# Patient Record
Sex: FEMALE | Race: WHITE | NOT HISPANIC OR LATINO | Employment: FULL TIME | URBAN - METROPOLITAN AREA
[De-identification: names, ages, dates, MRNs, and addresses within clinical notes are randomized per-mention and may not be internally consistent; named-entity substitution may affect disease eponyms.]

---

## 2017-01-06 ENCOUNTER — TELEPHONE (OUTPATIENT)
Dept: RADIOLOGY | Facility: MEDICAL CENTER | Age: 37
End: 2017-01-06

## 2017-01-09 ENCOUNTER — TELEPHONE (OUTPATIENT)
Dept: RADIOLOGY | Facility: MEDICAL CENTER | Age: 37
End: 2017-01-09

## 2017-01-10 ENCOUNTER — TELEPHONE (OUTPATIENT)
Dept: RADIOLOGY | Facility: MEDICAL CENTER | Age: 37
End: 2017-01-10

## 2017-01-12 ENCOUNTER — OFFICE VISIT (OUTPATIENT)
Dept: NEUROLOGY | Facility: MEDICAL CENTER | Age: 37
End: 2017-01-12
Payer: COMMERCIAL

## 2017-01-12 VITALS
SYSTOLIC BLOOD PRESSURE: 116 MMHG | BODY MASS INDEX: 22.69 KG/M2 | HEART RATE: 81 BPM | HEIGHT: 66 IN | DIASTOLIC BLOOD PRESSURE: 74 MMHG | RESPIRATION RATE: 16 BRPM | TEMPERATURE: 98.4 F | WEIGHT: 141.2 LBS | OXYGEN SATURATION: 98 %

## 2017-01-12 DIAGNOSIS — G43.009 MIGRAINE WITHOUT AURA AND WITHOUT STATUS MIGRAINOSUS, NOT INTRACTABLE: Primary | ICD-10-CM

## 2017-01-12 PROCEDURE — 99214 OFFICE O/P EST MOD 30 MIN: CPT | Performed by: PSYCHIATRY & NEUROLOGY

## 2017-01-12 RX ORDER — TOPIRAMATE 50 MG/1
150 CAPSULE, EXTENDED RELEASE ORAL 2 TIMES DAILY
Qty: 180 EACH | Refills: 1 | Status: SHIPPED | OUTPATIENT
Start: 2017-01-12 | End: 2017-01-12 | Stop reason: SDUPTHER

## 2017-01-12 RX ORDER — TOPIRAMATE 50 MG/1
150 CAPSULE, EXTENDED RELEASE ORAL 2 TIMES DAILY
Qty: 180 EACH | Refills: 1
Start: 2017-01-12 | End: 2017-02-15 | Stop reason: SDUPTHER

## 2017-01-12 ASSESSMENT — ENCOUNTER SYMPTOMS: HEADACHES: 1

## 2017-01-12 NOTE — PROGRESS NOTES
"Subjective:      Dany Lambert is a 36 y.o. female who presents for follow-up with a history of intractable migraine without aura.    HPI    Last seen 6 weeks ago, she received her last series of Botox injections, received good benefit for several weeks but then the headaches spontaneously increased. She denied any real triggers at the time, but since the headaches have increased, everything else has worsened including sleep hygiene, elevated stress, weather changes over the winter months, etc. She had not undergone medication changes prior to the headaches increasing. She now has a headache once or twice every week. Maxalt-MLT does provide benefit consistently, the problem is she has to take it several days every week. Between the headaches she can be headache free.    When last seen, we had increased Topamax by 33%, now 100 mg, twice a day. She cut Keppra dosing by 50% to 250 mg twice a day. She has had no event recurrences suggestive of near syncope/seizure. She has now taken to using gabapentin 300 mg on a near daily basis to help with headaches, at times taken with the Maxalt. She started doing this as the headaches recurred. We reviewed her history at length, she does remember having tried verapamil as well as Elavil and Inderal, all ineffective.    Medical, surgical and family histories are reviewed in the electronic health record, there are no new drug allergies. She is on Topamax 100 mg, twice a day, Maxalt-MLT 10 mg when necessary, gabapentin 300 mg when necessary, Keppra 250 mg, twice a day, Inderal 10 mg when necessary for anxiety, Prilosec, Effexor  mg daily, birth control, the rest as per the electronic health record.    Review of Systems   Constitutional: Positive for malaise/fatigue.   Neurological: Positive for headaches.   All other systems reviewed and are negative.       Objective:     /74 mmHg  Pulse 81  Temp(Src) 36.9 °C (98.4 °F)  Resp 16  Ht 1.676 m (5' 5.98\")  Wt " 64.048 kg (141 lb 3.2 oz)  BMI 22.80 kg/m2  SpO2 98%     Physical Exam    She appears in no acute distress. Vital signs are stable. There is no malar rash. Her neck is supple. Cardiac evaluation reveals a regular rhythm. Performed in quick and cursory fashion, with observation, mental status, cranial nerve, motor, coordination, and sensory evaluations are grossly intact.     Assessment/Plan:     1. Migraine without aura and without status migrainosus, not intractable  The headaches seem to have taken on a life of their own, even a higher dose of Topamax did not seem to provide much benefit. Getting off Keppra has not helped. Gabapentin may not be helping since she is not using it appropriately, but it also may be perpetuating the problem. She is using Maxalt appropriately, she was reassured she is not yet approaching a threshold for rebound headache.    Most of the evaluation today was spent discussing treatment options for her. She was told that we have no published longitudinal data in patients receiving Botox as to whether or not initial responders continued to respond, what is the percentage of patients with failure over time, predictors of failure, etc. In the interim, there are short-term abortive regimens including steroids and Migranal nasal spray, we talked about these, but she is opting out. Going up further on Topamax I think is unlikely to provide benefit, though the drug did provide notable benefit when she first got on it; she may simply be developing tachyphylaxis. In any case, she wants to avoid adding something else, we will increase Topamax to 150 mg, twice a day. She was told that if there is no benefit seen with this higher dose, further increases will be futile, we will simply reduce back to 100 mg twice a day and add something else. To be sure the drug is still helping, she was also told she can stop the drug entirely and see what happens, since she can always reinitiate without loss of  efficacy. Keppra will be discontinued entirely, as will gabapentin. She was reassured that she will begin to notice benefit within a matter of weeks of this change in her regimen. She will also follow-up for her next series of Botox injections 6 weeks from now, we will see if we can push it up by one week or so, if her insurance allows. She will continue the Maxalt rescue.    - Topiramate ER 50 MG Capsule ER 24 Hour Sprinkle; Take 150 mg by mouth 2 Times a Day.  Dispense: 180 Each; Refill: 1    Time: Evaluation of 25 minutes for exam, review, discussion, and education  Discussion: As mentioned in assessment, over 80% of the time spent face-to-face counseling and coordinating care surrounding all of the above.

## 2017-01-12 NOTE — MR AVS SNAPSHOT
"Dany Lambert   2017 10:20 AM   Office Visit   MRN: [id number]    Department:  [facility]   Dept Phone:  [phone]    Description:  Female : 1980   Provider:  Juan Manuel Griffin M.D.           Reason for Visit     Follow-Up Chronic migraine      Allergies as of 2017     Allergen Noted Reactions    Benadryl Allergy 2016   Anxiety    Demerol 2013   Hives    Medrol [Methylprednisolone] 04/10/2015   Itching    Previfem [Norgestimate-Ethinyl Estradiol] 2015       Nausea/vomiting    Reglan [Metoclopramide] 2016   Anxiety      You were diagnosed with     Migraine without aura and without status migrainosus, not intractable   [335003]  -  Primary       Vital Signs     Blood Pressure Pulse Temperature Respirations Height Weight    116/74 mmHg 81 36.9 °C (98.4 °F) 16 1.676 m (5' 5.98\") 64.048 kg (141 lb 3.2 oz)    Body Mass Index Oxygen Saturation Smoking Status             22.80 kg/m2 98% Former Smoker         Basic Information     Date Of Birth Sex Race Ethnicity Preferred Language    1980 Female White Non- English      Your appointments     2017 10:40 AM   MA DX30 with [facility] MG 1   [facility] ([address])    [address] [place] [address] [address]  [facility] [id number] [phone]            Feb 15, 2017  4:40 PM   BOTOX with Juan Manuel Griffin M.D.   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   10/6/2016 - Present      Health Maintenance        Date Due Completion Dates    IMM DTaP/Tdap/Td Vaccine (1 - Tdap) 1997 " ---    PAP SMEAR 9/1/2019 9/1/2016, 9/16/2013 (Done)    Override on 9/16/2013: Done            Current Immunizations     Hepatitis B Vaccine Recombivax (Adol/Adult) 5/4/2016, 4/20/2012    Influenza TIV (IM) 12/11/2014, 11/5/2013    Influenza Vaccine Quad Inj (Pf) 11/17/2016 11:45 AM, 12/11/2014    Influenza Vaccine Quad Inj (Preserved) 11/23/2015  3:10 PM    Tuberculin Skin Test 5/9/2016  7:40 AM, 5/2/2016  8:42 AM, 11/15/2013  2:44 PM, 11/5/2013  6:55 PM      Below and/or attached are the medications your provider expects you to take. Review all of your home medications and newly ordered medications with your provider and/or pharmacist. Follow medication instructions as directed by your provider and/or pharmacist. Please keep your medication list with you and share with your provider. Update the information when medications are discontinued, doses are changed, or new medications (including over-the-counter products) are added; and carry medication information at all times in the event of emergency situations     Allergies:  BENADRYL ALLERGY - Anxiety     DEMEROL - Hives     MEDROL - Itching     PREVIFEM - (reactions not documented)     REGLAN - Anxiety               Medications  Valid as of: January 12, 2017 - 11:42 AM    Generic Name Brand Name Tablet Size Instructions for use    Amoxicillin (Tab) AMOXIL 875 MG Take 1 Tab by mouth 2 times a day.        Cetirizine HCl (Cap) Cetirizine HCl 10 MG Take  by mouth.        Coenzyme Q10   Take 1 Cap by mouth every day.        Levonorgest-Eth Estrad 91-Day (Tab) Levonorgest-Eth Estrad 91-Day 0.15-0.03 &0.01 MG Take  by mouth.        Levonorgest-Eth Estrad 91-Day (Tab) Levonorgest-Eth Estrad 91-Day 0.15-0.03 &0.01 MG Take 1 Tab by mouth every day.        Magnesium   Take 1 Tab by mouth every day.        Omeprazole (CAPSULE DELAYED RELEASE) PRILOSEC 20 MG Take 20 mg by mouth every day.        Propranolol HCl (Tab) INDERAL 10 MG One tab daily as needed for performance anxiety         Rizatriptan Benzoate (TABLET DISPERSIBLE) MAXALT-MLT 10 MG 1 tab at headache onset; repeat every hour as needed up to #4 tab/24 hours        Topiramate (Capsule ER 24 Hour Sprinkle) Topiramate ER 50 MG Take 150 mg by mouth 2 Times a Day.        Venlafaxine HCl (CAPSULE SR 24 HR) EFFEXOR- MG Take 1 Cap by mouth every day.        .                 Medicines prescribed today were sent to:     [facility]ARMACY #[id number] - [place], [place] - [address]    [address] [address]    Phone: [phone] Fax: [phone]    Open 24 Hours?: No    [facility] - [place], [place] - [address]    [address] [facility] [address]    Phone: [phone] Fax: [phone]    Open 24 Hours?: No      Medication refill instructions:       If your prescription bottle indicates you have medication refills left, it is not necessary to call your provider’s office. Please contact your pharmacy and they will refill your medication.    If your prescription bottle indicates you do not have any refills left, you may request refills at any time through one of the following ways: The online [employer] system (except Urgent Care), by calling your provider’s office, or by asking your pharmacy to contact your provider’s office with a refill request. Medication refills are processed only during regular business hours and may not be available until the next business day. Your provider may request additional information or to have a follow-up visit with you prior to refilling your medication.   *Please Note: Medication refills are assigned a new Rx number when refilled electronically. Your pharmacy may indicate that no refills were authorized even though a new prescription for the same medication is available at the pharmacy. Please request the medicine by name with the pharmacy before contacting your provider for a refill.           [employer] Access Code: Activation code not generated  Current [employer] Status: Active

## 2017-01-18 ENCOUNTER — TELEPHONE (OUTPATIENT)
Dept: NEUROLOGY | Facility: MEDICAL CENTER | Age: 37
End: 2017-01-18

## 2017-01-18 NOTE — TELEPHONE ENCOUNTER
Pt is calling and stating that her insurance is stating that she needs to pay 400 dollars for the Topamax  mg BID. She is asking for a Tier reduction to be placed with her insurance. Tier reduction submitted into her insurance. Called and LM for pt that it has been submitted and I will inform her when it has been approved or denied. HERBERT

## 2017-01-18 NOTE — TELEPHONE ENCOUNTER
Insurance company called back and her co-pay for the Topamax  mg. Called and LM for pt stating that she should have a 15 dollar co-pay at the pharmacy for the Topamax ER. KA

## 2017-01-26 ENCOUNTER — HOSPITAL ENCOUNTER (OUTPATIENT)
Dept: RADIOLOGY | Facility: MEDICAL CENTER | Age: 37
End: 2017-01-26
Attending: FAMILY MEDICINE
Payer: COMMERCIAL

## 2017-01-26 DIAGNOSIS — N64.52 NIPPLE DISCHARGE: ICD-10-CM

## 2017-01-26 PROCEDURE — 76642 ULTRASOUND BREAST LIMITED: CPT | Mod: LT

## 2017-01-26 PROCEDURE — G0204 DX MAMMO INCL CAD BI: HCPCS

## 2017-02-15 ENCOUNTER — OFFICE VISIT (OUTPATIENT)
Dept: NEUROLOGY | Facility: MEDICAL CENTER | Age: 37
End: 2017-02-15
Payer: COMMERCIAL

## 2017-02-15 VITALS
DIASTOLIC BLOOD PRESSURE: 82 MMHG | SYSTOLIC BLOOD PRESSURE: 104 MMHG | BODY MASS INDEX: 23.46 KG/M2 | HEART RATE: 96 BPM | RESPIRATION RATE: 16 BRPM | TEMPERATURE: 97.1 F | WEIGHT: 140.8 LBS | HEIGHT: 65 IN | OXYGEN SATURATION: 97 %

## 2017-02-15 DIAGNOSIS — G43.009 MIGRAINE WITHOUT AURA AND WITHOUT STATUS MIGRAINOSUS, NOT INTRACTABLE: Primary | ICD-10-CM

## 2017-02-15 PROCEDURE — 64615 CHEMODENERV MUSC MIGRAINE: CPT | Performed by: PSYCHIATRY & NEUROLOGY

## 2017-02-15 PROCEDURE — 99213 OFFICE O/P EST LOW 20 MIN: CPT | Mod: 25 | Performed by: PSYCHIATRY & NEUROLOGY

## 2017-02-15 RX ORDER — TOPIRAMATE 50 MG/1
150 CAPSULE, EXTENDED RELEASE ORAL 2 TIMES DAILY
Qty: 180 EACH | Refills: 11 | Status: SHIPPED | OUTPATIENT
Start: 2017-02-15 | End: 2017-03-28

## 2017-02-15 ASSESSMENT — PATIENT HEALTH QUESTIONNAIRE - PHQ9: CLINICAL INTERPRETATION OF PHQ2 SCORE: 0

## 2017-02-15 NOTE — MR AVS SNAPSHOT
"        Dany Lambert   2/15/2017 4:40 PM   Office Visit   MRN: [id number]    Department:  [facility]   Dept Phone:  [phone]    Description:  Female : 1980   Provider:  Juan Manuel Griffin M.D.           Reason for Visit     Injections Botox      Allergies as of 2/15/2017     Allergen Noted Reactions    Benadryl Allergy 2016   Anxiety    Demerol 2013   Hives    Medrol [Methylprednisolone] 04/10/2015   Itching    Previfem [Norgestimate-Ethinyl Estradiol] 2015       Nausea/vomiting    Reglan [Metoclopramide] 2016   Anxiety      You were diagnosed with     Migraine without aura and without status migrainosus, not intractable   [[id number]]  -  Primary       Vital Signs     Blood Pressure Pulse Temperature Respirations Height Weight    104/82 mmHg 96 36.2 °C (97.1 °F) 16 1.651 m (5' 5\") 63.866 kg (140 lb 12.8 oz)    Body Mass Index Oxygen Saturation Smoking Status             23.43 kg/m2 97% Former Smoker         Basic Information     Date Of Birth Sex Race Ethnicity Preferred Language    1980 Female White Non- English      Your appointments     May 17, 2017  4:40 PM   BOTOX with Juan Manuel Griffin M.D.   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   10/6/2016 - Present      Health Maintenance        Date Due Completion Dates    IMM DTaP/Tdap/Td Vaccine (1 - Tdap) 1997 ---    PAP SMEAR 2019, 2013 (Done)    Override on 2013: Done            Current Immunizations     Hepatitis B Vaccine Recombivax (Adol/Adult) 2016, 2012   " Influenza TIV (IM) 12/11/2014, 11/5/2013    Influenza Vaccine Quad Inj (Pf) 11/17/2016 11:45 AM, 12/11/2014    Influenza Vaccine Quad Inj (Preserved) 11/23/2015  3:10 PM    Tuberculin Skin Test 5/9/2016  7:40 AM, 5/2/2016  8:42 AM, 11/15/2013  2:44 PM, 11/5/2013  6:55 PM      Below and/or attached are the medications your provider expects you to take. Review all of your home medications and newly ordered medications with your provider and/or pharmacist. Follow medication instructions as directed by your provider and/or pharmacist. Please keep your medication list with you and share with your provider. Update the information when medications are discontinued, doses are changed, or new medications (including over-the-counter products) are added; and carry medication information at all times in the event of emergency situations     Allergies:  BENADRYL ALLERGY - Anxiety     DEMEROL - Hives     MEDROL - Itching     PREVIFEM - (reactions not documented)     REGLAN - Anxiety               Medications  Valid as of: February 15, 2017 -  5:10 PM    Generic Name Brand Name Tablet Size Instructions for use    Amoxicillin (Tab) AMOXIL 875 MG Take 1 Tab by mouth 2 times a day.        Cetirizine HCl (Cap) Cetirizine HCl 10 MG Take  by mouth.        Coenzyme Q10   Take 1 Cap by mouth every day.        Levonorgest-Eth Estrad 91-Day (Tab) Levonorgest-Eth Estrad 91-Day 0.15-0.03 &0.01 MG Take  by mouth.        Levonorgest-Eth Estrad 91-Day (Tab) Levonorgest-Eth Estrad 91-Day 0.15-0.03 &0.01 MG Take 1 Tab by mouth every day.        Magnesium   Take 1 Tab by mouth every day.        Omeprazole (CAPSULE DELAYED RELEASE) PRILOSEC 20 MG Take 20 mg by mouth every day.        Propranolol HCl (Tab) INDERAL 10 MG One tab daily as needed for performance anxiety        Rizatriptan Benzoate (TABLET DISPERSIBLE) MAXALT-MLT 10 MG 1 tab at headache onset; repeat every hour as needed up to #4 tab/24 hours        Topiramate (Capsule ER 24 Hour  Sprinkle) Topiramate ER 50 MG Take 150 mg by mouth 2 Times a Day.        Venlafaxine HCl (CAPSULE SR 24 HR) EFFEXOR- MG Take 1 Cap by mouth every day.        .                 Medicines prescribed today were sent to:     [facility]/PHARMACY #[id number] - [place], [place] - [address]    [address] [place] [address]    Phone: [phone] Fax: [phone]    Open 24 Hours?: No    [facility] - [place], [place] - [address]    [address] [place] [address]    Phone: [phone] Fax: [phone]    Open 24 Hours?: No      Medication refill instructions:       If your prescription bottle indicates you have medication refills left, it is not necessary to call your provider’s office. Please contact your pharmacy and they will refill your medication.    If your prescription bottle indicates you do not have any refills left, you may request refills at any time through one of the following ways: The online [employer] system (except Urgent Care), by calling your provider’s office, or by asking your pharmacy to contact your provider’s office with a refill request. Medication refills are processed only during regular business hours and may not be available until the next business day. Your provider may request additional information or to have a follow-up visit with you prior to refilling your medication.   *Please Note: Medication refills are assigned a new Rx number when refilled electronically. Your pharmacy may indicate that no refills were authorized even though a new prescription for the same medication is available at the pharmacy. Please request the medicine by name with the pharmacy before contacting your provider for a refill.           [employer] Access Code: Activation code not generated  Current [employer] Status: Active

## 2017-02-16 NOTE — PROGRESS NOTES
"Subjective:      Dany Lambert is a 36 y.o. female who presents for follow-up with a history of intractable migraine without aura, for her next series of Botox injections.     HPI    Last seen 6 weeks ago, we increased Topamax from 100 mg twice a day to 150 mg twice a day. The headaches have improved, they have not recurred leading into this next series of Botox injections, as has been in the habit. She continues to show a greater than 7 days/month of headache reduction, but the other day she still is incapacitated for more than 4 hours/day when the headache occurs. She has some unusual reactions with an increase in the Topamax, describing almost a \"night terror\" like effect when she goes to sleep and then awakens. She is clearly awake and alert. These have since attenuated. She denies any other sustained side effects of the higher dose of the Topamax.    Medical, surgical and family histories are reviewed, there are no new drug allergies. She is on Topamax 150 mg, twice a day, Effexor  mg daily, Maxalt-MLT 10 mg when necessary, Prilosec, Inderal as needed, the rest as per the electronic health record.    Review of Systems   All other systems reviewed and are negative.       Objective:     /82 mmHg  Pulse 96  Temp(Src) 36.2 °C (97.1 °F)  Resp 16  Ht 1.651 m (5' 5\")  Wt 63.866 kg (140 lb 12.8 oz)  BMI 23.43 kg/m2  SpO2 97%     Physical Exam    She appears in no acute distress. She is still quite anxious, as always in anticipation of the injections themselves. She is quite cooperative. Vital signs are stable. There is no malar rash or jaw claudication. The neck is supple, range of motion is full, there is no tenderness across the occipital ridge. In quick and cursory fashion, mental status, cranial nerve, motor, coordination, and sensory assessments remain intact.    As per FDA protocol for chronic migraine injection, botulinum toxin injection was used at #31 sites, 5 units/site for a " total of 155 units, over frontalis, , procerus, temporalis, occipitalis, cervical paraspinal and trapezius muscle bodies bilaterally. No uncomfortable, she still tolerates the procedure well. She left the office in good shape. As per FDA protocol, 45 units was discarded.     Assessment/Plan:     1. Migraine without aura and without status migrainosus, not intractable  She will continue Topamax 150 mrem, twice a day, I will follow-up in 3 months for her next series of injections. She's been doing quite well in regards to headache control area    - onabotulinum toxin type A SOLR 200 Units; 200 Units by Injection route Once.    Time: Evaluation of 20 minutes for exam, review, discussion, and education  Discussion: As mentioned in the assessment, over 80% of the time spent face-to-face counseling and coordinating care

## 2017-02-28 ENCOUNTER — TELEPHONE (OUTPATIENT)
Dept: NEUROLOGY | Facility: MEDICAL CENTER | Age: 37
End: 2017-02-28

## 2017-02-28 ENCOUNTER — HOSPITAL ENCOUNTER (EMERGENCY)
Facility: MEDICAL CENTER | Age: 37
End: 2017-02-28
Attending: EMERGENCY MEDICINE
Payer: COMMERCIAL

## 2017-02-28 VITALS
TEMPERATURE: 97.2 F | WEIGHT: 135 LBS | BODY MASS INDEX: 21.69 KG/M2 | HEIGHT: 66 IN | RESPIRATION RATE: 20 BRPM | SYSTOLIC BLOOD PRESSURE: 103 MMHG | OXYGEN SATURATION: 98 % | HEART RATE: 89 BPM | DIASTOLIC BLOOD PRESSURE: 66 MMHG

## 2017-02-28 DIAGNOSIS — G43.911 INTRACTABLE MIGRAINE WITH STATUS MIGRAINOSUS, UNSPECIFIED MIGRAINE TYPE: ICD-10-CM

## 2017-02-28 PROCEDURE — 99284 EMERGENCY DEPT VISIT MOD MDM: CPT

## 2017-02-28 PROCEDURE — 304562 HCHG STAT O2 MASK/CANNULA

## 2017-02-28 PROCEDURE — 96375 TX/PRO/DX INJ NEW DRUG ADDON: CPT

## 2017-02-28 PROCEDURE — A9270 NON-COVERED ITEM OR SERVICE: HCPCS | Performed by: EMERGENCY MEDICINE

## 2017-02-28 PROCEDURE — 96365 THER/PROPH/DIAG IV INF INIT: CPT

## 2017-02-28 PROCEDURE — 700111 HCHG RX REV CODE 636 W/ 250 OVERRIDE (IP): Performed by: EMERGENCY MEDICINE

## 2017-02-28 PROCEDURE — 700105 HCHG RX REV CODE 258: Performed by: EMERGENCY MEDICINE

## 2017-02-28 PROCEDURE — 96376 TX/PRO/DX INJ SAME DRUG ADON: CPT

## 2017-02-28 PROCEDURE — 96361 HYDRATE IV INFUSION ADD-ON: CPT

## 2017-02-28 PROCEDURE — 304561 HCHG STAT O2

## 2017-02-28 PROCEDURE — 700102 HCHG RX REV CODE 250 W/ 637 OVERRIDE(OP): Performed by: EMERGENCY MEDICINE

## 2017-02-28 PROCEDURE — 96366 THER/PROPH/DIAG IV INF ADDON: CPT

## 2017-02-28 PROCEDURE — 96367 TX/PROPH/DG ADDL SEQ IV INF: CPT

## 2017-02-28 RX ORDER — ONDANSETRON 2 MG/ML
4 INJECTION INTRAMUSCULAR; INTRAVENOUS ONCE
Status: COMPLETED | OUTPATIENT
Start: 2017-02-28 | End: 2017-02-28

## 2017-02-28 RX ORDER — BUTALBITAL, ACETAMINOPHEN AND CAFFEINE 50; 325; 40 MG/1; MG/1; MG/1
2 TABLET ORAL ONCE
Status: COMPLETED | OUTPATIENT
Start: 2017-02-28 | End: 2017-02-28

## 2017-02-28 RX ORDER — MAGNESIUM SULFATE HEPTAHYDRATE 40 MG/ML
2 INJECTION, SOLUTION INTRAVENOUS ONCE
Status: COMPLETED | OUTPATIENT
Start: 2017-02-28 | End: 2017-02-28

## 2017-02-28 RX ORDER — DEXAMETHASONE SODIUM PHOSPHATE 4 MG/ML
10 INJECTION, SOLUTION INTRA-ARTICULAR; INTRALESIONAL; INTRAMUSCULAR; INTRAVENOUS; SOFT TISSUE ONCE
Status: COMPLETED | OUTPATIENT
Start: 2017-02-28 | End: 2017-02-28

## 2017-02-28 RX ORDER — SODIUM CHLORIDE 9 MG/ML
1000 INJECTION, SOLUTION INTRAVENOUS ONCE
Status: COMPLETED | OUTPATIENT
Start: 2017-02-28 | End: 2017-02-28

## 2017-02-28 RX ORDER — KETOROLAC TROMETHAMINE 30 MG/ML
30 INJECTION, SOLUTION INTRAMUSCULAR; INTRAVENOUS ONCE
Status: COMPLETED | OUTPATIENT
Start: 2017-02-28 | End: 2017-02-28

## 2017-02-28 RX ORDER — PROMETHAZINE HYDROCHLORIDE 25 MG/1
25 TABLET ORAL ONCE
Status: COMPLETED | OUTPATIENT
Start: 2017-02-28 | End: 2017-02-28

## 2017-02-28 RX ADMIN — SODIUM CHLORIDE 1000 ML: 9 INJECTION, SOLUTION INTRAVENOUS at 04:39

## 2017-02-28 RX ADMIN — HYDROMORPHONE HYDROCHLORIDE 1 MG: 1 INJECTION, SOLUTION INTRAMUSCULAR; INTRAVENOUS; SUBCUTANEOUS at 04:34

## 2017-02-28 RX ADMIN — ONDANSETRON 4 MG: 2 INJECTION, SOLUTION INTRAMUSCULAR; INTRAVENOUS at 04:34

## 2017-02-28 RX ADMIN — DEXAMETHASONE SODIUM PHOSPHATE 10 MG: 4 INJECTION, SOLUTION INTRAMUSCULAR; INTRAVENOUS at 05:01

## 2017-02-28 RX ADMIN — KETOROLAC TROMETHAMINE 30 MG: 30 INJECTION, SOLUTION INTRAMUSCULAR; INTRAVENOUS at 04:35

## 2017-02-28 RX ADMIN — PROMETHAZINE HYDROCHLORIDE 25 MG: 25 TABLET ORAL at 09:27

## 2017-02-28 RX ADMIN — MAGNESIUM SULFATE IN WATER 2 G: 40 INJECTION, SOLUTION INTRAVENOUS at 09:27

## 2017-02-28 RX ADMIN — BUTALBITAL, ACETAMINOPHEN, AND CAFFEINE 2 TABLET: 50; 325; 40 TABLET ORAL at 09:27

## 2017-02-28 RX ADMIN — HYDROMORPHONE HYDROCHLORIDE 1 MG: 1 INJECTION, SOLUTION INTRAMUSCULAR; INTRAVENOUS; SUBCUTANEOUS at 06:03

## 2017-02-28 RX ADMIN — VALPROATE SODIUM 1000 MG: 100 INJECTION, SOLUTION INTRAVENOUS at 08:09

## 2017-02-28 ASSESSMENT — ENCOUNTER SYMPTOMS
DIZZINESS: 1
HEADACHES: 1
BLURRED VISION: 1
ABDOMINAL PAIN: 0
PHOTOPHOBIA: 1
NAUSEA: 1
FEVER: 0

## 2017-02-28 ASSESSMENT — PAIN SCALES - GENERAL
PAINLEVEL_OUTOF10: 8
PAINLEVEL_OUTOF10: 8
PAINLEVEL_OUTOF10: 9

## 2017-02-28 NOTE — ED AVS SNAPSHOT
2/28/2017          Dany Lambert  [address] [place] [address]    Dear Dany:    [facility] wants to ensure your discharge home is safe and you or your loved ones have had all your questions answered regarding your care after you leave the hospital.    You may receive a telephone call within two days of your discharge.  This call is to make certain you understand your discharge instructions as well as ensure we provided you with the best care possible during your stay with us.     The call will only last approximately 3-5 minutes and will be done by a nurse.    Once again, we want to ensure your discharge home is safe and that you have a clear understanding of any next steps in your care.  If you have any questions or concerns, please do not hesitate to contact us, we are here for you.  Thank you for choosing [facility] for your healthcare needs.    Sincerely,    Jose Manuel Castillo    [facility]

## 2017-02-28 NOTE — ED AVS SNAPSHOT
[employer] Access Code: Activation code not generated  Current [employer] Status: Active    [employer]  A secure, online tool to manage your health information     [employer]’s [employer]com® is a secure, online tool that connects you to your personalized health information from the privacy of your home -- day or night - making it very easy for you to manage your healthcare. Once the activation process is completed, you can even access your medical information using the M[employer]ife[employer]com tristin, which is available for free in the Apple Tristin store or Google Play store.     M[employer]com provides the following levels of access (as shown below):   My Chart Features   [facility] Primary Care Doctor [facility]  Specialists [facility]  Urgent  Care Non-[facility]  Primary Care  Doctor   Email your healthcare team securely and privately 24/7 X X X X   Manage appointments: schedule your next appointment; view details of past/upcoming appointments X      Request prescription refills. X      View recent personal medical records, including lab and immunizations X X X X   View health record, including health history, allergies, medications X X X X   Read reports about your outpatient visits, procedures, consult and ER notes X X X X   See your discharge summary, which is a recap of your hospital and/or ER visit that includes your diagnosis, lab results, and care plan. X X       How to register for [employer]com:  1. Go to  [email or web address].  2. Click on the Sign Up Now box, which takes you to the New Member Sign Up page. You will need to provide the following information:  a. Enter your [employer] Access Code exactly as it appears at the top of this page. (You will not need to use this code after you’ve completed the sign-up process. If you do not sign up before the expiration date, you must request a new code.)   b. Enter your date of birth.   c. Enter your home email address.   d. Click Submit, and follow the next screen’s instructions.  3. Create a Mplife.com ID. This will  be your [employer] login ID and cannot be changed, so think of one that is secure and easy to remember.  4. Create a [employer] password. You can change your password at any time.  5. Enter your Password Reset Question and Answer. This can be used at a later time if you forget your password.   6. Enter your e-mail address. This allows you to receive e-mail notifications when new information is available in [employer].  7. Click Sign Up. You can now view your health information.    For assistance activating your [employer] account, call [phone]

## 2017-02-28 NOTE — ED PROVIDER NOTES
"ED Provider Note    Scribed for Dr. Rimma Rodrigues D.O. by Sal Ron. 2/28/2017, 4:02 AM.    Primary care provider: Pcp Pt States None  Means of arrival: Walk-in  History obtained from: Patient  History limited by: None     CHIEF COMPLAINT  Chief Complaint   Patient presents with   • Migraine       HPI  Dany Lambert is a 36 y.o. female who presents to the Emergency Department due to an intermittent severe headache onset 8 days ago and worsened at approximately 8 PM last night. Per patient, she came into work last night and her headache was \"tolerable\" but then gradually became severe. She states that her headache has been right-sided and intermittent since this morning and she has had associated symptoms of dizziness, nausea, and photophobia. When her pain is a 10/10 in severity, her headache is diffuse. Characteristic of her headache today is in keeping with her normal chronic intermittent headaches that she has had 5. Per patient, she started to feel dizzy when her headache worsened and it felt as if \"the floor was dropping out\". She also states that she was \"unable to see straight\". Per patient, she has taken 9 tablets of maxalt, with no relief. The patient states that she is aware of her triggers for migraines but is unable to alleviate her symptoms at home.She states that she has had migraines since she was 3 years old and she sees Dr. Griffin (Neurology). Per nurse's notes, she has had botox injections and takes topiramate at home. Denies fever or abdominal pain.  She is allergic to medrol.     REVIEW OF SYSTEMS  Review of Systems   Constitutional: Negative for fever.   Eyes: Positive for blurred vision (resolved) and photophobia.   Gastrointestinal: Positive for nausea. Negative for abdominal pain.   Neurological: Positive for dizziness and headaches (Right-sided).   All other systems reviewed and are negative.    PAST MEDICAL HISTORY   has a past medical history of Migraine (8/26/2013); " "Anxiety (8/26/2013); and Allergy, unspecified not elsewhere classified.    SURGICAL HISTORY   has past surgical history that includes tonsillectomy.    SOCIAL HISTORY  Social History   Substance Use Topics   • Smoking status: Former Smoker -- 15 years     Types: Cigarettes   • Smokeless tobacco: Never Used   • Alcohol Use: No      Comment: denies      History   Drug Use No       FAMILY HISTORY  Family History   Problem Relation Age of Onset   • Hypertension Father    • Diabetes Maternal Grandfather    • Cancer Paternal Grandfather    • Alzheimer's Disease Paternal Grandfather    • Cancer Paternal Grandmother 30     breast   • Suicide Attempts Maternal Uncle      Killed himself by GSW       CURRENT MEDICATIONS  Home Medications     Reviewed by Wilma Mccracken R.N. (Registered Nurse) on 02/28/17 at 0246  Med List Status: Complete    Medication Last Dose Status    Cetirizine HCl (ZYRTEC ALLERGY) 10 MG Cap  Active    Coenzyme Q10 (CO Q 10 PO)  Active    Levonorgest-Eth Estrad 91-Day (DAYSEE) 0.15-0.03 &0.01 MG Tab  Active    Levonorgest-Eth Estrad 91-Day (SEASONIQUE) 0.15-0.03 &0.01 MG Tab  Active    MAGNESIUM PO  Active    omeprazole (PRILOSEC) 20 MG delayed-release capsule  Active    rizatriptan (MAXALT-MLT) 10 MG disintegrating tablet  Active    Topiramate ER 50 MG Capsule ER 24 Hour Sprinkle  Active    venlafaxine (EFFEXOR XR) 150 MG extended-release capsule  Active                ALLERGIES  Allergies   Allergen Reactions   • Benadryl Allergy Anxiety   • Demerol Hives   • Medrol [Methylprednisolone] Itching   • Previfem [Norgestimate-Ethinyl Estradiol]      Nausea/vomiting   • Reglan [Metoclopramide] Anxiety       PHYSICAL EXAM  VITAL SIGNS: BP 97/65 mmHg  Pulse 89  Temp(Src) 36.2 °C (97.2 °F)  Resp 20  Ht 1.676 m (5' 5.98\")  Wt 61.236 kg (135 lb)  BMI 21.80 kg/m2  Vitals reviewed.  Constitutional: Patient is oriented to person, place, and time. Appears well-developed and well-nourished. Mild distress.  "   Head: Normocephalic and atraumatic.   Ears: Normal external ears bilaterally.   Mouth/Throat: Oropharynx is clear and moist.   Eyes: Conjunctivae are normal. Pupils are equal, round, and reactive to light.   Cardiovascular: Normal rate, regular rhythm and normal heart sounds.   Pulmonary/Chest: Effort normal and breath sounds normal. No respiratory distress, no wheezes, rhonchi, or rales.   Abdominal: Soft. Bowel sounds are normal. There is no tenderness, rebound or guarding, or peritoneal signs.  Musculoskeletal: No edema and no tenderness.   Neurological: No focal deficits.   Skin: Skin is warm and dry. No erythema. No pallor.   Psychiatric: Patient has a normal mood and affect.     COURSE & MEDICAL DECISION MAKING  Pertinent Labs & Imaging studies reviewed. (See chart for details)    Obtained and reviewed past medical records, which showed that the patient was seen here 3 times in 2016 for migraines. She was last seen in July.     4:02 AM - Patient seen and examined at bedside. Patient will be treated with dilaudid 1 mg IV, zofran 4 mg IV, toradol 30 mg IV, and NS infusion 1,000 mL IV. The differential diagnoses include but are not limited to: Exacerbation of chronic migraines and dehydration.    4:51 AM- Patient will be treated with decadron 10 mg IV for her symptoms.     5:55 AM- Patient will be treated with dilaudid 1 mg IV for her symptoms.     6:21 AM Recheck: Patient re-evaluated at beside. Patient reports that she was starting to feel improved but her headache has returned. She is concerned about going home. I explained that I will consult with her neurologist. Patient understands and agrees.     6:23 AM- Paged Dr. Griffin (Neurology).     7:10 AM I discussed the patient's case and the above findings with Dr. Montenegro (Neurology) who recommended ordering magnesium sulfate IVPB premix, a tapering dose of medrol, and depacon, 20 mg/kg IV infusion.    7:17 AM- Patient will be treated with depacon 1,000 mg in  NS 50 mL IV and magnesium sulfate IVPB premix. She has a allergy to oral prednisolone.    8:31 AM- Per nurse, the patient is requesting pain medication.     9:18 AM- Patient will be treated with phenergan 25 mg PO and fioricet -45 mg PO.     11:22 AM patient is reevaluated. She is resting comfortably. She awakens as I enter the room. She takes admitted to think about her symptoms but then reports that they are markedly better. At this time, I feel she is safe for discharge to home. She is advised to follow-up with her neurologist in the next 1-2 days. She is given strict return precautions. Again we talked about the danger in taking this many tablets of Maxalt which she is aware of and will not repeat.    Patient has had high blood pressure while in the emergency department, felt likely secondary to medical condition. Counseled patient to monitor blood pressure at home and follow up with primary care physician.      DISPOSITION:  Patient will be discharged home in stable condition.    FOLLOW UP:  Juan Manuel Griffin M.D.  [address] [place] [id number] [phone]    In 1 day  [facility]    [facility], Emergency Dept  [address] [facility] [address] [phone]    If symptoms worsen      OUTPATIENT MEDICATIONS:  New Prescriptions    No medications on file         FINAL IMPRESSION  1. Intractable migraine with status migrainosus, unspecified migraine type         This dictation has been created using voice recognition software and/or scribes. The accuracy of the dictation is limited by the abilities of the software and the expertise of the scribes. I expect there may be some errors of grammar and possibly content. I made every attempt to manually correct the errors within my dictation. However, errors related to voice recognition software and/or scribes may still exist and should be interpreted within the appropriate context.     Sal SILVEIRA  Asaf (Scribe), am scribing for, and in the presence of, Rimma Rodrigues D.O..    Electronically signed by: Sal Ron (Sabino), 2/28/2017    I, Rimma Rodrigues D.O. personally performed the services described in this documentation, as scribed by Sal Ron in my presence, and it is both accurate and complete.      The note accurately reflects work and decisions made by me.  Rimma Rodrigues  2/28/2017  11:40 AM

## 2017-02-28 NOTE — ED NOTES
Report received from BEAU Choe. Pt is resting, new orders have been place and poc has been discussed.

## 2017-02-28 NOTE — ED AVS SNAPSHOT
Home Care Instructions                                                                                                                Dany Lambert   MRN: [id number]    Department:  [facility], Emergency Dept   Date of Visit:  2/28/2017            [facility], Emergency Dept    [address] [facility] [address]    Phone:  [phone]      You were seen by     Rimma Rodrigues D.O.      Your Diagnosis Was     Intractable migraine with status migrainosus, unspecified migraine type     G43.911       These are the medications you received during your hospitalization from 02/28/2017 0225 to 02/28/2017 1109     Date/Time Order Dose Route Action    02/28/2017 0439 NS infusion 1,000 mL 1,000 mL Intravenous New Bag    02/28/2017 0435 ketorolac (TORADOL) injection 30 mg 30 mg Intravenous Given    02/28/2017 0434 ondansetron (ZOFRAN) syringe/vial injection 4 mg 4 mg Intravenous Given    02/28/2017 0434 HYDROmorphone (DILAUDID) injection 1 mg 1 mg Intravenous Given    02/28/2017 0501 dexamethasone (DECADRON) injection 10 mg 10 mg Intravenous Given    02/28/2017 0603 HYDROmorphone (DILAUDID) injection 1 mg 1 mg Intravenous Given    02/28/2017 0927 magnesium sulfate IVPB premix 2 g 2 g Intravenous New Bag    02/28/2017 0809 valproate (DEPACON) 1,000 mg in NS 50 mL IVPB 1,000 mg Intravenous New Bag    02/28/2017 0927 acetaminophen/caffeine/butalbital 325-40-50 mg (FIORICET) -40 MG per tablet 2 Tab 2 Tab Oral Given    02/28/2017 0927 promethazine (PHENERGAN) tablet 25 mg 25 mg Oral Given      Follow-up Information     1. Follow up with Juan Manuel Griffin M.D. In 1 day.    Specialty:  Neurology    Why:  call tomorrow to leyla follow-up    Contact information    [address]  [facility] [address] [phone]          2. Follow up with [facility], Emergency Dept.    Specialty:  Emergency Medicine    Why:  If symptoms worsen    Contact information     [address] [facility] [address]  [phone]      Medication Information     Review all of your home medications and newly ordered medications with your primary doctor and/or pharmacist as soon as possible. Follow medication instructions as directed by your doctor and/or pharmacist.     Please keep your complete medication list with you and share with your physician. Update the information when medications are discontinued, doses are changed, or new medications (including over-the-counter products) are added; and carry medication information at all times in the event of emergency situations.               Medication List      ASK your doctor about these medications        Instructions    CO Q 10 PO    Take 1 Cap by mouth every day.   Dose:  1 Cap       * DAYSEE 0.15-0.03 &0.01 MG Tabs   Generic drug:  Levonorgest-Eth Estrad 91-Day    Take  by mouth.       * Levonorgest-Eth Estrad 91-Day 0.15-0.03 &0.01 MG Tabs   Commonly known as:  SEASONIQUE    Take 1 Tab by mouth every day.   Dose:  1 Tab       MAGNESIUM PO    Take 1 Tab by mouth every day.   Dose:  1 Tab       omeprazole 20 MG delayed-release capsule   Commonly known as:  PRILOSEC    Take 20 mg by mouth every day.   Dose:  20 mg       rizatriptan 10 MG disintegrating tablet   Commonly known as:  MAXALT-MLT    1 tab at headache onset; repeat every hour as needed up to #4 tab/24 hours       Topiramate ER 50 MG Cs24    Take 150 mg by mouth 2 Times a Day.   Dose:  150 mg       venlafaxine 150 MG extended-release capsule   Commonly known as:  EFFEXOR XR    Take 1 Cap by mouth every day.   Dose:  150 mg       ZYRTEC ALLERGY 10 MG Caps   Generic drug:  Cetirizine HCl    Take  by mouth.       * Notice:  This list has 2 medication(s) that are the same as other medications prescribed for you. Read the directions carefully, and ask your doctor or other care provider to review them with you.            Procedures and tests performed during your visit     IV  Saline Lock        Discharge Instructions       Migraine Headache  A migraine headache is very bad, throbbing pain on one or both sides of your head. Talk to your doctor about what things may bring on (trigger) your migraine headaches.  HOME CARE  · Only take medicines as told by your doctor.  · Lie down in a dark, quiet room when you have a migraine.  · Keep a journal to find out if certain things bring on migraine headaches. For example, write down:  ¨ What you eat and drink.  ¨ How much sleep you get.  ¨ Any change to your diet or medicines.  · Lessen how much alcohol you drink.  · Quit smoking if you smoke.  · Get enough sleep.  · Lessen any stress in your life.  · Keep lights dim if bright lights bother you or make your migraines worse.  GET HELP RIGHT AWAY IF:   · Your migraine becomes really bad.  · You have a fever.  · You have a stiff neck.  · You have trouble seeing.  · Your muscles are weak, or you lose muscle control.  · You lose your balance or have trouble walking.  · You feel like you will pass out (faint), or you pass out.  · You have really bad symptoms that are different than your first symptoms.  MAKE SURE YOU:   · Understand these instructions.  · Will watch your condition.  · Will get help right away if you are not doing well or get worse.     This information is not intended to replace advice given to you by your health care provider. Make sure you discuss any questions you have with your health care provider.     Document Released: 09/26/2009 Document Revised: 03/11/2013 Document Reviewed: 08/25/2014  Elsevier Interactive Patient Education ©2016 Elsevier Inc[employer]            Patient Information     Patient Information    Following emergency treatment: all patient requiring follow-up care must return either to a private physician or a clinic if your condition worsens before you are able to obtain further medical attention, please return to the emergency room.     Billing Information    At [facility], we work to make the billing process streamlined for our patients.  Our Representatives are here to answer any questions you may have regarding your hospital bill.  If you have insurance coverage and have supplied your insurance information to us, we will submit a claim to your insurer on your behalf.  Should you have any questions regarding your bill, we can be reached online or by phone as follows:  Online: You are able pay your bills online or live chat with our representatives about any billing questions you may have. We are here to help Monday - Friday from 8:00am to 7:30pm and 9:00am - 12:00pm on Saturdays.  Please visit [email or web address]  for more information.   Phone:  [phone] or [phone]    Please note that your emergency physician, surgeon, pathologist, radiologist, anesthesiologist, and other specialists are not employed by [facility] and will therefore bill separately for their services.  Please contact them directly for any questions concerning their bills at the numbers below:     Emergency Physician Services:  [phone]  [facility]:  [phone]  [facility]:  [phone]  [facility]:  [phone]    1. Your final bill may vary from the amount quoted upon discharge if all procedures are not complete at that time, or if your doctor has additional procedures of which we are not aware. You will receive an additional bill if you return to the Emergency Department at [facility] for suture removal regardless of the facility of which the sutures were placed.     2. Please arrange for settlement of this account at the emergency registration.    3. All self-pay accounts are due in full at the time of treatment.  If you are unable to meet this obligation then payment is expected within 4-5 days.     4. If you have had radiology studies (CT, X-ray, Ultrasound, MRI), you have received a preliminary result  during your emergency department visit. Please contact the radiology department [phone] to receive a copy of your final result. Please discuss the Final result with your primary physician or with the follow up physician provided.     Crisis Hotline:  [place] Crisis Hotline:  3-740-IFIICLS or 1-338.818.5119  [place] Crisis Hotline:    1-485.817.8597 or 684-385-4247         ED Discharge Follow Up Questions    1. In order to provide you with very good care, we would like to follow up with a phone call in the next few days.  May we have your permission to contact you?     YES /  NO    2. What is the best phone number to call you? (       )_____-__________    3. What is the best time to call you?      Morning  /  Afternoon  /  Evening                   Patient Signature:  ____________________________________________________________    Date:  ____________________________________________________________      Your appointments     May 17, 2017  4:40 PM   BOTOX with Juan Manuel Griffin M.D.   [facility] (--)    [address], [address]  [facility] [id number] [phone]

## 2017-02-28 NOTE — ED NOTES
This patient present with migraine HA. Report present for 8 days, but much worse this evening at approx 2000. At this time dizziness, nausea, photosensitivity. Hx of migraine. Has had botox injections. Taking toprimate at home.

## 2017-02-28 NOTE — ED NOTES
Pt discharged to home. Pt was given follow up instructions. Pt verbalized understanding of all instructions for discharge and is ambulatory out of ED with steady gait father driving home.

## 2017-02-28 NOTE — TELEPHONE ENCOUNTER
Is calling and stating that she was just released from the ER due to a really bad migraine. Per the ER notes pt need to follow up with Dr. Griffin in 1 to 3 days. Called and LM for pt that I will contact her back about an appointment as soon as there is an opening. Pt is to return my call. HERBERT

## 2017-02-28 NOTE — ED NOTES
Report and care of pt received. Pt resting on gurney, lights in room dimmed, new medication orders received.

## 2017-03-04 ENCOUNTER — HOSPITAL ENCOUNTER (OUTPATIENT)
Facility: MEDICAL CENTER | Age: 37
End: 2017-03-06
Attending: EMERGENCY MEDICINE | Admitting: INTERNAL MEDICINE
Payer: COMMERCIAL

## 2017-03-04 ENCOUNTER — RESOLUTE PROFESSIONAL BILLING HOSPITAL PROF FEE (OUTPATIENT)
Dept: HOSPITALIST | Facility: MEDICAL CENTER | Age: 37
End: 2017-03-04
Payer: COMMERCIAL

## 2017-03-04 DIAGNOSIS — G43.811 OTHER MIGRAINE WITH STATUS MIGRAINOSUS, INTRACTABLE: ICD-10-CM

## 2017-03-04 PROBLEM — K58.9 IRRITABLE BOWEL DISEASE: Status: ACTIVE | Noted: 2017-03-04

## 2017-03-04 PROBLEM — G43.909 MIGRAINE HEADACHE: Status: ACTIVE | Noted: 2017-03-04

## 2017-03-04 LAB
ALBUMIN SERPL BCP-MCNC: 3.9 G/DL (ref 3.2–4.9)
ALBUMIN/GLOB SERPL: 1.3 G/DL
ALP SERPL-CCNC: 57 U/L (ref 30–99)
ALT SERPL-CCNC: 12 U/L (ref 2–50)
ANION GAP SERPL CALC-SCNC: 8 MMOL/L (ref 0–11.9)
AST SERPL-CCNC: 16 U/L (ref 12–45)
BASOPHILS # BLD AUTO: 0.3 % (ref 0–1.8)
BASOPHILS # BLD: 0.03 K/UL (ref 0–0.12)
BILIRUB SERPL-MCNC: 0.3 MG/DL (ref 0.1–1.5)
BUN SERPL-MCNC: 17 MG/DL (ref 8–22)
CALCIUM SERPL-MCNC: 9.1 MG/DL (ref 8.5–10.5)
CHLORIDE SERPL-SCNC: 110 MMOL/L (ref 96–112)
CO2 SERPL-SCNC: 18 MMOL/L (ref 20–33)
CREAT SERPL-MCNC: 0.86 MG/DL (ref 0.5–1.4)
EOSINOPHIL # BLD AUTO: 0.1 K/UL (ref 0–0.51)
EOSINOPHIL NFR BLD: 1.2 % (ref 0–6.9)
ERYTHROCYTE [DISTWIDTH] IN BLOOD BY AUTOMATED COUNT: 46.4 FL (ref 35.9–50)
GFR SERPL CREATININE-BSD FRML MDRD: >60 ML/MIN/1.73 M 2
GLOBULIN SER CALC-MCNC: 3 G/DL (ref 1.9–3.5)
GLUCOSE SERPL-MCNC: 89 MG/DL (ref 65–99)
HCG SERPL QL: NEGATIVE
HCT VFR BLD AUTO: 38.8 % (ref 37–47)
HGB BLD-MCNC: 12.7 G/DL (ref 12–16)
IMM GRANULOCYTES # BLD AUTO: 0.02 K/UL (ref 0–0.11)
IMM GRANULOCYTES NFR BLD AUTO: 0.2 % (ref 0–0.9)
LYMPHOCYTES # BLD AUTO: 2.7 K/UL (ref 1–4.8)
LYMPHOCYTES NFR BLD: 31.2 % (ref 22–41)
MCH RBC QN AUTO: 30.6 PG (ref 27–33)
MCHC RBC AUTO-ENTMCNC: 32.7 G/DL (ref 33.6–35)
MCV RBC AUTO: 93.5 FL (ref 81.4–97.8)
MONOCYTES # BLD AUTO: 0.54 K/UL (ref 0–0.85)
MONOCYTES NFR BLD AUTO: 6.2 % (ref 0–13.4)
NEUTROPHILS # BLD AUTO: 5.27 K/UL (ref 2–7.15)
NEUTROPHILS NFR BLD: 60.9 % (ref 44–72)
NRBC # BLD AUTO: 0 K/UL
NRBC BLD AUTO-RTO: 0 /100 WBC
PLATELET # BLD AUTO: 278 K/UL (ref 164–446)
PMV BLD AUTO: 10.9 FL (ref 9–12.9)
POTASSIUM SERPL-SCNC: 3.6 MMOL/L (ref 3.6–5.5)
PROT SERPL-MCNC: 6.9 G/DL (ref 6–8.2)
RBC # BLD AUTO: 4.15 M/UL (ref 4.2–5.4)
SODIUM SERPL-SCNC: 136 MMOL/L (ref 135–145)
TSH SERPL DL<=0.005 MIU/L-ACNC: 1.45 UIU/ML (ref 0.3–3.7)
WBC # BLD AUTO: 8.7 K/UL (ref 4.8–10.8)

## 2017-03-04 PROCEDURE — 84703 CHORIONIC GONADOTROPIN ASSAY: CPT

## 2017-03-04 PROCEDURE — 99285 EMERGENCY DEPT VISIT HI MDM: CPT

## 2017-03-04 PROCEDURE — 96361 HYDRATE IV INFUSION ADD-ON: CPT

## 2017-03-04 PROCEDURE — 83516 IMMUNOASSAY NONANTIBODY: CPT

## 2017-03-04 PROCEDURE — 96376 TX/PRO/DX INJ SAME DRUG ADON: CPT

## 2017-03-04 PROCEDURE — G0378 HOSPITAL OBSERVATION PER HR: HCPCS

## 2017-03-04 PROCEDURE — 93005 ELECTROCARDIOGRAM TRACING: CPT

## 2017-03-04 PROCEDURE — 700111 HCHG RX REV CODE 636 W/ 250 OVERRIDE (IP): Performed by: HOSPITALIST

## 2017-03-04 PROCEDURE — 700105 HCHG RX REV CODE 258: Performed by: HOSPITALIST

## 2017-03-04 PROCEDURE — 700101 HCHG RX REV CODE 250: Performed by: HOSPITALIST

## 2017-03-04 PROCEDURE — A9270 NON-COVERED ITEM OR SERVICE: HCPCS | Performed by: HOSPITALIST

## 2017-03-04 PROCEDURE — 700105 HCHG RX REV CODE 258: Performed by: EMERGENCY MEDICINE

## 2017-03-04 PROCEDURE — 96365 THER/PROPH/DIAG IV INF INIT: CPT

## 2017-03-04 PROCEDURE — 99220 PR INITIAL OBSERVATION CARE,LEVL III: CPT | Mod: GC | Performed by: INTERNAL MEDICINE

## 2017-03-04 PROCEDURE — 93010 ELECTROCARDIOGRAM REPORT: CPT | Performed by: INTERNAL MEDICINE

## 2017-03-04 PROCEDURE — 80053 COMPREHEN METABOLIC PANEL: CPT

## 2017-03-04 PROCEDURE — 700102 HCHG RX REV CODE 250 W/ 637 OVERRIDE(OP): Performed by: HOSPITALIST

## 2017-03-04 PROCEDURE — 96372 THER/PROPH/DIAG INJ SC/IM: CPT

## 2017-03-04 PROCEDURE — A9270 NON-COVERED ITEM OR SERVICE: HCPCS | Performed by: PSYCHIATRY & NEUROLOGY

## 2017-03-04 PROCEDURE — 700111 HCHG RX REV CODE 636 W/ 250 OVERRIDE (IP): Performed by: EMERGENCY MEDICINE

## 2017-03-04 PROCEDURE — 96367 TX/PROPH/DG ADDL SEQ IV INF: CPT

## 2017-03-04 PROCEDURE — 84443 ASSAY THYROID STIM HORMONE: CPT

## 2017-03-04 PROCEDURE — 700102 HCHG RX REV CODE 250 W/ 637 OVERRIDE(OP): Performed by: PSYCHIATRY & NEUROLOGY

## 2017-03-04 PROCEDURE — 96368 THER/DIAG CONCURRENT INF: CPT

## 2017-03-04 PROCEDURE — 93005 ELECTROCARDIOGRAM TRACING: CPT | Performed by: HOSPITALIST

## 2017-03-04 PROCEDURE — 85025 COMPLETE CBC W/AUTO DIFF WBC: CPT

## 2017-03-04 PROCEDURE — 96375 TX/PRO/DX INJ NEW DRUG ADDON: CPT

## 2017-03-04 PROCEDURE — 700111 HCHG RX REV CODE 636 W/ 250 OVERRIDE (IP): Performed by: PSYCHIATRY & NEUROLOGY

## 2017-03-04 RX ORDER — DIHYDROERGOTAMINE MESYLATE 1 MG/ML
1 INJECTION, SOLUTION INTRAMUSCULAR; INTRAVENOUS; SUBCUTANEOUS EVERY 8 HOURS
Status: DISCONTINUED | OUTPATIENT
Start: 2017-03-04 | End: 2017-03-04

## 2017-03-04 RX ORDER — SODIUM CHLORIDE 9 MG/ML
1000 INJECTION, SOLUTION INTRAVENOUS ONCE
Status: COMPLETED | OUTPATIENT
Start: 2017-03-04 | End: 2017-03-04

## 2017-03-04 RX ORDER — ONDANSETRON 2 MG/ML
4 INJECTION INTRAMUSCULAR; INTRAVENOUS ONCE
Status: COMPLETED | OUTPATIENT
Start: 2017-03-04 | End: 2017-03-04

## 2017-03-04 RX ORDER — DEXAMETHASONE SODIUM PHOSPHATE 4 MG/ML
10 INJECTION, SOLUTION INTRA-ARTICULAR; INTRALESIONAL; INTRAMUSCULAR; INTRAVENOUS; SOFT TISSUE EVERY 8 HOURS
Status: DISCONTINUED | OUTPATIENT
Start: 2017-03-04 | End: 2017-03-06 | Stop reason: HOSPADM

## 2017-03-04 RX ORDER — DEXAMETHASONE SODIUM PHOSPHATE 4 MG/ML
10 INJECTION, SOLUTION INTRA-ARTICULAR; INTRALESIONAL; INTRAMUSCULAR; INTRAVENOUS; SOFT TISSUE ONCE
Status: COMPLETED | OUTPATIENT
Start: 2017-03-04 | End: 2017-03-04

## 2017-03-04 RX ORDER — BISACODYL 10 MG
10 SUPPOSITORY, RECTAL RECTAL
Status: DISCONTINUED | OUTPATIENT
Start: 2017-03-04 | End: 2017-03-06 | Stop reason: HOSPADM

## 2017-03-04 RX ORDER — POLYETHYLENE GLYCOL 3350 17 G/17G
1 POWDER, FOR SOLUTION ORAL
Status: DISCONTINUED | OUTPATIENT
Start: 2017-03-04 | End: 2017-03-06 | Stop reason: HOSPADM

## 2017-03-04 RX ORDER — AMOXICILLIN 250 MG
2 CAPSULE ORAL 2 TIMES DAILY
Status: DISCONTINUED | OUTPATIENT
Start: 2017-03-04 | End: 2017-03-06 | Stop reason: HOSPADM

## 2017-03-04 RX ORDER — OMEPRAZOLE 20 MG/1
20 CAPSULE, DELAYED RELEASE ORAL DAILY
Status: DISCONTINUED | OUTPATIENT
Start: 2017-03-04 | End: 2017-03-06 | Stop reason: HOSPADM

## 2017-03-04 RX ORDER — DIHYDROERGOTAMINE MESYLATE 1 MG/ML
1 INJECTION, SOLUTION INTRAMUSCULAR; INTRAVENOUS; SUBCUTANEOUS EVERY 8 HOURS
Status: DISCONTINUED | OUTPATIENT
Start: 2017-03-05 | End: 2017-03-06 | Stop reason: HOSPADM

## 2017-03-04 RX ORDER — CHLORPROMAZINE HYDROCHLORIDE 25 MG/1
25 TABLET, FILM COATED ORAL EVERY 8 HOURS
Status: DISCONTINUED | OUTPATIENT
Start: 2017-03-04 | End: 2017-03-04

## 2017-03-04 RX ORDER — SODIUM CHLORIDE 9 MG/ML
1000 INJECTION, SOLUTION INTRAVENOUS ONCE
Status: COMPLETED | OUTPATIENT
Start: 2017-03-04 | End: 2017-03-05

## 2017-03-04 RX ORDER — CHLORPROMAZINE HYDROCHLORIDE 25 MG/1
25 TABLET, FILM COATED ORAL ONCE
Status: COMPLETED | OUTPATIENT
Start: 2017-03-04 | End: 2017-03-04

## 2017-03-04 RX ORDER — VENLAFAXINE HYDROCHLORIDE 75 MG/1
150 CAPSULE, EXTENDED RELEASE ORAL DAILY
Status: DISCONTINUED | OUTPATIENT
Start: 2017-03-04 | End: 2017-03-06 | Stop reason: HOSPADM

## 2017-03-04 RX ORDER — TOPIRAMATE 100 MG/1
150 TABLET, FILM COATED ORAL 2 TIMES DAILY
Status: DISCONTINUED | OUTPATIENT
Start: 2017-03-04 | End: 2017-03-06 | Stop reason: HOSPADM

## 2017-03-04 RX ORDER — TIZANIDINE 4 MG/1
4 TABLET ORAL EVERY EVENING
Status: DISCONTINUED | OUTPATIENT
Start: 2017-03-04 | End: 2017-03-06 | Stop reason: HOSPADM

## 2017-03-04 RX ADMIN — ENOXAPARIN SODIUM 40 MG: 100 INJECTION SUBCUTANEOUS at 20:49

## 2017-03-04 RX ADMIN — ONDANSETRON 4 MG: 2 INJECTION, SOLUTION INTRAMUSCULAR; INTRAVENOUS at 09:20

## 2017-03-04 RX ADMIN — DEXAMETHASONE SODIUM PHOSPHATE 10 MG: 4 INJECTION, SOLUTION INTRAMUSCULAR; INTRAVENOUS at 16:21

## 2017-03-04 RX ADMIN — MAGNESIUM SULFATE HEPTAHYDRATE 2 G: 500 INJECTION, SOLUTION INTRAMUSCULAR; INTRAVENOUS at 10:15

## 2017-03-04 RX ADMIN — VALPROATE SODIUM 1000 MG: 100 INJECTION, SOLUTION INTRAVENOUS at 09:56

## 2017-03-04 RX ADMIN — CHLORPROMAZINE HYDROCHLORIDE 25 MG: 25 INJECTION INTRAMUSCULAR at 15:04

## 2017-03-04 RX ADMIN — OMEPRAZOLE 20 MG: 20 CAPSULE, DELAYED RELEASE ORAL at 16:21

## 2017-03-04 RX ADMIN — CHLORPROMAZINE HYDROCHLORIDE 25 MG: 25 TABLET, SUGAR COATED ORAL at 23:45

## 2017-03-04 RX ADMIN — STANDARDIZED SENNA CONCENTRATE AND DOCUSATE SODIUM 2 TABLET: 8.6; 5 TABLET, FILM COATED ORAL at 20:48

## 2017-03-04 RX ADMIN — SODIUM CHLORIDE 1000 ML: 9 INJECTION, SOLUTION INTRAVENOUS at 15:49

## 2017-03-04 RX ADMIN — TOPIRAMATE 150 MG: 100 TABLET ORAL at 20:48

## 2017-03-04 RX ADMIN — DEXAMETHASONE SODIUM PHOSPHATE 10 MG: 4 INJECTION, SOLUTION INTRAMUSCULAR; INTRAVENOUS at 22:55

## 2017-03-04 RX ADMIN — SODIUM CHLORIDE 1000 ML: 9 INJECTION, SOLUTION INTRAVENOUS at 09:18

## 2017-03-04 RX ADMIN — HYDROMORPHONE HYDROCHLORIDE 1 MG: 1 INJECTION, SOLUTION INTRAMUSCULAR; INTRAVENOUS; SUBCUTANEOUS at 09:20

## 2017-03-04 RX ADMIN — HYDROMORPHONE HYDROCHLORIDE 1 MG: 1 INJECTION, SOLUTION INTRAMUSCULAR; INTRAVENOUS; SUBCUTANEOUS at 10:36

## 2017-03-04 RX ADMIN — DEXAMETHASONE SODIUM PHOSPHATE 10 MG: 4 INJECTION, SOLUTION INTRAMUSCULAR; INTRAVENOUS at 09:18

## 2017-03-04 RX ADMIN — TIZANIDINE 4 MG: 4 TABLET ORAL at 20:48

## 2017-03-04 RX ADMIN — VENLAFAXINE HYDROCHLORIDE 150 MG: 75 CAPSULE, EXTENDED RELEASE ORAL at 17:00

## 2017-03-04 ASSESSMENT — LIFESTYLE VARIABLES
EVER_SMOKED: NEVER
ALCOHOL_USE: NO
DO YOU DRINK ALCOHOL: NO
SUBSTANCE_ABUSE: 0

## 2017-03-04 ASSESSMENT — PAIN SCALES - GENERAL
PAINLEVEL_OUTOF10: 7
PAINLEVEL_OUTOF10: 8
PAINLEVEL_OUTOF10: 7
PAINLEVEL_OUTOF10: 6
PAINLEVEL_OUTOF10: 7
PAINLEVEL_OUTOF10: 7
PAINLEVEL_OUTOF10: 4

## 2017-03-04 ASSESSMENT — ENCOUNTER SYMPTOMS
HEADACHES: 1
ABDOMINAL PAIN: 0
HEARTBURN: 0
INSOMNIA: 0
SORE THROAT: 0
WEIGHT LOSS: 0
DIAPHORESIS: 0
MYALGIAS: 0
BLOOD IN STOOL: 0
SHORTNESS OF BREATH: 0
EYE REDNESS: 0
DOUBLE VISION: 0
HEMOPTYSIS: 0
CHILLS: 0
DIZZINESS: 0
BLURRED VISION: 0
LOSS OF CONSCIOUSNESS: 0
SPUTUM PRODUCTION: 0
DIARRHEA: 0
WEAKNESS: 0
TREMORS: 0
COUGH: 0
SPEECH CHANGE: 0
CLAUDICATION: 0
NECK PAIN: 0
NAUSEA: 1
ORTHOPNEA: 0
PHOTOPHOBIA: 1
EYE DISCHARGE: 0
VOMITING: 0
FEVER: 0
FOCAL WEAKNESS: 0
SEIZURES: 0
CONSTIPATION: 1
NERVOUS/ANXIOUS: 0
FALLS: 0
TINGLING: 0
BACK PAIN: 0
PALPITATIONS: 1

## 2017-03-04 NOTE — SENIOR ADMIT NOTE
HPI:  Ms Lambert is a 36 yr female, with pmhx significant for migraine headaches, and anxiety presents to ER with symptoms of intractable migraine. Patient states that she has been suffering from migraine headaches all her life. Used to have 15 episodes per month, but since she has been on botox for the last one year the number of episodes have come down. However, botox seems not to be working after first 6-7 sessions. She had her last botox 2 weeks ago and since the following day she developed headache, 10/10, continuous, sharp, behind her eyes and back of the head with associated nausea. She states that the nature of the headache has not changed and the same as prior episodes. She takes topomax and imitrex. She doesn't used any other medications as they do not work for her. She also reports palpitations, off and on. She denies any other symptoms at this time.    Physical exam:  Gen: She is crawled up in bed, due to pain.  Head: AT/NC  CVS: RRR, No M/G/R  RS: CTA b/l  Abd: BS +, soft, NT, ND  Neuro: No focal deficits    Assessment and plan:    1. Status Migrainosus  - Has been given Mg, dilaudid and ivf in ER w/ minimal help. Will admit patient to neuro floor w/ frequent neuro checks. Will try thorazine 25mg iv, dexamethasone iv 10mg and dihydroergotamine 1mg iv. Patient states that NSAID's do not help her. Will repeat the above medications if headaches cannot be aborted. Will get Per ERP, neurology has been consulted. Will hold of on imaging for now since the nature of headache has not changed and has no deficits on exam.    2. Anxiety. Continue home medication.    Refer to intern's H&P for complete details.

## 2017-03-04 NOTE — ED NOTES
PT ambulated to yellow 65 c/o migraine x 13 days.  Per pt she took a maxalt at 0600 without relief  Chief Complaint   Patient presents with   • Migraine     Blood pressure 111/67, pulse 81, temperature 35.8 °C (96.4 °F), resp. rate 16, weight 61.236 kg (135 lb).

## 2017-03-04 NOTE — IP AVS SNAPSHOT
Home Care Instructions                                                                                                                  Name:Dany Lambert  Medical Record Number:[id number]  CSN: [id number]    YOB: 1980   Age: 36 y.o.  Sex: female  HT:  WT: 61.6 kg (135 lb 12.9 oz)          Admit Date: 3/4/2017     Discharge Date:   Today's Date: 3/6/2017  Attending Doctor:  SUNDAR Laguerre*                  Allergies:  Benadryl allergy; Demerol; Medrol; Previfem; and Reglan            Discharge Instructions       Discharge Instructions    Discharged to home by car with relative. Discharged via wheelchair, hospital escort: Yes.  Special equipment needed: Not Applicable    Be sure to schedule a follow-up appointment with your primary care doctor or any specialists as instructed.     Discharge Plan:   Diet Plan: Discussed  Activity Level: Discussed  Confirmed Follow up Appointment: Patient to Call and Schedule Appointment  Confirmed Symptoms Management: Discussed  Medication Reconciliation Updated: Yes  Influenza Vaccine Indication: Not indicated: Previously immunized this influenza season and > 8 years of age    I understand that a diet low in cholesterol, fat, and sodium is recommended for good health. Unless I have been given specific instructions below for another diet, I accept this instruction as my diet prescription.   Other diet: Heart Healthy     Special Instructions: None    · Is patient discharged on Warfarin / Coumadin?   No     · Is patient Post Blood Transfusion?  No    Depression / Suicide Risk    As you are discharged from this [facility] facility, it is important to learn how to keep safe from harming yourself.    Recognize the warning signs:  · Abrupt changes in personality, positive or negative- including increase in energy   · Giving away possessions  · Change in eating patterns- significant weight changes-  positive or negative  · Change in sleeping patterns- unable to  sleep or sleeping all the time   · Unwillingness or inability to communicate  · Depression  · Unusual sadness, discouragement and loneliness  · Talk of wanting to die  · Neglect of personal appearance   · Rebelliousness- reckless behavior  · Withdrawal from people/activities they love  · Confusion- inability to concentrate     If you or a loved one observes any of these behaviors or has concerns about self-harm, here's what you can do:  · Talk about it- your feelings and reasons for harming yourself  · Remove any means that you might use to hurt yourself (examples: pills, rope, extension cords, firearm)  · Get professional help from the community (Mental Health, Substance Abuse, psychological counseling)  · Do not be alone:Call your Safe Contact- someone whom you trust who will be there for you.  · Call your local CRISIS HOTLINE 947-6535 or 417-501-9382  · Call your local Children's Mobile Crisis Response Team [place] (389) 484-8323 or www.Cle[employer] Machine  · Call the toll free National Suicide Prevention Hotlines   · National Suicide Prevention Lifeline 047-042-JVLQ (2498)  · [facility] Hope Line Network 800-SUICIDE (295-1340)  Migraine Headache  A migraine headache is very bad, throbbing pain on one or both sides of your head. Talk to your doctor about what things may bring on (trigger) your migraine headaches.  HOME CARE  · Only take medicines as told by your doctor.  · Lie down in a dark, quiet room when you have a migraine.  · Keep a journal to find out if certain things bring on migraine headaches. For example, write down:  ¨ What you eat and drink.  ¨ How much sleep you get.  ¨ Any change to your diet or medicines.  · Lessen how much alcohol you drink.  · Quit smoking if you smoke.  · Get enough sleep.  · Lessen any stress in your life.  · Keep lights dim if bright lights bother you or make your migraines worse.  GET HELP RIGHT AWAY IF:   · Your migraine becomes really bad.  · You have a fever.  · You  have a stiff neck.  · You have trouble seeing.  · Your muscles are weak, or you lose muscle control.  · You lose your balance or have trouble walking.  · You feel like you will pass out (faint), or you pass out.  · You have really bad symptoms that are different than your first symptoms.  MAKE SURE YOU:   · Understand these instructions.  · Will watch your condition.  · Will get help right away if you are not doing well or get worse.     This information is not intended to replace advice given to you by your health care provider. Make sure you discuss any questions you have with your health care provider.     Document Released: 09/26/2009 Document Revised: 03/11/2013 Document Reviewed: 08/25/2014  [employer] Interactive Patient Education ©2016 [employer]        Your appointments     Mar 27, 2017  1:20 PM   Follow Up Visit with Juan Manuel Griffin M.D.   [facility] (--)    [address], [address] [address]   [phone]           You will be receiving a confirmation call a few days before your appointment from our automated call confirmation system.            May 17, 2017  4:40 PM   BOTOX with Juan Manuel Grfifin M.D.   [facility] (--)    [address], [address] [address]   [phone]                 Discharge Medication Instructions:    Below are the medications your physician expects you to take upon discharge:    Review all your home medications and newly ordered medications with your doctor and/or pharmacist. Follow medication instructions as directed by your doctor and/or pharmacist.    Please keep your medication list with you and share with your physician.               Medication List      CONTINUE taking these medications        Instructions    CO Q 10 PO    Take 1 Cap by mouth every day.   Dose:  1 Cap       DAYSEE 0.15-0.03 &0.01 MG Tabs   Generic drug:  Levonorgest-Eth Estrad 91-Day    Take  by mouth.       Magnesium 100 MG Tabs    Take  1 Tab by mouth 2 Times a Day.   Dose:  1 Tab       omeprazole 20 MG delayed-release capsule   Last time this was given:  20 mg on 3/6/2017  9:29 AM   Commonly known as:  PRILOSEC    Take 20 mg by mouth every day.   Dose:  20 mg       rizatriptan 10 MG disintegrating tablet   Commonly known as:  MAXALT-MLT    1 tab at headache onset; repeat every hour as needed up to #4 tab/24 hours       Topiramate ER 50 MG Cs24    Take 150 mg by mouth 2 Times a Day.   Dose:  150 mg       venlafaxine 150 MG extended-release capsule   Last time this was given:  150 mg on 3/5/2017  9:00 PM   Commonly known as:  EFFEXOR XR    Take 1 Cap by mouth every day.   Dose:  150 mg       ZYRTEC ALLERGY 10 MG Caps   Generic drug:  Cetirizine HCl    Take  by mouth.               Instructions           Diet / Nutrition:    Follow any diet instructions given to you by your doctor or the dietician, including how much salt (sodium) you are allowed each day.    If you are overweight, talk to your doctor about a weight reduction plan.    Activity:    Remain physically active following your doctor's instructions about exercise and activity.    Rest often.     Any time you become even a little tired or short of breath, SIT DOWN and rest.    Worsening Symptoms:    Report any of the following signs and symptoms to the doctor's office immediately:    *Pain of jaw, arm, or neck  *Chest pain not relieved by medication                               *Dizziness or loss of consciousness  *Difficulty breathing even when at rest   *More tired than usual                                       *Bleeding drainage or swelling of surgical site  *Swelling of feet, ankles, legs or stomach                 *Fever (>100ºF)  *Pink or blood tinged sputum  *Weight gain (3lbs/day or 5lbs /week)           *Shock from internal defibrillator (if applicable)  *Palpitations or irregular heartbeats                *Cool and/or numb extremities    Stroke Awareness    Common Risk Factors  for Stroke include:    Age  Atrial Fibrillation  Carotid Artery Stenosis  Diabetes Mellitus  Excessive alcohol consumption  High blood pressure  Overweight   Physical inactivity  Smoking    Warning signs and symptoms of a stroke include:    *Sudden numbness or weakness of the face, arm or leg (especially on one side of the body).  *Sudden confusion, trouble speaking or understanding.  *Sudden trouble seeing in one or both eyes.  *Sudden trouble walking, dizziness, loss of balance or coordination.Sudden severe headache with no known cause.    It is very important to get treatment quickly when a stroke occurs. If you experience any of the above warning signs, call 911 immediately.                   Disclaimer         Quit Smoking / Tobacco Use:    I understand the use of any tobacco products increases my chance of suffering from future heart disease or stroke and could cause other illnesses which may shorten my life. Quitting the use of tobacco products is the single most important thing I can do to improve my health. For further information on smoking / tobacco cessation call a Toll Free Quit Line at 1-288.337.9121 (*National [employer]) or 1-753.143.9102 (American Lung Association) or you can access the web based program at www.lungusa.org.    [place] Tobacco Users Help Line:  (772) 970-9777       Toll Free: 1-300.687.2699  Quit Tobacco Program [facility] (215)844-4776    Crisis Hotline:    [place] Crisis Hotline:  8-842-WXFEIBY or 1-579.550.6723    [place] Crisis Hotline:    1-866.909.5790 or 110-937-8109    Discharge Survey:   Thank you for choosing [facility]. We hope we did everything we could to make your hospital stay a pleasant one. You may be receiving a phone survey and we would appreciate your time and participation in answering the questions. Your input is very valuable to us in our efforts to improve our service to our patients and their families.        My signature on  this form indicates that:    1. I have reviewed and understand the above information.  2. My questions regarding this information have been answered to my satisfaction.  3. I have formulated a plan with my discharge nurse to obtain my prescribed medications for home.                  Disclaimer         __________________________________                     __________       ________                       Patient Signature                                                 Date                    Time

## 2017-03-04 NOTE — ED PROVIDER NOTES
ED Provider Note    Scribed for Pierre Bruner M.D. by Prosper Kuo. 3/4/2017  8:06 AM    Primary care provider: Pcp Pt States None  Means of arrival: Walk in  History obtained from: patient  History limited by: none    CHIEF COMPLAINT  Chief Complaint   Patient presents with   • Migraine       HPI  Dany Lambert is a 36 y.o. female who presents to the Emergency Department complaining of a returning worsening migraine onset 4-5 hours ago. She describes a steady constant pain behind her left eye and a periodic stabbing sharp pain on the right side which radiates to the back of her head. Patient also notes the pain alternates between the left and right side. Patient was seen in the ED 4 days ago for similar symptoms. She reports having relief from her headache for those 4 days after being treated with magnesium and steroids.She describes experiencing pain after having Botox 2 weeks ago. She reports associated nausea. She denies newfound numbness, tingling, or weakness. She treats her nausea with promethazine and was prescribed Depakote in July. Patient has an appointment with a neurologist on 03/27. She reports allergies to reglan, Benadryl, and specifically oral steroids but not IV steroids. Last year she was hospitalized for similar symptoms.      REVIEW OF SYSTEMS  Pertinent positives include headache, migraine, nausea. Pertinent negatives include no newfound numbness, tingling, or weakness.  All other systems reviewed and negative. C    PAST MEDICAL HISTORY   has a past medical history of Migraine (8/26/2013); Anxiety (8/26/2013); and Allergy, unspecified not elsewhere classified.    SURGICAL HISTORY   has past surgical history that includes tonsillectomy.    SOCIAL HISTORY  Social History   Substance Use Topics   • Smoking status: Former Smoker -- 15 years     Types: Cigarettes   • Smokeless tobacco: Never Used   • Alcohol Use: No      Comment: denies      History   Drug Use No        FAMILY HISTORY  Family History   Problem Relation Age of Onset   • Hypertension Father    • Diabetes Maternal Grandfather    • Cancer Paternal Grandfather    • Alzheimer's Disease Paternal Grandfather    • Cancer Paternal Grandmother 30     breast   • Suicide Attempts Maternal Uncle      Killed himself by GSW       CURRENT MEDICATIONS  Home Medications     Reviewed by Leida Wilde, Pharmacy Int (Pharmacy Intern) on 03/04/17 at 1154  Med List Status: Complete    Medication Last Dose Status    Cetirizine HCl (ZYRTEC ALLERGY) 10 MG Cap >2 weeks Active    Coenzyme Q10 (CO Q 10 PO) 3/3/2017 Active    Levonorgest-Eth Estrad 91-Day (DAYSEE) 0.15-0.03 &0.01 MG Tab 3/3/2017 Active    Magnesium 100 MG Tab 3/3/2017 Active    omeprazole (PRILOSEC) 20 MG delayed-release capsule 3/3/2017 Active    rizatriptan (MAXALT-MLT) 10 MG disintegrating tablet 3/4/2017 Active    Topiramate ER 50 MG Capsule ER 24 Hour Sprinkle 3/3/2017 Active    venlafaxine (EFFEXOR XR) 150 MG extended-release capsule 3/3/2017 Active                ALLERGIES  Allergies   Allergen Reactions   • Benadryl Allergy Anxiety   • Demerol Hives   • Medrol [Methylprednisolone] Itching   • Previfem [Norgestimate-Ethinyl Estradiol]      Nausea/vomiting   • Reglan [Metoclopramide] Anxiety       PHYSICAL EXAM  VITAL SIGNS: /67 mmHg  Pulse 81  Temp(Src) 35.8 °C (96.4 °F)  Resp 16  Wt 61.236 kg (135 lb)    Constitutional: Well developed, Well nourished, moderate distress, Non-toxic appearance.   HENT: Normocephalic, Atraumatic, Bilateral external ears normal, Oropharynx moist, No oral exudates.   Eyes: PERRLA, EOMI, Conjunctiva normal, No discharge.   Neck: No stridor. Tenderness to right paraspinous musculature on neck  Lymphatic: No lymphadenopathy noted.   Cardiovascular: Normal heart rate, Normal rhythm.   Thorax & Lungs: Clear to auscultation bilaterally, No respiratory distress, No wheezing, No crackles.   Abdomen: Soft, No tenderness, No masses,  No pulsatile masses.   Skin: Warm, Dry, No erythema, No rash.   Extremities:, No edema No cyanosis.   Musculoskeletal: No tenderness to palpation or major deformities noted.  Intact distal pulses  Neurological: Awake, alert. Cranial nerves 2-11 intact, muscle strength 5/5 in bilateral upper and lower extremities   Psychiatric: Affect normal, Judgment normal, Mood normal.       LABS  Labs Reviewed   CBC WITH DIFFERENTIAL - Abnormal; Notable for the following:     RBC 4.15 (*)     MCHC 32.7 (*)     All other components within normal limits   COMP METABOLIC PANEL - Abnormal; Notable for the following:     Co2 18 (*)     All other components within normal limits   HCG QUAL SERUM   ESTIMATED GFR   TSH     All labs reviewed by me.      COURSE & MEDICAL DECISION MAKING  Pertinent Labs & Imaging studies reviewed. (See chart for details)    I reviewed the patient's medical records which showed patient was seen on 2/28 for migraine and was treated with Toradol and Dilaudid. Talked with on call neurologist who recommended magnesium and steroids.    8:06 AM - Patient seen and examined at bedside. Patient will be treated with NS infusion 1000 ml, Zofran 4mg, magnesium sulfate 2g, Depacon 1000 mg, Decadron 10 mg, and Dilaudid 1 mg. Ordered HCG Qual Serum, CBC, CMP to evaluate her symptoms.     12:08 PM Discussed the patient's case and above findings with [facility] who agrees to admit the patient.        Decision Making:  Patient with intractable migraine headache, status migrainous, attempted multiple therapies here, unable to improve on the patient's headache, discussed the case with the residents for admission to hospital.    DISPOSITION:  Patient will be admitted to [facility] in guarded condition.     FINAL IMPRESSION  1. Other migraine with status migrainosus, intractable          I, Suni Page (Scribe), am scribing for, and in the presence of, Pierre Bruner M.D..    Electronically signed by:  Suni Page (Scribe), 3/4/2017    I, Pierre Bruner M.D. personally performed the services described in this documentation, as scribed by Suni Page in my presence, and it is both accurate and complete.    The note accurately reflects work and decisions made by me.  Pierre Bruner  3/4/2017  4:01 PM

## 2017-03-04 NOTE — ED NOTES
"PT reports pain in the back of the head \"is better, but I have a sharp shooting pain still behind my lt eye\"  "

## 2017-03-04 NOTE — PROGRESS NOTES
[facility] [place] [facility] [place] and Physical    Name Dany Lambert     1980   Age/Sex 36 y.o. female   MRN [id number]   Code Status FULL     After 5PM or if no immediate response to page, please call for cross-coverage  Attending/Team: Asif/Mau  Call [phone] to page   1st Call - Day Intern (R1):   Dr. Gregory 2nd Call - Day Sr. Resident (R2/R3):   Dr. Dye      Chief Complaint:  Migraine     HPI:  Ms. Lambert is a 35 y/o female with a h/o mHA (has approximately 15-15 mHA/month) who presents to the ED for these mHA up to 3 times a year and was hospitalized for same last year. She was in the ED 4 days ago with her same intractable mHA but experienced relief with magnesium and IV steroids and was d/c'd home.     Today's mHA onset 4 - 5 hours ago and is similar to her previous mHA, left sided and retro-orbital, constant and stabbing in nature. She occasionally will also have right retro-orbital pain that radiates to the back of her head. She has associated photophobia, phonophobia and nausea w/o  vomitus or diarrhea. She denies f/c, neck stiffness or prodromal illness. She denies unilateral weakness or paresthesias.     She has been treated with Botox injections and experienced relief of her mHA, however her last treatment ~ 2 weeks ago did not provide the same relief it once did. She has an upcoming appointment with neurology on 3/27/17.     Review of Systems   Constitutional: Negative for fever.   HENT: Negative for congestion and hearing loss.    Eyes: Positive for photophobia.        Positive for phonophobia   Respiratory: Negative for cough.    Cardiovascular: Positive for palpitations.   Gastrointestinal: Positive for nausea and constipation. Negative for vomiting and diarrhea.   Genitourinary: Negative for dysuria.   Musculoskeletal: Negative for back pain and neck pain.   Neurological: Positive for headaches. Negative for dizziness.   Psychiatric/Behavioral: Negative  for substance abuse. The patient is not nervous/anxious.            Past Medical History:   Past Medical History   Diagnosis Date   • Migraine 8/26/2013   • Anxiety 8/26/2013   • Allergy, unspecified not elsewhere classified        Past Surgical History:  Past Surgical History   Procedure Laterality Date   • Tonsillectomy         Current Outpatient Medications:  Home Medications     Reviewed by Leida Wilde, Pharmacy Int (Pharmacy Intern) on 03/04/17 at 1154  Med List Status: Complete    Medication Last Dose Status    Cetirizine HCl (ZYRTEC ALLERGY) 10 MG Cap >2 weeks Active    Coenzyme Q10 (CO Q 10 PO) 3/3/2017 Active    Levonorgest-Eth Estrad 91-Day (DAYSEE) 0.15-0.03 &0.01 MG Tab 3/3/2017 Active    Magnesium 100 MG Tab 3/3/2017 Active    omeprazole (PRILOSEC) 20 MG delayed-release capsule 3/3/2017 Active    rizatriptan (MAXALT-MLT) 10 MG disintegrating tablet 3/4/2017 Active    Topiramate ER 50 MG Capsule ER 24 Hour Sprinkle 3/3/2017 Active    venlafaxine (EFFEXOR XR) 150 MG extended-release capsule 3/3/2017 Active                Medication Allergy/Sensitivities:  Allergies   Allergen Reactions   • Benadryl Allergy Anxiety   • Demerol Hives   • Medrol [Methylprednisolone] Itching   • Previfem [Norgestimate-Ethinyl Estradiol]      Nausea/vomiting   • Reglan [Metoclopramide] Anxiety       Family History:  Family History   Problem Relation Age of Onset   • Hypertension Father    • Diabetes Maternal Grandfather    • Cancer Paternal Grandfather    • Alzheimer's Disease Paternal Grandfather    • Cancer Paternal Grandmother 30     breast   • Suicide Attempts Maternal Uncle      Killed himself by GSW       Social History:  Social History     Social History   • Marital Status: Single     Spouse Name: N/A   • Number of Children: N/A   • Years of Education: N/A     Occupational History   • Not on file.     Social History Main Topics   • Smoking status: Former Smoker -- 15 years     Types: Cigarettes   • Smokeless  tobacco: Never Used   • Alcohol Use: No      Comment: denies   • Drug Use: No   • Sexual Activity: Not Currently     Other Topics Concern   • Not on file     Social History Narrative     Living situation: Unknown  PCP : None      Physical Exam     Filed Vitals:    03/04/17 1200 03/04/17 1230 03/04/17 1300 03/04/17 1320   BP:       Pulse: 96 94 95 102   Temp:       Resp:       Weight:       SpO2: 99% 99% 99% 98%     Body mass index is 21.8 kg/(m^2).  /65 mmHg  Pulse 102  Temp(Src) 35.8 °C (96.4 °F)  Resp 16  Wt 61.236 kg (135 lb)  SpO2 98%  O2 therapy: Pulse Oximetry: 98 %, O2 Delivery: None (Room Air)    Physical Exam   Constitutional: She is oriented to person, place, and time. She appears distressed.   HENT:   Head: Atraumatic.   Mouth/Throat: Oropharynx is clear and moist.   Eyes: Pupils are equal, round, and reactive to light.   Neck: Normal range of motion. Neck supple.   Cardiovascular: Regular rhythm, normal heart sounds and intact distal pulses.  Exam reveals no gallop and no friction rub.    No murmur heard.  Mild tachycardia   Pulmonary/Chest: Effort normal and breath sounds normal. No respiratory distress. She has no wheezes. She has no rales.   Abdominal: Soft. There is no tenderness. There is no rebound and no guarding.   Neurological: She is alert and oriented to person, place, and time. GCS score is 15.   Skin: Skin is warm and dry.   Psychiatric: Affect normal.       Data Review       Old Records Request:   Completed  Current Records review and summary: Completed    Lab Data Review:  Recent Results (from the past 24 hour(s))   CBC WITH DIFFERENTIAL    Collection Time: 03/04/17  9:11 AM   Result Value Ref Range    WBC 8.7 4.8 - 10.8 K/uL    RBC 4.15 (L) 4.20 - 5.40 M/uL    Hemoglobin 12.7 12.0 - 16.0 g/dL    Hematocrit 38.8 37.0 - 47.0 %    MCV 93.5 81.4 - 97.8 fL    MCH 30.6 27.0 - 33.0 pg    MCHC 32.7 (L) 33.6 - 35.0 g/dL    RDW 46.4 35.9 - 50.0 fL    Platelet Count 278 164 - 446 K/uL     MPV 10.9 9.0 - 12.9 fL    Neutrophils-Polys 60.90 44.00 - 72.00 %    Lymphocytes 31.20 22.00 - 41.00 %    Monocytes 6.20 0.00 - 13.40 %    Eosinophils 1.20 0.00 - 6.90 %    Basophils 0.30 0.00 - 1.80 %    Immature Granulocytes 0.20 0.00 - 0.90 %    Nucleated RBC 0.00 /100 WBC    Neutrophils (Absolute) 5.27 2.00 - 7.15 K/uL    Lymphs (Absolute) 2.70 1.00 - 4.80 K/uL    Monos (Absolute) 0.54 0.00 - 0.85 K/uL    Eos (Absolute) 0.10 0.00 - 0.51 K/uL    Baso (Absolute) 0.03 0.00 - 0.12 K/uL    Immature Granulocytes (abs) 0.02 0.00 - 0.11 K/uL    NRBC (Absolute) 0.00 K/uL   COMP METABOLIC PANEL    Collection Time: 03/04/17  9:11 AM   Result Value Ref Range    Sodium 136 135 - 145 mmol/L    Potassium 3.6 3.6 - 5.5 mmol/L    Chloride 110 96 - 112 mmol/L    Co2 18 (L) 20 - 33 mmol/L    Anion Gap 8.0 0.0 - 11.9    Glucose 89 65 - 99 mg/dL    Bun 17 8 - 22 mg/dL    Creatinine 0.86 0.50 - 1.40 mg/dL    Calcium 9.1 8.5 - 10.5 mg/dL    AST(SGOT) 16 12 - 45 U/L    ALT(SGPT) 12 2 - 50 U/L    Alkaline Phosphatase 57 30 - 99 U/L    Total Bilirubin 0.3 0.1 - 1.5 mg/dL    Albumin 3.9 3.2 - 4.9 g/dL    Total Protein 6.9 6.0 - 8.2 g/dL    Globulin 3.0 1.9 - 3.5 g/dL    A-G Ratio 1.3 g/dL   HCG Qual Serum    Collection Time: 03/04/17  9:11 AM   Result Value Ref Range    Beta-Hcg Qualitative Serum Negative Negative   ESTIMATED GFR    Collection Time: 03/04/17  9:11 AM   Result Value Ref Range    GFR If African American >60 >60 mL/min/1.73 m 2    GFR If Non African American >60 >60 mL/min/1.73 m 2       Imaging/Procedures Review:    ndependant Imaging Review: Completed  No orders to display            EKG:   EKG Independant Review: Completed  QTc:445, HR: 98, Normal Sinus Rhythm, diffuse t wave flattening with low voltage in the inferior leads.     Records reviewed and summarized in current documentation             Assessment/Plan   1. Intractable migrane headache. Pt with long h/o same mHA for which she previously got relief with botox  injections however is not getting relief any longer. She is afebrile in the department w/o signs of meningismus making meningitis or encephalitis unlikely. In the absence of any new focal neurological deficits, and without having any change in the characterization of her current mHA, I am less inclined to think she requires cranial imaging at this time. She has already received NSIV 1 L bolus in the ED and the team will treat her mHA with DHE, decadron and chlorpromazine once on the floor. Neurology consultation with Dr. Montenegro, who agrees with medical management and further recommends scheduling medication q 8 hours. She states NSAIDs and opioid medications do not work for her migraines and she is allergic to diphenhydramine thus a trial of those medications will not be attempted.   - Decadron 10 mg q 8 hours  - Chlorpromazine 25 mg q 8 hours  - Dihydroergotamine 1 mg q 8 hours     2. Generalized anxiety disorder. No complaints at this time  - continue home medications    Anticipated Hospital stay: 1 - 2 days    Quality Measures  Medications reviewed, Labs reviewed and EKG reviewed  Moreno catheter: No Moreno      DVT Prophylaxis: Enoxaparin (Lovenox)    Ulcer prophylaxis: Yes

## 2017-03-05 PROBLEM — G43.901 MIGRAINE WITH STATUS MIGRAINOSUS: Status: ACTIVE | Noted: 2017-03-04

## 2017-03-05 LAB
ALBUMIN SERPL BCP-MCNC: 3.6 G/DL (ref 3.2–4.9)
ALBUMIN/GLOB SERPL: 1.1 G/DL
ALP SERPL-CCNC: 63 U/L (ref 30–99)
ALT SERPL-CCNC: 12 U/L (ref 2–50)
ANION GAP SERPL CALC-SCNC: 7 MMOL/L (ref 0–11.9)
AST SERPL-CCNC: 13 U/L (ref 12–45)
BASOPHILS # BLD AUTO: 0 % (ref 0–1.8)
BASOPHILS # BLD: 0 K/UL (ref 0–0.12)
BILIRUB SERPL-MCNC: 0.3 MG/DL (ref 0.1–1.5)
BUN SERPL-MCNC: 12 MG/DL (ref 8–22)
CALCIUM SERPL-MCNC: 9 MG/DL (ref 8.5–10.5)
CHLORIDE SERPL-SCNC: 112 MMOL/L (ref 96–112)
CO2 SERPL-SCNC: 16 MMOL/L (ref 20–33)
CREAT SERPL-MCNC: 0.8 MG/DL (ref 0.5–1.4)
EOSINOPHIL # BLD AUTO: 0 K/UL (ref 0–0.51)
EOSINOPHIL NFR BLD: 0 % (ref 0–6.9)
ERYTHROCYTE [DISTWIDTH] IN BLOOD BY AUTOMATED COUNT: 45.5 FL (ref 35.9–50)
GFR SERPL CREATININE-BSD FRML MDRD: >60 ML/MIN/1.73 M 2
GLOBULIN SER CALC-MCNC: 3.4 G/DL (ref 1.9–3.5)
GLUCOSE SERPL-MCNC: 113 MG/DL (ref 65–99)
HCT VFR BLD AUTO: 41.1 % (ref 37–47)
HGB BLD-MCNC: 13.5 G/DL (ref 12–16)
IMM GRANULOCYTES # BLD AUTO: 0.02 K/UL (ref 0–0.11)
IMM GRANULOCYTES NFR BLD AUTO: 0.3 % (ref 0–0.9)
LYMPHOCYTES # BLD AUTO: 0.95 K/UL (ref 1–4.8)
LYMPHOCYTES NFR BLD: 14.3 % (ref 22–41)
MCH RBC QN AUTO: 30.3 PG (ref 27–33)
MCHC RBC AUTO-ENTMCNC: 32.8 G/DL (ref 33.6–35)
MCV RBC AUTO: 92.2 FL (ref 81.4–97.8)
MONOCYTES # BLD AUTO: 0.16 K/UL (ref 0–0.85)
MONOCYTES NFR BLD AUTO: 2.4 % (ref 0–13.4)
NEUTROPHILS # BLD AUTO: 5.53 K/UL (ref 2–7.15)
NEUTROPHILS NFR BLD: 83 % (ref 44–72)
NRBC # BLD AUTO: 0 K/UL
NRBC BLD AUTO-RTO: 0 /100 WBC
PLATELET # BLD AUTO: 290 K/UL (ref 164–446)
PMV BLD AUTO: 11.1 FL (ref 9–12.9)
POTASSIUM SERPL-SCNC: 3.7 MMOL/L (ref 3.6–5.5)
PROT SERPL-MCNC: 7 G/DL (ref 6–8.2)
RBC # BLD AUTO: 4.46 M/UL (ref 4.2–5.4)
SODIUM SERPL-SCNC: 135 MMOL/L (ref 135–145)
WBC # BLD AUTO: 6.7 K/UL (ref 4.8–10.8)

## 2017-03-05 PROCEDURE — 96375 TX/PRO/DX INJ NEW DRUG ADDON: CPT

## 2017-03-05 PROCEDURE — A9270 NON-COVERED ITEM OR SERVICE: HCPCS | Performed by: INTERNAL MEDICINE

## 2017-03-05 PROCEDURE — 96376 TX/PRO/DX INJ SAME DRUG ADON: CPT

## 2017-03-05 PROCEDURE — 700102 HCHG RX REV CODE 250 W/ 637 OVERRIDE(OP): Performed by: PSYCHIATRY & NEUROLOGY

## 2017-03-05 PROCEDURE — A9270 NON-COVERED ITEM OR SERVICE: HCPCS | Performed by: HOSPITALIST

## 2017-03-05 PROCEDURE — 80053 COMPREHEN METABOLIC PANEL: CPT

## 2017-03-05 PROCEDURE — 700102 HCHG RX REV CODE 250 W/ 637 OVERRIDE(OP): Performed by: HOSPITALIST

## 2017-03-05 PROCEDURE — 36415 COLL VENOUS BLD VENIPUNCTURE: CPT

## 2017-03-05 PROCEDURE — 99225 PR SUBSEQUENT OBSERVATION CARE,LEVEL II: CPT | Mod: GC | Performed by: INTERNAL MEDICINE

## 2017-03-05 PROCEDURE — A9270 NON-COVERED ITEM OR SERVICE: HCPCS | Performed by: PSYCHIATRY & NEUROLOGY

## 2017-03-05 PROCEDURE — G0378 HOSPITAL OBSERVATION PER HR: HCPCS

## 2017-03-05 PROCEDURE — 700102 HCHG RX REV CODE 250 W/ 637 OVERRIDE(OP): Performed by: INTERNAL MEDICINE

## 2017-03-05 PROCEDURE — 700101 HCHG RX REV CODE 250: Performed by: HOSPITALIST

## 2017-03-05 PROCEDURE — 700111 HCHG RX REV CODE 636 W/ 250 OVERRIDE (IP): Performed by: PSYCHIATRY & NEUROLOGY

## 2017-03-05 PROCEDURE — 96372 THER/PROPH/DIAG INJ SC/IM: CPT

## 2017-03-05 PROCEDURE — 85025 COMPLETE CBC W/AUTO DIFF WBC: CPT

## 2017-03-05 PROCEDURE — 700111 HCHG RX REV CODE 636 W/ 250 OVERRIDE (IP): Performed by: HOSPITALIST

## 2017-03-05 RX ORDER — LORAZEPAM 1 MG/1
1 TABLET ORAL EVERY 6 HOURS PRN
Status: DISCONTINUED | OUTPATIENT
Start: 2017-03-05 | End: 2017-03-06 | Stop reason: HOSPADM

## 2017-03-05 RX ORDER — ONDANSETRON 2 MG/ML
4 INJECTION INTRAMUSCULAR; INTRAVENOUS EVERY 4 HOURS PRN
Status: DISCONTINUED | OUTPATIENT
Start: 2017-03-05 | End: 2017-03-06 | Stop reason: HOSPADM

## 2017-03-05 RX ADMIN — PROCHLORPERAZINE EDISYLATE 10 MG: 5 INJECTION INTRAMUSCULAR; INTRAVENOUS at 21:34

## 2017-03-05 RX ADMIN — OMEPRAZOLE 20 MG: 20 CAPSULE, DELAYED RELEASE ORAL at 10:20

## 2017-03-05 RX ADMIN — ONDANSETRON 4 MG: 2 INJECTION, SOLUTION INTRAMUSCULAR; INTRAVENOUS at 12:33

## 2017-03-05 RX ADMIN — VENLAFAXINE HYDROCHLORIDE 150 MG: 75 CAPSULE, EXTENDED RELEASE ORAL at 21:00

## 2017-03-05 RX ADMIN — ONDANSETRON 4 MG: 2 INJECTION, SOLUTION INTRAMUSCULAR; INTRAVENOUS at 21:01

## 2017-03-05 RX ADMIN — ENOXAPARIN SODIUM 40 MG: 100 INJECTION SUBCUTANEOUS at 10:20

## 2017-03-05 RX ADMIN — DIHYDROERGOTAMINE MESYLATE 1 MG: 1 INJECTION, SOLUTION INTRAMUSCULAR; INTRAVENOUS; SUBCUTANEOUS at 14:29

## 2017-03-05 RX ADMIN — PROCHLORPERAZINE EDISYLATE 10 MG: 5 INJECTION INTRAMUSCULAR; INTRAVENOUS at 13:49

## 2017-03-05 RX ADMIN — DEXAMETHASONE SODIUM PHOSPHATE 10 MG: 4 INJECTION, SOLUTION INTRAMUSCULAR; INTRAVENOUS at 22:15

## 2017-03-05 RX ADMIN — MAGNESIUM GLUCONATE 500 MG ORAL TABLET 400 MG: 500 TABLET ORAL at 12:33

## 2017-03-05 RX ADMIN — TOPIRAMATE 150 MG: 100 TABLET ORAL at 21:00

## 2017-03-05 RX ADMIN — TIZANIDINE 4 MG: 4 TABLET ORAL at 21:00

## 2017-03-05 RX ADMIN — LORAZEPAM 1 MG: 1 TABLET ORAL at 21:34

## 2017-03-05 RX ADMIN — DIHYDROERGOTAMINE MESYLATE 1 MG: 1 INJECTION, SOLUTION INTRAMUSCULAR; INTRAVENOUS; SUBCUTANEOUS at 22:15

## 2017-03-05 RX ADMIN — DEXAMETHASONE SODIUM PHOSPHATE 10 MG: 4 INJECTION, SOLUTION INTRAMUSCULAR; INTRAVENOUS at 06:00

## 2017-03-05 RX ADMIN — LORAZEPAM 1 MG: 1 TABLET ORAL at 14:28

## 2017-03-05 RX ADMIN — MAGNESIUM GLUCONATE 500 MG ORAL TABLET 400 MG: 500 TABLET ORAL at 21:00

## 2017-03-05 RX ADMIN — DEXAMETHASONE SODIUM PHOSPHATE 10 MG: 4 INJECTION, SOLUTION INTRAMUSCULAR; INTRAVENOUS at 14:29

## 2017-03-05 RX ADMIN — TOPIRAMATE 150 MG: 100 TABLET ORAL at 10:20

## 2017-03-05 RX ADMIN — PROCHLORPERAZINE EDISYLATE 10 MG: 5 INJECTION INTRAMUSCULAR; INTRAVENOUS at 05:55

## 2017-03-05 RX ADMIN — DIHYDROERGOTAMINE MESYLATE 1 MG: 1 INJECTION, SOLUTION INTRAMUSCULAR; INTRAVENOUS; SUBCUTANEOUS at 06:39

## 2017-03-05 ASSESSMENT — ENCOUNTER SYMPTOMS
DEPRESSION: 0
MUSCULOSKELETAL NEGATIVE: 1
VOMITING: 0
EYE DISCHARGE: 0
SEIZURES: 0
EYE PAIN: 0
SORE THROAT: 0
PHOTOPHOBIA: 1
DIARRHEA: 0
PALPITATIONS: 1
SPEECH CHANGE: 0
CONSTIPATION: 1
COUGH: 0
SHORTNESS OF BREATH: 0
NAUSEA: 0
PND: 0
FEVER: 0
CHILLS: 0
EYE REDNESS: 0
DOUBLE VISION: 0
ABDOMINAL PAIN: 0
SENSORY CHANGE: 0
ORTHOPNEA: 0
BLURRED VISION: 0
SPUTUM PRODUCTION: 0
HEADACHES: 1

## 2017-03-05 ASSESSMENT — PAIN SCALES - GENERAL
PAINLEVEL_OUTOF10: 4
PAINLEVEL_OUTOF10: 3
PAINLEVEL_OUTOF10: 5
PAINLEVEL_OUTOF10: 4

## 2017-03-05 ASSESSMENT — LIFESTYLE VARIABLES: DO YOU DRINK ALCOHOL: NO

## 2017-03-05 NOTE — H&P
[facility] – [place] [facility] Admitting History and Physical    Name Dany Lambert     1980   Age/Sex 36 y.o. female   MRN [id number]   Code Status FULL     After 5PM or if no immediate response to page, please call for cross-coverage  Attending/Team: Dr. Gold/Raji Call [phone] to page   1st Call - Day Intern (R1):   Dr. Ramirez 2nd Call - Day Sr. Resident (R2/R3):   Dr. Dye       Chief Complaint:  Migraine headache     HPI:  37 y/o F, with PMHx sig for migraine headaches, and anxiety, irritable bowel disease,  presents to ER with symptoms of intractable migraine. Has hx of migraines since a young age.  Gets roughly 15 episodes a month. Last year started Botox injections which helped the first 5-6  times, and now she feels that they are getting as bad as they used to be prior to the injections. Last botox tx was 2 weeks ago, the next day after developed a continuous 10/10 HA, described as sharp behind her R eyes and back of the head with associated nausea. States she takes topomax and Magnesium which have helped her in the past. Denies trauma, neck pain/stiffness, vomiting. Does report photophobia, nausea, sound sensitivity. She also reported palpitations and constipation.   Of note- has noticed an association of increased HA when she eats donuts and other grains and processed sugars.    Review of Systems   Constitutional: Negative for fever, chills, weight loss, malaise/fatigue and diaphoresis.   HENT: Negative for congestion, ear discharge, sore throat and tinnitus.    Eyes: Positive for photophobia. Negative for blurred vision, double vision, discharge and redness.   Respiratory: Negative for cough, hemoptysis, sputum production and shortness of breath.    Cardiovascular: Positive for palpitations. Negative for chest pain, orthopnea, claudication and leg swelling.   Gastrointestinal: Positive for nausea and constipation. Negative for heartburn, vomiting, abdominal pain, diarrhea, blood  in stool and melena.   Genitourinary: Negative for dysuria, urgency and frequency.   Musculoskeletal: Negative for myalgias, falls and neck pain.   Skin: Negative for itching and rash.   Neurological: Positive for headaches. Negative for dizziness, tingling, tremors, speech change, focal weakness, seizures, loss of consciousness and weakness.   Psychiatric/Behavioral: The patient is not nervous/anxious and does not have insomnia.              Past Medical History:   Past Medical History   Diagnosis Date   • Migraine 8/26/2013   • Anxiety 8/26/2013   • Allergy, unspecified not elsewhere classified        Past Surgical History:  Past Surgical History   Procedure Laterality Date   • Tonsillectomy         Current Outpatient Medications:  Home Medications     Reviewed by Leida Wilde, Pharmacy Int (Pharmacy Intern) on 03/04/17 at 1154  Med List Status: Complete    Medication Last Dose Status    Cetirizine HCl (ZYRTEC ALLERGY) 10 MG Cap >2 weeks Active    Coenzyme Q10 (CO Q 10 PO) 3/3/2017 Active    Levonorgest-Eth Estrad 91-Day (DAYSEE) 0.15-0.03 &0.01 MG Tab 3/3/2017 Active    Magnesium 100 MG Tab 3/3/2017 Active    omeprazole (PRILOSEC) 20 MG delayed-release capsule 3/3/2017 Active    rizatriptan (MAXALT-MLT) 10 MG disintegrating tablet 3/4/2017 Active    Topiramate ER 50 MG Capsule ER 24 Hour Sprinkle 3/3/2017 Active    venlafaxine (EFFEXOR XR) 150 MG extended-release capsule 3/3/2017 Active                Medication Allergy/Sensitivities:  Allergies   Allergen Reactions   • Benadryl Allergy Anxiety   • Demerol Hives   • Medrol [Methylprednisolone] Itching   • Previfem [Norgestimate-Ethinyl Estradiol]      Nausea/vomiting   • Reglan [Metoclopramide] Anxiety         Family History:  Family History   Problem Relation Age of Onset   • Hypertension Father    • Diabetes Maternal Grandfather    • Cancer Paternal Grandfather    • Alzheimer's Disease Paternal Grandfather    • Cancer Paternal Grandmother 30     breast   •  Suicide Attempts Maternal Uncle      Killed himself by GSW       Social History:  Social History     Social History   • Marital Status: Single     Spouse Name: N/A   • Number of Children: N/A   • Years of Education: N/A     Occupational History   • Not on file.     Social History Main Topics   • Smoking status: Former Smoker -- 15 years     Types: Cigarettes   • Smokeless tobacco: Never Used   • Alcohol Use: No      Comment: denies   • Drug Use: No   • Sexual Activity: Not Currently     Other Topics Concern   • Not on file     Social History Narrative     Living situation: unknown at this time.    PCP :Pcp Pt States None      Physical Exam     Filed Vitals:    03/04/17 1300 03/04/17 1320 03/04/17 1411 03/04/17 1549   BP:   120/71 115/64   Pulse: 95 102 93 85   Temp:   36.8 °C (98.2 °F) 36.9 °C (98.5 °F)   Resp:   16 15   Weight:   61.6 kg (135 lb 12.9 oz)    SpO2: 99% 98% 98% 99%     Body mass index is 21.93 kg/(m^2).  /64 mmHg  Pulse 85  Temp(Src) 36.9 °C (98.5 °F)  Resp 15  Wt 61.6 kg (135 lb 12.9 oz)  SpO2 99%  O2 therapy: Pulse Oximetry: 99 %, O2 (LPM): 2, O2 Delivery: Nasal Cannula    Physical Exam   Constitutional: She is oriented to person, place, and time.   HENT:   Head: Normocephalic and atraumatic.   Eyes: Conjunctivae and EOM are normal. Pupils are equal, round, and reactive to light. No scleral icterus.   Neck: Normal range of motion. Neck supple. No tracheal deviation present.   Cardiovascular: Normal rate, regular rhythm, normal heart sounds and intact distal pulses.  Exam reveals no gallop and no friction rub.    No murmur heard.  Pulmonary/Chest: Effort normal and breath sounds normal. No respiratory distress. She has no wheezes. She exhibits no tenderness.   Abdominal: Soft. Bowel sounds are normal. She exhibits no distension. There is no tenderness. There is no rebound and no guarding.   Musculoskeletal: She exhibits no edema or tenderness.   Neurological: She is alert and oriented to  person, place, and time. No cranial nerve deficit. Coordination normal. GCS score is 15.   Skin: No rash noted. She is not diaphoretic. No erythema.             Data Review       Old Records Request:   Deferred  Current Records review and summary: Completed    Lab Data Review:  Recent Results (from the past 24 hour(s))   CBC WITH DIFFERENTIAL    Collection Time: 03/04/17  9:11 AM   Result Value Ref Range    WBC 8.7 4.8 - 10.8 K/uL    RBC 4.15 (L) 4.20 - 5.40 M/uL    Hemoglobin 12.7 12.0 - 16.0 g/dL    Hematocrit 38.8 37.0 - 47.0 %    MCV 93.5 81.4 - 97.8 fL    MCH 30.6 27.0 - 33.0 pg    MCHC 32.7 (L) 33.6 - 35.0 g/dL    RDW 46.4 35.9 - 50.0 fL    Platelet Count 278 164 - 446 K/uL    MPV 10.9 9.0 - 12.9 fL    Neutrophils-Polys 60.90 44.00 - 72.00 %    Lymphocytes 31.20 22.00 - 41.00 %    Monocytes 6.20 0.00 - 13.40 %    Eosinophils 1.20 0.00 - 6.90 %    Basophils 0.30 0.00 - 1.80 %    Immature Granulocytes 0.20 0.00 - 0.90 %    Nucleated RBC 0.00 /100 WBC    Neutrophils (Absolute) 5.27 2.00 - 7.15 K/uL    Lymphs (Absolute) 2.70 1.00 - 4.80 K/uL    Monos (Absolute) 0.54 0.00 - 0.85 K/uL    Eos (Absolute) 0.10 0.00 - 0.51 K/uL    Baso (Absolute) 0.03 0.00 - 0.12 K/uL    Immature Granulocytes (abs) 0.02 0.00 - 0.11 K/uL    NRBC (Absolute) 0.00 K/uL   COMP METABOLIC PANEL    Collection Time: 03/04/17  9:11 AM   Result Value Ref Range    Sodium 136 135 - 145 mmol/L    Potassium 3.6 3.6 - 5.5 mmol/L    Chloride 110 96 - 112 mmol/L    Co2 18 (L) 20 - 33 mmol/L    Anion Gap 8.0 0.0 - 11.9    Glucose 89 65 - 99 mg/dL    Bun 17 8 - 22 mg/dL    Creatinine 0.86 0.50 - 1.40 mg/dL    Calcium 9.1 8.5 - 10.5 mg/dL    AST(SGOT) 16 12 - 45 U/L    ALT(SGPT) 12 2 - 50 U/L    Alkaline Phosphatase 57 30 - 99 U/L    Total Bilirubin 0.3 0.1 - 1.5 mg/dL    Albumin 3.9 3.2 - 4.9 g/dL    Total Protein 6.9 6.0 - 8.2 g/dL    Globulin 3.0 1.9 - 3.5 g/dL    A-G Ratio 1.3 g/dL   HCG Qual Serum    Collection Time: 03/04/17  9:11 AM   Result Value  Ref Range    Beta-Hcg Qualitative Serum Negative Negative   ESTIMATED GFR    Collection Time: 03/04/17  9:11 AM   Result Value Ref Range    GFR If African American >60 >60 mL/min/1.73 m 2    GFR If Non African American >60 >60 mL/min/1.73 m 2       Imaging/Procedures Review:    ndependant Imaging Review: Completed  No orders to display     EKG:   EKG Independant Review: Completed  QTc:445,  HR: 98, Normal Sinus Rhythm, no ST/T elevation or depression. Diffusely flat T waves.     (x) Records reviewed and summarized in current documentation             Assessment/Plan     Migraine headache  Overview  - ED tx: Mg, dilaudid and IVNS - no change  - Pain same as prior episoded  - Seen recently for same  - no need for imaging at this time.    Assessment & Plan  - Neuro floor  - Neurocheck q 4hr  - ECG normal no need for tele monitoring, no CP  - Thorazine 25mg IVQ8h  - Dexamethasone 10mg IV Q8h  - Dihydroergotamine 1mg IV Q8hr  - Avoid Excedrin and Fioricet   - Neurology consulted per ED    Irritable bowel disease  Overview  - chronic constipation  - food sensitivities - HA and constipation worse with gluten and processed sugars    Assessment & Plan  - anti-tissue transglutaminase, antibody, IgA to r/o cilliac disease    Anxiety  Assessment & Plan  - Venlafaxine 150mg QDay          Anticipated Hospital stay: Observation admit        Quality Measures  Labs reviewed, Medications reviewed and EKG reviewed  Moreno catheter: No Moreno      DVT Prophylaxis: Enoxaparin (Lovenox)

## 2017-03-05 NOTE — PROGRESS NOTES
Pt reporting HA better, rating pain level 4/10. Pt still with intermittent nausea, no emesis noted.

## 2017-03-05 NOTE — PROGRESS NOTES
Seen by Neuro MD and continue with medication and treatment. Continue to keep room quit. Patient is resting well.

## 2017-03-05 NOTE — PROGRESS NOTES
Spoke with Dr. Montenegro to clarify orders for management of pt's headaches.     Treatment plan will be as follows, per our telephone conversation:   Give Thorazine 25 mg PO today, 8 hours after IV dose was given (~2300).   Tomorrow AM at 0600: give Compazine 10 mg IV followed by DHE 30 minutes later (MD is okay with pt receiving 1 mg dose initially, as she had DHE last year and this was well tolerated per MD).  Per MD we will continue with DHE 1 mg q8h, premedicated with Compazine thirty minutes prior to this dose, for a max of NINE doses.     Orders have been entered to reflect this. I will be here tomorrow morning, and will address with RN overseeing patient's care tomorrow to ensure correct medication administration per Neurologist's orders.     Viki Gallardo, PharmD

## 2017-03-05 NOTE — PROGRESS NOTES
RN CALLED TERRANCE PHARMACIST RE MED DHE.  THE MED WAS SUPPOSE START IN AM ACCORDING TO PHARMACIST NOTE.  THIS RN GAVE 0.5 OF DHE BECAUSE THE MAR SHOWED THE MEDICATION DUE.  UPDATED THE PHARMACIST TERRANCE THAT THE MED WAS GIVEN AND PAGED DR. MARTINS AS WELL.  RETURNED CALL ORDERS RECEIVED

## 2017-03-05 NOTE — ASSESSMENT & PLAN NOTE
Followed by GI as outpatient.   - last bowel movement 3/5.   - On omeprazole for abdominal discomfort, continue.

## 2017-03-05 NOTE — ASSESSMENT & PLAN NOTE
Complex status migrainous: similar HA and left facial droop which was also present with migraines in the past. No visual hallucinations.   - Neurology consulted per ED: appreciate recs. Received thorazine 25mg IVQ8h 30 mins before DHE. Dihydroergotamine 1mg IV Q8hr. dexamethasone 10mg IV Q8h  - Tizanidine 4 mg po qhs  - no ongoing neurological deficit and left eye / facial droop resolved.  - HA better. Ready to be discharge and continue home prophylactic medications: topamax, magnesium.  - Neurology follow up on 3/27.

## 2017-03-05 NOTE — PROGRESS NOTES
PT ADMITTED TO UNIT ORIENTED TO ROOM DISCUSSED PLAN OF CARE BED IN LOW POSITION CALL LIGHT WITHIN REACH NURSING HISTORY AND ASSESSMENT DONE CONDITION STABLE

## 2017-03-05 NOTE — PROGRESS NOTES
Dr Ramirez notified of the pt's request for pain med , per MD to give the first dose of DHE and will evaluate . Pt updated.

## 2017-03-05 NOTE — CONSULTS
DATE OF SERVICE:  03/04/2017    REFERRING PHYSICIAN:  Dr. Gregory.    REFERRAL REASON:  Intractable headache.    HISTORY OF PRESENT ILLNESS:  The patient is a 36-year-old woman with chronic   migraines, who presents with about 10-12 days of persistent headache.  She   normally gets Botox injections every 3 months to control her chronic   migraines.  Her last injection was 2 weeks ago and this seemed to flare up her   headaches that she has had persistent daily headache since that time.  She   has been seen in the emergency room at least once.  We will try to get this   under control with IV medications only for the rebound, back within 24 hours.    She describes the headache as holocephalic but shifting from right to left   and back to front.  It is associated with nausea and significant photophobia.    The headache itself is not inconsistent with her previous migraines except   for that will not respond to medication.  She has been taking extreme amounts   of Maxalt during this period and try to control in one day taking up to 6   pills in a day.    She has been admitted to the hospital for intractable headaches before in the   past about 1 year ago.  In fact, she was given IV DHE.  She does not recall   being particularly effective and felt like it made her very panicky when she   left the hospital.  It is difficult for me to tell how many days of DHE she   got but it looks like it was just a couple doses.    PAST MEDICAL HISTORY:  Significant for:  1.  Chronic migraines.  2.  Anxiety.    PAST SURGICAL HISTORY:  No significant surgeries in the past.    HOME MEDICATIONS:  1.  Zyrtec 10 mg daily.  2.  Coenzyme Q10.  3.  Seasonique 1 pill daily.  4.  Magnesium.  5.  Prilosec 20 mg daily.  6.  Maxalt 10 mg as needed.  7.  Topamax ER 50 mg daily.  8.  Effexor 150 mg daily.    ALLERGIES:  SHE HAS ALLERGIES TO BENADRYL, DEMEROL, MEDROL, _____, AND REGLAN.    REVIEW OF SYSTEMS:  Otherwise, negative except for as mentioned  above.    SOCIAL HISTORY:  The patient denies any tobacco, alcohol, or drug use.  She   works at the hospital.    PHYSICAL EXAMINATION:  VITAL SIGNS:  Temperature is 36.9 with a heart rate of 85, blood pressure   115/64.  GENERAL:  The patient is lying in a dark room and some mild discomfort, but no   obvious distress.  MENTAL STATUS:  Awake, alert, and oriented x3.  NEUROLOGIC:  Cranial nerves:  Pupils are equal, round, reactive to light.    Extraocular movements are intact.  No nystagmus.  Face is symmetric.  Facial   sensation is intact.  Shoulder shrug is symmetric.  MOTOR EXAM:  5/5 throughout with no drift.  COORDINATION:  Normal finger-to-nose bilaterally.  SENSATION:  Sensation is intact to light touch throughout.  MUSCULOSKELETAL:  There is significant paraspinal cervical muscle tenderness   and spasm.  She has significant crepitus of her jaw when she opens and closes   especially on the right.    LABORATORY DATA:  White blood cell count was 8.7, hemoglobin 12.7, platelets   278,000.  Sodium 136, potassium 3.6, BUN of 17, creatinine 0.86.  Pregnancy   test was negative.    IMPRESSION:  The patient is a 36-year-old woman with a history of chronic   migraines, who presents with status migrainosus.    PLAN:  1.  She has been treated with Thorazine and Decadron IV currently.  This is   fine.  2.  She took Maxalt today, so we will wait 24 hours to start IV DHE.  She is   somewhat reluctant to do this, but will be willing to proceed if her headache   is not resolved by the morning.  3.  I would like to add in some tizanidine 4 mg at bedtime to help with muscle   relaxation given her significant paraspinal spasms.  4.  If we do start DHE in the morning, she should receive 1 mg and pretreated   with Compazine or similar antiemetic by 30 minutes.  We will get 1 mg every q.   8 hours.  It should be stopped if there is any shortness of breath or chest   pain associated with it.  Since she has tolerated in the past, I  think we can   skip the test dose and go straight to a 1 mg dose to start with.       ____________________________________     MD ERIK Edward / RACHEL    DD:  03/04/2017 18:12:06  DT:  03/04/2017 18:57:30    D#:  [id number]  Job#:  [id number]

## 2017-03-05 NOTE — PROGRESS NOTES
Pt c/o increasing HA, rating pain level 6/10, pt requesting for pain, pt stated that the last time she was in ED she was given Fioricet and phenergan together and it  Helped with the HA. MD paged.awaiting call back.

## 2017-03-05 NOTE — PROGRESS NOTES
Neurology Progress Note        Subjective:  Dany reports that her headache is currently 2/10.  She thinks the DHE she got at 6am made a difference, but she doesn't like the way the DHE makes her feel.  She is having significant nausea and anxiety related to the mediaction.    Objective:  Vitals:  Filed Vitals:    03/04/17 1956 03/04/17 2254 03/05/17 0417 03/05/17 0827   BP: 123/73 104/69 115/79 120/83   Pulse: 75 68 85 64   Temp: 37 °C (98.6 °F) 37 °C (98.6 °F) 36.6 °C (97.8 °F) 37.3 °C (99.1 °F)   Resp: 16 15 16 14   Weight:       SpO2: 97% 96% 98%      General:  In no apparent distress, cooperative with exam, asleep in dark room when I enter, but easily arouses.  Mental Status:  Alert, oriented x 3, speech is fluent, comprehension is intact.  Cranial Nerves:  PERRL, EOMI with no nystagmus, face is symmetric  Motor: Moves all 4 limbs  Coordination:  Normal finger to nose bilaterally  Gait:  Deferred     Recent Labs      03/04/17   0911  03/05/17   0430   WBC  8.7  6.7   RBC  4.15*  4.46   HEMOGLOBIN  12.7  13.5   HEMATOCRIT  38.8  41.1   MCV  93.5  92.2   MCH  30.6  30.3   RDW  46.4  45.5   PLATELETCT  278  290   MPV  10.9  11.1   NEUTSPOLYS  60.90  83.00*   LYMPHOCYTES  31.20  14.30*   MONOCYTES  6.20  2.40   EOSINOPHILS  1.20  0.00   BASOPHILS  0.30  0.00     Recent Labs      03/04/17   0911  03/05/17   0430   SODIUM  136  135   POTASSIUM  3.6  3.7   CHLORIDE  110  112   CO2  18*  16*   GLUCOSE  89  113*   BUN  17  12         Impression:   Dany is a 36 year old woman with chronic migraines who is here in status migranosis for the last 2 weeks.    Recommendations:  1.  I would continue DHE for at least 3 injections, if not longer depending on her response.  2.  Continue tizanidine 4mg qhs and topamax 50mg daily.  3.  Will pretreat DHE with zofran and compazine.  Also will allow ativan 1mg po prn anxiety.  4.  Dr. Rodriguez assumes the neurology inpatient service tomorrow.    11:45 am:  Called by medicine resident  by concerns over left facial droop.  By the time I arrived to assess the patient there was no facial droop present.  Her smile was symmetric and there was no difference in the nasolabial folds.  She denied any numbness in the face.  There may have been some slight ptosis on the left, but she had been sleeping with her left face on top of her hand and this had left an impression over her left eye which confounded the picture.  Will continue to monitor for now.  If there are any further concerns for new neuro symptoms would consider MRI brain at that point.

## 2017-03-05 NOTE — PROGRESS NOTES
[facility] Internal Medicine Interval Note    Name Dany Lambert     1980   Age/Sex 36 y.o. female   MRN [id number]   Code Status full     After 5PM or if no immediate response to page, please call for cross-coverage  Attending/Team: Dr Gold / Raji Call [phone] to page   1st Call - Day Intern (R1):   Dr Ramirez 2nd Call - Day Sr. Resident (R2/R3):   Dr Dye         Chief complaint/ reason for interval visit (Primary Diagnosis)   Status migrainosus     ID: A 35 yo F with well established history of migraines admitted after 2 weeks of migraines following last botox injections    Interval Problem Daily Status Update    Active Hospital Problems    Diagnosis   • *Migraine with status migrainosus [G43.901] neurology following, scheduled DHE after premedication with compazine   • Irritable bowel disease [K58.9] have chronic constipation, currently on bowel protocol. Followed by outpatient GI.   • Anxiety [F41.9] stable on effexor           Review of Systems   Constitutional: Negative for fever and chills.   HENT: Negative for congestion, ear discharge, ear pain, hearing loss, sore throat and tinnitus.    Eyes: Positive for photophobia. Negative for blurred vision, double vision, pain, discharge and redness.   Respiratory: Negative for cough, sputum production and shortness of breath.    Cardiovascular: Positive for palpitations. Negative for chest pain, orthopnea, leg swelling and PND.   Gastrointestinal: Positive for constipation. Negative for nausea, vomiting, abdominal pain and diarrhea.   Genitourinary: Negative.    Musculoskeletal: Negative.    Neurological: Positive for headaches. Negative for sensory change, speech change and seizures.        Left eye droop   Psychiatric/Behavioral: Negative for depression and suicidal ideas.       Consultants/Specialty  Neurology    Disposition  Inpatient receiving IV medication for migraines    Quality Measures  Labs reviewed and  Medications reviewed  Moreno catheter: No Moreno      DVT Prophylaxis: Enoxaparin (Lovenox)                  Physical Exam       Filed Vitals:    03/04/17 2254 03/05/17 0417 03/05/17 0827 03/05/17 1153   BP: 104/69 115/79 120/83 116/70   Pulse: 68 85 64 67   Temp: 37 °C (98.6 °F) 36.6 °C (97.8 °F) 37.3 °C (99.1 °F) 37.2 °C (99 °F)   Resp: 15 16 14 19   Weight:       SpO2: 96% 98%  96%     Body mass index is 21.93 kg/(m^2). Weight: 61.6 kg (135 lb 12.9 oz)  Oxygen Therapy:  Pulse Oximetry: 96 %, O2 (LPM): 2, O2 Delivery: None (Room Air)    Physical Exam   Constitutional: She is oriented to person, place, and time and well-developed, well-nourished, and in no distress.   Appeared tired   HENT:   Head: Normocephalic and atraumatic.   Eyes: EOM are normal. Pupils are equal, round, and reactive to light.   Neck: Neck supple. No JVD present.   Cardiovascular: Normal rate and regular rhythm.  Exam reveals no gallop and no friction rub.    No murmur heard.  Pulmonary/Chest: Effort normal and breath sounds normal.   Abdominal: Soft. Bowel sounds are normal. She exhibits no distension. There is no rebound and no guarding.   Musculoskeletal: She exhibits no edema.   Neurological: She is alert and oriented to person, place, and time.   Left sided facial droop with asymmetry on smiling. No numbness.   No focal extremities weakness or sensory change.   Psychiatric: Mood and affect normal.         Lab Data Review:      3/5/2017  12:00 PM    Recent Labs      03/04/17   0911  03/05/17   0430   SODIUM  136  135   POTASSIUM  3.6  3.7   CHLORIDE  110  112   CO2  18*  16*   BUN  17  12   CREATININE  0.86  0.80   CALCIUM  9.1  9.0       Recent Labs      03/04/17   0911  03/05/17   0430   ALTSGPT  12  12   ASTSGOT  16  13   ALKPHOSPHAT  57  63   TBILIRUBIN  0.3  0.3   GLUCOSE  89  113*       Recent Labs      03/04/17   0911  03/05/17   0430   RBC  4.15*  4.46   HEMOGLOBIN  12.7  13.5   HEMATOCRIT  38.8  41.1   PLATELETCT  278  290        Recent Labs      03/04/17   0911  03/05/17   0430   WBC  8.7  6.7   NEUTSPOLYS  60.90  83.00*   LYMPHOCYTES  31.20  14.30*   MONOCYTES  6.20  2.40   EOSINOPHILS  1.20  0.00   BASOPHILS  0.30  0.00   ASTSGOT  16  13   ALTSGPT  12  12   ALKPHOSPHAT  57  63   TBILIRUBIN  0.3  0.3           Assessment/Plan     * Migraine with status migrainosus  Assessment & Plan  Complex status migrainous: similar HA and left facial droop which was also present with migraines in the past. No visual hallucinations.   - Neurology consulted per ED: appreciate recs.  - Thorazine 25mg IVQ8h 30 mins before DHE.  - Dihydroergotamine 1mg IV Q8hr  - Dexamethasone 10mg IV Q8h  - Tizanidine 4 mg po qhs  - Neurocheck q 4hr  - continue home prophylactic medications: topamax, magnesium.      Anxiety  Assessment & Plan  Controlled on venlafaxine  - continue venlafaxine 150mg QDay    Irritable bowel disease  Overview  - chronic constipation  - food sensitivities - HA and constipation worse with gluten and processed sugars    Assessment & Plan  Followed by GI as outpatient. On omeprazole for abdominal discomfort, continue.  - on bowel protocol.

## 2017-03-06 VITALS
OXYGEN SATURATION: 95 % | DIASTOLIC BLOOD PRESSURE: 84 MMHG | TEMPERATURE: 99.6 F | RESPIRATION RATE: 16 BRPM | HEART RATE: 62 BPM | BODY MASS INDEX: 21.93 KG/M2 | WEIGHT: 135.8 LBS | SYSTOLIC BLOOD PRESSURE: 128 MMHG

## 2017-03-06 LAB
ANION GAP SERPL CALC-SCNC: 10 MMOL/L (ref 0–11.9)
BUN SERPL-MCNC: 15 MG/DL (ref 8–22)
CALCIUM SERPL-MCNC: 9.2 MG/DL (ref 8.5–10.5)
CHLORIDE SERPL-SCNC: 112 MMOL/L (ref 96–112)
CO2 SERPL-SCNC: 17 MMOL/L (ref 20–33)
CREAT SERPL-MCNC: 0.82 MG/DL (ref 0.5–1.4)
DEPRECATED D DIMER PPP IA-ACNC: <200 NG/ML(D-DU)
GFR SERPL CREATININE-BSD FRML MDRD: >60 ML/MIN/1.73 M 2
GLUCOSE SERPL-MCNC: 121 MG/DL (ref 65–99)
POTASSIUM SERPL-SCNC: 3.9 MMOL/L (ref 3.6–5.5)
SODIUM SERPL-SCNC: 139 MMOL/L (ref 135–145)
TTG IGA SER IA-ACNC: 0 U/ML (ref 0–3)

## 2017-03-06 PROCEDURE — A9270 NON-COVERED ITEM OR SERVICE: HCPCS | Performed by: HOSPITALIST

## 2017-03-06 PROCEDURE — 700102 HCHG RX REV CODE 250 W/ 637 OVERRIDE(OP): Performed by: PSYCHIATRY & NEUROLOGY

## 2017-03-06 PROCEDURE — 700102 HCHG RX REV CODE 250 W/ 637 OVERRIDE(OP): Performed by: INTERNAL MEDICINE

## 2017-03-06 PROCEDURE — G0378 HOSPITAL OBSERVATION PER HR: HCPCS

## 2017-03-06 PROCEDURE — 700101 HCHG RX REV CODE 250: Performed by: HOSPITALIST

## 2017-03-06 PROCEDURE — 700111 HCHG RX REV CODE 636 W/ 250 OVERRIDE (IP): Performed by: PSYCHIATRY & NEUROLOGY

## 2017-03-06 PROCEDURE — 99217 PR OBSERVATION CARE DISCHARGE: CPT | Mod: GC | Performed by: INTERNAL MEDICINE

## 2017-03-06 PROCEDURE — 96372 THER/PROPH/DIAG INJ SC/IM: CPT

## 2017-03-06 PROCEDURE — 700102 HCHG RX REV CODE 250 W/ 637 OVERRIDE(OP): Performed by: HOSPITALIST

## 2017-03-06 PROCEDURE — 85379 FIBRIN DEGRADATION QUANT: CPT

## 2017-03-06 PROCEDURE — 96376 TX/PRO/DX INJ SAME DRUG ADON: CPT

## 2017-03-06 PROCEDURE — 80048 BASIC METABOLIC PNL TOTAL CA: CPT

## 2017-03-06 PROCEDURE — A9270 NON-COVERED ITEM OR SERVICE: HCPCS | Performed by: INTERNAL MEDICINE

## 2017-03-06 PROCEDURE — A9270 NON-COVERED ITEM OR SERVICE: HCPCS | Performed by: PSYCHIATRY & NEUROLOGY

## 2017-03-06 PROCEDURE — 700111 HCHG RX REV CODE 636 W/ 250 OVERRIDE (IP): Performed by: HOSPITALIST

## 2017-03-06 RX ADMIN — DEXAMETHASONE SODIUM PHOSPHATE 10 MG: 4 INJECTION, SOLUTION INTRAMUSCULAR; INTRAVENOUS at 14:44

## 2017-03-06 RX ADMIN — MAGNESIUM GLUCONATE 500 MG ORAL TABLET 400 MG: 500 TABLET ORAL at 09:29

## 2017-03-06 RX ADMIN — DIHYDROERGOTAMINE MESYLATE 1 MG: 1 INJECTION, SOLUTION INTRAMUSCULAR; INTRAVENOUS; SUBCUTANEOUS at 14:46

## 2017-03-06 RX ADMIN — PROCHLORPERAZINE EDISYLATE 10 MG: 5 INJECTION INTRAMUSCULAR; INTRAVENOUS at 05:38

## 2017-03-06 RX ADMIN — DIHYDROERGOTAMINE MESYLATE 1 MG: 1 INJECTION, SOLUTION INTRAMUSCULAR; INTRAVENOUS; SUBCUTANEOUS at 06:31

## 2017-03-06 RX ADMIN — TOPIRAMATE 150 MG: 100 TABLET ORAL at 09:30

## 2017-03-06 RX ADMIN — LORAZEPAM 1 MG: 1 TABLET ORAL at 05:38

## 2017-03-06 RX ADMIN — PROCHLORPERAZINE EDISYLATE 10 MG: 5 INJECTION INTRAMUSCULAR; INTRAVENOUS at 14:47

## 2017-03-06 RX ADMIN — DEXAMETHASONE SODIUM PHOSPHATE 2.5 MG: 4 INJECTION, SOLUTION INTRAMUSCULAR; INTRAVENOUS at 06:29

## 2017-03-06 RX ADMIN — ONDANSETRON 4 MG: 2 INJECTION, SOLUTION INTRAMUSCULAR; INTRAVENOUS at 13:22

## 2017-03-06 RX ADMIN — ONDANSETRON 4 MG: 2 INJECTION, SOLUTION INTRAMUSCULAR; INTRAVENOUS at 05:05

## 2017-03-06 RX ADMIN — LORAZEPAM 1 MG: 1 TABLET ORAL at 13:22

## 2017-03-06 RX ADMIN — OMEPRAZOLE 20 MG: 20 CAPSULE, DELAYED RELEASE ORAL at 09:29

## 2017-03-06 ASSESSMENT — PAIN SCALES - GENERAL
PAINLEVEL_OUTOF10: 3
PAINLEVEL_OUTOF10: 3
PAINLEVEL_OUTOF10: 1

## 2017-03-06 ASSESSMENT — ENCOUNTER SYMPTOMS
DIZZINESS: 0
ABDOMINAL PAIN: 0
HEADACHES: 1
PHOTOPHOBIA: 1
FEVER: 0
NECK PAIN: 0

## 2017-03-06 NOTE — DISCHARGE SUMMARY
[facility] Internal Medicine Discharge Summary      Admit Date:  3/4/2017       Discharge Date:   03/06/2017      Service:   [facility] Internal Medicine Gray Team  Attending Physician(s):   Dr Gold / Raji       Senior Resident(s):   Dr Dye  Ronal Resident(s):   Dr Ramirez      Primary Diagnosis:   Status migrainosus    Secondary Diagnoses:                Active Hospital Problems    Diagnosis   • *Migraine with status migrainosus [G43.901]   • Irritable bowel disease [K58.9]   • Anxiety [F41.9]       Hospital Summary (Brief Narrative):       A 35 yo F with PMHx of chronic migraines, anxiety disorder and irritable bowel disease admitted for intractable status migrainosus. She is well established with Neurology as outpatient and admitted last year for same complaint and is on prophylactic medications for migraines. She got last botox shot in Neurology office 2 weeks ago and having continuous headache since then. Her headache was similar to previous migraines and received treatment with dihydroergotamine premedicated with compazine and decadron. A brief period of left facial droop was noted, but resolved quickly and she did have facial droop said to be noticed by her family during previous migraine episodes. No ongoing neurological deficits. She responded to the first dose of DHE and headache slowly got better. After 4th injection, she felt comfortable to go home. Discussed with Neurology who agreed with discharge plan. Topamax and Mg are continued upon discharge and she already has neurology appointment on 3/27/17.     Off note, she complains of palpitations on admission, EKG was in regular sinus rhythm and unremarkable. No significant event on tele except for short lived emani to 40s. She denies lightheadedness or chest pain. She was having infrequent, non exertional, tight chest pain, lasting 1-2 mins 3-4 times per month, no such episode during admission. Her anxiety is said to be stable on effexor. She does not  have a PCP. Asked her to record her episodes and establish with PCP for possible cardiology follow up if necessary.    Patient /Hospital Summary (Details -- Problem Oriented) :          * Migraine with status migrainosus  Assessment & Plan  Complex status migrainous: similar HA and left facial droop which was also present with migraines in the past. No visual hallucinations.   - Neurology consulted per ED: appreciate recs. Received thorazine 25mg IVQ8h 30 mins before DHE. Dihydroergotamine 1mg IV Q8hr. dexamethasone 10mg IV Q8h  - Tizanidine 4 mg po qhs  - no ongoing neurological deficit and left eye / facial droop resolved.  - HA better. Ready to be discharge and continue home prophylactic medications: topamax, magnesium.  - Neurology follow up on 3/27.      Anxiety  Assessment & Plan  Controlled on venlafaxine  - continue venlafaxine 150mg QDay    Irritable bowel disease  Overview  - chronic constipation  - food sensitivities - HA and constipation worse with gluten and processed sugars    Assessment & Plan  Followed by GI as outpatient.   - last bowel movement 3/5.   - On omeprazole for abdominal discomfort, continue.        Consultants:     Neurology    Procedures:        none    Imaging/ Testing:      none    Discharge Medications:         Medication Reconciliation: Completed     Medication List      CONTINUE taking these medications       Instructions    CO Q 10 PO    Take 1 Cap by mouth every day.   Dose:  1 Cap       DAYSEE 0.15-0.03 &0.01 MG Tabs   Generic drug:  Levonorgest-Eth Estrad 91-Day    Take  by mouth.       Magnesium 100 MG Tabs    Take 1 Tab by mouth 2 Times a Day.   Dose:  1 Tab       omeprazole 20 MG delayed-release capsule   Last time this was given:  20 mg on 3/6/2017  9:29 AM   Commonly known as:  PRILOSEC    Take 20 mg by mouth every day.   Dose:  20 mg       rizatriptan 10 MG disintegrating tablet   Commonly known as:  MAXALT-MLT    1 tab at headache onset; repeat every hour as needed up to  #4 tab/24 hours       Topiramate ER 50 MG Cs24    Take 150 mg by mouth 2 Times a Day.   Dose:  150 mg       venlafaxine 150 MG extended-release capsule   Last time this was given:  150 mg on 3/5/2017  9:00 PM   Commonly known as:  EFFEXOR XR    Take 1 Cap by mouth every day.   Dose:  150 mg       ZYRTEC ALLERGY 10 MG Caps   Generic drug:  Cetirizine HCl    Take  by mouth.               Disposition:   home    Diet:   regular    Activity:   As tolerated    Instructions:      Follow up with neurology   The patient was instructed to return to the ER in the event of worsening symptoms. I have counseled the patient on the importance of compliance and the patient has agreed to proceed with all medical recommendations and follow up plan indicated above.   The patient understands that all medications come with benefits and risks. Risks may include permanent injury or death and these risks can be minimized with close reassessment and monitoring.        Primary Care Provider:    none  Discharge summary faxed to primary care provider:  Deferred  Copy of discharge summary given to the patient: Deferred    Follow up appointment details :      Mar 27, 2017 1:20 PM   Follow Up Visit with Juan Manuel Griffin M.D.   [facility] Neurology (--)     [address] [place] [address], [address] [address]   [phone]     You will be receiving a confirmation call a few days before your appointment from our automated call confirmation system.     May 17, 2017 4:40 PM   BOTOX with Juan Manuel Griffin M.D.   [facility] Neurology (--)     [address] [place] [address], [facility] [address]  [facility] [address]   [phone]         Pending Studies:        none    Time spent on discharge day patient visit, preparing discharge paperwork and arranging for patient follow up.    Summary of follow up issues:   - follow up with neurology for migraines  - establish with primary care to discuss if there's further episodes of chest pain    Discharge Time (Minutes) :     55 min      Condition on Discharge    ______________________________________________________________________    Interval history/exam for day of discharge:     Headache is getting better. Patient felt comfortable enough to go home and willing to go home.    Filed Vitals:    03/05/17 2343 03/06/17 0404 03/06/17 0724 03/06/17 1121   BP: 110/71 119/68 120/77 111/55   Pulse: 53 73 51 92   Temp: 37.4 °C (99.3 °F) 36.9 °C (98.5 °F) 36.8 °C (98.3 °F) 36.9 °C (98.5 °F)   Resp: 16 15 16 18   Weight:       SpO2: 94% 95% 95% 95%     Weight/BMI: Body mass index is 21.93 kg/(m^2).  Pulse Oximetry: 95 %, O2 (LPM): 0, O2 Delivery: None (Room Air)    General: no distress. A & O x 4, appears tired  CVS: S1, S2, RRR, no MRG  PULM: CTA bilaterally  ABD: soft, NT, BS +  EXT: no edema  NEURO: no more facial droop or eye lid droop, motor and sensory grossly normal.     Most Recent Labs:    Lab Results   Component Value Date/Time    WBC 6.7 03/05/2017 04:30 AM    RBC 4.46 03/05/2017 04:30 AM    HEMOGLOBIN 13.5 03/05/2017 04:30 AM    HEMATOCRIT 41.1 03/05/2017 04:30 AM    MCV 92.2 03/05/2017 04:30 AM    MCH 30.3 03/05/2017 04:30 AM    MCHC 32.8* 03/05/2017 04:30 AM    MPV 11.1 03/05/2017 04:30 AM    NEUTROPHILS-POLYS 83.00* 03/05/2017 04:30 AM    LYMPHOCYTES 14.30* 03/05/2017 04:30 AM    MONOCYTES 2.40 03/05/2017 04:30 AM    EOSINOPHILS 0.00 03/05/2017 04:30 AM    BASOPHILS 0.00 03/05/2017 04:30 AM      Lab Results   Component Value Date/Time    SODIUM 139 03/06/2017 04:03 AM    POTASSIUM 3.9 03/06/2017 04:03 AM    CHLORIDE 112 03/06/2017 04:03 AM    CO2 17* 03/06/2017 04:03 AM    GLUCOSE 121* 03/06/2017 04:03 AM    BUN 15 03/06/2017 04:03 AM    CREATININE 0.82 03/06/2017 04:03 AM      Lab Results   Component Value Date/Time    ALT(SGPT) 12 03/05/2017 04:30 AM    AST(SGOT) 13 03/05/2017 04:30 AM    ALKALINE PHOSPHATASE 63 03/05/2017 04:30 AM    TOTAL BILIRUBIN 0.3 03/05/2017 04:30 AM    ALBUMIN 3.6 03/05/2017 04:30 AM    GLOBULIN 3.4  03/05/2017 04:30 AM    INR 0.89 02/08/2016 11:02 AM     Lab Results   Component Value Date/Time    PT 12.1 02/08/2016 11:02 AM    INR 0.89 02/08/2016 11:02 AM

## 2017-03-06 NOTE — PROGRESS NOTES
Pt medicated per MAR. Discussed discharge following one hour after medication administration. Pt verbalizes understanding. Pt tired, quickly falls asleep after interaction. Will continue to monitor.

## 2017-03-06 NOTE — PROGRESS NOTES
Attempted to draw blood specimens, no succeed. Request  to come and draw blood. Apologized and thanks to pt.

## 2017-03-06 NOTE — PROGRESS NOTES
Assumed  Care of pt, pt A&OX4, CHARLES, no n/t, received report pt has R facial droop and pt reported that R facial droop, no facial droop noticed w/ assessment, L lower facial droop present but pt stated that pt always had limitation w/ movement on L lower face. Pt pupils R>L, both pupils react to light brisk. C/o HA 3/10, pt tolerated good, discussed POC tonight, pt agreed w/ it, ambulatory to bathroom. Pt reported that photophobia still present but it has gone better. LBM 3/5, no SOB, provided calm and quiet environment to increase comfort. Safety measures in placed. SZ precaution, in placed. All needs are met, hourly rounding in use.

## 2017-03-06 NOTE — DISCHARGE PLANNING
Care Transition Team Assessment    Information Source  Orientation : Oriented x 4  Information Given By: Patient         Elopement Risk  Legal Hold: No  Ambulatory or Self Mobile in Wheelchair: No-Not an Elopement Risk  Elopement Risk: Not at Risk for Elopement    Interdisciplinary Discharge Planning  Does Admitting Nurse Feel This Could be a Complex Discharge?: No  Lives with - Patient's Self Care Capacity: Alone and Able to Care For Self  Patient or legal guardian wants to designate a caregiver (see row info): No                   Vision / Hearing Impairment  Vision Impairment : Yes  Right Eye Vision: Wears Glasses  Left Eye Vision: Wears Glasses  Hearing Impairment : No    Values / Beliefs / Concerns  Values / Beliefs Concerns : No         Domestic Abuse  Have you ever been the victim of abuse or violence?: No  Possible Abuse Reported to:: Not Applicable

## 2017-03-06 NOTE — PROGRESS NOTES
Adm Zofran @ 2100, adm compazine @ 2125 w/ ativan, & adm DHE w/  Decadron @ 2215 per MAR. Pt tolerated good, each IV push adm slowly, this RN noticed that after adm DHE and decadron, pt HR dropped to 40s, no sustaining. Pt no c/o distress. Will continue to monitor.

## 2017-03-06 NOTE — DISCHARGE INSTRUCTIONS
Discharge Instructions    Discharged to home by car with relative. Discharged via wheelchair, hospital escort: Yes.  Special equipment needed: Not Applicable    Be sure to schedule a follow-up appointment with your primary care doctor or any specialists as instructed.     Discharge Plan:   Diet Plan: Discussed  Activity Level: Discussed  Confirmed Follow up Appointment: Patient to Call and Schedule Appointment  Confirmed Symptoms Management: Discussed  Medication Reconciliation Updated: Yes  Influenza Vaccine Indication: Not indicated: Previously immunized this influenza season and > 8 years of age    I understand that a diet low in cholesterol, fat, and sodium is recommended for good health. Unless I have been given specific instructions below for another diet, I accept this instruction as my diet prescription.   Other diet: Heart Healthy     Special Instructions: None    · Is patient discharged on Warfarin / Coumadin?   No     · Is patient Post Blood Transfusion?  No    Depression / Suicide Risk    As you are discharged from this [facility] facility, it is important to learn how to keep safe from harming yourself.    Recognize the warning signs:  · Abrupt changes in personality, positive or negative- including increase in energy   · Giving away possessions  · Change in eating patterns- significant weight changes-  positive or negative  · Change in sleeping patterns- unable to sleep or sleeping all the time   · Unwillingness or inability to communicate  · Depression  · Unusual sadness, discouragement and loneliness  · Talk of wanting to die  · Neglect of personal appearance   · Rebelliousness- reckless behavior  · Withdrawal from people/activities they love  · Confusion- inability to concentrate     If you or a loved one observes any of these behaviors or has concerns about self-harm, here's what you can do:  · Talk about it- your feelings and reasons for harming yourself  · Remove any means that you might use  to hurt yourself (examples: pills, rope, extension cords, firearm)  · Get professional help from the community (Mental Health, Substance Abuse, psychological counseling)  · Do not be alone:Call your Safe Contact- someone whom you trust who will be there for you.  · Call your local CRISIS HOTLINE 880-0985 or 056-404-8158  · Call your local Children's Mobile Crisis Response Team [place] (794) 139-8825 or www.WindowsWear  · Call the toll free National Suicide Prevention Hotlines   · National Suicide Prevention Lifeline 135-110-NXBP (6202)  · Better Place Line Network 800-SUICIDE (230-0521)  Migraine Headache  A migraine headache is very bad, throbbing pain on one or both sides of your head. Talk to your doctor about what things may bring on (trigger) your migraine headaches.  HOME CARE  · Only take medicines as told by your doctor.  · Lie down in a dark, quiet room when you have a migraine.  · Keep a journal to find out if certain things bring on migraine headaches. For example, write down:  ¨ What you eat and drink.  ¨ How much sleep you get.  ¨ Any change to your diet or medicines.  · Lessen how much alcohol you drink.  · Quit smoking if you smoke.  · Get enough sleep.  · Lessen any stress in your life.  · Keep lights dim if bright lights bother you or make your migraines worse.  GET HELP RIGHT AWAY IF:   · Your migraine becomes really bad.  · You have a fever.  · You have a stiff neck.  · You have trouble seeing.  · Your muscles are weak, or you lose muscle control.  · You lose your balance or have trouble walking.  · You feel like you will pass out (faint), or you pass out.  · You have really bad symptoms that are different than your first symptoms.  MAKE SURE YOU:   · Understand these instructions.  · Will watch your condition.  · Will get help right away if you are not doing well or get worse.     This information is not intended to replace advice given to you by your health care provider. Make sure  you discuss any questions you have with your health care provider.     Document Released: 09/26/2009 Document Revised: 03/11/2013 Document Reviewed: 08/25/2014  Elsevier Interactive Patient Education ©2016 Elsevier Inc.

## 2017-03-06 NOTE — PROGRESS NOTES
[facility] [place] [facility] [place]    Name Dany Lambert     1980   Age/Sex 36 y.o. female   MRN [id number]   Code Status FULL     After 5PM or if no immediate response to page, please call for cross-coverage  Attending/Team: Asif/Mau  Call [phone] to page   1st Call - Day Intern (R1):   Dr. Ramirez 2nd Call - Day Sr. Resident (R2/R3):   Dr. Dye          Chief complaint/ reason for interval visit (Primary Diagnosis)   Intractable migraine headache    Interval Problem Daily Status Update    Ms. Lambert is a 35 y/o female with a h/o mHA (has approximately 15 mHA/month) who presents to the ED for these mHA up to 3 times a year and was hospitalized once last year for same. She has experienced interval improvement in her HA and states she feels ready to be discharged home.      Active Hospital Problems    Diagnosis   • *Migraine with status migrainosus [G43.901]   • Irritable bowel disease [K58.9]   • Anxiety [F41.9]       Review of Systems   Constitutional: Negative for fever and malaise/fatigue.   Eyes: Positive for photophobia.   Gastrointestinal: Negative for abdominal pain.   Musculoskeletal: Negative for neck pain.   Neurological: Positive for headaches. Negative for dizziness.       Consultants/Specialty  Neurology/Dr. Montenegro     Disposition  No barriers to d/c. Anticipate discharge home today or tomorrow    Quality Measures  Labs reviewed and Medications reviewed  Moreno catheter: No Moreno      DVT Prophylaxis: Enoxaparin (Lovenox)    Ulcer prophylaxis: Yes              Physical Exam       Filed Vitals:    17 2343 17 0404 17 0724 17 1121   BP: 110/71 119/68 120/77 111/55   Pulse: 53 73 51 92   Temp: 37.4 °C (99.3 °F) 36.9 °C (98.5 °F) 36.8 °C (98.3 °F) 36.9 °C (98.5 °F)   Resp: 16 15 16 18   Weight:       SpO2: 94% 95% 95% 95%     Body mass index is 21.93 kg/(m^2).    Oxygen Therapy:  Pulse Oximetry: 95 %, O2 (LPM): 0, O2  Delivery: None (Room Air)    Physical Exam   Constitutional: She is oriented to person, place, and time and well-developed, well-nourished, and in no distress.   HENT:   Head: Normocephalic and atraumatic.   Eyes: Conjunctivae are normal.   Neck: Neck supple.   Cardiovascular: Normal rate, regular rhythm and normal heart sounds.    Pulmonary/Chest: Effort normal and breath sounds normal. No respiratory distress.   Abdominal: Soft. Bowel sounds are normal.   Musculoskeletal: She exhibits no edema.   Neurological: She is alert and oriented to person, place, and time. GCS score is 15.   Skin: Skin is warm and dry.         Lab Data Review:      3/6/2017  12:41 PM    Recent Labs      03/04/17   0911  03/05/17   0430  03/06/17   0403   SODIUM  136  135  139   POTASSIUM  3.6  3.7  3.9   CHLORIDE  110  112  112   CO2  18*  16*  17*   BUN  17  12  15   CREATININE  0.86  0.80  0.82   CALCIUM  9.1  9.0  9.2       Recent Labs      03/04/17   0911  03/05/17   0430  03/06/17   0403   ALTSGPT  12  12   --    ASTSGOT  16  13   --    ALKPHOSPHAT  57  63   --    TBILIRUBIN  0.3  0.3   --    GLUCOSE  89  113*  121*       Recent Labs      03/04/17   0911  03/05/17   0430   RBC  4.15*  4.46   HEMOGLOBIN  12.7  13.5   HEMATOCRIT  38.8  41.1   PLATELETCT  278  290       Recent Labs      03/04/17   0911  03/05/17   0430   WBC  8.7  6.7   NEUTSPOLYS  60.90  83.00*   LYMPHOCYTES  31.20  14.30*   MONOCYTES  6.20  2.40   EOSINOPHILS  1.20  0.00   BASOPHILS  0.30  0.00   ASTSGOT  16  13   ALTSGPT  12  12   ALKPHOSPHAT  57  63   TBILIRUBIN  0.3  0.3         Assessment/Plan   1. Neuro/Psyche- Admitted for intractable migrane headache, interval improvement. Pt with long h/o same mHA for which she previously got relief with botox injections however is not getting relief any longer. She is afebrile in the department w/o signs of meningismus making meningitis or encephalitis unlikely. In the absence of any new focal neurological deficits, and without  having any change in the characterization of her current mHA, no cranial imaging needed at this time. Her last CT Head (2016) was negative as was her last MRI/MRA Head (2016). Neurology consultation with Dr. Montenegro, who agrees with medical management and further recommends scheduling medication q 8 hours. She states NSAIDs and opioid medications do not work for her migraines and she is allergic to diphenhydramine thus a trial of those medications will not be attempted. Short term narcotics may be attempted to break pain cycle however, it is noted that the pt has specifically stated that narcotics don't work for her HA  - Decadron 10 mg q 8 hours  - Chlorpromazine 25 mg q 8 hours  - Dihydroergotamine 1 mg q 8 hours  - Ativan 1 mg prn anxiety  - Magnesium oxide 400 mg BID  - Zofran 4 mg q 4 hours prn nausea  - Continue home dose of venlafaxine, topiramate, tizanadine    2. MSK. No complaints.   - ctm    3. CVS. No complaints. Hemodynamically stable today with /55, HR 92  - ctm    4. PULM. No complaints. Satting well on RA at 95%    5. GI. BMI 22. H/o irritable bowel syndrome with chronic constipation and food sensitivities. On bowel protocol. No acute complaints at this time.   - ctm    6. RENAL. Electrolytes wnl except CO2 of 17, and elevated glucose of 121. FHx of DM. Consider further w/u for diabetes as an out-patient    7. HEM/ONC. CBC wnl. Normal absolute neutrophil count. Decreased absolute lymphocyte count     8. ENDO. No acute complaints. No chronic endocrinological issues except for the mildly elevated glucose. Consider further w/u for DM as an out pt.     9. ID. No infectious processes going on. No acute complaints.   - ctm

## 2017-03-06 NOTE — PROGRESS NOTES
Pt resting; no signs of distress. + for light sensitivity;  No change from previous assessment. Fall precautions in place. Will continue to monitor.

## 2017-03-06 NOTE — PROGRESS NOTES
Bedside report received 0720 from BEAU Nuñez. POC discussed with pt; Pt verbalizes improvement in migraine; c/o light sensitivity; Neuro assessment complete per flowsheet, all questions answered at this time.

## 2017-03-07 NOTE — PROGRESS NOTES
Pt dc'd home. IV and monitor removed. Pt left unit via Wheelchair with RN and Father. Personal belongings with pt when leaving unit. Pt given discharge instructions prior to leaving unit including prescription and when to visit with physician; verbalizes understanding. Copy of discharge instructions with pt and in the chart.

## 2017-03-09 ENCOUNTER — TELEPHONE (OUTPATIENT)
Dept: NEUROLOGY | Facility: MEDICAL CENTER | Age: 37
End: 2017-03-09

## 2017-03-09 NOTE — TELEPHONE ENCOUNTER
Dany called to say she was in the hospital again 2 days ago and she was itching while in the hospital and now has broken out into hives all over her body. She would like to know what to do if she gets another migraine since her appointment is not until 3/27.  Thanks!

## 2017-03-14 ENCOUNTER — OFFICE VISIT (OUTPATIENT)
Dept: NEUROLOGY | Facility: MEDICAL CENTER | Age: 37
End: 2017-03-14
Payer: COMMERCIAL

## 2017-03-14 VITALS
SYSTOLIC BLOOD PRESSURE: 114 MMHG | HEART RATE: 105 BPM | HEIGHT: 66 IN | OXYGEN SATURATION: 95 % | RESPIRATION RATE: 16 BRPM | DIASTOLIC BLOOD PRESSURE: 80 MMHG | WEIGHT: 138 LBS | TEMPERATURE: 98.6 F | BODY MASS INDEX: 22.18 KG/M2

## 2017-03-14 DIAGNOSIS — G43.009 MIGRAINE WITHOUT AURA AND WITHOUT STATUS MIGRAINOSUS, NOT INTRACTABLE: Primary | ICD-10-CM

## 2017-03-14 PROCEDURE — 99213 OFFICE O/P EST LOW 20 MIN: CPT | Performed by: PSYCHIATRY & NEUROLOGY

## 2017-03-14 RX ORDER — AMLODIPINE BESYLATE 2.5 MG/1
TABLET ORAL
Qty: 90 TAB | Refills: 1 | Status: SHIPPED | OUTPATIENT
Start: 2017-03-14 | End: 2017-04-20 | Stop reason: SDUPTHER

## 2017-03-14 RX ORDER — DIHYDROERGOTAMINE MESYLATE 4 MG/ML
SPRAY NASAL
Qty: 8 AMPULE | Refills: 3 | Status: SHIPPED | OUTPATIENT
Start: 2017-03-14 | End: 2017-06-07

## 2017-03-14 ASSESSMENT — ENCOUNTER SYMPTOMS
MEMORY LOSS: 0
DEPRESSION: 0
HEADACHES: 1

## 2017-03-14 NOTE — PROGRESS NOTES
"Subjective:      Dany Lambert is a 36 y.o. female who presents for urgent follow-up, with a history of refractory migraine without aura.    HPI    She had been doing well, she has just received another series of Botox injections, and headaches for unclear reasons began to increase. She evidently visited an emergency room twice, initially treated and then discharge, there was some brief interval of headache response, the headaches recurred, she was then reevaluated and admitted for March 4-6, 2017. Following this admission, she actually was headache free for several days but the headaches have again begun to recur. She is describing severe headache attacks, these have limited her ability to work, [employer] still works, the problem being the headaches will recur. She has remained on Topamax 150 mg, twice a day, throughout. She denies any other provoking circumstances that are unusual preceding this headache recurrence. She has failed Depakote in the past, verapamil as well, steroids where never well tolerated. She is asking about sphenopalatine ganglion blocks, something that can be provided in [place].    Medical, surgical and family histories are reviewed, there are no new drug allergies. She is on Topamax 150 mg, twice a day, Maxalt-MLT 10 mg when necessary, birth control, and some vitamin and mineral supplements.    Review of Systems   Constitutional: Positive for malaise/fatigue.   Neurological: Positive for headaches.   Psychiatric/Behavioral: Negative for depression and memory loss.   All other systems reviewed and are negative.       Objective:     /80 mmHg  Pulse 105  Temp(Src) 37 °C (98.6 °F)  Resp 16  Ht 1.676 m (5' 5.98\")  Wt 62.596 kg (138 lb)  BMI 22.28 kg/m2  SpO2 95%     Physical Exam    She appears in no acute distress, other than she is quite frightened and frustrated because of the headaches. Vital signs are stable. There is no malar rash or photophobia. The neck is supple. " Cardiac evaluation reveals a regular rhythm. With observation only, mental status, cranial nerve, motor, and coordination assessments are for the most part intact.     Assessment/Plan:     1. Migraine without aura and without status migrainosus, not intractable  I suspect Topamax has simply lost its efficacy, tolerance to the drug is a documented entity, she has been on the medicine for quite some time. We had increased the dose just a short time ago from 100 mg to 150 mg, twice a day. I don't think that further increases will really provide notable and sustained benefit. Unfortunately, steroids are not tolerated, I do not want to get her back into an emergency room, this has already been tried, she was even admitted for 3 days, and there was no long-term benefit. Norvasc 2.5 mg, twice a day, will be started, in one week increase to 5 mg, twice a day. Side effects were reviewed. Topamax will be continued unchanged for now. She will continue the Maxalt use. She was reassured she is not overusing the drug. This hs not been ongoing long enough to worry about medication-overuse issues. Migranal nasal spray will be ordered on a three-day protocol, used tid over 3 days, studied to be very effective, unfortunately I doubt insurance will cover it. As for the sphenopalatine ganglion blockade, there is some data supporting its use, but limited data suggesting it has long-term benefit in refractory cases. Still, as long as it is performed by the appropriately trained individuals, it certainly might provide help. I will certainly be willing to write the referral into the future. I will follow-up with her otherwise in several months, phone contact as we adjust medicines very    - amlodipine (NORVASC) 2.5 MG Tab; 1 tab bid for 1 week, then 2 tab bid  Dispense: 90 Tab; Refill: 1  - dihydroergotamine (MIGRANAL) 4 MG/ML nasal spray; 1 spray each nostril, wait 10 minutes and repeat. Repeat entire process every 8 hours for three days   Dispense: 8 Ampule; Refill: 3    Time: Evaluation of 20 minutes for exam, review, discussion, and education  Discussion: As mentioned in the assessment, over 50% of the time spent face-to-face counseling and coordinating care

## 2017-03-14 NOTE — MR AVS SNAPSHOT
"        Dany Lambert   3/14/2017 11:20 AM   Office Visit   MRN: [id number]    Department:  [facility]   Dept Phone:  [phone]    Description:  Female : 1980   Provider:  Juan Manuel Griffin M.D.           Reason for Visit     Follow-Up migraine      Allergies as of 3/14/2017     Allergen Noted Reactions    Benadryl Allergy 2016   Anxiety    Demerol 2013   Hives    Medrol [Methylprednisolone] 04/10/2015   Itching    Previfem [Norgestimate-Ethinyl Estradiol] 2015       Nausea/vomiting    Reglan [Metoclopramide] 2016   Anxiety      You were diagnosed with     Migraine without aura and without status migrainosus, not intractable   [195194]  -  Primary       Vital Signs     Blood Pressure Pulse Temperature Respirations Height Weight    114/80 mmHg 105 37 °C (98.6 °F) 16 1.676 m (5' 5.98\") 62.596 kg (138 lb)    Body Mass Index Oxygen Saturation Smoking Status             22.28 kg/m2 95% Former Smoker         Basic Information     Date Of Birth Sex Race Ethnicity Preferred Language    1980 Female White Non- English      Your appointments     May 17, 2017  4:40 PM   BOTOX with Juan Manuel Griffin M.D.   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   10/6/2016 - Present    Migraine with status migrainosus G43.901 High  3/4/2017 - Present    Irritable bowel disease K58.9 Low  3/4/2017 - Present      Health Maintenance        Date Due Completion Dates    IMM DTaP/Tdap/Td Vaccine (1 - Tdap) 1997 ---    PAP SMEAR 2019, 2013 (Done)    Override " on 9/16/2013: Done            Current Immunizations     Hepatitis B Vaccine Recombivax (Adol/Adult) 5/4/2016, 4/20/2012    Influenza TIV (IM) 12/11/2014, 11/5/2013    Influenza Vaccine Quad Inj (Pf) 11/17/2016 11:45 AM, 12/11/2014    Influenza Vaccine Quad Inj (Preserved) 11/23/2015  3:10 PM    Tuberculin Skin Test 5/9/2016  7:40 AM, 5/2/2016  8:42 AM, 11/15/2013  2:44 PM, 11/5/2013  6:55 PM      Below and/or attached are the medications your provider expects you to take. Review all of your home medications and newly ordered medications with your provider and/or pharmacist. Follow medication instructions as directed by your provider and/or pharmacist. Please keep your medication list with you and share with your provider. Update the information when medications are discontinued, doses are changed, or new medications (including over-the-counter products) are added; and carry medication information at all times in the event of emergency situations     Allergies:  BENADRYL ALLERGY - Anxiety     DEMEROL - Hives     MEDROL - Itching     PREVIFEM - (reactions not documented)     REGLAN - Anxiety               Medications  Valid as of: March 14, 2017 - 11:55 AM    Generic Name Brand Name Tablet Size Instructions for use    AmLODIPine Besylate (Tab) NORVASC 2.5 MG 1 tab bid for 1 week, then 2 tab bid        Cetirizine HCl (Cap) Cetirizine HCl 10 MG Take  by mouth.        Coenzyme Q10   Take 1 Cap by mouth every day.        Dihydroergotamine Mesylate (Solution) MIGRANAL 4 MG/ML 1 spray each nostril, wait 10 minutes and repeat. Repeat entire process every 8 hours for three days        Levonorgest-Eth Estrad 91-Day (Tab) Levonorgest-Eth Estrad 91-Day 0.15-0.03 &0.01 MG Take  by mouth.        Magnesium (Tab) Magnesium 100 MG Take 1 Tab by mouth 2 Times a Day.        Omeprazole (CAPSULE DELAYED RELEASE) PRILOSEC 20 MG Take 20 mg by mouth every day.        Rizatriptan Benzoate (TABLET DISPERSIBLE) MAXALT-MLT 10 MG 1 tab at  headache onset; repeat every hour as needed up to #4 tab/24 hours        Topiramate (Capsule ER 24 Hour Sprinkle) Topiramate ER 50 MG Take 150 mg by mouth 2 Times a Day.        Venlafaxine HCl (CAPSULE SR 24 HR) EFFEXOR- MG Take 1 Cap by mouth every day.        .                 Medicines prescribed today were sent to:     [id number] - [place], [place] - [address]    [address] [place] [address]    Phone: [phone] Fax: [phone]    Open 24 Hours?: No    [id number] - [place], [place] - [address]    [address] [place] [address]    Phone: [phone] Fax: [phone]    Open 24 Hours?: No      Medication refill instructions:       If your prescription bottle indicates you have medication refills left, it is not necessary to call your provider’s office. Please contact your pharmacy and they will refill your medication.    If your prescription bottle indicates you do not have any refills left, you may request refills at any time through one of the following ways: The online Cybits system (except Urgent Care), by calling your provider’s office, or by asking your pharmacy to contact your provider’s office with a refill request. Medication refills are processed only during regular business hours and may not be available until the next business day. Your provider may request additional information or to have a follow-up visit with you prior to refilling your medication.   *Please Note: Medication refills are assigned a new Rx number when refilled electronically. Your pharmacy may indicate that no refills were authorized even though a new prescription for the same medication is available at the pharmacy. Please request the medicine by name with the pharmacy before contacting your provider for a refill.           Cybits Access Code: Activation code not generated  Current Cybits Status: Active

## 2017-03-24 ENCOUNTER — TELEPHONE (OUTPATIENT)
Dept: NEUROLOGY | Facility: MEDICAL CENTER | Age: 37
End: 2017-03-24

## 2017-03-24 LAB — EKG IMPRESSION: NORMAL

## 2017-03-24 NOTE — TELEPHONE ENCOUNTER
Pt is calling and stating that she has some additional questions and would like a call back. Called and LM for pt to return my call to discuss her questions and concerns. HERBERT

## 2017-03-27 ENCOUNTER — TELEPHONE (OUTPATIENT)
Dept: NEUROLOGY | Facility: MEDICAL CENTER | Age: 37
End: 2017-03-27

## 2017-03-27 DIAGNOSIS — G43.009 MIGRAINE WITHOUT AURA AND WITHOUT STATUS MIGRAINOSUS, NOT INTRACTABLE: ICD-10-CM

## 2017-03-27 NOTE — TELEPHONE ENCOUNTER
Called and LM for pt that the [facility] paperwork has been faxed to her employer. EHRBERT

## 2017-03-27 NOTE — TELEPHONE ENCOUNTER
Pt is calling and she wants to pursue the Referral to the Pain clinic in [place] to get the Sphenopalatine Ganglion Block. Please advise. Thank you. KA

## 2017-03-28 ENCOUNTER — TELEPHONE (OUTPATIENT)
Dept: BEHAVIORAL HEALTH | Facility: PHYSICIAN GROUP | Age: 37
End: 2017-03-28

## 2017-03-28 ENCOUNTER — TELEPHONE (OUTPATIENT)
Dept: NEUROLOGY | Facility: MEDICAL CENTER | Age: 37
End: 2017-03-28

## 2017-03-28 DIAGNOSIS — G43.009 MIGRAINE WITHOUT AURA AND WITHOUT STATUS MIGRAINOSUS, NOT INTRACTABLE: ICD-10-CM

## 2017-03-28 RX ORDER — TOPIRAMATE 50 MG/1
150 TABLET, FILM COATED ORAL 2 TIMES DAILY
Qty: 180 TAB | Refills: 6 | Status: SHIPPED | OUTPATIENT
Start: 2017-03-28 | End: 2017-09-12 | Stop reason: SDUPTHER

## 2017-03-28 NOTE — TELEPHONE ENCOUNTER
Pt is calling and stating that her insurance is not going to pay for the Topamax ER 50 mg 3 tablets BID. They are charging her 300 dollars a month for this Rx. She would like to get just regular Topamax 50 mg 3 tablets BID. Her co-pay is much cheaper that way. She is asking for a new Rx to be sent to her pharmacy. She only has 1 day left of her medication. Please advise. Thank you. HERBERT

## 2017-04-03 ENCOUNTER — TELEPHONE (OUTPATIENT)
Dept: NEUROLOGY | Facility: MEDICAL CENTER | Age: 37
End: 2017-04-03

## 2017-04-03 NOTE — TELEPHONE ENCOUNTER
Pt is calling and stating that she has an appointment with [facility] pain management on Wednesday and she wants to know the status of her referral. Called and spoke with pt. Auth is still Pending. Referral Dept will submit for Stat Auth now. Pt notified. EHRBERT

## 2017-04-10 ENCOUNTER — TELEPHONE (OUTPATIENT)
Dept: NEUROLOGY | Facility: MEDICAL CENTER | Age: 37
End: 2017-04-10

## 2017-04-10 DIAGNOSIS — G43.009 MIGRAINE WITHOUT AURA AND WITHOUT STATUS MIGRAINOSUS, NOT INTRACTABLE: ICD-10-CM

## 2017-04-10 NOTE — TELEPHONE ENCOUNTER
Pt's mother called and stated that they went to [facility] and did not get the care they expected they would. He did not do the SPG block with a cath. They can not afford to keep going down to [facility]. They are asking for a referral to Dr. Navarro here in [place].  He does the SPG Block but with the needle. They state they would like to keep it local. Referral placed. KA

## 2017-04-20 DIAGNOSIS — G43.009 MIGRAINE WITHOUT AURA AND WITHOUT STATUS MIGRAINOSUS, NOT INTRACTABLE: ICD-10-CM

## 2017-04-24 RX ORDER — AMLODIPINE BESYLATE 2.5 MG/1
TABLET ORAL
Qty: 120 TAB | Refills: 1 | Status: SHIPPED | OUTPATIENT
Start: 2017-04-24 | End: 2017-06-27 | Stop reason: SDUPTHER

## 2017-05-08 DIAGNOSIS — F41.9 ANXIETY: ICD-10-CM

## 2017-05-09 RX ORDER — VENLAFAXINE HYDROCHLORIDE 150 MG/1
CAPSULE, EXTENDED RELEASE ORAL
Qty: 30 CAP | Refills: 1 | Status: SHIPPED | OUTPATIENT
Start: 2017-05-09 | End: 2017-06-14 | Stop reason: SDUPTHER

## 2017-05-17 ENCOUNTER — APPOINTMENT (OUTPATIENT)
Dept: NEUROLOGY | Facility: MEDICAL CENTER | Age: 37
End: 2017-05-17
Payer: COMMERCIAL

## 2017-05-19 ENCOUNTER — APPOINTMENT (OUTPATIENT)
Dept: BEHAVIORAL HEALTH | Facility: PHYSICIAN GROUP | Age: 37
End: 2017-05-19
Payer: COMMERCIAL

## 2017-06-07 ENCOUNTER — HOSPITAL ENCOUNTER (EMERGENCY)
Facility: MEDICAL CENTER | Age: 37
End: 2017-06-07
Attending: EMERGENCY MEDICINE
Payer: COMMERCIAL

## 2017-06-07 VITALS
BODY MASS INDEX: 23.03 KG/M2 | RESPIRATION RATE: 16 BRPM | DIASTOLIC BLOOD PRESSURE: 62 MMHG | WEIGHT: 143.3 LBS | TEMPERATURE: 99 F | SYSTOLIC BLOOD PRESSURE: 104 MMHG | OXYGEN SATURATION: 95 % | HEART RATE: 74 BPM | HEIGHT: 66 IN

## 2017-06-07 DIAGNOSIS — M54.9 PAIN, UPPER BACK: ICD-10-CM

## 2017-06-07 DIAGNOSIS — M54.2 NECK PAIN: ICD-10-CM

## 2017-06-07 LAB
ANION GAP SERPL CALC-SCNC: 10 MMOL/L (ref 0–11.9)
BASOPHILS # BLD AUTO: 0.5 % (ref 0–1.8)
BASOPHILS # BLD: 0.04 K/UL (ref 0–0.12)
BUN SERPL-MCNC: 13 MG/DL (ref 8–22)
CALCIUM SERPL-MCNC: 9.1 MG/DL (ref 8.4–10.2)
CHLORIDE SERPL-SCNC: 109 MMOL/L (ref 96–112)
CO2 SERPL-SCNC: 19 MMOL/L (ref 20–33)
CREAT SERPL-MCNC: 1.02 MG/DL (ref 0.5–1.4)
EOSINOPHIL # BLD AUTO: 0.13 K/UL (ref 0–0.51)
EOSINOPHIL NFR BLD: 1.5 % (ref 0–6.9)
ERYTHROCYTE [DISTWIDTH] IN BLOOD BY AUTOMATED COUNT: 45.5 FL (ref 35.9–50)
GFR SERPL CREATININE-BSD FRML MDRD: >60 ML/MIN/1.73 M 2
GLUCOSE SERPL-MCNC: 91 MG/DL (ref 65–99)
HCT VFR BLD AUTO: 37 % (ref 37–47)
HGB BLD-MCNC: 12.6 G/DL (ref 12–16)
IMM GRANULOCYTES # BLD AUTO: 0.02 K/UL (ref 0–0.11)
IMM GRANULOCYTES NFR BLD AUTO: 0.2 % (ref 0–0.9)
LYMPHOCYTES # BLD AUTO: 2.98 K/UL (ref 1–4.8)
LYMPHOCYTES NFR BLD: 34.4 % (ref 22–41)
MCH RBC QN AUTO: 31 PG (ref 27–33)
MCHC RBC AUTO-ENTMCNC: 34.1 G/DL (ref 33.6–35)
MCV RBC AUTO: 91.1 FL (ref 81.4–97.8)
MONOCYTES # BLD AUTO: 0.69 K/UL (ref 0–0.85)
MONOCYTES NFR BLD AUTO: 8 % (ref 0–13.4)
NEUTROPHILS # BLD AUTO: 4.8 K/UL (ref 2–7.15)
NEUTROPHILS NFR BLD: 55.4 % (ref 44–72)
NRBC # BLD AUTO: 0 K/UL
NRBC BLD AUTO-RTO: 0 /100 WBC
PLATELET # BLD AUTO: 283 K/UL (ref 164–446)
PMV BLD AUTO: 10.4 FL (ref 9–12.9)
POTASSIUM SERPL-SCNC: 3.7 MMOL/L (ref 3.6–5.5)
RBC # BLD AUTO: 4.06 M/UL (ref 4.2–5.4)
SODIUM SERPL-SCNC: 138 MMOL/L (ref 135–145)
WBC # BLD AUTO: 8.7 K/UL (ref 4.8–10.8)

## 2017-06-07 PROCEDURE — 85025 COMPLETE CBC W/AUTO DIFF WBC: CPT

## 2017-06-07 PROCEDURE — 36415 COLL VENOUS BLD VENIPUNCTURE: CPT

## 2017-06-07 PROCEDURE — 96375 TX/PRO/DX INJ NEW DRUG ADDON: CPT

## 2017-06-07 PROCEDURE — 700105 HCHG RX REV CODE 258: Performed by: EMERGENCY MEDICINE

## 2017-06-07 PROCEDURE — 96374 THER/PROPH/DIAG INJ IV PUSH: CPT

## 2017-06-07 PROCEDURE — 96361 HYDRATE IV INFUSION ADD-ON: CPT

## 2017-06-07 PROCEDURE — 80048 BASIC METABOLIC PNL TOTAL CA: CPT

## 2017-06-07 PROCEDURE — 99284 EMERGENCY DEPT VISIT MOD MDM: CPT

## 2017-06-07 PROCEDURE — 700111 HCHG RX REV CODE 636 W/ 250 OVERRIDE (IP): Performed by: EMERGENCY MEDICINE

## 2017-06-07 RX ORDER — KETOROLAC TROMETHAMINE 30 MG/ML
30 INJECTION, SOLUTION INTRAMUSCULAR; INTRAVENOUS ONCE
Status: COMPLETED | OUTPATIENT
Start: 2017-06-07 | End: 2017-06-07

## 2017-06-07 RX ORDER — CETIRIZINE HYDROCHLORIDE 10 MG/1
10 TABLET ORAL DAILY
COMMUNITY

## 2017-06-07 RX ORDER — PREDNISONE 20 MG/1
50 TABLET ORAL ONCE
Status: COMPLETED | OUTPATIENT
Start: 2017-06-07 | End: 2017-06-07

## 2017-06-07 RX ORDER — DIAZEPAM 5 MG/ML
2.5 INJECTION, SOLUTION INTRAMUSCULAR; INTRAVENOUS ONCE
Status: COMPLETED | OUTPATIENT
Start: 2017-06-07 | End: 2017-06-07

## 2017-06-07 RX ORDER — OXYCODONE HYDROCHLORIDE AND ACETAMINOPHEN 5; 325 MG/1; MG/1
1 TABLET ORAL EVERY 4 HOURS PRN
Qty: 18 TAB | Refills: 0 | Status: SHIPPED | OUTPATIENT
Start: 2017-06-07 | End: 2017-07-27

## 2017-06-07 RX ORDER — DIAZEPAM 5 MG/1
5 TABLET ORAL EVERY 6 HOURS PRN
Qty: 15 TAB | Refills: 0 | Status: SHIPPED | OUTPATIENT
Start: 2017-06-07 | End: 2017-07-27

## 2017-06-07 RX ORDER — SODIUM CHLORIDE 9 MG/ML
1000 INJECTION, SOLUTION INTRAVENOUS ONCE
Status: COMPLETED | OUTPATIENT
Start: 2017-06-07 | End: 2017-06-07

## 2017-06-07 RX ORDER — PREDNISONE 20 MG/1
40 TABLET ORAL DAILY
Qty: 8 TAB | Refills: 0 | Status: SHIPPED | OUTPATIENT
Start: 2017-06-07 | End: 2017-06-11

## 2017-06-07 RX ADMIN — HYDROMORPHONE HYDROCHLORIDE 1 MG: 1 INJECTION, SOLUTION INTRAMUSCULAR; INTRAVENOUS; SUBCUTANEOUS at 10:28

## 2017-06-07 RX ADMIN — DIAZEPAM 2.5 MG: 5 INJECTION, SOLUTION INTRAMUSCULAR; INTRAVENOUS at 10:28

## 2017-06-07 RX ADMIN — PREDNISONE 50 MG: 20 TABLET ORAL at 10:28

## 2017-06-07 RX ADMIN — KETOROLAC TROMETHAMINE 30 MG: 30 INJECTION, SOLUTION INTRAMUSCULAR at 10:28

## 2017-06-07 RX ADMIN — SODIUM CHLORIDE 1000 ML: 9 INJECTION, SOLUTION INTRAVENOUS at 10:28

## 2017-06-07 ASSESSMENT — ENCOUNTER SYMPTOMS
FEVER: 0
FALLS: 0
DIZZINESS: 0
NECK PAIN: 1
WEAKNESS: 0
VOMITING: 0
PHOTOPHOBIA: 0
NAUSEA: 0
SHORTNESS OF BREATH: 0
HEADACHES: 1
MYALGIAS: 0
BACK PAIN: 1
TINGLING: 0
FOCAL WEAKNESS: 0

## 2017-06-07 NOTE — ED AVS SNAPSHOT
Home Care Instructions                                                                                                                Dany Lambert   MRN: [id number]    Department:  [facility], Emergency Dept   Date of Visit:  6/7/2017            [facility], Emergency Dept    [address]    [place] [address]    Phone:  [phone]      You were seen by     Rimma Rodrigues D.O.      Your Diagnosis Was     Neck pain     M54.2 Suspect reaction to Botox injections      These are the medications you received during your hospitalization from 06/07/2017 0900 to 06/07/2017 1140     Date/Time Order Dose Route Action    06/07/2017 1028 NS infusion 1,000 mL 1,000 mL Intravenous New Bag    06/07/2017 1028 HYDROmorphone (DILAUDID) injection 1 mg 1 mg Intravenous Given    06/07/2017 1028 ketorolac (TORADOL) injection 30 mg 30 mg Intravenous Given    06/07/2017 1028 diazepam (VALIUM) injection 2.5 mg 2.5 mg Intravenous Given    06/07/2017 1028 predniSONE (DELTASONE) tablet 50 mg 50 mg Oral Given      Follow-up Information     1. Schedule an appointment as soon as possible for a visit with Scotty Navarro D.O..    Specialty:  Phys Med and Rehab    Contact information    [address] [place] [address] [phone]          2. Follow up with [facility], Emergency Dept.    Specialty:  Emergency Medicine    Why:  If symptoms worsen    Contact information    [address] [address] [phone]      Medication Information     Review all of your home medications and newly ordered medications with your primary doctor and/or pharmacist as soon as possible. Follow medication instructions as directed by your doctor and/or pharmacist.     Please keep your complete medication list with you and share with your physician. Update the information when medications are discontinued, doses are changed, or new medications  (including over-the-counter products) are added; and carry medication information at all times in the event of emergency situations.               Medication List      START taking these medications        Instructions    Morning Afternoon Evening Bedtime    diazepam 5 MG Tabs   Commonly known as:  VALIUM        Take 1 Tab by mouth every 6 hours as needed for Anxiety.   Dose:  5 mg                        oxycodone-acetaminophen 5-325 MG Tabs   Commonly known as:  PERCOCET        Take 1 Tab by mouth every four hours as needed.   Dose:  1 Tab                        predniSONE 20 MG Tabs   Last time this was given:  50 mg on 6/7/2017 10:28 AM   Commonly known as:  DELTASONE        Take 2 Tabs by mouth every day for 4 days.   Dose:  40 mg                          ASK your doctor about these medications        Instructions    Morning Afternoon Evening Bedtime    amlodipine 2.5 MG Tabs   Commonly known as:  NORVASC        TAKE 1 TABLET TWICE DAILY FOR 1 WEEK, THEN 2 TABS TWICE DAILY                        cetirizine 10 MG Tabs   Commonly known as:  ZYRTEC        Take 10 mg by mouth every day.   Dose:  10 mg                        CO Q 10 PO        Take 1 Cap by mouth every day.   Dose:  1 Cap                        DAYSEE 0.15-0.03 &0.01 MG Tabs   Generic drug:  Levonorgest-Eth Estrad 91-Day        Take 1 Tab by mouth every day.   Dose:  1 Tab                        Magnesium 100 MG Tabs        Take 1 Tab by mouth 2 Times a Day.   Dose:  1 Tab                        omeprazole 20 MG delayed-release capsule   Commonly known as:  PRILOSEC        Take 20 mg by mouth every day.   Dose:  20 mg                        rizatriptan 10 MG disintegrating tablet   Commonly known as:  MAXALT-MLT        1 tab at headache onset; repeat every hour as needed up to #4 tab/24 hours                        topiramate 50 MG tablet   Commonly known as:  TOPAMAX        Take 3 Tabs by mouth 2 times a day.   Dose:  150 mg                         venlafaxine 150 MG extended-release capsule   Commonly known as:  EFFEXOR-XR        TAKE 1 CAPSULE BY MOUTH EVERY DAY.                             Where to Get Your Medications      You can get these medications from any pharmacy     Bring a paper prescription for each of these medications    - diazepam 5 MG Tabs  - oxycodone-acetaminophen 5-325 MG Tabs  - predniSONE 20 MG Tabs            Procedures and tests performed during your visit     BASIC METABOLIC PANEL    CBC WITH DIFFERENTIAL    ESTIMATED GFR    IV Saline Lock        Discharge Instructions       Take medications as instructed. Follow-up with Dr. Navarro. Watch for signs of worsening symptoms, fever or systemic complaints. If he develop any of these, recommend that he return to the ED for reevaluation.          Patient Information     Patient Information    Following emergency treatment: all patient requiring follow-up care must return either to a private physician or a clinic if your condition worsens before you are able to obtain further medical attention, please return to the emergency room.     Billing Information    At [facility], we work to make the billing process streamlined for our patients.  Our Representatives are here to answer any questions you may have regarding your hospital bill.  If you have insurance coverage and have supplied your insurance information to us, we will submit a claim to your insurer on your behalf.  Should you have any questions regarding your bill, we can be reached online or by phone as follows:  Online: You are able pay your bills online or live chat with our representatives about any billing questions you may have. We are here to help Monday - Friday from 8:00am to 7:30pm and 9:00am - 12:00pm on Saturdays.  Please visit [email or web address]  for more information.   Phone:  [phone] or [phone]    Please note that your emergency physician, surgeon, pathologist,  radiologist, anesthesiologist, and other specialists are not employed by [facility] and will therefore bill separately for their services.  Please contact them directly for any questions concerning their bills at the numbers below:     Emergency Physician Services:  [phone]  [facility]:  [phone]  [facility]:  [phone]  [facility]:  [phone]    1. Your final bill may vary from the amount quoted upon discharge if all procedures are not complete at that time, or if your doctor has additional procedures of which we are not aware. You will receive an additional bill if you return to the Emergency Department at [facility] for suture removal regardless of the facility of which the sutures were placed.     2. Please arrange for settlement of this account at the emergency registration.    3. All self-pay accounts are due in full at the time of treatment.  If you are unable to meet this obligation then payment is expected within 4-5 days.     4. If you have had radiology studies (CT, X-ray, Ultrasound, MRI), you have received a preliminary result during your emergency department visit. Please contact the radiology department [phone] to receive a copy of your final result. Please discuss the Final result with your primary physician or with the follow up physician provided.     Crisis Hotline:  [place] Crisis Hotline:  4-621-SRVOZSB or 1-134.423.2432  [place] Crisis Hotline:    1-364.514.3586 or 756-905-2740         ED Discharge Follow Up Questions    1. In order to provide you with very good care, we would like to follow up with a phone call in the next few days.  May we have your permission to contact you?     YES /  NO    2. What is the best phone number to call you? (       )_____-__________    3. What is the best time to call you?      Morning  /  Afternoon  /  Evening                   Patient Signature:   ____________________________________________________________    Date:  ____________________________________________________________

## 2017-06-07 NOTE — ED NOTES
Pt c/o severe upper back pain and neck pain. Pt states has been going on intermittently for approx 3 weeks since she got a cervical block and botox treatment. Pt states pain has gotten worse. Saw  On Monday and received trigger point injections of lidocaine and felt better until pt went to work last night and now pain is worse.

## 2017-06-07 NOTE — ED AVS SNAPSHOT
[employer] Access Code: Activation code not generated  Current [employer] Status: Active    LineRate Systemshart  A secure, online tool to manage your health information     [employer]’s [employer]® is a secure, online tool that connects you to your personalized health information from the privacy of your home -- day or night - making it very easy for you to manage your healthcare. Once the activation process is completed, you can even access your medical information using the [employer] tristin, which is available for free in the Apple Tristin store or Google Play store.     [employer] provides the following levels of access (as shown below):   My Chart Features   [facility] Primary Care Doctor [facility]  Specialists [facility]  Urgent  Care Non-[facility]  Primary Care  Doctor   Email your healthcare team securely and privately 24/7 X X X X   Manage appointments: schedule your next appointment; view details of past/upcoming appointments X      Request prescription refills. X      View recent personal medical records, including lab and immunizations X X X X   View health record, including health history, allergies, medications X X X X   Read reports about your outpatient visits, procedures, consult and ER notes X X X X   See your discharge summary, which is a recap of your hospital and/or ER visit that includes your diagnosis, lab results, and care plan. X X       How to register for [employer]:  1. Go to  [email or web address].  2. Click on the Sign Up Now box, which takes you to the New Member Sign Up page. You will need to provide the following information:  a. Enter your [employer] Access Code exactly as it appears at the top of this page. (You will not need to use this code after you’ve completed the sign-up process. If you do not sign up before the expiration date, you must request a new code.)   b. Enter your date of birth.   c. Enter your home email address.   d. Click Submit, and follow the next screen’s instructions.  3. Create a [employer] ID. This will  be your [employer] login ID and cannot be changed, so think of one that is secure and easy to remember.  4. Create a [employer] password. You can change your password at any time.  5. Enter your Password Reset Question and Answer. This can be used at a later time if you forget your password.   6. Enter your e-mail address. This allows you to receive e-mail notifications when new information is available in [employer].  7. Click Sign Up. You can now view your health information.    For assistance activating your [employer] account, call [phone]

## 2017-06-07 NOTE — DISCHARGE INSTRUCTIONS
Take medications as instructed. Follow-up with Dr. Navarro. Watch for signs of worsening symptoms, fever or systemic complaints. If he develop any of these, recommend that he return to the ED for reevaluation.

## 2017-06-07 NOTE — ED AVS SNAPSHOT
6/7/2017    Dany Lambert  [address]  [place] [address]    Dear Dany:    [facility] wants to ensure your discharge home is safe and you or your loved ones have had all of your questions answered regarding your care after you leave the hospital.    Below is a list of resources and contact information should you have any questions regarding your hospital stay, follow-up instructions, or active medical symptoms.    Questions or Concerns Regarding… Contact   Medical Questions Related to Your Discharge  (7 days a week, 8am-5pm) Contact a Nurse Care Coordinator   [phone]   Medical Questions Not Related to Your Discharge  (24 hours a day / 7 days a week)  Contact the Nurse Health Line   [phone]    Medications or Discharge Instructions Refer to your discharge packet   or contact your [facility] Primary Care Provider   [phone]   Follow-up Appointment(s) Schedule your appointment via [employer]   or contact Scheduling [phone]   Billing Review your statement via [employer]  or contact Billing [phone]   Medical Records Review your records via [employer]   or contact Medical Records [phone]     You may receive a telephone call within two days of discharge. This call is to make certain you understand your discharge instructions and have the opportunity to have any questions answered. You can also easily access your medical information, test results and upcoming appointments via the [employer] free online health management tool. You can learn more and sign up at [email or web address]. For assistance setting up your [employer] account, please call [phone].    Once again, we want to ensure your discharge home is safe and that you have a clear understanding of any next steps in your care. If you have any questions or concerns, please do not hesitate to contact us, we are here for you. Thank you for choosing [facility] for your healthcare needs.    Sincerely,    Your [facility] Healthcare Team

## 2017-06-07 NOTE — ED PROVIDER NOTES
"ED Provider Note    ED Provider Note    Scribed for Rimma Rodrigues D.O. by Rimma Rodrigues. 6/7/2017, 9:58 AM.    Primary care provider: Pcp Pt States None  Means of arrival: POV  History obtained from: Patient  History limited by: None    CHIEF COMPLAINT  Chief Complaint   Patient presents with   • Neck Pain   • Back Pain       HPI  Dany Lambert is a 36 y.o. female who presents to the Emergency Department with a chief complaint of lower neck and pain across the back of her shoulders. The patient is known to me from prior ED visits. She is also a colleague. She gives additional history that she is being worked up to have an ablation of her neck for purposes of hopefully alleviating her chronic severe migraines. In preparation for this, the patient underwent a 1 hour block by Dr. Navarro at [facility]. This is a block of her cervical spine and she received pain relief for several hours. Subsequently, she underwent a \"5 hour block which caused pain in her shoulders that lasted for about a week before going away. Because she was having persistent pain, she will return to [facility] and underwent Botox injections which she has had many times in the past albeit not at this particular office. She received them in her scalp, her occipital area, forehead area and across her upper shoulders. Since that time, she's had severe pain across her upper back and lower neck posteriorly. She returned on Monday to advanced pain for these symptoms and she underwent lidocaine trigger point injections which did improve but was only temporary relief before the symptoms returned. She presents today because of the patient's symptoms are intolerable. She is tried Ultram and eating ice and heat. She tried prescription strength ibuprofen without resolve. No fever. No systemic symptoms. No recent trauma or falls. No neurologic deficits.     REVIEW OF SYSTEMS  Review of Systems   Constitutional: Negative for fever. "   HENT: Negative for congestion.    Eyes: Negative for photophobia.   Respiratory: Negative for shortness of breath.    Cardiovascular: Negative for chest pain.   Gastrointestinal: Negative for nausea and vomiting.   Musculoskeletal: Positive for back pain and neck pain. Negative for myalgias and falls.   Neurological: Positive for headaches. Negative for dizziness, tingling, focal weakness and weakness.       PAST MEDICAL HISTORY   has a past medical history of Migraine (8/26/2013); Anxiety (8/26/2013); and Allergy, unspecified not elsewhere classified.    SURGICAL HISTORY   has past surgical history that includes tonsillectomy and other.    SOCIAL HISTORY  Social History   Substance Use Topics   • Smoking status: Former Smoker -- 15 years     Types: Cigarettes   • Smokeless tobacco: Never Used   • Alcohol Use: No      Comment: denies      History   Drug Use No       FAMILY HISTORY  Family History   Problem Relation Age of Onset   • Hypertension Father    • Diabetes Maternal Grandfather    • Cancer Paternal Grandfather    • Alzheimer's Disease Paternal Grandfather    • Cancer Paternal Grandmother 30     breast   • Suicide Attempts Maternal Uncle      Killed himself by GSW       CURRENT MEDICATIONS  Home Medications     Reviewed by Celso Mo (Pharmacy Tech) on 06/07/17 at 1039  Med List Status: Complete    Medication Last Dose Status    amlodipine (NORVASC) 2.5 MG Tab 6/7/2017 Active    cetirizine (ZYRTEC) 10 MG Tab 6/6/2017 Active    Coenzyme Q10 (CO Q 10 PO) 6/7/2017 Active    Levonorgest-Eth Estrad 91-Day (DAYSEE) 0.15-0.03 &0.01 MG Tab 6/7/2017 Active    Magnesium 100 MG Tab 6/7/2017 Active    omeprazole (PRILOSEC) 20 MG delayed-release capsule 6/7/2017 Active    rizatriptan (MAXALT-MLT) 10 MG disintegrating tablet PRN Active    topiramate (TOPAMAX) 50 MG tablet 6/7/2017 Active    venlafaxine (EFFEXOR-XR) 150 MG extended-release capsule 6/7/2017 Active                ALLERGIES  Allergies   Allergen  "Reactions   • Benadryl Allergy Anxiety   • Demerol Hives   • Medrol [Methylprednisolone] Itching   • Previfem [Norgestimate-Ethinyl Estradiol]      Nausea/vomiting   • Reglan [Metoclopramide] Anxiety       PHYSICAL EXAM  VITAL SIGNS: /85 mmHg  Pulse 74  Temp(Src) 37.2 °C (99 °F)  Resp 16  Ht 1.676 m (5' 6\")  Wt 65 kg (143 lb 4.8 oz)  BMI 23.14 kg/m2  SpO2 99%  LMP 06/07/2016  Vitals reviewed.  Constitutional: Patient is oriented to person, place, and time. Appears well-developed and well-nourished. Mild distress. tearful, frustrated  Head: Normocephalic and atraumatic.   Ears: Normal external ears bilaterally.   Mouth/Throat: Oropharynx is clear and moist  Eyes: Conjunctivae are normal. Pupils are equal, round, and reactive to light.   Neck: Normal range of motion. Neck supple. There is diffuse pain across her upper back from shoulder to shoulder not including her shoulder joints. And extending up the posterior neck. There are scattered areas of erythema. These areas are not raised. There is diffuse mild swelling to the area.  Cardiovascular: Normal rate, regular rhythm and normal heart sounds.   Pulmonary/Chest: Effort normal and breath sounds normal. No respiratory distress, no wheezes, rhonchi, or rales.  Abdominal: Soft. Bowel sounds are normal. There is no tenderness, rebound or guarding, or peritoneal signs.  Musculoskeletal: No edema and no tenderness.   Lymphadenopathy: No cervical adenopathy.   Neurological: No focal deficits.   Skin: Skin is warm and dry. No erythema. No pallor.   Psychiatric: Patient has a normal mood and affect.     LABS  Results for orders placed or performed during the hospital encounter of 06/07/17   CBC WITH DIFFERENTIAL   Result Value Ref Range    WBC 8.7 4.8 - 10.8 K/uL    RBC 4.06 (L) 4.20 - 5.40 M/uL    Hemoglobin 12.6 12.0 - 16.0 g/dL    Hematocrit 37.0 37.0 - 47.0 %    MCV 91.1 81.4 - 97.8 fL    MCH 31.0 27.0 - 33.0 pg    MCHC 34.1 33.6 - 35.0 g/dL    RDW 45.5 " 35.9 - 50.0 fL    Platelet Count 283 164 - 446 K/uL    MPV 10.4 9.0 - 12.9 fL    Neutrophils-Polys 55.40 44.00 - 72.00 %    Lymphocytes 34.40 22.00 - 41.00 %    Monocytes 8.00 0.00 - 13.40 %    Eosinophils 1.50 0.00 - 6.90 %    Basophils 0.50 0.00 - 1.80 %    Immature Granulocytes 0.20 0.00 - 0.90 %    Nucleated RBC 0.00 /100 WBC    Neutrophils (Absolute) 4.80 2.00 - 7.15 K/uL    Lymphs (Absolute) 2.98 1.00 - 4.80 K/uL    Monos (Absolute) 0.69 0.00 - 0.85 K/uL    Eos (Absolute) 0.13 0.00 - 0.51 K/uL    Baso (Absolute) 0.04 0.00 - 0.12 K/uL    Immature Granulocytes (abs) 0.02 0.00 - 0.11 K/uL    NRBC (Absolute) 0.00 K/uL   BASIC METABOLIC PANEL   Result Value Ref Range    Sodium 138 135 - 145 mmol/L    Potassium 3.7 3.6 - 5.5 mmol/L    Chloride 109 96 - 112 mmol/L    Co2 19 (L) 20 - 33 mmol/L    Glucose 91 65 - 99 mg/dL    Bun 13 8 - 22 mg/dL    Creatinine 1.02 0.50 - 1.40 mg/dL    Calcium 9.1 8.4 - 10.2 mg/dL    Anion Gap 10.0 0.0 - 11.9   ESTIMATED GFR   Result Value Ref Range    GFR If African American >60 >60 mL/min/1.73 m 2    GFR If Non African American >60 >60 mL/min/1.73 m 2       All labs reviewed by me.    RADIOLOGY  No orders to display     The radiologist's interpretation of all radiological studies have been reviewed by me.    COURSE & MEDICAL DECISION MAKING  Pertinent Labs & Imaging studies reviewed. (See chart for details)    Obtained and reviewed past medical records. She has been seen multiple times in the past for migraine headaches including visits in March and February of this year. In addition to April and July of last year. She doesn't urgent follow-up appointment Dr. Yoder in March of this year following her ED visit. The notes and recommendations and changes made at that visit were noted.    9:58 AM - Patient seen and examined at bedside. Patient is clearly frustrated. She's been struggling with pain for a few weeks now. She is attempted multiple home remedies which have not been  particularly helpful. She did receive improvement after trigger point injection but it was only temporary. She is concerned about possible infection. This point, I suspect that this is an acute process given all the things that have gone on recently. Will try to support her through the acute pain phase. I think she could benefit from steroids, pain medication and a muscle relaxer. She is not driving herself home. In addition, I think she could benefit from IV fluids and will assess labs for possible infection although there does not appear to be an acute cellulitis and certainly no clinical symptoms to suggest abscess at this time.     11:11 AM labs were reviewed. White blood cell count normal. Patient has a slightly low CO2 on chemistry but otherwise basic metabolic panel was normal.    11:36 AM patient's reevaluated. She is feeling significantly better. They're still one spot that is causing her trouble but overall much improvement. We discussed lab values which were overall unrevealing. At this time, I feel she is safe for discharge to home. She'll be treated with a short course of prednisone, short course of Percocet as well as a muscle relaxer, Valium. I have checked her  and she has low Narc sedative scores. She'll follow up with Dr. Navarro for continued care. She is given strict return precautions.     She'll be discharged to home in stable condition.      FINAL IMPRESSION  1. Neck pain    2. Pain, upper back

## 2017-06-07 NOTE — ED NOTES
IV started. Labs drawn and sent. Medicated pt per ERP order. No other needs or complaints. Call light within reach

## 2017-06-14 DIAGNOSIS — F41.9 ANXIETY: ICD-10-CM

## 2017-06-14 RX ORDER — VENLAFAXINE HYDROCHLORIDE 150 MG/1
150 CAPSULE, EXTENDED RELEASE ORAL
Qty: 30 CAP | Refills: 6 | Status: SHIPPED | OUTPATIENT
Start: 2017-06-14 | End: 2018-01-29 | Stop reason: SDUPTHER

## 2017-06-27 DIAGNOSIS — G43.019 INTRACTABLE MIGRAINE WITHOUT AURA AND WITHOUT STATUS MIGRAINOSUS: ICD-10-CM

## 2017-06-27 RX ORDER — AMLODIPINE BESYLATE 2.5 MG/1
TABLET ORAL
Qty: 120 TAB | Refills: 6 | Status: SHIPPED | OUTPATIENT
Start: 2017-06-27 | End: 2017-09-12

## 2017-07-05 ENCOUNTER — TELEPHONE (OUTPATIENT)
Dept: MEDICAL GROUP | Age: 37
End: 2017-07-05

## 2017-07-05 NOTE — TELEPHONE ENCOUNTER
ESTABLISHED PATIENT PRE-VISIT PLANNING     Note: Patient will not be contacted if there is no indication to call.     1.  Reviewed notes from the last few office visits within the medical group: Yes    2.  If any orders were placed at last visit or intended to be done for this visit (i.e. 6 mos follow-up), do we have Results/Consult Notes?        •  Labs - Labs ordered, completed and results are in chart.       •  Imaging - Imaging ordered, completed and results are in chart.       •  Referrals - No referrals were ordered at last office visit.    3. Is this appointment scheduled as a Hospital Follow-Up? No    4.  Immunizations were updated in Epic using WebIZ?: Epic matches WebIZ       •  Web Iz Recommendations: HEPATITIS A  HEPATITIS B    5.  Patient is due for the following Health Maintenance Topics:   There are no preventive care reminders to display for this patient.    - Patient is up-to-date on all Health Maintenance topics. No records have been requested at this time.    6.  Patient was NOT informed to arrive 15 min prior to their scheduled appointment and bring in their medication bottles.

## 2017-07-10 ENCOUNTER — APPOINTMENT (OUTPATIENT)
Dept: MEDICAL GROUP | Age: 37
End: 2017-07-10
Payer: COMMERCIAL

## 2017-07-24 DIAGNOSIS — G43.109 MIGRAINE WITH AURA AND WITHOUT STATUS MIGRAINOSUS, NOT INTRACTABLE: ICD-10-CM

## 2017-07-25 RX ORDER — RIZATRIPTAN BENZOATE 10 MG/1
TABLET, ORALLY DISINTEGRATING ORAL
Qty: 9 TAB | Refills: 11 | Status: SHIPPED | OUTPATIENT
Start: 2017-07-25 | End: 2017-09-12 | Stop reason: SDUPTHER

## 2017-07-27 ENCOUNTER — APPOINTMENT (OUTPATIENT)
Dept: ADMISSIONS | Facility: MEDICAL CENTER | Age: 37
End: 2017-07-27
Attending: PHYSICAL MEDICINE & REHABILITATION
Payer: COMMERCIAL

## 2017-07-27 NOTE — OP REPORT
Surgeon: Scotty Navarro DO    Assistants:  None    Preoperative diagnosis:  Cervical spondylosis    Post operative diagnosis:  Cervical spondylosis    Type of Sedation:  Local anesthesia and conscious sedation    Sedation time:17 minutes    Site of Service: sonarDesign     Procedure:  1. Left Third Occipital Nerve Radiofrequency Ablation Under Fluoroscopy  2. Left C3 Medial Branch Nerve Radiofrequency Ablation Under Fluoroscopy  3. Left C4 Medial Branch Nerve Radiofrequency Ablation Under Fluoroscopy    After discussing risks, benefits, and alternatives to the procedure, the patient expressed understanding and wished to proceed.  The patient was brought into the fluoroscopy suite and placed in the prone position.  Procedural pause was conducted to verify: correct patient identity, procedure to be performed and as applicable, correct side and site, correct patient position, and availability of implants, special equipment or special instruments.      The skin was sterilely prepped and draped in the usual fashion using betadine times three in the region of the injections.      Left Third Occipital Nerve Radiofrequency Ablation:  Using fluoroscopy, the left posterolateral margin of the C3 vertebra was identified and marked.  The skin and subcutaneous tissues were anesthetized with 1% lidocaine using a 25 gauge 1.5 inch needle.  Using fluoroscopic guidance, a 100 mm SMK RF cannulus with a 10 mm active tip was then inserted down to the left posterolateral margin of the C3 vertebra.  Sensory testing was performed at 50Hz up to 1 volt.  Motor testing was performed at 2Hz up to 3.5 volts.  With final needle positioning, there was no evidence of radicular stimulation.  Prior to lesioning, 1 cc of 1% lidocaine was injected.  Lesion was performed at 80 degrees Celcius for 90 seconds once.  After lesioning a solution of 0.5 cc of 0.5% Bupivicaine and 0.5 cc of Celestone (6mg/ml) was injected.  A spot film was taken  for documentation purposes.  Following the procedure the needle was withdrawn slightly and flushed with 1% Lidocaine as it was withdrawn.      Left TON RF probe spot film:  A single A-P spot film was taken of the probe   placement which documented that the probe position was at the left posterolateral margin of the C3 vertebra.    Left C3 Medial Branch Nerve Radiofrequency Ablation: Using fluoroscopy, the waist of the left side of the C3 lateral mass was identified and marked.  The skin and subcutaneous tissues were anesthetized with 1% lidocaine using a 25 gauge 1.5 inch needle.  Using fluoroscopic guidance, a 100 mm SMK RF cannulus with a 10 mm active tip was then inserted down to the waist of the left side of the C3 lateral mass.  Sensory testing was performed at 50Hz up to 1 volt.  Motor testing was performed at 2Hz up to 3.5 volts.  With final needle positioning, there was no evidence of radicular stimulation.  Prior to lesioning, 1 cc of 1% lidocaine was injected.  Lesion was performed at 80 degrees Celcius for 90 seconds once.  After lesioning a solution of 0.5 cc of 0.5% Bupivicaine and 0.5 cc of Celestone (6mg/ml) was injected.  A spot film was taken for documentation purposes.  Following the procedure the needle was withdrawn slightly and flushed with 1% Lidocaine as it was withdrawn.      Left C3 RF probe spot film:  A single A-P spot film was taken of the probe   placement which documented that the probe position was at the waist of the left side of the C3 lateral mass.    Left C4 Medial Branch Nerve Radiofrequency Ablation:  Using fluoroscopy, the waist of the left side of the C4 lateral mass was identified and marked.  The skin and subcutaneous tissues were anesthetized with 1% lidocaine using a 25 gauge 1.5 inch needle.  Using fluoroscopic guidance, a 100 mm SMK RF cannulus with a 10 mm active tip was then inserted down to the waist of the left side of the C4 lateral mass.  Sensory testing was  performed at 50Hz up to 1 volt.  Motor testing was performed at 2Hz up to 3.5 volts.  With final needle positioning, there was no evidence of radicular stimulation.  Prior to lesioning, 1 cc of 1% lidocaine was injected.  Lesion was performed at 80 degrees Celcius for 90 seconds once.  After lesioning a solution of 0.5 cc of 0.5% Bupivicaine and 0.5 cc of Celestone (6mg/ml) was injected.  A spot film was taken for documentation purposes.  Following the procedure the needle was withdrawn slightly and flushed with 1% Lidocaine as it was withdrawn.      Left C4 RF probe spot film:  A single A-P spot film was taken of the probe   placement which documented that the probe position was at the waist of the left side of the C4 lateral mass.      The patient tolerated the procedure well and there were no apparent complications.  After an appropriate amount of observation, the patient was dismissed from the clinic in good condition under their own power.    Complications:  None    Disposition:   1.  The patient was discharged to the recovery area in good condition.  2.  Discharge instructions were provided and explained.  3.  Patient was instructed to call with any questions or problems.  4.  Apply ice to the procedure site and keep clean and dry for 24 hours.  5.  Medications were reviewed for efficacy and appropriateness.  6.  Continue current medications.  7.  Return in 1 to 2 weeks for follow up.

## 2017-07-27 NOTE — OR NURSING
Instructed pt to follow Dr Navarro's preop instructions... Pt states she was instructed to be NPO 8 hrs prior, including water. Reviewed Preparing for your procedure instructions with pt via phone.

## 2017-07-28 ENCOUNTER — HOSPITAL ENCOUNTER (OUTPATIENT)
Facility: MEDICAL CENTER | Age: 37
End: 2017-07-28
Attending: PHYSICAL MEDICINE & REHABILITATION | Admitting: PHYSICAL MEDICINE & REHABILITATION
Payer: COMMERCIAL

## 2017-07-28 VITALS
DIASTOLIC BLOOD PRESSURE: 66 MMHG | BODY MASS INDEX: 22.5 KG/M2 | HEIGHT: 66 IN | OXYGEN SATURATION: 95 % | RESPIRATION RATE: 12 BRPM | HEART RATE: 60 BPM | TEMPERATURE: 97.7 F | WEIGHT: 140 LBS | SYSTOLIC BLOOD PRESSURE: 107 MMHG

## 2017-07-28 PROBLEM — M47.812 CERVICAL SPONDYLOSIS: Status: ACTIVE | Noted: 2017-07-28

## 2017-07-28 PROCEDURE — 160048 HCHG OR STATISTICAL LEVEL 1-5: Performed by: PHYSICAL MEDICINE & REHABILITATION

## 2017-07-28 PROCEDURE — 700101 HCHG RX REV CODE 250

## 2017-07-28 PROCEDURE — 700105 HCHG RX REV CODE 258: Performed by: PHYSICAL MEDICINE & REHABILITATION

## 2017-07-28 PROCEDURE — 99152 MOD SED SAME PHYS/QHP 5/>YRS: CPT | Performed by: PHYSICAL MEDICINE & REHABILITATION

## 2017-07-28 PROCEDURE — 64633 DESTROY CERV/THOR FACET JNT: CPT | Performed by: PHYSICAL MEDICINE & REHABILITATION

## 2017-07-28 PROCEDURE — 160002 HCHG RECOVERY MINUTES (STAT): Performed by: PHYSICAL MEDICINE & REHABILITATION

## 2017-07-28 PROCEDURE — 700111 HCHG RX REV CODE 636 W/ 250 OVERRIDE (IP)

## 2017-07-28 PROCEDURE — 64634 DESTROY C/TH FACET JNT ADDL: CPT | Performed by: PHYSICAL MEDICINE & REHABILITATION

## 2017-07-28 RX ORDER — SODIUM CHLORIDE, SODIUM LACTATE, POTASSIUM CHLORIDE, CALCIUM CHLORIDE 600; 310; 30; 20 MG/100ML; MG/100ML; MG/100ML; MG/100ML
INJECTION, SOLUTION INTRAVENOUS CONTINUOUS
Status: DISCONTINUED | OUTPATIENT
Start: 2017-07-28 | End: 2017-07-28 | Stop reason: HOSPADM

## 2017-07-28 RX ORDER — BUPIVACAINE HYDROCHLORIDE 5 MG/ML
INJECTION, SOLUTION EPIDURAL; INTRACAUDAL
Status: DISCONTINUED | OUTPATIENT
Start: 2017-07-28 | End: 2017-07-28 | Stop reason: HOSPADM

## 2017-07-28 RX ORDER — BETAMETHASONE SODIUM PHOSPHATE AND BETAMETHASONE ACETATE 3; 3 MG/ML; MG/ML
INJECTION, SUSPENSION INTRA-ARTICULAR; INTRALESIONAL; INTRAMUSCULAR; SOFT TISSUE
Status: DISCONTINUED | OUTPATIENT
Start: 2017-07-28 | End: 2017-07-28 | Stop reason: HOSPADM

## 2017-07-28 RX ORDER — MIDAZOLAM HYDROCHLORIDE 1 MG/ML
INJECTION INTRAMUSCULAR; INTRAVENOUS
Status: DISCONTINUED | OUTPATIENT
Start: 2017-07-28 | End: 2017-07-28 | Stop reason: HOSPADM

## 2017-07-28 RX ADMIN — SODIUM CHLORIDE, POTASSIUM CHLORIDE, SODIUM LACTATE AND CALCIUM CHLORIDE: 600; 310; 30; 20 INJECTION, SOLUTION INTRAVENOUS at 07:56

## 2017-07-28 ASSESSMENT — PAIN SCALES - GENERAL
PAINLEVEL_OUTOF10: 3
PAINLEVEL_OUTOF10: 0
PAINLEVEL_OUTOF10: 0

## 2017-07-28 NOTE — IP AVS SNAPSHOT
" Home Care Instructions                                                                                                                Name:Dany Lambert  Medical Record Number:[id number]  CSN: [id number]    YOB: 1980   Age: 36 y.o.  Sex: female  HT:1.676 m (5' 5.98\") WT: 63.504 kg (140 lb)          Admit Date: 7/28/2017     Discharge Date:   Today's Date: 7/28/2017  Attending Doctor:  Scotty Navarro D.O.                  Allergies:  Benadryl allergy; Demerol; Medrol; Previfem; and Reglan                Discharge Instructions          HOME CARE INSTRUCTIONS      1. Resume usual diet and medications unless otherwise instructed by your physician.  2. You may experience tenderness in your low back/neck after the procedure for the next 24 hours.  The pain you have may worsen in the next day or so.  It can take up to one week to get relief from the injection.  For post-procedure pain, over-the-counter non-aspirin analgesic is recommended.  3. Apply ice packs to the injection site intermittently for the next 24 hours (apply for 15 minutes, remove for 30 minutes, reapply ice, etc.)  Do not apply heat to this area for the next 24 hours.  4. Resume non-strenuous activity as prior to injection unless otherwise instructed by your physician.  5. If given local anesthesia with your procedure, you may experience warmth, tingling and/or numbness in your extremities following the injection.  6. It is important that you take extra time and precautions to prevent possible stumbling or falling, especially if you are taking medications that may cause drowsiness.  7. Do not take a tub bath for a few days after your procedure.  Showers are okay.  8. You should not drive for 24 hours after the procedure.  A ride home after the procedure is required.  If you received sedation, you must have a responsible adult to stay with you for 24 hours.  9. No alcohol for 24 hours if you receive sedation.  10. Do not make any " important decisions or sign any legal documents for 24 hours after receiving sedation.  11. If you experience any loss of bowel or bladder control, contact your physician.  12. Your physician will discuss follow-up arrangements with you.  If you  have any questions or problems please call your physician.       Dr. Navarro [phone]      I hereby acknowledge that I understand and accept these instructions.                            Medication List      ASK your doctor about these medications        Instructions    Morning Afternoon Evening Bedtime    amlodipine 2.5 MG Tabs   Commonly known as:  NORVASC        TAKE 1 TABLET TWICE DAILY FOR 1 WEEK, THEN 2 TABS TWICE DAILY                        cetirizine 10 MG Tabs   Commonly known as:  ZYRTEC        Take 10 mg by mouth every day.   Dose:  10 mg                        CO Q 10 PO        Take 1 Cap by mouth every day.   Dose:  1 Cap                        DAYSEE 0.15-0.03 &0.01 MG Tabs   Generic drug:  Levonorgest-Eth Estrad 91-Day        Take 1 Tab by mouth every day.   Dose:  1 Tab                        Magnesium 100 MG Tabs        Take 1 Tab by mouth 2 Times a Day.   Dose:  1 Tab                        omeprazole 20 MG delayed-release capsule   Commonly known as:  PRILOSEC        Take 20 mg by mouth every day.   Dose:  20 mg                        rizatriptan 10 MG disintegrating tablet   Commonly known as:  MAXALT-MLT        1 TAB AT HEADACHE ONSET REPEAT EVERY HOUR AS NEEDED UP TO #4 TAB/24 HOURS                        topiramate 50 MG tablet   Commonly known as:  TOPAMAX        Take 3 Tabs by mouth 2 times a day.   Dose:  150 mg                        venlafaxine 150 MG extended-release capsule   Commonly known as:  EFFEXOR-XR        Take 1 Cap by mouth every day.   Dose:  150 mg                                Medication Information     Above and/or attached are the medications your physician expects you to take upon discharge. Review all of your home  medications and newly ordered medications with your doctor and/or pharmacist. Follow medication instructions as directed by your doctor and/or pharmacist. Please keep your medication list with you and share with your physician. Update the information when medications are discontinued, doses are changed, or new medications (including over-the-counter products) are added; and carry medication information at all times in the event of emergency situations.        Resources     Quit Smoking / Tobacco Use:    I understand the use of any tobacco products increases my chance of suffering from future heart disease or stroke and could cause other illnesses which may shorten my life. Quitting the use of tobacco products is the single most important thing I can do to improve my health. For further information on smoking / tobacco cessation call a Toll Free Quit Line at 1-876.667.9928 (*[employer] [place]) or 1-168.422.3072 (American [employer] Association) or you can access the web based program at www.lungusa.org.    [place] Tobacco Users Help Line:  (697) 574-7702       Toll Free: 1-324.807.1893  Quit Tobacco Program [facility] (981)715-9052    Crisis Hotline:    [place] Crisis Hotline:  7-556-LIQFMNO or 1-712.488.2163    [place] Crisis Hotline:    1-907.360.2978 or 078-579-2962    Discharge Survey:   Thank you for choosing [facility]. We hope we did everything we could to make your hospital stay a pleasant one. You may be receiving a survey and we would appreciate your time and participation in answering the questions. Your input is very valuable to us in our efforts to improve our service to our patients and their families.            Signatures     My signature on this form indicates that:    1. I acknowledge receipt and understanding of these Home Care Instruction.  2. My questions regarding this information have been answered to my satisfaction.  3. I have formulated a plan with my discharge  nurse to obtain my prescribed medications for home.    __________________________________      __________________________________                   Patient Signature                                 Guardian/Responsible Adult Signature      __________________________________                 __________       ________                       Nurse Signature                                               Date                 Time

## 2017-07-28 NOTE — IP AVS SNAPSHOT
7/28/2017    Dany Lambert  [address]  [place] [address]    Dear Dany:    [facility] wants to ensure your discharge home is safe and you or your loved ones have had all of your questions answered regarding your care after you leave the hospital.    Below is a list of resources and contact information should you have any questions regarding your hospital stay, follow-up instructions, or active medical symptoms.    Questions or Concerns Regarding… Contact   Medical Questions Related to Your Discharge  (7 days a week, 8am-5pm) Contact a Nurse Care Coordinator   [phone]   Medical Questions Not Related to Your Discharge  (24 hours a day / 7 days a week)  Contact the Nurse Health Line   [phone]    Medications or Discharge Instructions Refer to your discharge packet   or contact your [facility] Primary Care Provider   [phone]   Follow-up Appointment(s) Schedule your appointment via eCommHub   or contact Scheduling [phone]   Billing Review your statement via eCommHub  or contact Billing [phone]   Medical Records Review your records via eCommHub   or contact Medical Records [phone]     You may receive a telephone call within two days of discharge. This call is to make certain you understand your discharge instructions and have the opportunity to have any questions answered. You can also easily access your medical information, test results and upcoming appointments via the eCommHub free online health management tool. You can learn more and sign up at [email or web address]. For assistance setting up your eCommHub account, please call [phone].    Once again, we want to ensure your discharge home is safe and that you have a clear understanding of any next steps in your care. If you have any questions or concerns, please do not hesitate to contact us, we are here for you. Thank you for choosing [facility] for your healthcare needs.    Sincerely,    Your [facility] Healthcare Team

## 2017-07-28 NOTE — OR NURSING
Pt arrived from pain procedure room with RN present. Pt denies numbness/tingling in UE bilaterally. No reports of pain or nausea. Unlabored breathing. VSS. Dressing clean, dry, intact. No drainage from injection sites.

## 2017-07-28 NOTE — DISCHARGE INSTRUCTIONS
HOME CARE INSTRUCTIONS      1. Resume usual diet and medications unless otherwise instructed by your physician.  2. You may experience tenderness in your low back/neck after the procedure for the next 24 hours.  The pain you have may worsen in the next day or so.  It can take up to one week to get relief from the injection.  For post-procedure pain, over-the-counter non-aspirin analgesic is recommended.  3. Apply ice packs to the injection site intermittently for the next 24 hours (apply for 15 minutes, remove for 30 minutes, reapply ice, etc.)  Do not apply heat to this area for the next 24 hours.  4. Resume non-strenuous activity as prior to injection unless otherwise instructed by your physician.  5. If given local anesthesia with your procedure, you may experience warmth, tingling and/or numbness in your extremities following the injection.  6. It is important that you take extra time and precautions to prevent possible stumbling or falling, especially if you are taking medications that may cause drowsiness.  7. Do not take a tub bath for a few days after your procedure.  Showers are okay.  8. You should not drive for 24 hours after the procedure.  A ride home after the procedure is required.  If you received sedation, you must have a responsible adult to stay with you for 24 hours.  9. No alcohol for 24 hours if you receive sedation.  10. Do not make any important decisions or sign any legal documents for 24 hours after receiving sedation.  11. If you experience any loss of bowel or bladder control, contact your physician.  12. Your physician will discuss follow-up arrangements with you.  If you  have any questions or problems please call your physician.       Dr. Navarro [phone]      I hereby acknowledge that I understand and accept these instructions.

## 2017-09-11 ENCOUNTER — APPOINTMENT (OUTPATIENT)
Dept: NEUROLOGY | Facility: MEDICAL CENTER | Age: 37
End: 2017-09-11
Payer: COMMERCIAL

## 2017-09-12 ENCOUNTER — TELEPHONE (OUTPATIENT)
Dept: MEDICAL GROUP | Age: 37
End: 2017-09-12

## 2017-09-12 ENCOUNTER — OFFICE VISIT (OUTPATIENT)
Dept: NEUROLOGY | Facility: MEDICAL CENTER | Age: 37
End: 2017-09-12
Payer: COMMERCIAL

## 2017-09-12 VITALS
SYSTOLIC BLOOD PRESSURE: 108 MMHG | OXYGEN SATURATION: 97 % | RESPIRATION RATE: 15 BRPM | BODY MASS INDEX: 21.68 KG/M2 | HEIGHT: 66 IN | TEMPERATURE: 98.4 F | WEIGHT: 134.9 LBS | HEART RATE: 96 BPM | DIASTOLIC BLOOD PRESSURE: 78 MMHG

## 2017-09-12 DIAGNOSIS — G43.109 MIGRAINE WITH AURA AND WITHOUT STATUS MIGRAINOSUS, NOT INTRACTABLE: Primary | ICD-10-CM

## 2017-09-12 PROCEDURE — 99214 OFFICE O/P EST MOD 30 MIN: CPT | Performed by: PSYCHIATRY & NEUROLOGY

## 2017-09-12 RX ORDER — RIZATRIPTAN BENZOATE 10 MG/1
TABLET, ORALLY DISINTEGRATING ORAL
Qty: 9 TAB | Refills: 11 | Status: SHIPPED | OUTPATIENT
Start: 2017-09-12 | End: 2018-07-19

## 2017-09-12 RX ORDER — TOPIRAMATE 50 MG/1
150 TABLET, FILM COATED ORAL 2 TIMES DAILY
Qty: 180 TAB | Refills: 6 | Status: SHIPPED | OUTPATIENT
Start: 2017-09-12 | End: 2018-04-09 | Stop reason: SDUPTHER

## 2017-09-12 ASSESSMENT — ENCOUNTER SYMPTOMS
MEMORY LOSS: 0
NECK PAIN: 1
MYALGIAS: 1
HEADACHES: 1
SENSORY CHANGE: 0
TREMORS: 1

## 2017-09-12 ASSESSMENT — PAIN SCALES - GENERAL: PAINLEVEL: 5=MODERATE PAIN

## 2017-09-12 NOTE — TELEPHONE ENCOUNTER
ESTABLISHED PATIENT PRE-VISIT PLANNING     Note: Patient will not be contacted if there is no indication to call.     1.  Reviewed notes from the last few office visits within the medical group: Yes    2.  If any orders were placed at last visit or intended to be done for this visit (i.e. 6 mos follow-up), do we have Results/Consult Notes?        •  Labs - Labs were not ordered at last office visit.       •  Imaging - Imaging was not ordered at last office visit.       •  Referrals - No referrals were ordered at last office visit.    3. Is this appointment scheduled as a Hospital Follow-Up? No    4.  Immunizations were updated in Epic using WebIZ?: Epic matches WebIZ       •  Web Iz Recommendations: FLU, HEPATITIS A , HEPATITIS B and VARICELLA (Chicken Pox)     5.  Patient is due for the following Health Maintenance Topics:   Health Maintenance Due   Topic Date Due   • IMM INFLUENZA (1) 09/01/2017       - Patient is up-to-date on all Health Maintenance topics. No records have been requested at this time.    6.  Patient was NOT informed to arrive 15 min prior to their scheduled appointment and bring in their medication bottles.

## 2017-09-13 ENCOUNTER — OFFICE VISIT (OUTPATIENT)
Dept: MEDICAL GROUP | Age: 37
End: 2017-09-13
Payer: COMMERCIAL

## 2017-09-13 ENCOUNTER — HOSPITAL ENCOUNTER (OUTPATIENT)
Dept: LAB | Facility: MEDICAL CENTER | Age: 37
End: 2017-09-13
Attending: INTERNAL MEDICINE
Payer: COMMERCIAL

## 2017-09-13 ENCOUNTER — HOSPITAL ENCOUNTER (OUTPATIENT)
Dept: LAB | Facility: MEDICAL CENTER | Age: 37
End: 2017-09-13
Payer: COMMERCIAL

## 2017-09-13 VITALS
TEMPERATURE: 98.2 F | OXYGEN SATURATION: 98 % | SYSTOLIC BLOOD PRESSURE: 108 MMHG | DIASTOLIC BLOOD PRESSURE: 78 MMHG | HEIGHT: 66 IN | BODY MASS INDEX: 21.66 KG/M2 | HEART RATE: 100 BPM | WEIGHT: 134.8 LBS

## 2017-09-13 DIAGNOSIS — N64.52 NIPPLE DISCHARGE IN FEMALE: ICD-10-CM

## 2017-09-13 DIAGNOSIS — R53.82 CHRONIC FATIGUE: ICD-10-CM

## 2017-09-13 DIAGNOSIS — G43.109 MIGRAINE WITH AURA AND WITHOUT STATUS MIGRAINOSUS, NOT INTRACTABLE: ICD-10-CM

## 2017-09-13 DIAGNOSIS — Z23 NEED FOR VACCINATION: ICD-10-CM

## 2017-09-13 DIAGNOSIS — K21.9 GASTROESOPHAGEAL REFLUX DISEASE WITHOUT ESOPHAGITIS: ICD-10-CM

## 2017-09-13 DIAGNOSIS — K58.2 IRRITABLE BOWEL SYNDROME WITH BOTH CONSTIPATION AND DIARRHEA: ICD-10-CM

## 2017-09-13 PROCEDURE — 80053 COMPREHEN METABOLIC PANEL: CPT

## 2017-09-13 PROCEDURE — S5190 WELLNESS ASSESSMENT BY NONPH: HCPCS

## 2017-09-13 PROCEDURE — 36415 COLL VENOUS BLD VENIPUNCTURE: CPT

## 2017-09-13 PROCEDURE — 84270 ASSAY OF SEX HORMONE GLOBUL: CPT

## 2017-09-13 PROCEDURE — 82947 ASSAY GLUCOSE BLOOD QUANT: CPT

## 2017-09-13 PROCEDURE — 83002 ASSAY OF GONADOTROPIN (LH): CPT

## 2017-09-13 PROCEDURE — 82670 ASSAY OF TOTAL ESTRADIOL: CPT

## 2017-09-13 PROCEDURE — 84443 ASSAY THYROID STIM HORMONE: CPT

## 2017-09-13 PROCEDURE — 90471 IMMUNIZATION ADMIN: CPT | Performed by: INTERNAL MEDICINE

## 2017-09-13 PROCEDURE — 84146 ASSAY OF PROLACTIN: CPT

## 2017-09-13 PROCEDURE — 84439 ASSAY OF FREE THYROXINE: CPT

## 2017-09-13 PROCEDURE — 99204 OFFICE O/P NEW MOD 45 MIN: CPT | Mod: 25 | Performed by: INTERNAL MEDICINE

## 2017-09-13 PROCEDURE — 85025 COMPLETE CBC W/AUTO DIFF WBC: CPT

## 2017-09-13 PROCEDURE — 84403 ASSAY OF TOTAL TESTOSTERONE: CPT

## 2017-09-13 PROCEDURE — 90686 IIV4 VACC NO PRSV 0.5 ML IM: CPT | Performed by: INTERNAL MEDICINE

## 2017-09-13 PROCEDURE — 80061 LIPID PANEL: CPT

## 2017-09-13 PROCEDURE — 83001 ASSAY OF GONADOTROPIN (FSH): CPT

## 2017-09-13 RX ORDER — GABAPENTIN 300 MG/1
CAPSULE ORAL PRN
COMMUNITY
Start: 2017-09-08 | End: 2018-03-27

## 2017-09-13 NOTE — PROGRESS NOTES
Subjective:      Dany Lambert is a 37 y.o. female who presents For follow-up with a history of refractory migraine with aura.     HPI    Since last seen, she had attempted a visit to a pain specialist in [place], [place], but was not pleased with the response and given only the possibility of a more invasive sphenopalatine ganglion blockade, she opted out. Locally, she saw Scotty Navarro M.D., and after test trial of occipital blockade, the response was felt adequate and she underwent permanent, left occipital ablation about 6 weeks ago. Though uncomfortable, it has resulted in significant headache control. She has been able to stop Norvasc completely, there was no change in the headaches when this was done prior to the ablation, now only on Topamax 150 mg, twice a day, she has periods without headache at all. The headaches when they occur are milder, shorter in duration, and only a single Maxalt tablet will be required, rarely will she repeat within 24 hours. On one occasion she had a headache that lasted for several consecutive days. She is scheduled for the contralateral ablative procedure next week.    Which she has noted is more of the generalized musculoskeletal aches and pains now that the headache and neck pain have improved remarkably. She is also having some myalgias and arthralgias. Sleep is less disturbed. Her energy level overall has improved. She could not be happier. She is still receiving her Botox injections every 3 months with Dr. Navarro.    Medical, surgical and family histories are all reviewed in electronic health record, there are no new drug allergies. There are no new issues on the family side with new diagnoses. She is on venlafaxine  mg, daily, Topamax 150 mg, twice a day, Maxalt 10 mg when necessary, her birth control, Prilosec, magnesium and coenzyme Q10.    Review of Systems   Constitutional: Negative for malaise/fatigue.   Musculoskeletal: Positive for myalgias  "and neck pain.   Neurological: Positive for tremors and headaches. Negative for sensory change.   Psychiatric/Behavioral: Negative for memory loss.        Objective:     /78   Pulse 96   Temp 36.9 °C (98.4 °F)   Resp 15   Ht 1.676 m (5' 6\")   Wt 61.2 kg (134 lb 14.4 oz)   SpO2 97%   BMI 21.77 kg/m²      Physical Exam    She appears in no acute distress. Vital signs are stable. There is no malar rash or temporal tenderness. There is still some tenderness bilaterally over the occipital ridge, occipital notch, and cervical paraspinal muscle bodies. Spinous processes are nontender. Carotid pulses are present bilaterally without asymmetry. Cardiac evaluation is unremarkable. Including mental status, cranial nerves, motor, reflex, coordination, and sensory evaluations, the complete neurologic examination is done and reveals no evidence of deficits.     Assessment/Plan:     1. Migraine with aura and without status migrainosus, not intractable  Much improved with the surgical interventions, I encouraged her to follow-up for the contralateral ablative procedure. I continue to write for her Topamax moving forwards, though I suspect she might even be able to get off this as well. She will continue the Maxalt use as needed. I will see her again in 6 months. She will call the office earlier if there are any acute crises that develop. She will continue with Dr. Navarro in regards to her Botox injections, repeat ablative procedures will likely be necessary in about one year or so, hopefully not sooner.    - topiramate (TOPAMAX) 50 MG tablet; Take 3 Tabs by mouth 2 times a day.  Dispense: 180 Tab; Refill: 6  - rizatriptan (MAXALT-MLT) 10 MG disintegrating tablet; 1 TAB AT HEADACHE ONSET REPEAT EVERY HOUR AS NEEDED UP TO #4 TAB/24 HOURS  Dispense: 9 Tab; Refill: 11    Time: Evaluation of 25 minutes for exam, review, discussion, and education  Discussion: As mentioned in the assessment, over 60% of the time spent " face-to-face counseling and coordinating care.

## 2017-09-14 LAB
ALBUMIN SERPL BCP-MCNC: 4.1 G/DL (ref 3.2–4.9)
ALBUMIN/GLOB SERPL: 1.4 G/DL
ALP SERPL-CCNC: 56 U/L (ref 30–99)
ALT SERPL-CCNC: 17 U/L (ref 2–50)
ANION GAP SERPL CALC-SCNC: 4 MMOL/L (ref 0–11.9)
AST SERPL-CCNC: 15 U/L (ref 12–45)
BASOPHILS # BLD AUTO: 0.8 % (ref 0–1.8)
BASOPHILS # BLD: 0.06 K/UL (ref 0–0.12)
BDY FAT % MEASURED: 26.2 %
BILIRUB SERPL-MCNC: 0.2 MG/DL (ref 0.1–1.5)
BP DIAS: 69 MMHG
BP SYS: 103 MMHG
BUN SERPL-MCNC: 12 MG/DL (ref 8–22)
CALCIUM SERPL-MCNC: 9.3 MG/DL (ref 8.5–10.5)
CHLORIDE SERPL-SCNC: 113 MMOL/L (ref 96–112)
CHOLEST SERPL-MCNC: 258 MG/DL (ref 100–199)
CO2 SERPL-SCNC: 20 MMOL/L (ref 20–33)
CREAT SERPL-MCNC: 0.9 MG/DL (ref 0.5–1.4)
DIABETES HTDIA: NO
EOSINOPHIL # BLD AUTO: 0.11 K/UL (ref 0–0.51)
EOSINOPHIL NFR BLD: 1.4 % (ref 0–6.9)
ERYTHROCYTE [DISTWIDTH] IN BLOOD BY AUTOMATED COUNT: 48.7 FL (ref 35.9–50)
ESTRADIOL SERPL-MCNC: <20 PG/ML
EVENT NAME HTEVT: NORMAL
FASTING HTFAS: YES
FSH SERPL-ACNC: 1.8 MIU/ML
GFR SERPL CREATININE-BSD FRML MDRD: >60 ML/MIN/1.73 M 2
GLOBULIN SER CALC-MCNC: 3 G/DL (ref 1.9–3.5)
GLUCOSE SERPL-MCNC: 89 MG/DL (ref 65–99)
GLUCOSE SERPL-MCNC: 90 MG/DL (ref 65–99)
HCT VFR BLD AUTO: 39.7 % (ref 37–47)
HDLC SERPL-MCNC: 53 MG/DL
HGB BLD-MCNC: 12.9 G/DL (ref 12–16)
HYPERTENSION HTHYP: NO
IMM GRANULOCYTES # BLD AUTO: 0.04 K/UL (ref 0–0.11)
IMM GRANULOCYTES NFR BLD AUTO: 0.5 % (ref 0–0.9)
LDLC SERPL CALC-MCNC: 175 MG/DL
LH SERPL-ACNC: <0.2 IU/L
LYMPHOCYTES # BLD AUTO: 1.86 K/UL (ref 1–4.8)
LYMPHOCYTES NFR BLD: 24.5 % (ref 22–41)
MCH RBC QN AUTO: 30.5 PG (ref 27–33)
MCHC RBC AUTO-ENTMCNC: 32.5 G/DL (ref 33.6–35)
MCV RBC AUTO: 93.9 FL (ref 81.4–97.8)
MONOCYTES # BLD AUTO: 0.36 K/UL (ref 0–0.85)
MONOCYTES NFR BLD AUTO: 4.7 % (ref 0–13.4)
NEUTROPHILS # BLD AUTO: 5.17 K/UL (ref 2–7.15)
NEUTROPHILS NFR BLD: 68.1 % (ref 44–72)
NRBC # BLD AUTO: 0 K/UL
NRBC BLD AUTO-RTO: 0 /100 WBC
PLATELET # BLD AUTO: 310 K/UL (ref 164–446)
PMV BLD AUTO: 12 FL (ref 9–12.9)
POTASSIUM SERPL-SCNC: 4.2 MMOL/L (ref 3.6–5.5)
PROLACTIN SERPL-MCNC: 5.29 NG/ML (ref 2.8–26)
PROT SERPL-MCNC: 7.1 G/DL (ref 6–8.2)
RBC # BLD AUTO: 4.23 M/UL (ref 4.2–5.4)
SCREENING LOC CITY HTCIT: NORMAL
SCREENING LOC STATE HTSTA: NORMAL
SCREENING LOCATION HTLOC: NORMAL
SMOKING HTSMO: YES
SODIUM SERPL-SCNC: 137 MMOL/L (ref 135–145)
SUBSCRIBER ID HTSID: NORMAL
T4 FREE SERPL-MCNC: 0.69 NG/DL (ref 0.53–1.43)
TRIGL SERPL-MCNC: 149 MG/DL (ref 0–149)
TSH SERPL DL<=0.005 MIU/L-ACNC: 1.65 UIU/ML (ref 0.3–3.7)
WBC # BLD AUTO: 7.6 K/UL (ref 4.8–10.8)

## 2017-09-14 NOTE — ASSESSMENT & PLAN NOTE
Patient has history of chronic fatigue. She reports that she has low thyroid hormone in the past and was treated with levothyroxine. She is not taking any thyroid hormone supplement currently. She is [employer] and she works at night shift. Her tiredness can be also related to her night shift work.

## 2017-09-14 NOTE — ASSESSMENT & PLAN NOTE
Patient reported that she has chronic irritable bowel syndrome with diarrhea alternating with constipation. [facility]. She is currently taking omeprazole and FiberCon. She requests to refer back to [facility] for follow-up on that.

## 2017-09-14 NOTE — ASSESSMENT & PLAN NOTE
Patient is taking omeprazole 20 mg every morning. She reported that she has chronic nausea feeling and her GI recommend her to take it daily. She wants to refer back to GI to discuss her symptoms. She denies side effects from taking omeprazole.

## 2017-09-14 NOTE — ASSESSMENT & PLAN NOTE
Patient reported that she has nipple discharge on the left side of the breast for about a year. She has workup with diagnostic bilateral mammogram and left breast ultrasound without conclusion. She has stable lesion on the left breast. She denied peripheral vision change. She reported that she has borderline low thyroid in the past and was treated with levothyroxine. She stopped taking it as her thyroid hormone improved. She reported feeling tired chronically.

## 2017-09-14 NOTE — PROGRESS NOTES
Dany Lambert is a 37 y.o. female here to establish care and the evaluation and management of:      HPI:    Nipple discharge in female  Patient reported that she has nipple discharge on the left side of the breast for about a year. She has workup with diagnostic bilateral mammogram and left breast ultrasound without conclusion. She has stable lesion on the left breast. She denied peripheral vision change. She reported that she has borderline low thyroid in the past and was treated with levothyroxine. She stopped taking it as her thyroid hormone improved. She reported feeling tired chronically.    Migraine with aura and without status migrainosus, not intractable  Patient is taking Topamax 150 mg twice a day and rizatriptan as needed and gabapentin as needed for migraine. She stated that she received cervical spinal ablation and Botox injection with Dr. Navarro at [facility] for migraine treatment. Her migraine has been improved and she does not require to take rescue medication a lot. She also takes daysee oral contraceptive pill daily with skipping placebo pill week as she has migraine more often during menstruation. We discussed to try to cut down oral contraceptive pill due to history of breast vision and nipple discharge. Patient will try to cut down oral contraceptive pill.    Irritable bowel disease  Patient reported that she has chronic irritable bowel syndrome with diarrhea alternating with constipation. She [facility]. She is currently taking omeprazole and FiberCon. She requests to refer back to [facility] for follow-up on that.    Gastroesophageal reflux disease without esophagitis  Patient is taking omeprazole 20 mg every morning. She reported that she has chronic nausea feeling and her GI recommend her to take it daily. She wants to refer back to GI to discuss her symptoms. She denies side effects from taking omeprazole.    Chronic fatigue  Patient has  history of chronic fatigue. She reports that she has low thyroid hormone in the past and was treated with levothyroxine. She is not taking any thyroid hormone supplement currently. 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 Her tiredness can be also related to her night shift work.    Current medicines (including changes today)  Current Outpatient Prescriptions   Medication Sig Dispense Refill   • gabapentin (NEURONTIN) 300 MG Cap      • Calcium Polycarbophil (FIBERCON PO) Take 3 Tabs by mouth 2 Times a Day.     • topiramate (TOPAMAX) 50 MG tablet Take 3 Tabs by mouth 2 times a day. 180 Tab 6   • rizatriptan (MAXALT-MLT) 10 MG disintegrating tablet 1 TAB AT HEADACHE ONSET REPEAT EVERY HOUR AS NEEDED UP TO #4 TAB/24 HOURS 9 Tab 11   • venlafaxine (EFFEXOR-XR) 150 MG extended-release capsule Take 1 Cap by mouth every day. 30 Cap 6   • cetirizine (ZYRTEC) 10 MG Tab Take 10 mg by mouth every day.     • Magnesium 100 MG Tab Take 1 Tab by mouth 2 Times a Day.     • omeprazole (PRILOSEC) 20 MG delayed-release capsule Take 20 mg by mouth every day.     • Levonorgest-Eth Estrad 91-Day (DAYSEE) 0.15-0.03 &0.01 MG Tab Take 1 Tab by mouth every day.     • Coenzyme Q10 (CO Q 10 PO) Take 1 Cap by mouth every day.       No current facility-administered medications for this visit.      She  has a past medical history of Allergy, unspecified not elsewhere classified; Anxiety (8/26/2013); Arthritis; High cholesterol; Migraine with aura and without status migrainosus, not intractable (8/26/2013); Pain (7/27/2017); Psychiatric problem; and Seizure (CMS-HCC) (1/2016).  She  has a past surgical history that includes tonsillectomy; other; dstr nrolytc agnt parverteb fct sngl crvcl/thora (Left, 7/28/2017); and dstr nrolytc agnt parverteb fct addl crvcl/thora (7/28/2017).  Social History   Substance Use Topics   • Smoking status: Former Smoker     Packs/day: 1.00     Years: 15.00     Types: Cigarettes     Quit date: 1/1/2010   • Smokeless  "tobacco: Never Used   • Alcohol use No      Comment: denies     Social History     Social History Narrative   • No narrative on file     Family History   Problem Relation Age of Onset   • Diabetes Maternal Grandfather    • Cancer Paternal Grandfather    • Alzheimer's Disease Paternal Grandfather    • Cancer Paternal Grandmother 30     breast   • Suicide Attempts Maternal Uncle      Killed himself by GSW   • Hypertension Father      Family Status   Relation Status   • Maternal Grandfather    • Paternal Grandfather    • Paternal Grandmother    • Mother Alive   • Maternal Uncle    • Maternal Grandmother    • Father      The patient has no Health Maintenance topics of status Overdue, Due On, or Due Soon      ROS    Gen.: Denied weight change, appetite change, fatigue.  ENT: Denied sinus tenderness, nasal congestion, runny nose, or sore throat  CVS: Denied chest pain, palpitations, legs swelling.  Respiratory: Denied cough, shortness of breath, wheezing.  GI: Denied abdominal pain, constipation or diarrhea.  Endocrine: Denied temperature intolerance, increased frequency of urination, polyphagia or polydipsia.  Musculoskeletal: Denied back pain or joint pain.    All other systems reviewed and are negative     Objective:     Blood pressure 108/78, pulse 100, temperature 36.8 °C (98.2 °F), height 1.676 m (5' 6\"), weight 61.1 kg (134 lb 12.8 oz), SpO2 98 %, not currently breastfeeding. Body mass index is 21.76 kg/m².  Physical Exam:    Constitutional: Well nourished and Well developed, Alert, no distress.  Skin: Warm, dry, good turgor, no rashes in visible areas.  Eye: Equal, round and reactive, conjunctiva clear, lids normal.  ENMT: Lips without lesions, good dentition, oropharynx clear.  Neck: Trachea midline, no masses, no thyromegaly. No cervical or supraclavicular lymphadenopathy.  Respiratory: Unlabored respiratory effort, lungs clear to auscultation, no wheezes, no " santi.  Cardiovascular: Normal S1, S2, no murmur, no edema.  Abdomen: Soft, non distended, non-tender, no masses, no hepatosplenomegaly. Bowel sound normal.  Extremities: No edema, no clubbing, no cyanosis.  Psych: Alert and oriented x3, normal affect and mood.      Assessment and Plan:   The following treatment plan was discussed       1. Migraine with aura and without status migrainosus, not intractable  - Stable with current regimens. She will follow with [facility] for cervical spinal ablation.   - CBC WITH DIFFERENTIAL; Future  - COMP METABOLIC PANEL; Future    2. Gastroesophageal reflux disease without esophagitis  - Still has nausea feeling chronically. Continue omeprazole. Refer to GI consultant.  - REFERRAL TO GASTROENTEROLOGY    3. Irritable bowel syndrome with both constipation and diarrhea  - Discussed to follow diet instruction and continue FiberCon. Suggest to retake probiotic. Patient follows with GI consultant chronically. She wants to refer back to GI consultant.  - REFERRAL TO GASTROENTEROLOGY    4. Nipple discharge in female  - Discussed possible causes with patient. Will check prolactin and FSH/LH, estrogen, testosterone.  - Advised to try to stop oral contraceptive pill   - PROLACTIN; Future  - FSH/LH; Future  - ESTRADIOL; Future  - TESTOSTERONE F&T FEMALES/CHILD; Future  - TSH; Future  - FREE THYROXINE; Future    5. Chronic fatigue  - We will check CBC, CMP, thyroid function test, prolactin, FSH, LH, estrogen, testosterone for further workup.  - PROLACTIN; Future  - FSH/LH; Future  - ESTRADIOL; Future  - TESTOSTERONE F&T FEMALES/CHILD; Future  - CBC WITH DIFFERENTIAL; Future  - COMP METABOLIC PANEL; Future  - TSH; Future  - FREE THYROXINE; Future    6. Need for vaccination  - Influenza vaccine was given today after reviewing risks and benefits as well as side effects of vaccine.  - Flu Quad Inj >3 Year Pre-Filled (Preservative Free)    Face-to-face time spent 45 minutes with patient  and more than half of that time spent for counseling and cooperating of care for medical problems listed above.     Records requested.  Followup: Return in about 4 weeks (around 10/11/2017), or if symptoms worsen or fail to improve, for migraine, fatigue, nipple discharge, GERD, anxiety, lab review. sooner should new symptoms or problems arise.      Please note that this dictation was created using voice recognition software. I have made every reasonable attempt to correct obvious errors, but I expect that there may have unintended errors in text, spelling, punctuation, or grammar that I did not discover.

## 2017-09-14 NOTE — ASSESSMENT & PLAN NOTE
Patient is taking Topamax 150 mg twice a day and rizatriptan as needed and gabapentin as needed for migraine. She stated that she received cervical spinal ablation and Botox injection with Dr. Navarro at [facility] for migraine treatment. Her migraine has been improved and she does not require to take rescue medication a lot. She also takes daysee oral contraceptive pill daily with skipping placebo pill week as she has migraine more often during menstruation. We discussed to try to cut down oral contraceptive pill due to history of breast vision and nipple discharge. Patient will try to cut down oral contraceptive pill.

## 2017-09-16 LAB
SHBG SERPL-SCNC: 214 NMOL/L (ref 30–135)
TESTOST FREE SERPL-MCNC: 0.2 PG/ML (ref 1.3–9.2)
TESTOST SERPL-MCNC: 4 NG/DL (ref 9–55)

## 2017-09-25 ENCOUNTER — EH NON-PROVIDER (OUTPATIENT)
Dept: OCCUPATIONAL MEDICINE | Facility: CLINIC | Age: 37
End: 2017-09-25

## 2017-09-25 DIAGNOSIS — Z29.89 NEED FOR ISOLATION: ICD-10-CM

## 2017-09-25 PROCEDURE — 94375 RESPIRATORY FLOW VOLUME LOOP: CPT

## 2017-09-26 ENCOUNTER — OFFICE VISIT (OUTPATIENT)
Dept: MEDICAL GROUP | Age: 37
End: 2017-09-26
Payer: COMMERCIAL

## 2017-09-26 VITALS
HEIGHT: 66 IN | WEIGHT: 136 LBS | OXYGEN SATURATION: 97 % | SYSTOLIC BLOOD PRESSURE: 122 MMHG | DIASTOLIC BLOOD PRESSURE: 68 MMHG | HEART RATE: 100 BPM | TEMPERATURE: 97.7 F | BODY MASS INDEX: 21.86 KG/M2

## 2017-09-26 DIAGNOSIS — E78.00 HYPERCHOLESTEROLEMIA: ICD-10-CM

## 2017-09-26 DIAGNOSIS — R53.82 CHRONIC FATIGUE: ICD-10-CM

## 2017-09-26 DIAGNOSIS — E55.9 VITAMIN D INSUFFICIENCY: ICD-10-CM

## 2017-09-26 DIAGNOSIS — N64.52 NIPPLE DISCHARGE IN FEMALE: ICD-10-CM

## 2017-09-26 DIAGNOSIS — G43.109 MIGRAINE WITH AURA AND WITHOUT STATUS MIGRAINOSUS, NOT INTRACTABLE: ICD-10-CM

## 2017-09-26 DIAGNOSIS — Z80.3 FAMILY HISTORY OF BREAST CANCER: ICD-10-CM

## 2017-09-26 PROCEDURE — 99215 OFFICE O/P EST HI 40 MIN: CPT | Performed by: INTERNAL MEDICINE

## 2017-09-26 RX ORDER — CHOLECALCIFEROL (VITAMIN D3) 50 MCG
TABLET ORAL DAILY
COMMUNITY
End: 2019-03-06

## 2017-09-26 NOTE — ASSESSMENT & PLAN NOTE
Patient reported that her paternal grandmother has breast cancer at age 35. She is concerning of breast cancer given her nipple discharge and cystic lesion on both breasts.

## 2017-09-26 NOTE — ASSESSMENT & PLAN NOTE
Patient reported chronic fatigue. Her thyroid function test was normal on 9/13/17. She was treated with levothyroxine in the past. Given her normal thyroid function tests. I do not recommend her take levothyroxine. She has low vitamin D last year and she has not retest for vitamin D level. She works as an ER tech at night shift.

## 2017-09-26 NOTE — ASSESSMENT & PLAN NOTE
Patient has high total cholesterol and LDL cholesterol. She has never been treated with medication. I reviewed her recent blood tests with her in clinic.  ASCVD score was 0.7%.     Results for HIPOLITO VIRAJ MACDONALD (MRN [id number]) as of 9/25/2017 20:26   Ref. Range 9/1/2016 13:00 9/13/2017 17:22   Cholesterol,Tot Latest Ref Range: 100 - 199 mg/dL 251 (H) 258 (H)   Triglycerides Latest Ref Range: 0 - 149 mg/dL 130 149   HDL Latest Ref Range: >=40 mg/dL 56 53   LDL Latest Ref Range: <100 mg/dL 169 (H) 175 (H)

## 2017-09-26 NOTE — ASSESSMENT & PLAN NOTE
Patient has low vitamin D at 24 in February 2016. She reported tired chronically. She is not taking any vitamin D supplement currently. I discussed with patient to take over-the-counter vitamin D 2000 units daily.

## 2017-09-26 NOTE — PROGRESS NOTES
Subjective:   Viraj Lambert is a 37 y.o. female here today for evaluation and management of:      Nipple discharge in female  Patient has nipple discharge on the left side of the breast for about one year. She has nonconclusive diagnostic bilateral mammogram and breast ultrasound in the past.   She has history of cysts on both breasts with calcifications on the left breast.  I discussed with her recent prolactin test which is normal on 9/13/17. She has normal thyroid function test. She has low testosterone, low estradiol E2 and high hormone binding globulin. Patient is taking Daysee oral contraceptive pill every day and she skipped the vitamin pills.   We discussed to stop taking oral contraceptive pill.  We also discussed to see surgeon for nipple discharge.  We also discussed to repeat diagnostic mammogram this year for follow-up.    Migraine with aura and without status migrainosus, not intractable  Patient follows with [facility] for migraine treatment. She received Botox injection and cervical spinal ablation with Dr. Navarro at [facility]. She is taking Topamax 150 mg twice a day and she also takes rizatriptan as needed. She tolerates Topamax and rizatriptan without side effects.    Hypercholesterolemia  Patient has high total cholesterol and LDL cholesterol. She has never been treated with medication. I reviewed her recent blood tests with her in clinic.  ASCVD score was 0.7%.     Results for VIRAJ LAMBERT (MRN [id number]) as of 9/25/2017 20:26   Ref. Range 9/1/2016 13:00 9/13/2017 17:22   Cholesterol,Tot Latest Ref Range: 100 - 199 mg/dL 251 (H) 258 (H)   Triglycerides Latest Ref Range: 0 - 149 mg/dL 130 149   HDL Latest Ref Range: >=40 mg/dL 56 53   LDL Latest Ref Range: <100 mg/dL 169 (H) 175 (H)       Chronic fatigue  Patient reported chronic fatigue. Her thyroid function test was normal on 9/13/17. She was treated with levothyroxine in the past. Given her normal  thyroid function tests. I do not recommend her take levothyroxine. She has low vitamin D last year and she has not retest for vitamin D level. 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    Family history of breast cancer in paternal grandmother at age 35  Patient reported that her paternal grandmother has breast cancer at age 35. She is concerning of breast cancer given her nipple discharge and cystic lesion on both breasts.    Vitamin D insufficiency  Patient has low vitamin D at 24 in February 2016. She reported tired chronically. She is not taking any vitamin D supplement currently. I discussed with patient to take over-the-counter vitamin D 2000 units daily.         Current medicines (including changes today)  Current Outpatient Prescriptions   Medication Sig Dispense Refill   • Cholecalciferol (VITAMIN D) 2000 UNIT Tab Take  by mouth every day.     • gabapentin (NEURONTIN) 300 MG Cap      • Calcium Polycarbophil (FIBERCON PO) Take 3 Tabs by mouth 2 Times a Day.     • topiramate (TOPAMAX) 50 MG tablet Take 3 Tabs by mouth 2 times a day. 180 Tab 6   • rizatriptan (MAXALT-MLT) 10 MG disintegrating tablet 1 TAB AT HEADACHE ONSET REPEAT EVERY HOUR AS NEEDED UP TO #4 TAB/24 HOURS 9 Tab 11   • venlafaxine (EFFEXOR-XR) 150 MG extended-release capsule Take 1 Cap by mouth every day. 30 Cap 6   • cetirizine (ZYRTEC) 10 MG Tab Take 10 mg by mouth every day.     • Magnesium 100 MG Tab Take 1 Tab by mouth 2 Times a Day.     • omeprazole (PRILOSEC) 20 MG delayed-release capsule Take 20 mg by mouth every day.     • Levonorgest-Eth Estrad 91-Day (DAYSEE) 0.15-0.03 &0.01 MG Tab Take 1 Tab by mouth every day.     • Coenzyme Q10 (CO Q 10 PO) Take 1 Cap by mouth every day.       No current facility-administered medications for this visit.      She  has a past medical history of Allergy, unspecified not elsewhere classified; Anxiety (8/26/2013); Arthritis; High cholesterol; Migraine with aura and without status migrainosus, not  "intractable (8/26/2013); Pain (7/27/2017); Psychiatric problem; and Seizure (CMS-HCC) (1/2016).    ROS   No chest pain, no shortness of breath, no abdominal pain       Objective:     Blood pressure 122/68, pulse 100, temperature 36.5 °C (97.7 °F), height 1.676 m (5' 6\"), weight 61.7 kg (136 lb), SpO2 97 %. Body mass index is 21.95 kg/m².   Physical Exam:  General: Alert, oriented and no acute distress.  Eye contact is good, speech goal directed, affect calm  HEENT: conjunctiva non-injected, sclera non-icteric.  Oral mucous membranes pink and moist with no lesions.  Pinna normal.   Lungs: Normal respiratory effort, clear to auscultation bilaterally with good excursion.  CV: regular rate and rhythm. No murmurs.   Abdomen: soft, non distended, nontender, Bowel sound normal.  Ext: no edema, color normal, vascularity normal, temperature normal        Assessment and Plan:   The following treatment plan was discussed     1. Nipple discharge in female  - I reviewed all her blood test and previous mammogram and breast ultrasound with her today.   - Etiology is unclear. Prolactin level was normal, testosterone and estrogen level was low or low-normal.  - Order diagnostic mammogram for both breasts and refer to surgeon to get second opinion   - REFERRAL TO GENERAL SURGERY  - MA-DIAGNOSTIC MAMMO W/CAD-BILAT; Future    2. Migraine with aura and without status migrainosus, not intractable  - Well-controlled. Continue current regimens. Recheck lab 1-2 weeks before next follow up visit.    3. Hypercholesterolemia  - Not on medication. She will continue to control with diet and exercise.  - Advised to eat low fat, low carbohydrate and high fiber diet as well as do cardio physical exercise regularly.   - COMP METABOLIC PANEL; Future  - LIPID PROFILE; Future    4. Chronic fatigue  - She has normal thyroid hormone and normal B12 level. She is not anemic.  - Discussed her take vitamin D 2000 units daily, as her last vitamin D was low in " February 2016.  - Encouraged to do regular physical exercise.      5. Family history of breast cancer in paternal grandmother at age 35  - REFERRAL TO GENERAL SURGERY    6. Vitamin D insufficiency  - Recommend to take vitamin D 2000 units daily. Recheck vitamin D in 6 months.  - Cholecalciferol (VITAMIN D) 2000 UNIT Tab; Take  by mouth every day.  - VITAMIN D,25 HYDROXY; Future    Face-to-face time spent 40 minutes with patient and more than half of that time spent for counseling and cooperating of care for medical problems listed above.       Followup: Return in about 6 months (around 3/26/2018), or if symptoms worsen or fail to improve, for hypercholesterolemia, vitamin D insufficiency, fatigue, migraine, nipple discharge, lab review.      Please note that this dictation was created using voice recognition software. I have made every reasonable attempt to correct obvious errors, but I expect that there may have unintended errors in text, spelling, punctuation, or grammar that I did not discover.

## 2017-09-26 NOTE — ASSESSMENT & PLAN NOTE
Patient has nipple discharge on the left side of the breast for about one year. She has nonconclusive diagnostic bilateral mammogram and breast ultrasound in the past.   She has history of cysts on both breasts with calcifications on the left breast.  I discussed with her recent prolactin test which is normal on 9/13/17. She has normal thyroid function test. She has low testosterone, low estradiol E2 and high hormone binding globulin. Patient is taking Daysee oral contraceptive pill every day and she skipped the vitamin pills.   We discussed to stop taking oral contraceptive pill.  We also discussed to see surgeon for nipple discharge.  We also discussed to repeat diagnostic mammogram this year for follow-up.

## 2017-09-26 NOTE — ASSESSMENT & PLAN NOTE
Patient follows with [facility] for migraine treatment. She received Botox injection and cervical spinal ablation with Dr. Navarro at [facility]. She is taking Topamax 150 mg twice a day and she also takes rizatriptan as needed. She tolerates Topamax and rizatriptan without side effects.

## 2017-09-28 ENCOUNTER — TELEPHONE (OUTPATIENT)
Dept: RADIOLOGY | Facility: MEDICAL CENTER | Age: 37
End: 2017-09-28

## 2017-09-28 ENCOUNTER — APPOINTMENT (OUTPATIENT)
Dept: ADMISSIONS | Facility: MEDICAL CENTER | Age: 37
End: 2017-09-28
Attending: PHYSICAL MEDICINE & REHABILITATION
Payer: COMMERCIAL

## 2017-09-28 NOTE — OR NURSING
"Preadmit appointment: \" Preparing for your Procedure information\" sheet given to patient with verbal and written instructions. Patient instructed to continue prescribed medications through the day before surgery, instructed to take the following medications the day of surgery per anesthesia protocol: topamax  "

## 2017-09-28 NOTE — OP REPORT
Surgeon: Scotty Navarro DO    Assistants:  None    Preoperative diagnosis:  Cervical spondylosis    Post operative diagnosis:  Cervical spondylosis    Type of Sedation:  Local anesthesia and conscious sedation    Sedation time:  16 minutes    Site of Service:  [facility]     Procedure:  1. Right Third Occipital Nerve Radiofrequency Ablation Under Fluoroscopy  2. Right C3 Medial Branch Nerve Radiofrequency Ablation Under Fluoroscopy  3. Right C4 Medial Branch Nerve Radiofrequency Ablation Under Fluoroscopy    After discussing risks, benefits, and alternatives to the procedure, the patient expressed understanding and wished to proceed.  The patient was brought into the fluoroscopy suite and placed in the prone position.  Procedural pause was conducted to verify: correct patient identity, procedure to be performed and as applicable, correct side and site, correct patient position, and availability of implants, special equipment or special instruments.      The skin was sterilely prepped and draped in the usual fashion using betadine times three in the region of the injections.      Right Third Occipital Nerve Radiofrequency Ablation:  Using fluoroscopy, the right posterolateral margin of the C3 vertebra was identified and marked.  The skin and subcutaneous tissues were anesthetized with 1% lidocaine using a 25 gauge 1.5 inch needle.  Using fluoroscopic guidance, a 100 mm SMK RF cannulus with a 10 mm active tip was then inserted down to the right posterolateral margin of the C3 vertebra.  Sensory testing was performed at 50Hz up to 1 volt.  Motor testing was performed at 2Hz up to 3.5 volts.  With final needle positioning, there was no evidence of radicular stimulation.  Prior to lesioning, 1 cc of 1% lidocaine was injected.  Lesion was performed at 80 degrees Celcius for 90 seconds once.  After lesioning a solution of 0.5 cc of 0.5% Bupivicaine and 0.5 cc of Celestone (6mg/ml) was injected.  A spot film  was taken for documentation purposes.  Following the procedure the needle was withdrawn slightly and flushed with 1% Lidocaine as it was withdrawn.      Right TON RF probe spot film:  A single A-P spot film was taken of the probe   placement which documented that the probe position was at the right posterolateral margin of the C3 vertebra.    Right C3 Medial Branch Nerve Radiofrequency Ablation: Using fluoroscopy, the waist of the right side of the C3 lateral mass was identified and marked.  The skin and subcutaneous tissues were anesthetized with 1% lidocaine using a 25 gauge 1.5 inch needle.  Using fluoroscopic guidance, a 100 mm SMK RF cannulus with a 10 mm active tip was then inserted down to the waist of the right side of the C3 lateral mass.  Sensory testing was performed at 50Hz up to 1 volt.  Motor testing was performed at 2Hz up to 3.5 volts.  With final needle positioning, there was no evidence of radicular stimulation.  Prior to lesioning, 1 cc of 1% lidocaine was injected.  Lesion was performed at 80 degrees Celcius for 90 seconds once.  After lesioning a solution of 0.5 cc of 0.5% Bupivicaine and 0.5 cc of Celestone (6mg/ml) was injected.  A spot film was taken for documentation purposes.  Following the procedure the needle was withdrawn slightly and flushed with 1% Lidocaine as it was withdrawn.      Right C3 RF probe spot film:  A single A-P spot film was taken of the probe   placement which documented that the probe position was at the waist of the right side of the C3 lateral mass.    Right C4 Medial Branch Nerve Radiofrequency Ablation:  Using fluoroscopy, the waist of the right side of the C4 lateral mass was identified and marked.  The skin and subcutaneous tissues were anesthetized with 1% lidocaine using a 25 gauge 1.5 inch needle.  Using fluoroscopic guidance, a 100 mm SMK RF cannulus with a 10 mm active tip was then inserted down to the waist of the right side of the C4 lateral mass.  Sensory  testing was performed at 50Hz up to 1 volt.  Motor testing was performed at 2Hz up to 3.5 volts.  With final needle positioning, there was no evidence of radicular stimulation.  Prior to lesioning, 1 cc of 1% lidocaine was injected.  Lesion was performed at 80 degrees Celcius for 90 seconds once.  After lesioning a solution of 0.5 cc of 0.5% Bupivicaine and 0.5 cc of Celestone (6mg/ml) was injected.  A spot film was taken for documentation purposes.  Following the procedure the needle was withdrawn slightly and flushed with 1% Lidocaine as it was withdrawn.      Right C4 RF probe spot film:  A single A-P spot film was taken of the probe   placement which documented that the probe position was at the waist of the right side of the C4 lateral mass.      The patient tolerated the procedure well and there were no apparent complications.  After an appropriate amount of observation, the patient was dismissed from the clinic in good condition under their own power.      Complications:  None    Disposition:   1.  The patient was discharged to the recovery area in good condition.  2.  Discharge instructions were provided and explained.  3.  Patient was instructed to call with any questions or problems.  4.  Apply ice to the procedure site and keep clean and dry for 24 hours.  5.  Medications were reviewed for efficacy and appropriateness.  6.  Continue current medications.  7.  Return in 1 to 2 weeks for follow up.

## 2017-09-29 ENCOUNTER — HOSPITAL ENCOUNTER (OUTPATIENT)
Facility: MEDICAL CENTER | Age: 37
End: 2017-09-29
Attending: PHYSICAL MEDICINE & REHABILITATION | Admitting: PHYSICAL MEDICINE & REHABILITATION
Payer: COMMERCIAL

## 2017-09-29 ENCOUNTER — APPOINTMENT (OUTPATIENT)
Dept: RADIOLOGY | Facility: MEDICAL CENTER | Age: 37
End: 2017-09-29
Attending: PHYSICAL MEDICINE & REHABILITATION
Payer: COMMERCIAL

## 2017-09-29 VITALS
RESPIRATION RATE: 16 BRPM | SYSTOLIC BLOOD PRESSURE: 108 MMHG | WEIGHT: 135 LBS | HEIGHT: 66 IN | BODY MASS INDEX: 21.69 KG/M2 | OXYGEN SATURATION: 99 % | HEART RATE: 74 BPM | DIASTOLIC BLOOD PRESSURE: 72 MMHG | TEMPERATURE: 97.7 F

## 2017-09-29 PROBLEM — M47.812 CERVICAL SPONDYLOSIS WITHOUT MYELOPATHY: Status: ACTIVE | Noted: 2017-09-29

## 2017-09-29 PROCEDURE — 502240 HCHG MISC OR SUPPLY RC 0272: Performed by: PHYSICAL MEDICINE & REHABILITATION

## 2017-09-29 PROCEDURE — 160048 HCHG OR STATISTICAL LEVEL 1-5: Performed by: PHYSICAL MEDICINE & REHABILITATION

## 2017-09-29 PROCEDURE — 64634 DESTROY C/TH FACET JNT ADDL: CPT | Performed by: PHYSICAL MEDICINE & REHABILITATION

## 2017-09-29 PROCEDURE — 700111 HCHG RX REV CODE 636 W/ 250 OVERRIDE (IP)

## 2017-09-29 PROCEDURE — 64633 DESTROY CERV/THOR FACET JNT: CPT | Performed by: PHYSICAL MEDICINE & REHABILITATION

## 2017-09-29 PROCEDURE — A6402 STERILE GAUZE <= 16 SQ IN: HCPCS | Performed by: PHYSICAL MEDICINE & REHABILITATION

## 2017-09-29 PROCEDURE — 160002 HCHG RECOVERY MINUTES (STAT): Performed by: PHYSICAL MEDICINE & REHABILITATION

## 2017-09-29 PROCEDURE — 160035 HCHG PACU - 1ST 60 MINS PHASE I: Performed by: PHYSICAL MEDICINE & REHABILITATION

## 2017-09-29 PROCEDURE — 99152 MOD SED SAME PHYS/QHP 5/>YRS: CPT | Performed by: PHYSICAL MEDICINE & REHABILITATION

## 2017-09-29 RX ORDER — BETAMETHASONE SODIUM PHOSPHATE AND BETAMETHASONE ACETATE 3; 3 MG/ML; MG/ML
INJECTION, SUSPENSION INTRA-ARTICULAR; INTRALESIONAL; INTRAMUSCULAR; SOFT TISSUE
Status: DISCONTINUED | OUTPATIENT
Start: 2017-09-29 | End: 2017-09-29 | Stop reason: HOSPADM

## 2017-09-29 RX ORDER — LIDOCAINE HYDROCHLORIDE 10 MG/ML
INJECTION, SOLUTION INFILTRATION; PERINEURAL
Status: DISCONTINUED | OUTPATIENT
Start: 2017-09-29 | End: 2017-09-29 | Stop reason: HOSPADM

## 2017-09-29 RX ORDER — SODIUM CHLORIDE, SODIUM LACTATE, POTASSIUM CHLORIDE, CALCIUM CHLORIDE 600; 310; 30; 20 MG/100ML; MG/100ML; MG/100ML; MG/100ML
INJECTION, SOLUTION INTRAVENOUS
Status: DISCONTINUED | OUTPATIENT
Start: 2017-09-29 | End: 2017-09-29 | Stop reason: HOSPADM

## 2017-09-29 RX ORDER — MIDAZOLAM HYDROCHLORIDE 1 MG/ML
INJECTION INTRAMUSCULAR; INTRAVENOUS
Status: DISCONTINUED | OUTPATIENT
Start: 2017-09-29 | End: 2017-09-29 | Stop reason: HOSPADM

## 2017-09-29 RX ORDER — BUPIVACAINE HYDROCHLORIDE 5 MG/ML
INJECTION, SOLUTION EPIDURAL; INTRACAUDAL
Status: DISCONTINUED | OUTPATIENT
Start: 2017-09-29 | End: 2017-09-29 | Stop reason: HOSPADM

## 2017-09-29 RX ADMIN — SODIUM CHLORIDE, SODIUM LACTATE, POTASSIUM CHLORIDE, CALCIUM CHLORIDE: 600; 310; 30; 20 INJECTION, SOLUTION INTRAVENOUS at 08:03

## 2017-09-29 ASSESSMENT — PAIN SCALES - GENERAL
PAINLEVEL_OUTOF10: 4
PAINLEVEL_OUTOF10: 0
PAINLEVEL_OUTOF10: 0

## 2017-09-29 NOTE — DISCHARGE INSTRUCTIONS
HOME CARE INSTRUCTIONS      1. Resume usual diet and medications unless otherwise instructed by your physician.  2. You may experience tenderness in your low back/neck after the procedure for the next 24 hours.  The pain you have may worsen in the next day or so.  It can take up to one week to get relief from the injection.  For post-procedure pain, over-the-counter non-aspirin analgesic is recommended.  3. Apply ice packs to the injection site intermittently for the next 24 hours (apply for 15 minutes, remove for 30 minutes, reapply ice, etc.)  Do not apply heat to this area for the next 24 hours.  4. Resume non-strenuous activity as prior to injection unless otherwise instructed by your physician.  5. If given local anesthesia with your procedure, you may experience warmth, tingling and/or numbness in your extremities following the injection.  6. It is important that you take extra time and precautions to prevent possible stumbling or falling, especially if you are taking medications that may cause drowsiness.  7. Do not take a tub bath for a few days after your procedure.  Showers are okay.  8. You should not drive for 24 hours after the procedure.  A ride home after the procedure is required.  If you received sedation, you must have a responsible adult to stay with you for 24 hours.  9. No alcohol for 24 hours if you receive sedation.  10. Do not make any important decisions or sign any legal documents for 24 hours after receiving sedation.  11. If you experience any loss of bowel or bladder control, contact your physician.  12. Your physician will discuss follow-up arrangements with you.  If you  have any questions or problems please call your physician.      Dr. York       [phone]  Dr. Wagner      [phone]  Dr. Montanez   [phone]  Dr. Canela [phone]     Dr. Carroll  [phone]  Dr. Mortensen      [phone]  Dr. Rondon   [phone]  Dr. Peterson  [phone]  Dr. Livingston           [phone]  Dr. Navarro [phone]    Semaj  [phone] Dr. Elder   [phone]  Dr. Harding        [phone]  Dr. Mondragon      [phone]  Dr. Zollinger [phone]  Other:___________    I hereby acknowledge that I understand and accept these instructions.

## 2017-10-06 ENCOUNTER — HOSPITAL ENCOUNTER (EMERGENCY)
Facility: MEDICAL CENTER | Age: 37
End: 2017-10-06
Attending: EMERGENCY MEDICINE
Payer: COMMERCIAL

## 2017-10-06 ENCOUNTER — APPOINTMENT (OUTPATIENT)
Dept: RADIOLOGY | Facility: MEDICAL CENTER | Age: 37
End: 2017-10-06
Attending: EMERGENCY MEDICINE
Payer: COMMERCIAL

## 2017-10-06 VITALS
HEART RATE: 72 BPM | SYSTOLIC BLOOD PRESSURE: 109 MMHG | HEIGHT: 66 IN | DIASTOLIC BLOOD PRESSURE: 67 MMHG | RESPIRATION RATE: 18 BRPM | WEIGHT: 136.02 LBS | OXYGEN SATURATION: 98 % | BODY MASS INDEX: 21.86 KG/M2 | TEMPERATURE: 98.6 F

## 2017-10-06 DIAGNOSIS — M54.2 NECK PAIN ON RIGHT SIDE: ICD-10-CM

## 2017-10-06 LAB
ALBUMIN SERPL BCP-MCNC: 3.6 G/DL (ref 3.2–4.9)
ALBUMIN/GLOB SERPL: 1.2 G/DL
ALP SERPL-CCNC: 63 U/L (ref 30–99)
ALT SERPL-CCNC: 13 U/L (ref 2–50)
ANION GAP SERPL CALC-SCNC: 9 MMOL/L (ref 0–11.9)
APPEARANCE UR: CLEAR
AST SERPL-CCNC: 14 U/L (ref 12–45)
BASOPHILS # BLD AUTO: 0.5 % (ref 0–1.8)
BASOPHILS # BLD: 0.04 K/UL (ref 0–0.12)
BILIRUB SERPL-MCNC: 0.4 MG/DL (ref 0.1–1.5)
BILIRUB UR QL STRIP.AUTO: NEGATIVE
BUN SERPL-MCNC: 12 MG/DL (ref 8–22)
CALCIUM SERPL-MCNC: 8.7 MG/DL (ref 8.4–10.2)
CHLORIDE SERPL-SCNC: 108 MMOL/L (ref 96–112)
CO2 SERPL-SCNC: 19 MMOL/L (ref 20–33)
COLOR UR: YELLOW
CREAT SERPL-MCNC: 0.87 MG/DL (ref 0.5–1.4)
CRP SERPL HS-MCNC: 0.37 MG/DL (ref 0–0.75)
CULTURE IF INDICATED INDCX: NO UA CULTURE
EOSINOPHIL # BLD AUTO: 0.1 K/UL (ref 0–0.51)
EOSINOPHIL NFR BLD: 1.2 % (ref 0–6.9)
ERYTHROCYTE [DISTWIDTH] IN BLOOD BY AUTOMATED COUNT: 47.5 FL (ref 35.9–50)
ERYTHROCYTE [SEDIMENTATION RATE] IN BLOOD BY WESTERGREN METHOD: 8 MM/HOUR (ref 0–20)
GFR SERPL CREATININE-BSD FRML MDRD: >60 ML/MIN/1.73 M 2
GLOBULIN SER CALC-MCNC: 2.9 G/DL (ref 1.9–3.5)
GLUCOSE SERPL-MCNC: 102 MG/DL (ref 65–99)
GLUCOSE UR STRIP.AUTO-MCNC: NEGATIVE MG/DL
HCG UR QL: NEGATIVE
HCT VFR BLD AUTO: 39.4 % (ref 37–47)
HGB BLD-MCNC: 13.2 G/DL (ref 12–16)
IMM GRANULOCYTES # BLD AUTO: 0.03 K/UL (ref 0–0.11)
IMM GRANULOCYTES NFR BLD AUTO: 0.4 % (ref 0–0.9)
KETONES UR STRIP.AUTO-MCNC: NEGATIVE MG/DL
LACTATE BLD-SCNC: 1.32 MMOL/L (ref 0.5–2)
LEUKOCYTE ESTERASE UR QL STRIP.AUTO: NEGATIVE
LYMPHOCYTES # BLD AUTO: 2.16 K/UL (ref 1–4.8)
LYMPHOCYTES NFR BLD: 25.7 % (ref 22–41)
MCH RBC QN AUTO: 30.9 PG (ref 27–33)
MCHC RBC AUTO-ENTMCNC: 33.5 G/DL (ref 33.6–35)
MCV RBC AUTO: 92.3 FL (ref 81.4–97.8)
MICRO URNS: NORMAL
MONOCYTES # BLD AUTO: 0.45 K/UL (ref 0–0.85)
MONOCYTES NFR BLD AUTO: 5.4 % (ref 0–13.4)
NEUTROPHILS # BLD AUTO: 5.62 K/UL (ref 2–7.15)
NEUTROPHILS NFR BLD: 66.8 % (ref 44–72)
NITRITE UR QL STRIP.AUTO: NEGATIVE
NRBC # BLD AUTO: 0 K/UL
NRBC BLD AUTO-RTO: 0 /100 WBC
PH UR STRIP.AUTO: 6 [PH]
PLATELET # BLD AUTO: 290 K/UL (ref 164–446)
PMV BLD AUTO: 10.7 FL (ref 9–12.9)
POTASSIUM SERPL-SCNC: 3.4 MMOL/L (ref 3.6–5.5)
PROT SERPL-MCNC: 6.5 G/DL (ref 6–8.2)
PROT UR QL STRIP: NEGATIVE MG/DL
RBC # BLD AUTO: 4.27 M/UL (ref 4.2–5.4)
RBC UR QL AUTO: NEGATIVE
SODIUM SERPL-SCNC: 136 MMOL/L (ref 135–145)
SP GR UR REFRACTOMETRY: 1.01
SP GR UR STRIP.AUTO: 1.01
SPECIMEN DRAWN FROM PATIENT: NORMAL
WBC # BLD AUTO: 8.4 K/UL (ref 4.8–10.8)

## 2017-10-06 PROCEDURE — 70491 CT SOFT TISSUE NECK W/DYE: CPT

## 2017-10-06 PROCEDURE — A9270 NON-COVERED ITEM OR SERVICE: HCPCS | Performed by: EMERGENCY MEDICINE

## 2017-10-06 PROCEDURE — 99284 EMERGENCY DEPT VISIT MOD MDM: CPT

## 2017-10-06 PROCEDURE — 85652 RBC SED RATE AUTOMATED: CPT

## 2017-10-06 PROCEDURE — 80053 COMPREHEN METABOLIC PANEL: CPT

## 2017-10-06 PROCEDURE — 81025 URINE PREGNANCY TEST: CPT

## 2017-10-06 PROCEDURE — 83605 ASSAY OF LACTIC ACID: CPT

## 2017-10-06 PROCEDURE — 36415 COLL VENOUS BLD VENIPUNCTURE: CPT

## 2017-10-06 PROCEDURE — 700117 HCHG RX CONTRAST REV CODE 255: Performed by: EMERGENCY MEDICINE

## 2017-10-06 PROCEDURE — 85025 COMPLETE CBC W/AUTO DIFF WBC: CPT

## 2017-10-06 PROCEDURE — 87040 BLOOD CULTURE FOR BACTERIA: CPT | Mod: 91

## 2017-10-06 PROCEDURE — 700102 HCHG RX REV CODE 250 W/ 637 OVERRIDE(OP): Performed by: EMERGENCY MEDICINE

## 2017-10-06 PROCEDURE — 81003 URINALYSIS AUTO W/O SCOPE: CPT

## 2017-10-06 PROCEDURE — 86140 C-REACTIVE PROTEIN: CPT

## 2017-10-06 RX ORDER — LIDOCAINE 50 MG/G
1 PATCH TOPICAL EVERY 24 HOURS
Qty: 10 PATCH | Refills: 0 | Status: SHIPPED | OUTPATIENT
Start: 2017-10-06 | End: 2019-03-06

## 2017-10-06 RX ORDER — PREGABALIN 100 MG/1
100 CAPSULE ORAL 2 TIMES DAILY
Qty: 20 CAP | Refills: 0 | Status: SHIPPED | OUTPATIENT
Start: 2017-10-06 | End: 2018-03-27

## 2017-10-06 RX ORDER — HYDROCODONE BITARTRATE AND ACETAMINOPHEN 5; 325 MG/1; MG/1
1 TABLET ORAL ONCE
Status: COMPLETED | OUTPATIENT
Start: 2017-10-06 | End: 2017-10-06

## 2017-10-06 RX ADMIN — IOHEXOL 100 ML: 350 INJECTION, SOLUTION INTRAVENOUS at 19:28

## 2017-10-06 RX ADMIN — HYDROCODONE BITARTRATE AND ACETAMINOPHEN 1 TABLET: 5; 325 TABLET ORAL at 18:13

## 2017-10-06 ASSESSMENT — PAIN SCALES - GENERAL: PAINLEVEL_OUTOF10: 6

## 2017-10-07 ENCOUNTER — PATIENT OUTREACH (OUTPATIENT)
Dept: HEALTH INFORMATION MANAGEMENT | Facility: OTHER | Age: 37
End: 2017-10-07

## 2017-10-07 NOTE — ED NOTES
ERP at bedside. Pt agrees with plan of care discussed by ERP. AIDET acknowledged with patient. IV established. Blood sent to lab. BC +2. Medicinal interventions carried out per ERP orders. Nela in low position, side rail up for pt safety. Call light within reach. Will continue to monitor.

## 2017-10-07 NOTE — ED NOTES
Patient had spinal ablation 9/29/2017 she has just been at home and has general feeling of weakness. Has a red streak up her neck near ablation area, concerned about infection.

## 2017-10-07 NOTE — ED PROVIDER NOTES
ED Provider Note    CHIEF COMPLAINT  Chief Complaint   Patient presents with   • Malaise     just ffeels sick         [facility]    Primary care provider: Emerald Rico M.D.   History obtained from:Patient  History limited by: None     Dany Lambert is a 37 y.o. female who presents to the ED complaining of possible infection on the right side of her posterior neck. Patient had an ablation performed on September 29 with Dr. Navarro. She reports noticing pain and redness on the right side where she had ablation performed over the past 48 hours. She also reports associated chills, malaise and body aches. Patient reports that she just does not feel well. She called her mother who is a retired neurologist and was informed that this may be an infection so she came to the ED. Patient denies any fever at home. She denies any possibility of pregnancy. She reports that she has been at home and has not been exposed to anyone sick. She denies any cough/congestion/sore throat/difficulty swallowing/difficulty breathing or shortness of breath. She has had pretty severe nausea but no vomiting. She denies any dysuria. She has chronic constipation due to IBS which is unchanged. Patient reports that she had the same ablation procedure performed on the left side in July and did not have any subsequent problems like this. Patient also reports that she developed some generalized itch after the procedure and it has persisted but denies any hives or rash.    REVIEW OF SYSTEMS  Please see HPI for pertinent positives/negatives.  All other systems reviewed and are negative.     PAST MEDICAL HISTORY  Past Medical History:   Diagnosis Date   • Pain 7/27/2017    Chronic pain- Migraine; neck pain, spasm   • Seizure (CMS-HCC) 1/2016    ? possibly r/t migraine    • Anxiety 8/26/2013   • Migraine with aura and without status migrainosus, not intractable 8/26/2013   • Allergy, unspecified not elsewhere classified    • Arthritis     Cervical  joints, and hands   • High cholesterol    • Psychiatric problem     hx of anxiety        SURGICAL HISTORY  Past Surgical History:   Procedure Laterality Date   • AR DSTR NROLYTC AGNT PARVERTEB FCT SNGL CRVCL/THORA Right 9/29/2017    Procedure: NEURO DEST FACET C/T W/IG SNGL C2-4;  Surgeon: Scotty Navarro D.O.;  Location: SURGERY [facility];  Service: Pain Management   • AR DSTR NROLYTC AGNT PARVERTEB FCT ADDL CRVCL/THORA Right 9/29/2017    Procedure: NEURO DEST FACET C/T W/IG ADDL;  Surgeon: Scotty Navarro D.O.;  Location: SURGERY [facility];  Service: Pain Management   • AR DSTR NROLYTC AGNT PARVERTEB FCT SNGL CRVCL/THORA Left 7/28/2017    Procedure: NEURO DEST FACET C/T W/IG SNGL - C2-4;  Surgeon: Scotty Navarro D.O.;  Location: SURGERY [facility];  Service: Pain Management   • AR DSTR NROLYTC AGNT PARVERTEB FCT ADDL CRVCL/THORA  7/28/2017    Procedure: NEURO DEST FACET C/T W/IG ADDL;  Surgeon: Scotty Navarro D.O.;  Location: SURGERY [facility];  Service: Pain Management   • TONSILLECTOMY  1997   • CYST EXCISION Left as child    cyst removal on L wrist        SOCIAL HISTORY  Social History     Social History Main Topics   • Smoking status: Former Smoker     Packs/day: 1.00     Years: 15.00     Types: Cigarettes     Quit date: 1/1/2010   • Smokeless tobacco: Never Used   • Alcohol use No      Comment: denies   • Drug use: No   • Sexual activity: Not Currently        FAMILY HISTORY  Family History   Problem Relation Age of Onset   • Diabetes Maternal Grandfather    • Cancer Paternal Grandfather    • Alzheimer's Disease Paternal Grandfather    • Cancer Paternal Grandmother 30     breast   • Suicide Attempts Maternal Uncle      Killed himself by GSW   • Hypertension Father         CURRENT MEDICATIONS  Home Medications    **Home medications have not yet been reviewed for this encounter**          ALLERGIES  Allergies   Allergen Reactions   • Benadryl Allergy Anxiety   •  "Demerol Hives   • Medrol [Methylprednisolone] Itching   • Previfem [Norgestimate-Ethinyl Estradiol]      Nausea/vomiting   • Reglan [Metoclopramide] Anxiety        PHYSICAL EXAM  VITAL SIGNS: /67   Pulse 72   Temp 37.1 °C (98.7 °F)   Resp 18   Ht 1.676 m (5' 6\")   Wt 61.7 kg (136 lb 0.4 oz)   SpO2 98%   BMI 21.95 kg/m²  @LYNDA[[id number]::@     Pulse ox interpretation: 98% I interpret this pulse ox as normal     Constitutional: Well developed, well nourished, alert in no apparent distress, nontoxic appearance    HENT: No external signs of trauma, normocephalic, bilateral external ears normal, oropharynx moist and clear, nose normal    Eyes: PERRL, conjunctiva without erythema, no discharge, no icterus    Neck: Soft and supple, trachea midline, no stridor, trace erythema on right posterior cervical paraspinal region with tenderness to palpation, no warmth/crepitus/fluctuance/streaking, no LAD, no JVD, good ROM    Cardiovascular: Regular rate and rhythm, no murmurs/rubs/gallops, strong distal pulses and good perfusion    Thorax & Lungs: No respiratory distress, CTAB  Abdomen: Soft, nontender, nondistended, no guarding, no rebound, normal BS    Back: No CVAT    Extremities: No clubbing, no cyanosis, no edema, no gross deformity, good ROM, no tenderness, intact distal pulses with brisk cap refill    Skin: Warm, dry, no pallor/cyanosis, no rash noted    Lymphatic: No lymphadenopathy noted    Neuro: A/O times 3, no focal deficits noted    Psychiatric: Cooperative, slightly anxious, normal judgement          DIAGNOSTIC STUDIES / PROCEDURES        LABS  All labs reviewed by me.     Results for orders placed or performed during the hospital encounter of 10/06/17   CBC WITH DIFFERENTIAL   Result Value Ref Range    WBC 8.4 4.8 - 10.8 K/uL    RBC 4.27 4.20 - 5.40 M/uL    Hemoglobin 13.2 12.0 - 16.0 g/dL    Hematocrit 39.4 37.0 - 47.0 %    MCV 92.3 81.4 - 97.8 fL    MCH 30.9 27.0 - 33.0 pg    MCHC 33.5 (L) 33.6 - 35.0 " g/dL    RDW 47.5 35.9 - 50.0 fL    Platelet Count 290 164 - 446 K/uL    MPV 10.7 9.0 - 12.9 fL    Neutrophils-Polys 66.80 44.00 - 72.00 %    Lymphocytes 25.70 22.00 - 41.00 %    Monocytes 5.40 0.00 - 13.40 %    Eosinophils 1.20 0.00 - 6.90 %    Basophils 0.50 0.00 - 1.80 %    Immature Granulocytes 0.40 0.00 - 0.90 %    Nucleated RBC 0.00 /100 WBC    Neutrophils (Absolute) 5.62 2.00 - 7.15 K/uL    Lymphs (Absolute) 2.16 1.00 - 4.80 K/uL    Monos (Absolute) 0.45 0.00 - 0.85 K/uL    Eos (Absolute) 0.10 0.00 - 0.51 K/uL    Baso (Absolute) 0.04 0.00 - 0.12 K/uL    Immature Granulocytes (abs) 0.03 0.00 - 0.11 K/uL    NRBC (Absolute) 0.00 K/uL   COMP METABOLIC PANEL   Result Value Ref Range    Sodium 136 135 - 145 mmol/L    Potassium 3.4 (L) 3.6 - 5.5 mmol/L    Chloride 108 96 - 112 mmol/L    Co2 19 (L) 20 - 33 mmol/L    Anion Gap 9.0 0.0 - 11.9    Glucose 102 (H) 65 - 99 mg/dL    Bun 12 8 - 22 mg/dL    Creatinine 0.87 0.50 - 1.40 mg/dL    Calcium 8.7 8.4 - 10.2 mg/dL    AST(SGOT) 14 12 - 45 U/L    ALT(SGPT) 13 2 - 50 U/L    Alkaline Phosphatase 63 30 - 99 U/L    Total Bilirubin 0.4 0.1 - 1.5 mg/dL    Albumin 3.6 3.2 - 4.9 g/dL    Total Protein 6.5 6.0 - 8.2 g/dL    Globulin 2.9 1.9 - 3.5 g/dL    A-G Ratio 1.2 g/dL   LACTIC ACID   Result Value Ref Range    Lactic Acid 1.32 0.50 - 2.00 mmol/L    Specimen Venous    URINALYSIS CULTURE, IF INDICATED   Result Value Ref Range    Color Yellow     Character Clear     Specific Gravity 1.010 <1.035    Ph 6.0 5.0 - 8.0    Glucose Negative Negative mg/dL    Ketones Negative Negative mg/dL    Protein Negative Negative mg/dL    Bilirubin Negative Negative    Nitrite Negative Negative    Leukocyte Esterase Negative Negative    Occult Blood Negative Negative    Micro Urine Req see below     Culture Indicated No UA Culture   CRP QUANTITIVE (NON-CARDIAC)   Result Value Ref Range    Stat C-Reactive Protein 0.37 0.00 - 0.75 mg/dL   WESTERGREN SED RATE   Result Value Ref Range    Sed Rate  Westergren 8 0 - 20 mm/hour   BETA-HCG QUALITATIVE URINE   Result Value Ref Range    Beta-Hcg Urine Negative Negative   REFRACTOMETER SG   Result Value Ref Range    Specific Gravity 1.015    ESTIMATED GFR   Result Value Ref Range    GFR If African American >60 >60 mL/min/1.73 m 2    GFR If Non African American >60 >60 mL/min/1.73 m 2        RADIOLOGY  The radiologist's interpretation of all radiological studies have been reviewed by me.     CT-SOFT TISSUE NECK WITH   Final Result      No abscess collection identified.      No cervical lymphadenopathy is seen.                      COURSE & MEDICAL DECISION MAKING  Nursing notes, VS, PMSFHx reviewed in chart.     Review of past medical records shows the patient had ablation performed under fluoroscopy on September 29.    Differential diagnoses considered include but are not limited to: Cellulitis, abscess, sepsis, viral syndrome    2015: D/W Dr. Steiner, on call for Dr. Navarro. He recommends Lyrica and pain patch for the patient and will follow up later this week.      Patient presents to the ED with above complaint. Labs and CT neck were all essentially unremarkable. Patient was given a dose of Norco for her pain. Findings discussed with the patient. Patient noted to be resting comfortably in no acute distress. This is a pleasant well-appearing patient with unremarkable vital signs. Patient states that she never filled her gabapentin prescription. She also states that Dr. Navarro Had mentioned Lyrica but did not give her a prescription. She will be prescribed Percocet and Lidoderm patch. Patient was advised on outpatient follow-up and given return to ED precautions. She verbalized understanding and agreed with plan of care with no further questions or concerns.      The patient is referred to a primary physician for blood pressure management, diabetic screening, and for all other preventative health concerns.       FINAL IMPRESSION  1. Neck pain on right side            DISPOSITION  Patient will be discharged home in stable condition.       FOLLOW UP  Scotty Navarro D.O.  [address]  [place] [id number] [phone]    Call in 3 days      [facility], Emergency Dept  [address]  [place] [id number] [phone]    If symptoms worsen         OUTPATIENT MEDICATIONS  New Prescriptions    LIDOCAINE (LIDODERM) 5 % PATCH    Apply 1 Patch to skin as directed every 24 hours.    PREGABALIN (LYRICA) 100 MG CAP    Take 1 Cap by mouth 2 times a day.          Electronically signed by: Kush Yin, 10/6/2017 5:45 PM      Portions of this record were made with voice recognition software.  Despite my review, spelling/grammar/context errors may still remain.  Interpretation of this chart should be taken in this context.

## 2017-10-07 NOTE — DISCHARGE INSTRUCTIONS
Musculoskeletal Pain  Musculoskeletal pain is muscle and nessa aches and pains. These pains can occur in any part of the body. Your caregiver may treat you without knowing the cause of the pain. They may treat you if blood or urine tests, X-rays, and other tests were normal.   CAUSES  There is often not a definite cause or reason for these pains. These pains may be caused by a type of germ (virus). The discomfort may also come from overuse. Overuse includes working out too hard when your body is not fit. Nessa aches also come from weather changes. Bone is sensitive to atmospheric pressure changes.  HOME CARE INSTRUCTIONS   · Ask when your test results will be ready. Make sure you get your test results.  · Only take over-the-counter or prescription medicines for pain, discomfort, or fever as directed by your caregiver. If you were given medications for your condition, do not drive, operate machinery or power tools, or sign legal documents for 24 hours. Do not drink alcohol. Do not take sleeping pills or other medications that may interfere with treatment.  · Continue all activities unless the activities cause more pain. When the pain lessens, slowly resume normal activities. Gradually increase the intensity and duration of the activities or exercise.  · During periods of severe pain, bed rest may be helpful. Lay or sit in any position that is comfortable.  · Putting ice on the injured area.  ¨ Put ice in a bag.  ¨ Place a towel between your skin and the bag.  ¨ Leave the ice on for 15 to 20 minutes, 3 to 4 times a day.  · Follow up with your caregiver for continued problems and no reason can be found for the pain. If the pain becomes worse or does not go away, it may be necessary to repeat tests or do additional testing. Your caregiver may need to look further for a possible cause.  SEEK IMMEDIATE MEDICAL CARE IF:  · You have pain that is getting worse and is not relieved by medications.  · You develop chest pain  that is associated with shortness or breath, sweating, feeling sick to your stomach (nauseous), or throw up (vomit).  · Your pain becomes localized to the abdomen.  · You develop any new symptoms that seem different or that concern you.  MAKE SURE YOU:   · Understand these instructions.  · Will watch your condition.  · Will get help right away if you are not doing well or get worse.     This information is not intended to replace advice given to you by your health care provider. Make sure you discuss any questions you have with your health care provider.     Document Released: 12/18/2006 Document Revised: 03/11/2013 Document Reviewed: 08/22/2014  [employer] Interactive Patient Education ©2016 [employer]

## 2017-10-09 ENCOUNTER — APPOINTMENT (OUTPATIENT)
Dept: RADIOLOGY | Facility: MEDICAL CENTER | Age: 37
End: 2017-10-09
Attending: EMERGENCY MEDICINE
Payer: COMMERCIAL

## 2017-10-09 ENCOUNTER — HOSPITAL ENCOUNTER (EMERGENCY)
Facility: MEDICAL CENTER | Age: 37
End: 2017-10-10
Attending: EMERGENCY MEDICINE
Payer: COMMERCIAL

## 2017-10-09 DIAGNOSIS — E86.0 DEHYDRATION: ICD-10-CM

## 2017-10-09 DIAGNOSIS — I49.3 PVC (PREMATURE VENTRICULAR CONTRACTION): ICD-10-CM

## 2017-10-09 DIAGNOSIS — R00.2 PALPITATIONS: ICD-10-CM

## 2017-10-09 LAB
ALBUMIN SERPL BCP-MCNC: 3.9 G/DL (ref 3.2–4.9)
ALBUMIN/GLOB SERPL: 1.3 G/DL
ALP SERPL-CCNC: 67 U/L (ref 30–99)
ALT SERPL-CCNC: 9 U/L (ref 2–50)
ANION GAP SERPL CALC-SCNC: 9 MMOL/L (ref 0–11.9)
APTT PPP: 26.1 SEC (ref 24.7–36)
AST SERPL-CCNC: 14 U/L (ref 12–45)
BASOPHILS # BLD AUTO: 0.8 % (ref 0–1.8)
BASOPHILS # BLD: 0.06 K/UL (ref 0–0.12)
BILIRUB SERPL-MCNC: 0.2 MG/DL (ref 0.1–1.5)
BUN SERPL-MCNC: 14 MG/DL (ref 8–22)
CALCIUM SERPL-MCNC: 9.2 MG/DL (ref 8.5–10.5)
CHLORIDE SERPL-SCNC: 110 MMOL/L (ref 96–112)
CO2 SERPL-SCNC: 17 MMOL/L (ref 20–33)
CREAT SERPL-MCNC: 0.85 MG/DL (ref 0.5–1.4)
DEPRECATED D DIMER PPP IA-ACNC: <200 NG/ML(D-DU)
EKG IMPRESSION: NORMAL
EOSINOPHIL # BLD AUTO: 0.1 K/UL (ref 0–0.51)
EOSINOPHIL NFR BLD: 1.3 % (ref 0–6.9)
ERYTHROCYTE [DISTWIDTH] IN BLOOD BY AUTOMATED COUNT: 47.7 FL (ref 35.9–50)
GFR SERPL CREATININE-BSD FRML MDRD: >60 ML/MIN/1.73 M 2
GLOBULIN SER CALC-MCNC: 3.1 G/DL (ref 1.9–3.5)
GLUCOSE BLD-MCNC: 95 MG/DL (ref 65–99)
GLUCOSE SERPL-MCNC: 101 MG/DL (ref 65–99)
HCT VFR BLD AUTO: 39.9 % (ref 37–47)
HGB BLD-MCNC: 13.1 G/DL (ref 12–16)
IMM GRANULOCYTES # BLD AUTO: 0.04 K/UL (ref 0–0.11)
IMM GRANULOCYTES NFR BLD AUTO: 0.5 % (ref 0–0.9)
INR PPP: 0.91 (ref 0.87–1.13)
LIPASE SERPL-CCNC: 31 U/L (ref 11–82)
LYMPHOCYTES # BLD AUTO: 2.31 K/UL (ref 1–4.8)
LYMPHOCYTES NFR BLD: 29.1 % (ref 22–41)
MCH RBC QN AUTO: 30.2 PG (ref 27–33)
MCHC RBC AUTO-ENTMCNC: 32.8 G/DL (ref 33.6–35)
MCV RBC AUTO: 91.9 FL (ref 81.4–97.8)
MONOCYTES # BLD AUTO: 0.5 K/UL (ref 0–0.85)
MONOCYTES NFR BLD AUTO: 6.3 % (ref 0–13.4)
NEUTROPHILS # BLD AUTO: 4.94 K/UL (ref 2–7.15)
NEUTROPHILS NFR BLD: 62 % (ref 44–72)
NRBC # BLD AUTO: 0 K/UL
NRBC BLD AUTO-RTO: 0 /100 WBC
PLATELET # BLD AUTO: 317 K/UL (ref 164–446)
PMV BLD AUTO: 10.8 FL (ref 9–12.9)
POTASSIUM SERPL-SCNC: 3.7 MMOL/L (ref 3.6–5.5)
PROT SERPL-MCNC: 7 G/DL (ref 6–8.2)
PROTHROMBIN TIME: 12.5 SEC (ref 12–14.6)
RBC # BLD AUTO: 4.34 M/UL (ref 4.2–5.4)
SODIUM SERPL-SCNC: 136 MMOL/L (ref 135–145)
TROPONIN I SERPL-MCNC: <0.01 NG/ML (ref 0–0.04)
WBC # BLD AUTO: 8 K/UL (ref 4.8–10.8)

## 2017-10-09 PROCEDURE — 82962 GLUCOSE BLOOD TEST: CPT

## 2017-10-09 PROCEDURE — 85379 FIBRIN DEGRADATION QUANT: CPT

## 2017-10-09 PROCEDURE — 83690 ASSAY OF LIPASE: CPT

## 2017-10-09 PROCEDURE — 700105 HCHG RX REV CODE 258: Performed by: EMERGENCY MEDICINE

## 2017-10-09 PROCEDURE — 85025 COMPLETE CBC W/AUTO DIFF WBC: CPT

## 2017-10-09 PROCEDURE — 96361 HYDRATE IV INFUSION ADD-ON: CPT

## 2017-10-09 PROCEDURE — 71010 DX-CHEST-LIMITED (1 VIEW): CPT

## 2017-10-09 PROCEDURE — 84484 ASSAY OF TROPONIN QUANT: CPT

## 2017-10-09 PROCEDURE — 96360 HYDRATION IV INFUSION INIT: CPT

## 2017-10-09 PROCEDURE — 85730 THROMBOPLASTIN TIME PARTIAL: CPT

## 2017-10-09 PROCEDURE — 99284 EMERGENCY DEPT VISIT MOD MDM: CPT

## 2017-10-09 PROCEDURE — 80053 COMPREHEN METABOLIC PANEL: CPT

## 2017-10-09 PROCEDURE — 93005 ELECTROCARDIOGRAM TRACING: CPT

## 2017-10-09 PROCEDURE — 93005 ELECTROCARDIOGRAM TRACING: CPT | Performed by: EMERGENCY MEDICINE

## 2017-10-09 PROCEDURE — 85610 PROTHROMBIN TIME: CPT

## 2017-10-09 RX ORDER — SODIUM CHLORIDE 9 MG/ML
1000 INJECTION, SOLUTION INTRAVENOUS ONCE
Status: COMPLETED | OUTPATIENT
Start: 2017-10-09 | End: 2017-10-10

## 2017-10-09 RX ORDER — SODIUM CHLORIDE 9 MG/ML
1000 INJECTION, SOLUTION INTRAVENOUS ONCE
Status: COMPLETED | OUTPATIENT
Start: 2017-10-09 | End: 2017-10-09

## 2017-10-09 RX ADMIN — SODIUM CHLORIDE 1000 ML: 9 INJECTION, SOLUTION INTRAVENOUS at 23:29

## 2017-10-09 RX ADMIN — SODIUM CHLORIDE 1000 ML: 9 INJECTION, SOLUTION INTRAVENOUS at 22:45

## 2017-10-09 ASSESSMENT — ENCOUNTER SYMPTOMS
FEVER: 0
SHORTNESS OF BREATH: 1
VOMITING: 0
PALPITATIONS: 1
NAUSEA: 1
TINGLING: 1
DIZZINESS: 1

## 2017-10-09 ASSESSMENT — PAIN SCALES - GENERAL: PAINLEVEL_OUTOF10: 7

## 2017-10-10 VITALS
OXYGEN SATURATION: 100 % | BODY MASS INDEX: 21.9 KG/M2 | DIASTOLIC BLOOD PRESSURE: 87 MMHG | TEMPERATURE: 97.3 F | HEIGHT: 66 IN | RESPIRATION RATE: 16 BRPM | HEART RATE: 93 BPM | WEIGHT: 136.24 LBS | SYSTOLIC BLOOD PRESSURE: 132 MMHG

## 2017-10-10 NOTE — ED PROVIDER NOTES
"ED Provider Note    Scribed for Arnoldo Robertson M.D. by Thomas Santiago. 10/9/2017, 10:02 PM.    Primary care provider: Emerald Rico M.D.  Means of arrival: Walk-In  History obtained from: Patient  History limited by: None    CHIEF COMPLAINT  Chief Complaint   Patient presents with   • Chest Pressure   • Shortness of Breath   • Rapid Heart Beat     Irregular, \"it feels like im skipping beats\"   • Neck Swelling     HPI  Dany Lambert is a 37 y.o. female who presents to the Emergency Department complaining of  progressively worsening \"hard\" chest palpitations that causes associated shortness of breath onset a few days ago.The patient tried listening to her heart with a stethoscope and felt like she heard skipped beats. Per nurse, she had a near-syncopal episode in triage and possible seizure-like activity. Patient notes that the past year, she had 2 seizures and felt dizzy while in triage. She has had associated nausea and pain in lower calf. The patient also reports chronic numbness and tingling. One week ago, she had a cervical spine ablation conducted and recently saw a a red streak running down her right neck with swelling. However, she went to [facility] and had unremarkable results. She just recently stopped birth control but has been on it consistently for the past year.   Denies chest pain, vomiting, fever.    REVIEW OF SYSTEMS  Review of Systems   Constitutional: Negative for fever.   HENT:        + Neck swelling   Respiratory: Positive for shortness of breath.    Cardiovascular: Positive for palpitations. Negative for chest pain.   Gastrointestinal: Positive for nausea. Negative for vomiting.   Musculoskeletal:        + Calf pain   Neurological: Positive for dizziness and tingling (Chronic).        + Near-syncope   All other systems reviewed and are negative.    C.    PAST MEDICAL HISTORY   has a past medical history of Allergy, unspecified not elsewhere classified; Anxiety (8/26/2013); " Arthritis; High cholesterol; Migraine with aura and without status migrainosus, not intractable (8/26/2013); Pain (7/27/2017); Psychiatric problem; and Seizure (CMS-HCC) (1/2016).    SURGICAL HISTORY   has a past surgical history that includes tonsillectomy (1997); dstr nrolytc agnt parverteb fct sngl crvcl/thora (Left, 7/28/2017); dstr nrolytc agnt parverteb fct addl crvcl/thora (7/28/2017); cyst excision (Left, as child); dstr nrolytc agnt parverteb fct sngl crvcl/thora (Right, 9/29/2017); and dstr nrolytc agnt parverteb fct addl crvcl/thora (Right, 9/29/2017).    SOCIAL HISTORY  Social History   Substance Use Topics   • Smoking status: Former Smoker     Packs/day: 1.00     Years: 15.00     Types: Cigarettes     Quit date: 1/1/2010   • Smokeless tobacco: Never Used   • Alcohol use No      Comment: denies      History   Drug Use No     FAMILY HISTORY  Family History   Problem Relation Age of Onset   • Diabetes Maternal Grandfather    • Cancer Paternal Grandfather    • Alzheimer's Disease Paternal Grandfather    • Cancer Paternal Grandmother 30     breast   • Suicide Attempts Maternal Uncle      Killed himself by GSW   • Hypertension Father      CURRENT MEDICATIONS  Home Medications     Reviewed by Thomas Riggs R.N. (Registered Nurse) on 10/09/17 at 2157  Med List Status: Partial   Medication Last Dose Status   Calcium Polycarbophil (FIBERCON PO) 9/27/2017 Active   cetirizine (ZYRTEC) 10 MG Tab 9/27/2017 Active   Cholecalciferol (VITAMIN D) 2000 UNIT Tab 9/27/2017 Active   Coenzyme Q10 (CO Q 10 PO) 9/27/2017 Active   gabapentin (NEURONTIN) 300 MG Cap 3 months ago Active   Levonorgest-Eth Estrad 91-Day (DAYSEE) 0.15-0.03 &0.01 MG Tab 9/29/2017 Active   lidocaine (LIDODERM) 5 % Patch  Active   Magnesium 100 MG Tab 9/27/2017 Active   omeprazole (PRILOSEC) 20 MG delayed-release capsule 9/27/2017 Active   pregabalin (LYRICA) 100 MG Cap  Active   rizatriptan (MAXALT-MLT) 10 MG disintegrating tablet few weeks ago  "Active   topiramate (TOPAMAX) 50 MG tablet 9/29/2017 Active   venlafaxine (EFFEXOR-XR) 150 MG extended-release capsule 9/29/2017 Active              ALLERGIES  Allergies   Allergen Reactions   • Benadryl Allergy Anxiety   • Demerol Hives   • Medrol [Methylprednisolone] Itching   • Previfem [Norgestimate-Ethinyl Estradiol]      Nausea/vomiting   • Reglan [Metoclopramide] Anxiety     PHYSICAL EXAM  VITAL SIGNS: /87   Pulse (!) 120   Temp 36.3 °C (97.3 °F)   Resp 18   Ht 1.676 m (5' 6\")   Wt 61.8 kg (136 lb 3.9 oz)   SpO2 99%   BMI 21.99 kg/m²     Constitutional: Well developed, Mild distress.   HENT: Normocephalic, Atraumatic, Slightly dry mucous mebranes  Eyes: Conjunctiva normal, No discharge.   Neck: Supple, No stridor, no erythema, warmth, or tenderness.  Cardiovascular: Tachycardic heart rate, Normal rhythm, No murmurs, equal pulses.   Pulmonary: Normal breath sounds, No respiratory distress, No wheezing, No rales, No rhonchi.  Chest: No chest wall tenderness or deformity.   Abdomen:Soft, No tenderness.  Musculoskeletal: No major deformities noted, No calf tenderness and appears symmetric.  Skin: Warm, Dry, No erythema, No rash.   Neurologic: Alert & oriented x 3, Normal motor function,  No focal deficits noted.   Psychiatric: Affect normal, Judgment normal, Mood normal.     LABS  Results for orders placed or performed during the hospital encounter of 10/09/17   Troponin   Result Value Ref Range    Troponin I <0.01 0.00 - 0.04 ng/mL   CBC with Differential   Result Value Ref Range    WBC 8.0 4.8 - 10.8 K/uL    RBC 4.34 4.20 - 5.40 M/uL    Hemoglobin 13.1 12.0 - 16.0 g/dL    Hematocrit 39.9 37.0 - 47.0 %    MCV 91.9 81.4 - 97.8 fL    MCH 30.2 27.0 - 33.0 pg    MCHC 32.8 (L) 33.6 - 35.0 g/dL    RDW 47.7 35.9 - 50.0 fL    Platelet Count 317 164 - 446 K/uL    MPV 10.8 9.0 - 12.9 fL    Neutrophils-Polys 62.00 44.00 - 72.00 %    Lymphocytes 29.10 22.00 - 41.00 %    Monocytes 6.30 0.00 - 13.40 %    " Eosinophils 1.30 0.00 - 6.90 %    Basophils 0.80 0.00 - 1.80 %    Immature Granulocytes 0.50 0.00 - 0.90 %    Nucleated RBC 0.00 /100 WBC    Neutrophils (Absolute) 4.94 2.00 - 7.15 K/uL    Lymphs (Absolute) 2.31 1.00 - 4.80 K/uL    Monos (Absolute) 0.50 0.00 - 0.85 K/uL    Eos (Absolute) 0.10 0.00 - 0.51 K/uL    Baso (Absolute) 0.06 0.00 - 0.12 K/uL    Immature Granulocytes (abs) 0.04 0.00 - 0.11 K/uL    NRBC (Absolute) 0.00 K/uL   Complete Metabolic Panel (CMP)   Result Value Ref Range    Sodium 136 135 - 145 mmol/L    Potassium 3.7 3.6 - 5.5 mmol/L    Chloride 110 96 - 112 mmol/L    Co2 17 (L) 20 - 33 mmol/L    Anion Gap 9.0 0.0 - 11.9    Glucose 101 (H) 65 - 99 mg/dL    Bun 14 8 - 22 mg/dL    Creatinine 0.85 0.50 - 1.40 mg/dL    Calcium 9.2 8.5 - 10.5 mg/dL    AST(SGOT) 14 12 - 45 U/L    ALT(SGPT) 9 2 - 50 U/L    Alkaline Phosphatase 67 30 - 99 U/L    Total Bilirubin 0.2 0.1 - 1.5 mg/dL    Albumin 3.9 3.2 - 4.9 g/dL    Total Protein 7.0 6.0 - 8.2 g/dL    Globulin 3.1 1.9 - 3.5 g/dL    A-G Ratio 1.3 g/dL   Prothrombin Time   Result Value Ref Range    PT 12.5 12.0 - 14.6 sec    INR 0.91 0.87 - 1.13   APTT   Result Value Ref Range    APTT 26.1 24.7 - 36.0 sec   Lipase   Result Value Ref Range    Lipase 31 11 - 82 U/L   D-Dimer (only helpful in low pre-test probability wells critieria. Do not order if patient ruled out by PERC criteria. See Weblinks at top of Labs section)   Result Value Ref Range    D-Dimer Screen <200 <250 ng/mL(D-DU)   ESTIMATED GFR   Result Value Ref Range    GFR If African American >60 >60 mL/min/1.73 m 2    GFR If Non African American >60 >60 mL/min/1.73 m 2   ACCU-CHEK GLUCOSE   Result Value Ref Range    Glucose - Accu-Ck 95 65 - 99 mg/dL   EKG (ER)   Result Value Ref Range    Report       [facility] Emergency Dept.    Test Date:  2017-10-09  Pt Name:    VIRAJ MCMILLAN                Department: ER  MRN:        [id number]                      Room:  Gender:     F                             Technician: [id number]  :        1980                   Requested By:ER TRIAGE PROTOCOL  Order #:    [id number]                    Reading MD:    Measurements  Intervals                                Axis  Rate:       106                          P:          61  CT:         124                          QRS:        28  QRSD:       72                           T:          -36  QT:         324  QTc:        431    Interpretive Statements  SINUS TACHYCARDIA  BORDERLINE T ABNORMALITIES, DIFFUSE LEADS  Compared to ECG 2017 12:49:13  Sinus rhythm no longer present  T-wave abnormality still present       All labs reviewed by me.    EKG  12 Lead EKG interpreted by me shows a Tachycardic sinus rhythm at a rate of 106. Axis normal. Diffuse T wave flattening nonspecific. Old EKG from 3/4/17 shows no significant changes. Final impression: Sinus Tachycardia.    RADIOLOGY  DX-CHEST-LIMITED (1 VIEW)   Final Result      No evidence of acute cardiopulmonary process.        The radiologist's interpretation of all radiological studies have been reviewed by me.    COURSE & MEDICAL DECISION MAKING  Pertinent Labs & Imaging studies reviewed. (See chart for details)    Reviewed old medical records that showed the patient had a CT-Neck conducted 3 days ago on 10/6 which was unremarkable. C react protein     10:02 PM - Patient seen and examined at bedside. Patient will be treated with IV fluids for tachycardia. Ordered DX-Chest, Estimated GFR, D-Dimer, Troponin, CBC, CMP, Prothrombin Time, APTT, Lipase to evaluate her symptoms. The differential diagnoses include but are not limited to: Pulmonary Embolism, Myocardial Infarction, Dehydration, Anxiety, Electrolyte Abnormality.    10:11 PM - The images of the patient's radiology studies were independently reviewed by me after they were performed at bedside.    10:25 PM - Nurse clarified the seizure-like activity and stated that the patient was feeling dizzy and went limp for  about a minute with slight trembling. However, there was no clear locking up phase or seizure.    11:15 PM - Patient seen at bedside. I discussed that the patient has PVCs which are causing her palpitations. I explained the lab and radiology results noted above. The patient notes she had a migraine earlier today but it has resolved. Treated with another 1L of IV fluids for hydration.    12:44 AM - Patient seen at bedside. I explained PVCs in detail and that she can be safely discharged. The patient was given return precautions such as new or worsening symptoms.    Medical Decision Making: This point, think the patient's palpitations are secondary to her PVCs. Patient's x-rays are negative for any infiltrates. Her labs are otherwise unremarkable. X-ray of the left index finger were done to rule out injury therapy since patient has no tenderness there. The patient will return for new or worsening symptoms and is stable at the time of discharge.    The patient is referred to a primary physician for blood pressure management, diabetic screening, and for all other preventative health concerns.    DISPOSITION:  Patient will be discharged home in stable condition.    FOLLOW UP:  Emerald Rico M.D.  [address] [facility] [address]  [facility] [id number] [phone]    Schedule an appointment as soon as possible for a visit in 3 days      Nagi Flood M.D.  [address]  [facility] [address]  [facility] [id number] [phone]      If you keep having a lot of PVC's     FINAL IMPRESSION  1. PVC (premature ventricular contraction)    2. Palpitations    3. Dehydration       Thomas SILVEIRA (Sabino), am scribing for, and in the presence of, Arnoldo Robertson M.D.    Electronically signed by: Thomas Santiago (Sabino), 10/9/2017    Arnoldo SILVEIRA M.D. personally performed the services described in this documentation, as scribed by Thomas Santiago in my presence, and it is both accurate and complete.    The note accurately reflects work  and decisions made by me.  Arnoldo Robertson  10/10/2017  6:53 AM

## 2017-10-10 NOTE — DISCHARGE INSTRUCTIONS
Return if you have new or different chest pain, shortness of breath, lightheaded or pass out. Drink more fluids.   If you keep having these you can follow up with cardiology.   Premature Ventricular Contraction  A premature ventricular contraction is an irregularity in the normal heart rhythm. These contractions are extra heartbeats that occur too early in the normal sequence. In most cases, these contractions are harmless and do not require treatment.  CAUSES  Premature ventricular contractions may occur without a known cause. In healthy people, the extra contractions may be caused by:  · Smoking.  · Drinking alcohol.  · Caffeine.  · Certain medicines.  · Some illegal drugs.  · Stress.  Sometimes, changes in chemicals in the blood (electrolytes) can also cause premature ventricular contractions. They can also occur in people with heart diseases that cause a decrease in blood flow to the heart.  SIGNS AND SYMPTOMS  Premature ventricular contractions often do not cause any symptoms. In some cases, you may have a feeling of your heart beating fast or skipping a beat (palpitations).  DIAGNOSIS  Your health care provider will take your medical history and do a physical exam. During the exam, the health care provider will check for irregular heartbeats. Various tests may be done to help diagnose premature ventricular contractions. These tests may include:  · An ECG (electrocardiogram) to monitor the electrical activity of your heart.  · Holter monitor testing. A Holter monitor is a portable device that can monitor the electrical activity of your heart over longer periods of time.  · Stress tests to see how exercise affects your heart rhythm.  · Echocardiogram. This test uses sound waves (ultrasound) to produce an image of your heart.  · Electrophysiology study. This is used to evaluate the electrical conduction system of your heart.  TREATMENT  Usually, no treatment is needed. You may be advised to avoid things that can  trigger the premature contractions, such as caffeine or alcohol. Medicines are sometimes given if symptoms are severe or if the extra heartbeats are very frequent. Treatment may also be needed for an underlying cause of the contractions if one is found.  HOME CARE INSTRUCTIONS  · Take medicines only as directed by your health care provider.  · Make any lifestyle changes recommended by your health care provider. These may include:  ¨ Quitting smoking.  ¨ Avoiding or limiting caffeine or alcohol.  ¨ Exercising. Talk to your health care provider about what type of exercise is safe for you.  ¨ Trying to reduce stress.  · Keep all follow-up visits with your health care provider. This is important.  SEEK IMMEDIATE MEDICAL CARE IF:  · You feel palpitations that are frequent or continual.  · You have chest pain.  · You have shortness of breath.  · You have sweating for no reason.  · You have nausea and vomiting.  · You become light-headed or faint.     This information is not intended to replace advice given to you by your health care provider. Make sure you discuss any questions you have with your health care provider.     Document Released: 08/04/2005 Document Revised: 01/08/2016 Document Reviewed: 05/21/2015  [employer] Interactive Patient Education ©2016 [employer]        Dehydration, Adult  Dehydration is when you lose more fluids from the body than you take in. Vital organs like the kidneys, brain, and heart cannot function without a proper amount of fluids and salt. Any loss of fluids from the body can cause dehydration.   CAUSES   · Vomiting.  · Diarrhea.  · Excessive sweating.  · Excessive urine output.  · Fever.  SYMPTOMS   Mild dehydration  · Thirst.  · Dry lips.  · Slightly dry mouth.  Moderate dehydration  · Very dry mouth.  · Sunken eyes.  · Skin does not bounce back quickly when lightly pinched and released.  · Dark urine and decreased urine production.  · Decreased tear production.  · Headache.  Severe  dehydration  · Very dry mouth.  · Extreme thirst.  · Rapid, weak pulse (more than 100 beats per minute at rest).  · Cold hands and feet.  · Not able to sweat in spite of heat and temperature.  · Rapid breathing.  · Blue lips.  · Confusion and lethargy.  · Difficulty being awakened.  · Minimal urine production.  · No tears.  DIAGNOSIS   Your caregiver will diagnose dehydration based on your symptoms and your exam. Blood and urine tests will help confirm the diagnosis. The diagnostic evaluation should also identify the cause of dehydration.  TREATMENT   Treatment of mild or moderate dehydration can often be done at home by increasing the amount of fluids that you drink. It is best to drink small amounts of fluid more often. Drinking too much at one time can make vomiting worse. Refer to the home care instructions below.  Severe dehydration needs to be treated at the hospital where you will probably be given intravenous (IV) fluids that contain water and electrolytes.  HOME CARE INSTRUCTIONS   · Ask your caregiver about specific rehydration instructions.  · Drink enough fluids to keep your urine clear or pale yellow.  · Drink small amounts frequently if you have nausea and vomiting.  · Eat as you normally do.  · Avoid:  ¨ Foods or drinks high in sugar.  ¨ Carbonated drinks.  ¨ Juice.  ¨ Extremely hot or cold fluids.  ¨ Drinks with caffeine.  ¨ Fatty, greasy foods.  ¨ Alcohol.  ¨ Tobacco.  ¨ Overeating.  ¨ Gelatin desserts.  · Wash your hands well to avoid spreading bacteria and viruses.  · Only take over-the-counter or prescription medicines for pain, discomfort, or fever as directed by your caregiver.  · Ask your caregiver if you should continue all prescribed and over-the-counter medicines.  · Keep all follow-up appointments with your caregiver.  SEEK MEDICAL CARE IF:  · You have abdominal pain and it increases or stays in one area (localizes).  · You have a rash, stiff neck, or severe headache.  · You are irritable,  sleepy, or difficult to awaken.  · You are weak, dizzy, or extremely thirsty.  SEEK IMMEDIATE MEDICAL CARE IF:   · You are unable to keep fluids down or you get worse despite treatment.  · You have frequent episodes of vomiting or diarrhea.  · You have blood or green matter (bile) in your vomit.  · You have blood in your stool or your stool looks black and tarry.  · You have not urinated in 6 to 8 hours, or you have only urinated a small amount of very dark urine.  · You have a fever.  · You faint.  MAKE SURE YOU:   · Understand these instructions.  · Will watch your condition.  · Will get help right away if you are not doing well or get worse.     This information is not intended to replace advice given to you by your health care provider. Make sure you discuss any questions you have with your health care provider.     Document Released: 12/18/2006 Document Revised: 03/11/2013 Document Reviewed: 08/06/2012  [employer] Interactive Patient Education ©2016 [employer]      Rehydration, Adult  Rehydration is the replacement of body fluids lost during dehydration. Dehydration is an extreme loss of body fluids to the point of body function impairment. There are many ways extreme fluid loss can occur, including vomiting, diarrhea, or excess sweating. Recovering from dehydration requires replacing lost fluids, continuing to eat to maintain strength, and avoiding foods and beverages that may contribute to further fluid loss or may increase nausea.  HOW TO REHYDRATE  In most cases, rehydration involves the replacement of not only fluids but also carbohydrates and basic body salts. Rehydration with an oral rehydration solution is one way to replace essential nutrients lost through dehydration.  An oral rehydration solution can be purchased at pharmacies, retail stores, and online. Premixed packets of powder that you combine with water to make a solution are also sold. You can prepare an oral rehydration solution at home by  mixing the following ingredients together:   ·  - tsp table salt.  · ¾ tsp baking soda.  ·  tsp salt substitute containing potassium chloride.  · 1 tablespoons sugar.  · 1 L (34 oz) of water.  Be sure to use exact measurements. Including too much sugar can make diarrhea worse.  Drink ½-1 cup (120-240 mL) of oral rehydration solution each time you have diarrhea or vomit. If drinking this amount makes your vomiting worse, try drinking smaller amounts more often. For example, drink 1-3 tsp every 5-10 minutes.   A general rule for staying hydrated is to drink 1½-2 L of fluid per day. Talk to your caregiver about the specific amount you should be drinking each day. Drink enough fluids to keep your urine clear or pale yellow.  EATING WHEN DEHYDRATED  Even if you have had severe sweating or you are having diarrhea, do not stop eating. Many healthy items in a normal diet are okay to continue eating while recovering from dehydration. The following tips can help you to lessen nausea when you eat:  · Ask someone else to prepare your food. Cooking smells may worsen nausea.  · Eat in a well-ventilated room away from cooking smells.  · Sit up when you eat. Avoid lying down until 1-2 hours after eating.  · Eat small amounts when you eat.  · Eat foods that are easy to digest. These include soft, well-cooked, or mashed foods.  FOODS AND BEVERAGES TO AVOID  Avoid eating or drinking the following foods and beverages that may increase nausea or further loss of fluid:   · Fruit juices with a high sugar content, such as concentrated juices.  · Alcohol.  · Beverages containing caffeine.  · Carbonated drinks. They may cause a lot of gas.  · Foods that may cause a lot of gas, such as cabbage, broccoli, and beans.  · Fatty, greasy, and fried foods.  · Spicy, very salty, and very sweet foods or drinks.  · Foods or drinks that are very hot or very cold. Consume food or drinks at or near room temperature.  · Foods that need a lot of chewing,  such as raw vegetables.  · Foods that are sticky or hard to swallow, such as peanut butter.     This information is not intended to replace advice given to you by your health care provider. Make sure you discuss any questions you have with your health care provider.     Document Released: 03/11/2013 Document Revised: 09/11/2013 Document Reviewed: 03/11/2013  [employer] Interactive Patient Education ©2016 [employer]

## 2017-10-10 NOTE — ED NOTES
"Chief Complaint   Patient presents with   • Chest Pressure   • Shortness of Breath   • Rapid Heart Beat     Irregular, \"it feels like im skipping beats\"   • Neck Swelling     Ambulates to triage for above.  Patient had cervical spine ablation done 1 week ago, been sedentary at home, \"not feeling well\".  Presents with above symptoms.      "

## 2017-10-10 NOTE — ED NOTES
Pt brought to [facility].  Pt had syncopal episode in triage.  Pt had cervical ablation last week and was seen a few days prior for possible infection.  Pt has been having intermittent episodes of heart palpitations and shortness of breath.

## 2017-10-10 NOTE — ED NOTES
"During triage assessment, patient stated that she \"felt dizzy and lightheaded,\" with subsequent syncopal episode.  Patient picked up and placed on floor on side.  Charge RN notified of patient status. Patient had seizure like activity lasting approximately 1-2 minutes, placed on gurney and taken to [facility].        "

## 2017-10-10 NOTE — ED NOTES
Pt AOx4.  Pt given discharge instructions and pt verbalized understanding.  Pt ambulated to [facility].

## 2017-10-11 LAB
BACTERIA BLD CULT: NORMAL
BACTERIA BLD CULT: NORMAL
SIGNIFICANT IND 70042: NORMAL
SIGNIFICANT IND 70042: NORMAL
SITE SITE: NORMAL
SITE SITE: NORMAL
SOURCE SOURCE: NORMAL
SOURCE SOURCE: NORMAL

## 2017-11-03 ENCOUNTER — HOSPITAL ENCOUNTER (OUTPATIENT)
Dept: RADIOLOGY | Facility: MEDICAL CENTER | Age: 37
End: 2017-11-03
Attending: INTERNAL MEDICINE
Payer: COMMERCIAL

## 2017-11-03 ENCOUNTER — HOSPITAL ENCOUNTER (OUTPATIENT)
Dept: RADIOLOGY | Facility: MEDICAL CENTER | Age: 37
End: 2017-11-03
Attending: FAMILY MEDICINE
Payer: COMMERCIAL

## 2017-11-03 DIAGNOSIS — N64.4 BREAST TENDERNESS IN FEMALE: ICD-10-CM

## 2017-11-03 DIAGNOSIS — N64.52 NIPPLE DISCHARGE IN FEMALE: ICD-10-CM

## 2017-11-03 DIAGNOSIS — N64.52 DISCHARGE OF BREAST: ICD-10-CM

## 2017-11-03 PROCEDURE — G0279 TOMOSYNTHESIS, MAMMO: HCPCS

## 2017-11-03 PROCEDURE — 76642 ULTRASOUND BREAST LIMITED: CPT | Mod: LT

## 2017-11-18 PROBLEM — Z09 HOSPITAL DISCHARGE FOLLOW-UP: Status: ACTIVE | Noted: 2017-11-18

## 2017-11-18 NOTE — ASSESSMENT & PLAN NOTE
Patient was evaluated in the ER on 10/9/17 for palpitation. She has negative workup with blood tests and EKG as well as chest x-ray. There is no evident seizure during ER evaluation. Her blood tests on 10/9/17 and ER showed that she has slightly low CO2 as she might be hyperventilated from anxiety. She was diagnosed with PVC, dehydration, palpitation discharged to home without any medication changes.

## 2017-11-20 ENCOUNTER — APPOINTMENT (OUTPATIENT)
Dept: MEDICAL GROUP | Age: 37
End: 2017-11-20
Payer: COMMERCIAL

## 2018-01-29 DIAGNOSIS — F41.9 ANXIETY: ICD-10-CM

## 2018-01-29 RX ORDER — VENLAFAXINE HYDROCHLORIDE 150 MG/1
CAPSULE, EXTENDED RELEASE ORAL
Qty: 30 CAP | Refills: 6 | Status: SHIPPED | OUTPATIENT
Start: 2018-01-29 | End: 2018-08-22 | Stop reason: CLARIF

## 2018-02-02 ENCOUNTER — TELEPHONE (OUTPATIENT)
Dept: MEDICAL GROUP | Age: 38
End: 2018-02-02

## 2018-02-02 NOTE — TELEPHONE ENCOUNTER
ESTABLISHED PATIENT PRE-VISIT PLANNING     Note: Patient will not be contacted if there is no indication to call.     1.  Reviewed notes from the last few office visits within the medical group: Yes    2.  If any orders were placed at last visit or intended to be done for this visit (i.e. 6 mos follow-up), do we have Results/Consult Notes?        •  Labs - Labs ordered, completed on 10/9/17 and results are in chart.   Note: If patient appointment is for lab review and patient did not complete labs, check with provider if OK to reschedule patient until labs completed.       •  Imaging - Imaging ordered, completed and results are in chart.       •  Referrals - Referral ordered, patient has NOT been seen.    3. Is this appointment scheduled as a Hospital Follow-Up? No    4.  Immunizations were updated in Epic using WebIZ?: Epic matches WebIZ       •  Web Iz Recommendations: Patient is up to date on all vaccines    5.  Patient is due for the following Health Maintenance Topics:   There are no preventive care reminders to display for this patient.    - Patient is up-to-date on all Health Maintenance topics. No records have been requested at this time.    6.  Patient was NOT informed to arrive 15 min prior to their scheduled appointment and bring in their medication bottles.

## 2018-02-05 ENCOUNTER — APPOINTMENT (OUTPATIENT)
Dept: MEDICAL GROUP | Age: 38
End: 2018-02-05
Payer: COMMERCIAL

## 2018-02-07 PROBLEM — R63.4 WEIGHT LOSS: Status: ACTIVE | Noted: 2018-02-07

## 2018-03-12 ENCOUNTER — APPOINTMENT (OUTPATIENT)
Dept: NEUROLOGY | Facility: MEDICAL CENTER | Age: 38
End: 2018-03-12
Payer: COMMERCIAL

## 2018-03-27 ENCOUNTER — OFFICE VISIT (OUTPATIENT)
Dept: MEDICAL GROUP | Age: 38
End: 2018-03-27
Payer: COMMERCIAL

## 2018-03-27 VITALS
BODY MASS INDEX: 20.28 KG/M2 | HEIGHT: 66 IN | WEIGHT: 126.2 LBS | TEMPERATURE: 98.3 F | DIASTOLIC BLOOD PRESSURE: 70 MMHG | SYSTOLIC BLOOD PRESSURE: 120 MMHG | OXYGEN SATURATION: 97 % | HEART RATE: 102 BPM

## 2018-03-27 DIAGNOSIS — F41.9 ANXIETY: ICD-10-CM

## 2018-03-27 DIAGNOSIS — G43.109 MIGRAINE WITH AURA AND WITHOUT STATUS MIGRAINOSUS, NOT INTRACTABLE: ICD-10-CM

## 2018-03-27 DIAGNOSIS — Z09 HOSPITAL DISCHARGE FOLLOW-UP: ICD-10-CM

## 2018-03-27 DIAGNOSIS — R07.89 CHEST TIGHTNESS OR PRESSURE: ICD-10-CM

## 2018-03-27 DIAGNOSIS — Z02.9 ADMINISTRATIVE ENCOUNTER: ICD-10-CM

## 2018-03-27 DIAGNOSIS — E78.00 HYPERCHOLESTEROLEMIA: ICD-10-CM

## 2018-03-27 PROCEDURE — 99215 OFFICE O/P EST HI 40 MIN: CPT | Performed by: INTERNAL MEDICINE

## 2018-03-27 RX ORDER — LOVASTATIN 10 MG/1
10 TABLET ORAL EVERY EVENING
Qty: 90 TAB | Refills: 3 | Status: SHIPPED | OUTPATIENT
Start: 2018-03-27 | End: 2019-04-03 | Stop reason: SDUPTHER

## 2018-03-28 ENCOUNTER — TELEPHONE (OUTPATIENT)
Dept: MEDICAL GROUP | Age: 38
End: 2018-03-28

## 2018-03-28 NOTE — ASSESSMENT & PLAN NOTE
Patient reported that she had chest tightness and was difficult to take deep breath when she was busy. Symptoms improved with rest. She is concerning of coronary artery disease. She denied chest pain or shortness of breath currently. She has high cholesterol and she is a current smoker. Patient agreed to try lovastatin for cholesterol. She would like to do cardiac stress test for further workup.  She has intermittent palpitation but symptom is mild. She was told that she has PVC. She has propranolol at home and she wants to try propranolol for her symptom. She declined to refer to cardiologist currently. She also does not want to do any cardiac monitoring or Holter monitor. Patient is advised not to take propranolol 48 -72 hour before cardiac stress test.

## 2018-03-28 NOTE — ASSESSMENT & PLAN NOTE
Patient is taking Topamax 150 mg twice a day as managed by neurologist. She takes rizatriptan only when she has flareup. She also follows with Dr. Navarro, physiatry for cervical spinal ablation and Botox injection. Migraine is stable with current regimens. She denies side effects from taking Topamax and rizatriptan. Patient needs to fill up [place] letter for migraine today.

## 2018-03-28 NOTE — TELEPHONE ENCOUNTER
1. Caller Name: Dany Reesey  Call Back Number: [phone] (home)         Patient approves a detailed voicemail message: N\A      2. [place] paperwork received from Dany Lambert   requiring provider signature.     3. All appropriate fields completed by Medical Assistant: YES    4. Paperwork handed to provider to sign then faxed to fax number provided

## 2018-03-28 NOTE — ASSESSMENT & PLAN NOTE
Patient is taking Effexor  mg one tablet daily. She denies side effects from taking Effexor. Her anxiety is stable and well-controlled with current regimens.

## 2018-03-28 NOTE — PROGRESS NOTES
Subjective:   Viraj Lambert is a 37 y.o. female here today for evaluation and management of:      Migraine with aura and without status migrainosus, not intractable  Patient is taking Topamax 150 mg twice a day as managed by neurologist. She takes rizatriptan only when she has flareup. She also follows with Dr. Navarro, physiatry for cervical spinal ablation and Botox injection. Migraine is stable with current regimens. She denies side effects from taking Topamax and rizatriptan. Patient needs to fill up [facility] letter for migraine today.    Hypercholesterolemia  Patient has high cholesterol and tries to control with diet and exercise. Her cholesterol level does not improve with diet control. She stated that she has a family history of high cholesterol in her father. Patient is a current smoker. She is concerning for having cardiovascular disease from high cholesterol. Patient wants to try statin. I discussed with patient to try lovastatin 10 mg to take one tablet every evening and I reviewed the potential side effects of lovastatin with her. She agreed to try it.    Results for VIRAJ LAMBERT (MRN [id number]) as of 3/27/2018 21:49   Ref. Range 9/15/2014 08:37 9/16/2015 08:20 9/1/2016 13:00 9/13/2017 17:22   Cholesterol,Tot Latest Ref Range: 100 - 199 mg/dL 256 (H) 231 (H) 251 (H) 258 (H)   Triglycerides Latest Ref Range: 0 - 149 mg/dL 193 (H) 119 130 149   HDL Latest Ref Range: >=40 mg/dL 72 56 56 53   LDL Latest Ref Range: <100 mg/dL 145 (H) 151 (H) 169 (H) 175 (H)       Hospital discharge follow-up  Patient was evaluated in ER on 10/9/17 for palpitation and chest pressure and tightness. She stated that she still has palpitation once in a while. She was told to have PVCs by ER provider. She is advised to hydrate well with water. Patient concerns to have cardiovascular disease with high cholesterol and current smoking.    Anxiety  Patient is taking Effexor  mg one tablet daily. She denies side  effects from taking Effexor. Her anxiety is stable and well-controlled with current regimens.    Chest tightness or pressure  Patient reported that she had chest tightness and was difficult to take deep breath when she was busy. Symptoms improved with rest. She is concerning of coronary artery disease. She denied chest pain or shortness of breath currently. She has high cholesterol and she is a current smoker. Patient agreed to try lovastatin for cholesterol. She would like to do cardiac stress test for further workup.  She has intermittent palpitation but symptom is mild. She was told that she has PVC. She has propranolol at home and she wants to try propranolol for her symptom. She declined to refer to cardiologist currently. She also does not want to do any cardiac monitoring or Holter monitor. Patient is advised not to take propranolol 48 -72 hour before cardiac stress test.           Current medicines (including changes today)  Current Outpatient Prescriptions   Medication Sig Dispense Refill   • lovastatin (MEVACOR) 10 MG tablet Take 1 Tab by mouth every evening. 90 Tab 3   • venlafaxine (EFFEXOR-XR) 150 MG extended-release capsule TAKE 1 CAPSULE BY MOUTH EVERY DAY. 30 Cap 6   • lidocaine (LIDODERM) 5 % Patch Apply 1 Patch to skin as directed every 24 hours. 10 Patch 0   • Cholecalciferol (VITAMIN D) 2000 UNIT Tab Take  by mouth every day.     • Calcium Polycarbophil (FIBERCON PO) Take 3 Tabs by mouth 2 Times a Day.     • topiramate (TOPAMAX) 50 MG tablet Take 3 Tabs by mouth 2 times a day. 180 Tab 6   • rizatriptan (MAXALT-MLT) 10 MG disintegrating tablet 1 TAB AT HEADACHE ONSET REPEAT EVERY HOUR AS NEEDED UP TO #4 TAB/24 HOURS 9 Tab 11   • cetirizine (ZYRTEC) 10 MG Tab Take 10 mg by mouth every day.     • Magnesium 100 MG Tab Take 1 Tab by mouth 2 Times a Day.     • omeprazole (PRILOSEC) 20 MG delayed-release capsule Take 20 mg by mouth every evening.     • Coenzyme Q10 (CO Q 10 PO) Take 1 Cap by mouth every  "day.       No current facility-administered medications for this visit.      She  has a past medical history of Allergy, unspecified not elsewhere classified; Anxiety (8/26/2013); Arthritis; High cholesterol; Migraine with aura and without status migrainosus, not intractable (8/26/2013); Pain (7/27/2017); Psychiatric problem; and Seizure (CMS-HCC) (1/2016).    ROS   No chest pain, no shortness of breath, no abdominal pain       Objective:     Blood pressure 120/70, pulse (!) 102, temperature 36.8 °C (98.3 °F), height 1.676 m (5' 6\"), weight 57.2 kg (126 lb 3.2 oz), SpO2 97 %. Body mass index is 20.37 kg/m².   Physical Exam:  General: Alert, oriented and no acute distress.  Eye contact is good, speech goal directed, affect calm  HEENT: conjunctiva non-injected, sclera non-icteric.  Oral mucous membranes pink and moist with no lesions.  Pinna normal.   Lungs: Normal respiratory effort, clear to auscultation bilaterally with good excursion.  CV: regular rate and rhythm. No murmurs.   Abdomen: soft, non distended, nontender, Bowel sound normal.  Ext: no edema, color normal, vascularity normal, temperature normal        Assessment and Plan:   The following treatment plan was discussed     1. Hypercholesterolemia  - Start on lovastatin 10 mg one tablet every evening. Reviewed potential side effects of lovastatin with patient.  - Advised to eat low fat, low carbohydrate and high fiber diet as well as do cardio physical exercise regularly.   - CARDIAC STRESS TEST TREADMILL ONLY; Future  - lovastatin (MEVACOR) 10 MG tablet; Take 1 Tab by mouth every evening.  Dispense: 90 Tab; Refill: 3  - COMP METABOLIC PANEL; Future  - LIPID PROFILE; Future    2. Migraine with aura and without status migrainosus, not intractable  - Well-controlled. Continue current regimens. Recheck lab 1-2 weeks before next follow up visit.  - Follow-up with neurologist and his physiatrist as scheduled.    3. Anxiety  - Well-controlled. Continue Effexor X " are 150 mg daily.  - Discussed with patient regarding the use and side effects of medication as well as black box warning of suicidality risk with medication. Patient verbally understands. Recommend to call 911 or go to ER if patient has suicidal ideation or plan.     4. Chest tightness or pressure  - Discussed healthy diet and regular physical exercise. Order cardiac stress test for further workup.  - CARDIAC STRESS TEST TREADMILL ONLY; Future    5. Hospital discharge follow-up  - Patient still has intermittent palpitation but she does not feel severe. Recommend to limit caffeine use.  - She does not want to do a Holter monitor or cardiac events monitor. She wants to postpone to refer to cardiologist currently.  - Patient has propranolol at home and she wants to try propranolol first. I advised patient not to take propranolol 72 hour before cardiac stress test. I also advised patient to check blood pressure and pulse when she is taking propranolol.  - Patient is advised to keep well hydrated.    6. Administrative encounter  - Reviewed FMLA paper with patient and filled FMLA paper for her.    Face-to-face time spent 40 minutes with patient and more than half of that time spent for counseling and cooperating of care for medical problems listed above.       Followup: Return in about 3 months (around 6/27/2018), or if symptoms worsen or fail to improve, for hyperlipidemia, migraine, anxiety, lab review.      Please note that this dictation was created using voice recognition software. I have made every reasonable attempt to correct obvious errors, but I expect that there may have unintended errors in text, spelling, punctuation, or grammar that I did not discover.

## 2018-03-28 NOTE — ASSESSMENT & PLAN NOTE
Patient has high cholesterol and tries to control with diet and exercise. Her cholesterol level does not improve with diet control. She stated that she has a family history of high cholesterol in her father. Patient is a current smoker. She is concerning for having cardiovascular disease from high cholesterol. Patient wants to try statin. I discussed with patient to try lovastatin 10 mg to take one tablet every evening and I reviewed the potential side effects of lovastatin with her. She agreed to try it.    Results for HIPOLITOVIRAJ RENAE (MRN [id number]) as of 3/27/2018 21:49   Ref. Range 9/15/2014 08:37 9/16/2015 08:20 9/1/2016 13:00 9/13/2017 17:22   Cholesterol,Tot Latest Ref Range: 100 - 199 mg/dL 256 (H) 231 (H) 251 (H) 258 (H)   Triglycerides Latest Ref Range: 0 - 149 mg/dL 193 (H) 119 130 149   HDL Latest Ref Range: >=40 mg/dL 72 56 56 53   LDL Latest Ref Range: <100 mg/dL 145 (H) 151 (H) 169 (H) 175 (H)

## 2018-03-28 NOTE — ASSESSMENT & PLAN NOTE
Patient was evaluated in ER on 10/9/17 for palpitation and chest pressure and tightness. She stated that she still has palpitation once in a while. She was told to have PVCs by ER provider. She is advised to hydrate well with water. Patient concerns to have cardiovascular disease with high cholesterol and current smoking.

## 2018-04-02 ENCOUNTER — TELEPHONE (OUTPATIENT)
Dept: MEDICAL GROUP | Age: 38
End: 2018-04-02

## 2018-04-03 NOTE — TELEPHONE ENCOUNTER
----- Message from Marilu Quan sent at 4/2/2018  3:21 PM PDT -----  Good Afternoon  We have attempted multiple times to get this patient scheduled for her imaging exam but has yet to return our call.  You may wish to proceed with a letter in regards.  Thanks much.

## 2018-04-03 NOTE — TELEPHONE ENCOUNTER
Phone Number Called: [phone] (home)     Message: Left message for the patient to call us back regarding the note below.    Left Message for patient to call back: yes

## 2018-04-03 NOTE — TELEPHONE ENCOUNTER
Please inform patient that imaging department tried to call her several times to schedule for cardiac stress test. Please advise patient to schedule cardiac stress test for further workup.  If she does not answer the call, please send a letter to inform patient to call [phone] to schedule cardiac stress test.    Thanks!  Emerald Rico M.D.

## 2018-04-05 ENCOUNTER — TELEPHONE (OUTPATIENT)
Dept: MEDICAL GROUP | Age: 38
End: 2018-04-05

## 2018-04-05 NOTE — TELEPHONE ENCOUNTER
Phone Number Called: [phone] (home)       Message: Returned pt call    Left Message for patient to call back: yes

## 2018-04-09 DIAGNOSIS — G43.109 MIGRAINE WITH AURA AND WITHOUT STATUS MIGRAINOSUS, NOT INTRACTABLE: ICD-10-CM

## 2018-04-09 RX ORDER — TOPIRAMATE 50 MG/1
150 TABLET, FILM COATED ORAL 2 TIMES DAILY
Qty: 540 TAB | Refills: 3 | Status: SHIPPED | OUTPATIENT
Start: 2018-04-09 | End: 2018-09-11 | Stop reason: SDUPTHER

## 2018-05-02 ENCOUNTER — NON-PROVIDER VISIT (OUTPATIENT)
Dept: CARDIOLOGY | Facility: MEDICAL CENTER | Age: 38
End: 2018-05-02
Attending: INTERNAL MEDICINE
Payer: COMMERCIAL

## 2018-05-02 VITALS
DIASTOLIC BLOOD PRESSURE: 70 MMHG | OXYGEN SATURATION: 99 % | BODY MASS INDEX: 20.09 KG/M2 | SYSTOLIC BLOOD PRESSURE: 120 MMHG | HEIGHT: 66 IN | HEART RATE: 92 BPM | WEIGHT: 125 LBS

## 2018-05-02 DIAGNOSIS — E78.00 HYPERCHOLESTEROLEMIA: ICD-10-CM

## 2018-05-02 DIAGNOSIS — R07.89 CHEST TIGHTNESS OR PRESSURE: ICD-10-CM

## 2018-05-02 LAB — TREADMILL STRESS: NORMAL

## 2018-05-02 PROCEDURE — 93015 CV STRESS TEST SUPVJ I&R: CPT | Performed by: INTERNAL MEDICINE

## 2018-05-03 ENCOUNTER — TELEPHONE (OUTPATIENT)
Dept: MEDICAL GROUP | Age: 38
End: 2018-05-03

## 2018-05-03 NOTE — TELEPHONE ENCOUNTER
----- Message from Emerald Rico M.D. sent at 5/2/2018  7:08 PM PDT -----  Please call to inform patient that recent cardiac stress test is normal and there is no ischemic changes or heart attack.    Thanks!  Emerald Rico M.D.

## 2018-05-03 NOTE — LETTER
May 4, 2018        Dany Lambert  [address]  [place] [address]        Dear Dany:    Dr. Rico's office has been unable to reach you. Dr. Rico wanted you to know your cardiac stress test came back normal and that there is no ischemic changes or heart attack. Please call our office at [phone] if you have any questions.     Thank you.              Sincerely,      Grisel Lance, Med Ass't

## 2018-05-04 NOTE — TELEPHONE ENCOUNTER
Phone Number Called: [phone] (home)     Message: Tried to call patient, phone rang three times and then we got disconnected. Second attempt. Letter out.    Left Message for patient to call back: no

## 2018-05-04 NOTE — TELEPHONE ENCOUNTER
Phone Number Called: [phone] (home)       Message: LVM for patient to call office regarding below message,     Left Message for patient to call back: yes

## 2018-05-29 DIAGNOSIS — G43.109 MIGRAINE WITH AURA AND WITHOUT STATUS MIGRAINOSUS, NOT INTRACTABLE: ICD-10-CM

## 2018-05-29 RX ORDER — RIZATRIPTAN BENZOATE 10 MG/1
TABLET, ORALLY DISINTEGRATING ORAL
Qty: 9 TAB | Refills: 10 | Status: SHIPPED | OUTPATIENT
Start: 2018-05-29 | End: 2018-09-11 | Stop reason: SDUPTHER

## 2018-06-19 ENCOUNTER — HOSPITAL ENCOUNTER (OUTPATIENT)
Dept: RADIOLOGY | Facility: MEDICAL CENTER | Age: 38
End: 2018-06-19
Attending: PHYSICIAN ASSISTANT
Payer: COMMERCIAL

## 2018-06-19 ENCOUNTER — OFFICE VISIT (OUTPATIENT)
Dept: URGENT CARE | Facility: CLINIC | Age: 38
End: 2018-06-19
Payer: COMMERCIAL

## 2018-06-19 VITALS
SYSTOLIC BLOOD PRESSURE: 130 MMHG | BODY MASS INDEX: 20.89 KG/M2 | DIASTOLIC BLOOD PRESSURE: 88 MMHG | RESPIRATION RATE: 14 BRPM | HEART RATE: 80 BPM | WEIGHT: 130 LBS | TEMPERATURE: 98 F | OXYGEN SATURATION: 98 % | HEIGHT: 66 IN

## 2018-06-19 DIAGNOSIS — G43.801 OTHER MIGRAINE WITH STATUS MIGRAINOSUS, NOT INTRACTABLE: ICD-10-CM

## 2018-06-19 DIAGNOSIS — G44.53 THUNDERCLAP HEADACHE: ICD-10-CM

## 2018-06-19 PROCEDURE — 70450 CT HEAD/BRAIN W/O DYE: CPT

## 2018-06-19 PROCEDURE — 99204 OFFICE O/P NEW MOD 45 MIN: CPT | Performed by: PHYSICIAN ASSISTANT

## 2018-06-19 RX ORDER — ONDANSETRON 4 MG/1
4 TABLET, ORALLY DISINTEGRATING ORAL ONCE
Status: COMPLETED | OUTPATIENT
Start: 2018-06-19 | End: 2018-06-19

## 2018-06-19 RX ORDER — KETOROLAC TROMETHAMINE 30 MG/ML
60 INJECTION, SOLUTION INTRAMUSCULAR; INTRAVENOUS ONCE
Status: COMPLETED | OUTPATIENT
Start: 2018-06-19 | End: 2018-06-19

## 2018-06-19 RX ORDER — ONDANSETRON 4 MG/1
4 TABLET, FILM COATED ORAL EVERY 4 HOURS PRN
Qty: 24 TAB | Refills: 0 | Status: SHIPPED | OUTPATIENT
Start: 2018-06-19 | End: 2019-03-06

## 2018-06-19 RX ADMIN — KETOROLAC TROMETHAMINE 60 MG: 30 INJECTION, SOLUTION INTRAMUSCULAR; INTRAVENOUS at 18:53

## 2018-06-19 RX ADMIN — ONDANSETRON 4 MG: 4 TABLET, ORALLY DISINTEGRATING ORAL at 18:54

## 2018-06-19 ASSESSMENT — ENCOUNTER SYMPTOMS
SEIZURES: 0
FEVER: 0
DIZZINESS: 1
NAUSEA: 1
FOCAL WEAKNESS: 0
EYE PAIN: 0
PHOTOPHOBIA: 1
DOUBLE VISION: 0
NECK PAIN: 0
SENSORY CHANGE: 0
MIGRAINE HEADACHES: 1
HEADACHES: 1
SHORTNESS OF BREATH: 0
BLURRED VISION: 0
PALPITATIONS: 0
COUGH: 0
LOSS OF CONSCIOUSNESS: 0
CHILLS: 0
VOMITING: 0

## 2018-06-19 ASSESSMENT — PATIENT HEALTH QUESTIONNAIRE - PHQ9: CLINICAL INTERPRETATION OF PHQ2 SCORE: 0

## 2018-06-19 NOTE — PROGRESS NOTES
Subjective:      Dany Lambert is a 37 y.o. female who presents with Migraine and Nausea            Headache    This is a new problem. The current episode started in the past 7 days. The problem occurs constantly. The problem has been gradually worsening. The pain is located in the right unilateral region. The pain does not radiate. The pain quality is not similar to prior headaches. The quality of the pain is described as sharp. Associated symptoms include dizziness, nausea, phonophobia and photophobia. Pertinent negatives include no blurred vision, coughing, ear pain, eye pain, fever, hearing loss, neck pain, seizures, tinnitus or vomiting. Nothing aggravates the symptoms. She has tried beta blockers and triptans for the symptoms. The treatment provided no relief. Her past medical history is significant for migraine headaches.       Review of Systems   Constitutional: Negative for chills and fever.   HENT: Negative for ear pain, hearing loss and tinnitus.    Eyes: Positive for photophobia. Negative for blurred vision, double vision and pain.   Respiratory: Negative for cough and shortness of breath.    Cardiovascular: Negative for chest pain and palpitations.   Gastrointestinal: Positive for nausea. Negative for vomiting.   Musculoskeletal: Negative for neck pain.   Neurological: Positive for dizziness and headaches. Negative for sensory change, focal weakness, seizures and loss of consciousness.   All other systems reviewed and are negative.    PMH:  has a past medical history of Allergy, unspecified not elsewhere classified; Anxiety (8/26/2013); Arthritis; High cholesterol; Migraine with aura and without status migrainosus, not intractable (8/26/2013); Pain (7/27/2017); Psychiatric problem; and Seizure ([facility]) (1/2016).  MEDS:   Current Outpatient Prescriptions:   •  ondansetron (ZOFRAN) 4 MG Tab tablet, Take 1 Tab by mouth every four hours as needed for Nausea/Vomiting., Disp: 24 Tab, Rfl: 0  •   rizatriptan (MAXALT-MLT) 10 MG disintegrating tablet, TAKE 1 TABLET BY MOUTH AT HEADACHE ONSET REPEAT EVERY HOUR AS NEEDED UP TO 4 TABLETS IN 24 HOURS, Disp: 9 Tab, Rfl: 10  •  topiramate (TOPAMAX) 50 MG tablet, Take 3 Tabs by mouth 2 times a day., Disp: 540 Tab, Rfl: 3  •  lovastatin (MEVACOR) 10 MG tablet, Take 1 Tab by mouth every evening., Disp: 90 Tab, Rfl: 3  •  venlafaxine (EFFEXOR-XR) 150 MG extended-release capsule, TAKE 1 CAPSULE BY MOUTH EVERY DAY., Disp: 30 Cap, Rfl: 6  •  lidocaine (LIDODERM) 5 % Patch, Apply 1 Patch to skin as directed every 24 hours., Disp: 10 Patch, Rfl: 0  •  Cholecalciferol (VITAMIN D) 2000 UNIT Tab, Take  by mouth every day., Disp: , Rfl:   •  Calcium Polycarbophil (FIBERCON PO), Take 3 Tabs by mouth 2 Times a Day., Disp: , Rfl:   •  rizatriptan (MAXALT-MLT) 10 MG disintegrating tablet, 1 TAB AT HEADACHE ONSET REPEAT EVERY HOUR AS NEEDED UP TO #4 TAB/24 HOURS, Disp: 9 Tab, Rfl: 11  •  cetirizine (ZYRTEC) 10 MG Tab, Take 10 mg by mouth every day., Disp: , Rfl:   •  Magnesium 100 MG Tab, Take 1 Tab by mouth 2 Times a Day., Disp: , Rfl:   •  omeprazole (PRILOSEC) 20 MG delayed-release capsule, Take 20 mg by mouth every evening., Disp: , Rfl:   •  Coenzyme Q10 (CO Q 10 PO), Take 1 Cap by mouth every day., Disp: , Rfl:     Current Facility-Administered Medications:   •  ketorolac (TORADOL) injection 60 mg, 60 mg, Intramuscular, Once, [place], P.A.-C.  •  ondansetron (ZOFRAN ODT) dispertab 4 mg, 4 mg, Oral, Once, [place], P.A.-C.  ALLERGIES:   Allergies   Allergen Reactions   • Benadryl Allergy Anxiety   • Demerol Hives   • Medrol [Methylprednisolone] Itching   • Previfem [Norgestimate-Ethinyl Estradiol]      Nausea/vomiting   • Reglan [Metoclopramide] Anxiety     SURGHX:   Past Surgical History:   Procedure Laterality Date   • NC DSTR NROLYTC AGNT PARVERTEB FCT SNGL CRVCL/THORA Right 9/29/2017    Procedure: NEURO DEST FACET C/T W/IG SNGL C2-4;  Surgeon: Scotty WHITLOCK  "Navarro, D.O.;  Location: SURGERY [facility];  Service: Pain Management   • ND DSTR NROLYTC AGNT PARVERTEB FCT ADDL CRVCL/THORA Right 9/29/2017    Procedure: NEURO DEST FACET C/T W/IG ADDL;  Surgeon: Scotty Navarro D.O.;  Location: SURGERY [facility];  Service: Pain Management   • ND DSTR NROLYTC AGNT PARVERTEB FCT SNGL CRVCL/THORA Left 7/28/2017    Procedure: NEURO DEST FACET C/T W/IG SNGL - C2-4;  Surgeon: Scotty Navarro D.O.;  Location: SURGERY [facility];  Service: Pain Management   • ND DSTR NROLYTC AGNT PARVERTEB FCT ADDL CRVCL/THORA  7/28/2017    Procedure: NEURO DEST FACET C/T W/IG ADDL;  Surgeon: Scotty Navarro D.O.;  [facility]GERY [facility];  Service: Pain Management   • TONSILLECTOMY  1997   • CYST EXCISION Left as child    cyst removal on L wrist     SOCHX:  reports that she has been smoking Cigarettes.  She has a 15.00 pack-year smoking history. She has never used smokeless tobacco. She reports that she does not drink alcohol or use drugs.  FH: Family history was reviewed, no pertinent findings to report  Medications, Allergies, and current problem list reviewed today in Epic       Objective:     /88   Pulse 80   Temp 36.7 °C (98 °F)   Resp 14   Ht 1.676 m (5' 6\")   Wt 59 kg (130 lb)   SpO2 98%   BMI 20.98 kg/m²      Physical Exam   Constitutional: She is oriented to person, place, and time. She appears well-developed and well-nourished.   HENT:   Head: Normocephalic and atraumatic.   Right Ear: External ear normal.   Left Ear: External ear normal.   Nose: Nose normal.   Mouth/Throat: Oropharynx is clear and moist.   Eyes: Conjunctivae and EOM are normal. Pupils are equal, round, and reactive to light.   Neck: Normal range of motion. Neck supple.   Cardiovascular: Normal rate, regular rhythm and normal heart sounds.  Exam reveals no gallop and no friction rub.    No murmur heard.  Pulmonary/Chest: Effort normal and breath sounds normal. "   Abdominal: Soft. Bowel sounds are normal.   Neurological: She is alert and oriented to person, place, and time. She has normal strength and normal reflexes. She displays normal reflexes. No cranial nerve deficit or sensory deficit. She displays a negative Romberg sign. Coordination and gait normal. GCS eye subscore is 4. GCS verbal subscore is 5. GCS motor subscore is 6.   CN II-XII intact. Cerebellar function  intact. Motor coordination intact.  strength strong and symmetrical bilaterally. Proprioception intact. Strength 5/5 equal in the upper and lower extremities. Facial features symmetric with equal movement. No focal deficits. Lower extremities normal- no sensory deficit. Distal n/v intact     Skin: Skin is warm and dry.   Psychiatric: She has a normal mood and affect. Her behavior is normal. Judgment and thought content normal.   Vitals reviewed.           HISTORY/REASON FOR EXAM:  Headache.      TECHNIQUE/EXAM DESCRIPTION AND NUMBER OF VIEWS:  CT of the head without contrast.    The study was performed on a helical multidetector CT scanner. Contiguous 2.5 mm axial sections were obtained from the skull base through the vertex.    Up to date radiation dose reduction adjustments have been utilized to meet ALARA standards for radiation dose reduction.    COMPARISON:  None available    FINDINGS:  The calvariae and skull base are unremarkable. There are no extraaxial fluid collections. The ventricular system and basal cisterns are within normal limits. There are no areas of abnormal density in the brain substance. There are no hemorrhagic lesions.   There are no mass effects or shift of midline structures.      Paranasal sinuses in the field of view are unremarkable.    Mastoids in the field of view are unremarkable.        Impression       Head CT without contrast within normal limits. No evidence of acute cerebral hemorrhage or mass lesion.        Assessment/Plan:   Patient is a 37-year-old female who  presents with headache and nausea for one week. She has a PMH of migraines and is sees neurologist for ablations and Botox injections. She does take alt max which has usually helped her migraines in the past. She states that last week she had a sudden lightning bolt of pain into her head that caused her to have near syncope and have uncontrollable shaking. This resolved after some time and she has had constant persistent headache that cannot be resolved with her usual medicines. Vital signs normal. No concerning findings with neurologic exam. Given her history a CT scan was done which showed normal limits.  We have her follow up with her neurologist.  She has tried multiple rx for this including triptans and ergotimine with caffeine.     1. [place] headache    - CT-HEAD W/O; Future  - ketorolac (TORADOL) injection 60 mg; 2 mL by Intramuscular route Once.  - ondansetron (ZOFRAN) 4 MG Tab tablet; Take 1 Tab by mouth every four hours as needed for Nausea/Vomiting.  Dispense: 24 Tab; Refill: 0  - ondansetron (ZOFRAN ODT) dispertab 4 mg; Take 1 Tab by mouth Once.    2. Other migraine with status migrainosus, not intractable  - CT-HEAD W/O; Future  - ketorolac (TORADOL) injection 60 mg; 2 mL by Intramuscular route Once.  - ondansetron (ZOFRAN) 4 MG Tab tablet; Take 1 Tab by mouth every four hours as needed for Nausea/Vomiting.  Dispense: 24 Tab; Refill: 0  - ondansetron (ZOFRAN ODT) dispertab 4 mg; Take 1 Tab by mouth Once.    Differential diagnosis, natural history, supportive care discussed. Follow-up with primary care provider within 7-10 days, emergency room precautions discussed.  Patient and/or family appears understanding of information.  Handout and review of patients diagnosis and treatment was discussed extensively.

## 2018-07-03 ENCOUNTER — APPOINTMENT (OUTPATIENT)
Dept: MEDICAL GROUP | Age: 38
End: 2018-07-03
Payer: COMMERCIAL

## 2018-07-10 ENCOUNTER — APPOINTMENT (OUTPATIENT)
Dept: MEDICAL GROUP | Age: 38
End: 2018-07-10
Payer: COMMERCIAL

## 2018-07-19 ENCOUNTER — OFFICE VISIT (OUTPATIENT)
Dept: MEDICAL GROUP | Age: 38
End: 2018-07-19
Payer: COMMERCIAL

## 2018-07-19 VITALS
HEART RATE: 114 BPM | WEIGHT: 122.4 LBS | HEIGHT: 65 IN | DIASTOLIC BLOOD PRESSURE: 70 MMHG | SYSTOLIC BLOOD PRESSURE: 116 MMHG | TEMPERATURE: 98.7 F | BODY MASS INDEX: 20.39 KG/M2 | OXYGEN SATURATION: 98 %

## 2018-07-19 DIAGNOSIS — F33.1 MODERATE EPISODE OF RECURRENT MAJOR DEPRESSIVE DISORDER (HCC): ICD-10-CM

## 2018-07-19 DIAGNOSIS — F41.9 ANXIETY: ICD-10-CM

## 2018-07-19 DIAGNOSIS — F10.239 ALCOHOL DEPENDENCE WITH WITHDRAWAL WITH COMPLICATION (HCC): ICD-10-CM

## 2018-07-19 PROCEDURE — 99215 OFFICE O/P EST HI 40 MIN: CPT | Performed by: INTERNAL MEDICINE

## 2018-07-19 RX ORDER — LORAZEPAM 0.5 MG/1
0.5 TABLET ORAL EVERY 8 HOURS PRN
Qty: 42 TAB | Refills: 0 | Status: SHIPPED | OUTPATIENT
Start: 2018-07-19 | End: 2018-08-02

## 2018-07-19 RX ORDER — GABAPENTIN 300 MG/1
CAPSULE ORAL
Refills: 3 | COMMUNITY
Start: 2018-06-22 | End: 2018-07-19

## 2018-07-19 RX ORDER — BUPROPION HYDROCHLORIDE 100 MG/1
100 TABLET, EXTENDED RELEASE ORAL 2 TIMES DAILY
Qty: 60 TAB | Refills: 3 | Status: SHIPPED | OUTPATIENT
Start: 2018-07-19 | End: 2018-08-22 | Stop reason: CLARIF

## 2018-07-19 NOTE — ASSESSMENT & PLAN NOTE
Patient stated that Effexor  mg daily is helpful to control her anxiety currently until she has withdrawal symptoms from alcohol.  She wants to have additional medication for her depression and anxiety.  She did try Wellbutrin in the past without side effects.  She agreed to restart Wellbutrin.  Patient also has tremors and shakiness currently.  She wants to have Ativan.  I explained to patient that she can try gabapentin for her withdrawal symptoms but she stated that she is overusing gabapentin in the past and she did not want to take it anymore.    After long discussion with patient and father, I will prescribe Ativan 0.5 mg to take 1 tablets every 8 hours as needed for 2 weeks until she can see psychiatrist for alcohol detox and to treat her depression and anxiety.  Patient agreed to let her parents manage her Ativan instead of taking by herself as she has high risk of over taking Ativan.  Patient and father agree with the plan.

## 2018-07-19 NOTE — ASSESSMENT & PLAN NOTE
Patient and father reported that she has depression for many years which has never been diagnosed.  Patient has history of anxiety and she is taking Effexor  mg daily for many years.  Patient reported that she has low energy, depressed mood, lack of interest, decreased appetite, and sleep more in the past few months.  Patient and father believe that she was going back to drinking alcoholic due to her depression.  Her brother is also psychologist.  Patient stated that Effexor is helpful to control her anxiety but not her depression.  She denied suicidal ideation or plan or homicidal ideation or plan.

## 2018-07-19 NOTE — PROGRESS NOTES
Subjective:   Dany Lambert is a 37 y.o. female here today for evaluation and management of:      Alcohol dependence with withdrawal with complication (HCC)  Patient presented to clinic with her father to discuss her alcohol dependency.  Patient reported that she had remission from alcohol for 12 years but she had mild relapse in past 12 years and sober by herself without medical intervention.  She went to inpatient detox 12 years ago.  She has delirium tremor that she denies history of seizure.  Patient stated that she recently started drinking alcohol again in the past few months and she had binge drinking intermittently.   She can drink the whole bottle for Vodka.  She tried to sober herself in the past 2 weeks without drinking alcohol and she had tremors and withdrawal symptoms with palpitation and anxiety.  She drank again 2 days ago.  She called her parents to seek help.  She presented to clinic with her father for referring to psychiatrist and psychologist for alcohol detox and depression.  I discussed with patient and father for inpatient detox due to risks of delirium tremor or seizure or relapse.  Patient insisted not to be admitted to hospital for inpatient detox.      After the long discussion, father provided that he and his wife will let patient stay with them until she can be completely detoxed with psychiatrist.  So patient can have supervision by her parents.  Patient currently lives by herself in her apartment.  Patient agreed  with the plan.    I also discussed any signs of worsening withdrawal symptoms including not limited to delirium tremor, high fever, palpitation, constant nausea, vomiting, seizure, patient needs to call 911 and be admitted to hospital.  Patient and father agree with the plan.      Moderate episode of recurrent major depressive disorder (HCC)  Patient and father reported that she has depression for many years which has never been diagnosed.  Patient has history of  anxiety and she is taking Effexor  mg daily for many years.  Patient reported that she has low energy, depressed mood, lack of interest, decreased appetite, and sleep more in the past few months.  Patient and father believe that she was going back to drinking alcoholic due to her depression.  Her brother is also psychologist.  Patient stated that Effexor is helpful to control her anxiety but not her depression.  She denied suicidal ideation or plan or homicidal ideation or plan.     Anxiety  Patient stated that Effexor  mg daily is helpful to control her anxiety currently until she has withdrawal symptoms from alcohol.  She wants to have additional medication for her depression and anxiety.  She did try Wellbutrin in the past without side effects.  She agreed to restart Wellbutrin.  Patient also has tremors and shakiness currently.  She wants to have Ativan.  I explained to patient that she can try gabapentin for her withdrawal symptoms but she stated that she is overusing gabapentin in the past and she did not want to take it anymore.    After long discussion with patient and father, I will prescribe Ativan 0.5 mg to take 1 tablets every 8 hours as needed for 2 weeks until she can see psychiatrist for alcohol detox and to treat her depression and anxiety.  Patient agreed to let her parents manage her Ativan instead of taking by herself as she has high risk of over taking Ativan.  Patient and father agree with the plan.         Current medicines (including changes today)  Current Outpatient Prescriptions   Medication Sig Dispense Refill   • buPROPion SR (WELLBUTRIN-SR) 100 MG TABLET SR 12 HR Take 1 Tab by mouth 2 times a day. 60 Tab 3   • LORazepam (ATIVAN) 0.5 MG Tab Take 1 Tab by mouth every 8 hours as needed for Anxiety for up to 14 days. 42 Tab 0   • ondansetron (ZOFRAN) 4 MG Tab tablet Take 1 Tab by mouth every four hours as needed for Nausea/Vomiting. 24 Tab 0   • rizatriptan (MAXALT-MLT) 10 MG  "disintegrating tablet TAKE 1 TABLET BY MOUTH AT HEADACHE ONSET REPEAT EVERY HOUR AS NEEDED UP TO 4 TABLETS IN 24 HOURS 9 Tab 10   • topiramate (TOPAMAX) 50 MG tablet Take 3 Tabs by mouth 2 times a day. 540 Tab 3   • lovastatin (MEVACOR) 10 MG tablet Take 1 Tab by mouth every evening. 90 Tab 3   • venlafaxine (EFFEXOR-XR) 150 MG extended-release capsule TAKE 1 CAPSULE BY MOUTH EVERY DAY. 30 Cap 6   • lidocaine (LIDODERM) 5 % Patch Apply 1 Patch to skin as directed every 24 hours. 10 Patch 0   • Cholecalciferol (VITAMIN D) 2000 UNIT Tab Take  by mouth every day.     • Calcium Polycarbophil (FIBERCON PO) Take 3 Tabs by mouth 2 Times a Day.     • cetirizine (ZYRTEC) 10 MG Tab Take 10 mg by mouth every day.     • Magnesium 100 MG Tab Take 1 Tab by mouth 2 Times a Day.     • omeprazole (PRILOSEC) 20 MG delayed-release capsule Take 20 mg by mouth every evening.     • Coenzyme Q10 (CO Q 10 PO) Take 1 Cap by mouth every day.       No current facility-administered medications for this visit.      She  has a past medical history of Allergy, unspecified not elsewhere classified; Anxiety (8/26/2013); Arthritis; High cholesterol; Migraine with aura and without status migrainosus, not intractable (8/26/2013); Pain (7/27/2017); Psychiatric problem; and Seizure (HCC) (1/2016).    ROS   No chest pain, no shortness of breath, no abdominal pain       Objective:     Blood pressure 116/70, pulse (!) 114, temperature 37.1 °C (98.7 °F), height 1.656 m (5' 5.2\"), weight 55.5 kg (122 lb 6.4 oz), last menstrual period 07/06/2018, SpO2 98 %. Body mass index is 20.24 kg/m².   Physical Exam:  General: Alert, oriented and no acute distress.  Eye contact is good, speech goal directed, affect calm  HEENT: conjunctiva non-injected, sclera non-icteric.  Oral mucous membranes pink and moist with no lesions.  Pinna normal.  Lungs: Normal respiratory effort, clear to auscultation bilaterally with good excursion.  CV: regular rate and rhythm. No " murmurs.  Abdomen: soft, non distended, nontender, Bowel sound normal.  Ext: no edema, color normal, vascularity normal, temperature normal  Musculoskeletal exam:       Assessment and Plan:   The following treatment plan was discussed     1. Alcohol dependence with withdrawal with complication (HCC)  - Counseling for alcohol cessation and discussed signs or symptoms of alcohol withdrawal.  Patient refused to be admitted to hospital for inpatient alcohol detox.  Patient agreed to move into her parents' house and will have supervision by her parents.    - We discussed to try gabapentin to help withdrawal symptoms, but she declined to take it.  Patient stated that she over took gabapentin in the past.  - I prescribed Ativan 0.5 mg 1 tablets every 8 hours as needed for 14 day supply.  I reviewed the use and side effects of Ativan with patient.  Ativan will be arranged by her parents to prevent overusing.  Patient agreed with the plan.    - Patient and family are advised to send patient to ER or call 911 if she has any worsening symptoms or having constant nausea vomiting or palpitation or seizure or delirium tremor or confuse.  Patient and family understand and agree with the plan.  - Referred to behavioral health urgently for depression anxiety and alcohol detox.  - REFERRAL TO BEHAVIORAL HEALTH  - LORazepam (ATIVAN) 0.5 MG Tab; Take 1 Tab by mouth every 8 hours as needed for Anxiety for up to 14 days.  Dispense: 42 Tab; Refill: 0  - COMP METABOLIC PANEL; Future  - CBC WITH DIFFERENTIAL; Future    2. Moderate episode of recurrent major depressive disorder (HCC)  - Worsening depression.  Symptom is not well controlled with Effexor X are 150 mg daily.  She tried Wellbutrin before without side effects.  Will add on Wellbutrin SR  100 mg twice daily.  Reviewed potential side effects of Effexor and Wellbutrin with patient.  - Discussed with patient regarding the use and side effects of medication as well as black box  warning of suicidality risk with medication. Patient verbally understands. Recommend to call 911 or go to ER if patient has suicidal ideation or plan.  - REFERRAL TO BEHAVIORAL HEALTH  - buPROPion SR (WELLBUTRIN-SR) 100 MG TABLET SR 12 HR; Take 1 Tab by mouth 2 times a day.  Dispense: 60 Tab; Refill: 3  - COMP METABOLIC PANEL; Future  - CBC WITH DIFFERENTIAL; Future    3. Anxiety  - Patient will continue Effexor  mg daily.  Wellbutrin is added today.  She will take Ativan only when she has severe tremors or withdrawal symptoms from alcohol.  Patient is advised to take Ativan 1 tablet not more than 3 times a day.  - REFERRAL TO BEHAVIORAL HEALTH  - LORazepam (ATIVAN) 0.5 MG Tab; Take 1 Tab by mouth every 8 hours as needed for Anxiety for up to 14 days.  Dispense: 42 Tab; Refill: 0  - COMP METABOLIC PANEL; Future  - CBC WITH DIFFERENTIAL; Future    Face-to-face time spent 40 minutes with patient and more than half of that time spent for counseling and cooperating of care for medical problems listed above.       Followup: Return in about 4 weeks (around 8/16/2018), or if symptoms worsen or fail to improve, for Depression, Anxiety, alcohol dependence, Lab review.      Please note that this dictation was created using voice recognition software. I have made every reasonable attempt to correct obvious errors, but I expect that there may have unintended errors in text, spelling, punctuation, or grammar that I did not discover.

## 2018-07-19 NOTE — ASSESSMENT & PLAN NOTE
Patient presented to clinic with her father to discuss her alcohol dependency.  Patient reported that she had remission from alcohol for 12 years but she had mild relapse in past 12 years and sober by herself without medical intervention.  She went to inpatient detox 12 years ago.  She has delirium tremor that she denies history of seizure.  Patient stated that she recently started drinking alcohol again in the past few months and she had binge drinking intermittently.   She can drink the whole bottle for Vodka.  She tried to sober herself in the past 2 weeks without drinking alcohol and she had tremors and withdrawal symptoms with palpitation and anxiety.  She drank again 2 days ago.  She called her parents to seek help.  She presented to clinic with her father for referring to psychiatrist and psychologist for alcohol detox and depression.  I discussed with patient and father for inpatient detox due to risks of delirium tremor or seizure or relapse.  Patient insisted not to be admitted to hospital for inpatient detox.      After the long discussion, father provided that he and his wife will let patient stay with them until she can be completely detoxed with psychiatrist.  So patient can have supervision by her parents.  Patient currently lives by herself in her apartment.  Patient agreed  with the plan.    I also discussed any signs of worsening withdrawal symptoms including not limited to delirium tremor, high fever, palpitation, constant nausea, vomiting, seizure, patient needs to call 911 and be admitted to hospital.  Patient and father agree with the plan.

## 2018-08-07 ENCOUNTER — HOSPITAL ENCOUNTER (OUTPATIENT)
Dept: LAB | Facility: MEDICAL CENTER | Age: 38
End: 2018-08-07
Attending: INTERNAL MEDICINE
Payer: COMMERCIAL

## 2018-08-07 ENCOUNTER — HOSPITAL ENCOUNTER (OUTPATIENT)
Dept: LAB | Facility: MEDICAL CENTER | Age: 38
End: 2018-08-07
Attending: PHYSICIAN ASSISTANT
Payer: COMMERCIAL

## 2018-08-07 DIAGNOSIS — F41.9 ANXIETY: ICD-10-CM

## 2018-08-07 DIAGNOSIS — E78.00 HYPERCHOLESTEROLEMIA: ICD-10-CM

## 2018-08-07 DIAGNOSIS — F10.239 ALCOHOL DEPENDENCE WITH WITHDRAWAL WITH COMPLICATION (HCC): ICD-10-CM

## 2018-08-07 DIAGNOSIS — F33.1 MODERATE EPISODE OF RECURRENT MAJOR DEPRESSIVE DISORDER (HCC): ICD-10-CM

## 2018-08-07 LAB
ALBUMIN SERPL BCP-MCNC: 4.4 G/DL (ref 3.2–4.9)
ALBUMIN/GLOB SERPL: 1.9 G/DL
ALP SERPL-CCNC: 69 U/L (ref 30–99)
ALT SERPL-CCNC: 9 U/L (ref 2–50)
ANION GAP SERPL CALC-SCNC: 6 MMOL/L (ref 0–11.9)
AST SERPL-CCNC: 13 U/L (ref 12–45)
BASOPHILS # BLD AUTO: 0.8 % (ref 0–1.8)
BASOPHILS # BLD: 0.07 K/UL (ref 0–0.12)
BILIRUB SERPL-MCNC: 0.3 MG/DL (ref 0.1–1.5)
BUN SERPL-MCNC: 11 MG/DL (ref 8–22)
CALCIUM SERPL-MCNC: 9.2 MG/DL (ref 8.5–10.5)
CHLORIDE SERPL-SCNC: 111 MMOL/L (ref 96–112)
CHOLEST SERPL-MCNC: 213 MG/DL (ref 100–199)
CO2 SERPL-SCNC: 20 MMOL/L (ref 20–33)
CREAT SERPL-MCNC: 0.87 MG/DL (ref 0.5–1.4)
CRP SERPL HS-MCNC: 0.05 MG/DL (ref 0–0.75)
EOSINOPHIL # BLD AUTO: 0.25 K/UL (ref 0–0.51)
EOSINOPHIL NFR BLD: 2.9 % (ref 0–6.9)
ERYTHROCYTE [DISTWIDTH] IN BLOOD BY AUTOMATED COUNT: 51.7 FL (ref 35.9–50)
GLOBULIN SER CALC-MCNC: 2.3 G/DL (ref 1.9–3.5)
GLUCOSE SERPL-MCNC: 93 MG/DL (ref 65–99)
HCT VFR BLD AUTO: 39 % (ref 37–47)
HDLC SERPL-MCNC: 68 MG/DL
HGB BLD-MCNC: 13 G/DL (ref 12–16)
IMM GRANULOCYTES # BLD AUTO: 0.03 K/UL (ref 0–0.11)
IMM GRANULOCYTES NFR BLD AUTO: 0.4 % (ref 0–0.9)
LDLC SERPL CALC-MCNC: 128 MG/DL
LYMPHOCYTES # BLD AUTO: 3 K/UL (ref 1–4.8)
LYMPHOCYTES NFR BLD: 35 % (ref 22–41)
MCH RBC QN AUTO: 31.6 PG (ref 27–33)
MCHC RBC AUTO-ENTMCNC: 33.3 G/DL (ref 33.6–35)
MCV RBC AUTO: 94.9 FL (ref 81.4–97.8)
MONOCYTES # BLD AUTO: 0.53 K/UL (ref 0–0.85)
MONOCYTES NFR BLD AUTO: 6.2 % (ref 0–13.4)
NEUTROPHILS # BLD AUTO: 4.69 K/UL (ref 2–7.15)
NEUTROPHILS NFR BLD: 54.7 % (ref 44–72)
NRBC # BLD AUTO: 0 K/UL
NRBC BLD-RTO: 0 /100 WBC
PLATELET # BLD AUTO: 290 K/UL (ref 164–446)
PMV BLD AUTO: 11.6 FL (ref 9–12.9)
POTASSIUM SERPL-SCNC: 3.6 MMOL/L (ref 3.6–5.5)
PROT SERPL-MCNC: 6.7 G/DL (ref 6–8.2)
RBC # BLD AUTO: 4.11 M/UL (ref 4.2–5.4)
SODIUM SERPL-SCNC: 137 MMOL/L (ref 135–145)
TRIGL SERPL-MCNC: 84 MG/DL (ref 0–149)
WBC # BLD AUTO: 8.6 K/UL (ref 4.8–10.8)

## 2018-08-07 PROCEDURE — 80053 COMPREHEN METABOLIC PANEL: CPT

## 2018-08-07 PROCEDURE — 36415 COLL VENOUS BLD VENIPUNCTURE: CPT

## 2018-08-07 PROCEDURE — 83516 IMMUNOASSAY NONANTIBODY: CPT

## 2018-08-07 PROCEDURE — 80061 LIPID PANEL: CPT

## 2018-08-07 PROCEDURE — 85025 COMPLETE CBC W/AUTO DIFF WBC: CPT

## 2018-08-07 PROCEDURE — 82784 ASSAY IGA/IGD/IGG/IGM EACH: CPT

## 2018-08-07 PROCEDURE — 86140 C-REACTIVE PROTEIN: CPT

## 2018-08-09 LAB
IGA SERPL-MCNC: 444 MG/DL (ref 68–408)
TTG IGG SER IA-ACNC: 5 U/ML (ref 0–5)

## 2018-08-20 DIAGNOSIS — F33.1 MODERATE EPISODE OF RECURRENT MAJOR DEPRESSIVE DISORDER (HCC): ICD-10-CM

## 2018-08-21 NOTE — TELEPHONE ENCOUNTER
Please call to inform patient that I declined to refill sertraline (Zoloft) 50 mg daily as she is currently taking Effexor  mg once a day and Wellbutrin 100 mg twice a day.  So it will be taking a lot of medications if she adds on Zoloft.  I will continue Effexor and Wellbutrin currently.  She is ready to schedule with behavioral health to discuss her anxiety and depression as well as to help her quit drinking alcohol.    Please advise patient to call [phone] to schedule with psychiatrist and psychologist so that they can adjust the dose for depression and anxiety medications.    Thanks!  Emerald Rico M.D.

## 2018-08-22 ENCOUNTER — APPOINTMENT (OUTPATIENT)
Dept: MEDICAL GROUP | Age: 38
End: 2018-08-22
Payer: COMMERCIAL

## 2018-08-22 ENCOUNTER — TELEPHONE (OUTPATIENT)
Dept: MEDICAL GROUP | Age: 38
End: 2018-08-22

## 2018-08-22 DIAGNOSIS — F33.1 MODERATE EPISODE OF RECURRENT MAJOR DEPRESSIVE DISORDER (HCC): ICD-10-CM

## 2018-08-22 RX ORDER — NALTREXONE HYDROCHLORIDE 50 MG/1
TABLET, FILM COATED ORAL
COMMUNITY
Start: 2018-07-23 | End: 2018-09-11

## 2018-08-22 NOTE — TELEPHONE ENCOUNTER
Phone Number Called: [phone] (home)       Message: LVM for patient. Patient gave okay for detailed message.    Left Message for patient to call back: yes

## 2018-08-22 NOTE — TELEPHONE ENCOUNTER
Please inform patient that I refilled Zoloft 50 mg 1 tablet once a day for 30 day supply.  Please advise patient to follow with psychiatrist to discuss treatment and refill medication from her psychiatrist.  She needs to get additional refills from her psychiatrist since psychiatrist is managing her depression, anxiety and helping for rehab.  I will discontinue Effexor and Wellbutrin from her medication list since she is not taking those medication anymore.    Emerald Rico M.D.

## 2018-08-22 NOTE — TELEPHONE ENCOUNTER
1. Caller Name: Dany Lambert                                           Call Back Number: [phone] (home)         Patient approves a detailed voicemail message: no    I explained that refill was declined due to medication already prescribed such as Wellbutrin and Effexor.  Patient states that she in no longer taking Effexor ER and has never started Wellbutrin. When she went into the rehab facility Dr. Wang Chery advised her not to take Wellbutrin due to increased risk of seizure with Topamax. Patient is asking if a refill is possible because she in no longer taking Effexor and never started Wellbutrin. She fears withdrawal symptoms since today is her last pill of Sertraline.     Please advise.

## 2018-08-23 ENCOUNTER — APPOINTMENT (OUTPATIENT)
Dept: RADIOLOGY | Facility: IMAGING CENTER | Age: 38
End: 2018-08-23
Attending: FAMILY MEDICINE
Payer: COMMERCIAL

## 2018-08-23 ENCOUNTER — OFFICE VISIT (OUTPATIENT)
Dept: URGENT CARE | Facility: CLINIC | Age: 38
End: 2018-08-23
Payer: COMMERCIAL

## 2018-08-23 VITALS
HEIGHT: 65 IN | WEIGHT: 125 LBS | OXYGEN SATURATION: 98 % | TEMPERATURE: 98.7 F | BODY MASS INDEX: 20.83 KG/M2 | DIASTOLIC BLOOD PRESSURE: 74 MMHG | HEART RATE: 72 BPM | SYSTOLIC BLOOD PRESSURE: 110 MMHG | RESPIRATION RATE: 14 BRPM

## 2018-08-23 DIAGNOSIS — M67.912 ROTATOR CUFF DISORDER, LEFT: ICD-10-CM

## 2018-08-23 DIAGNOSIS — G89.29 CHRONIC PAIN IN LEFT SHOULDER: ICD-10-CM

## 2018-08-23 DIAGNOSIS — M25.512 CHRONIC PAIN IN LEFT SHOULDER: ICD-10-CM

## 2018-08-23 PROCEDURE — 99214 OFFICE O/P EST MOD 30 MIN: CPT | Performed by: FAMILY MEDICINE

## 2018-08-23 PROCEDURE — 73030 X-RAY EXAM OF SHOULDER: CPT | Mod: TC,FY,LT | Performed by: FAMILY MEDICINE

## 2018-08-23 RX ORDER — CYCLOBENZAPRINE HCL 5 MG
5-10 TABLET ORAL 3 TIMES DAILY PRN
Qty: 20 TAB | Refills: 0 | Status: SHIPPED | OUTPATIENT
Start: 2018-08-23 | End: 2018-09-11 | Stop reason: SDUPTHER

## 2018-08-23 ASSESSMENT — ENCOUNTER SYMPTOMS
SPEECH CHANGE: 0
FOCAL WEAKNESS: 0
HEADACHES: 0
TINGLING: 0
CHILLS: 0
FEVER: 0
SENSORY CHANGE: 0
TREMORS: 0
FALLS: 0

## 2018-08-24 NOTE — PROGRESS NOTES
Subjective:      Dany Lambert is a 38 y.o. female who presents with Shoulder Pain ([id number] )    Patient presents to urgent care with acute onset of a chronic problem.  She states that she has a history of left shoulder pain off and on for years.  Over the past 3 days though she has had increased pain to the area with movement and also at rest.  She can feel some cracking and popping she seen a chiropractor as well.  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  She has not taken any anti-inflammatory medication.  She has never had any imaging done she never had any inciting trauma has just noticed pain in the shoulder for years.  She does have a history of cervical spondylosis but she has not had any upper arm radiculitis.  She feels that there is a physical abnormality that she can see on the back with her musculature that is different on the left versus right.  She denies any numbness tingling or weakness distally.    Upon chart review I do see that patient has had a problem in the past with substace abuse and is currently on naltrexone.    No fevers chills or skin rashes.       HPI  Review of Systems   Constitutional: Negative for chills and fever.   Musculoskeletal: Positive for joint pain. Negative for falls.   Skin: Negative for rash.   Neurological: Negative for tingling, tremors, sensory change, speech change, focal weakness and headaches.   All other systems reviewed and are negative.    PMH:  has a past medical history of Allergy, unspecified not elsewhere classified; Anxiety (8/26/2013); Arthritis; High cholesterol; Migraine with aura and without status migrainosus, not intractable (8/26/2013); Pain (7/27/2017); Psychiatric problem; and Seizure (HCC) (1/2016).  MEDS:   Current Outpatient Prescriptions:   •  sertraline (ZOLOFT) 50 MG Tab, Take 1 Tab by mouth every day., Disp: 30  Tab, Rfl: 0  •  naltrexone (DEPADE) 50 MG Tab, , Disp: , Rfl:   •  ondansetron (ZOFRAN) 4 MG Tab tablet, Take 1 Tab by mouth every four hours as needed for Nausea/Vomiting., Disp: 24 Tab, Rfl: 0  •  rizatriptan (MAXALT-MLT) 10 MG disintegrating tablet, TAKE 1 TABLET BY MOUTH AT HEADACHE ONSET REPEAT EVERY HOUR AS NEEDED UP TO 4 TABLETS IN 24 HOURS, Disp: 9 Tab, Rfl: 10  •  topiramate (TOPAMAX) 50 MG tablet, Take 3 Tabs by mouth 2 times a day., Disp: 540 Tab, Rfl: 3  •  lovastatin (MEVACOR) 10 MG tablet, Take 1 Tab by mouth every evening., Disp: 90 Tab, Rfl: 3  •  lidocaine (LIDODERM) 5 % Patch, Apply 1 Patch to skin as directed every 24 hours., Disp: 10 Patch, Rfl: 0  •  Cholecalciferol (VITAMIN D) 2000 UNIT Tab, Take  by mouth every day., Disp: , Rfl:   •  Calcium Polycarbophil (FIBERCON PO), Take 3 Tabs by mouth 2 Times a Day., Disp: , Rfl:   •  cetirizine (ZYRTEC) 10 MG Tab, Take 10 mg by mouth every day., Disp: , Rfl:   •  Magnesium 100 MG Tab, Take 1 Tab by mouth 2 Times a Day., Disp: , Rfl:   •  omeprazole (PRILOSEC) 20 MG delayed-release capsule, Take 20 mg by mouth every evening., Disp: , Rfl:   •  Coenzyme Q10 (CO Q 10 PO), Take 1 Cap by mouth every day., Disp: , Rfl:   ALLERGIES:   Allergies   Allergen Reactions   • Benadryl Allergy Anxiety   • Demerol Hives   • Medrol [Methylprednisolone] Itching   • Previfem [Norgestimate-Ethinyl Estradiol]      Nausea/vomiting   • Reglan [Metoclopramide] Anxiety   SURGHX:   Past Surgical History:   Procedure Laterality Date   [facility]    Procedure: NEURO DEST FACET C/T W/IG SNGL C2-4;  Surgeon: Scotty Navarro D.O.;  Location: SURGERY [facility];  Service: Pain Management   • NE DSTR NROLYTC AGNT PARVERTEB FCT ADDL CRVCL/THORA Right 9/29/2017    Procedure: NEURO DEST FACET C/T W/IG ADDL;  Surgeon: Scotty Navarro D.O.;  Location: SURGERY [facility];  Service: Pain Management   • NE DSTR  "NROLYTC AGNT PARVERTEB FCT SNGL CRVCL/THORA Left 7/28/2017    Procedure: NEURO DEST FACET C/T W/IG SNGL - C2-4;  Surgeon: Scotty Navarro D.O.;  Location: SURGERY [facility];  Service: Pain Management   • [place] DSTR NROLYTC DUSTIN PARVERTEB FCT ADDL CRVCL/THORA  7/28/2017    Procedure: NEURO DEST FACET C/T W/IG ADDL;  Surgeon: Scotty Navarro D.O.;  Location: SURGERY [facility];  Service: Pain Management   • TONSILLECTOMY  1997   • CYST EXCISION Left as child    cyst removal on L wrist     SOCHX:  reports that she has been smoking Cigarettes.  She has a 15.00 pack-year smoking history. She has never used smokeless tobacco. She reports that she does not drink alcohol or use drugs.  FH: Family history was reviewed, no pertinent findings to report     Objective:     /74   Pulse 72   Temp 37.1 °C (98.7 °F)   Resp 14   Ht 1.651 m (5' 5\")   Wt 56.7 kg (125 lb)   SpO2 98%   Breastfeeding? No   BMI 20.80 kg/m²      Physical Exam   Constitutional: She is oriented to person, place, and time. She appears well-developed and well-nourished. No distress.   HENT:   Mouth/Throat: Oropharynx is clear and moist.   Eyes: Conjunctivae are normal.   Neck: Normal range of motion. Muscular tenderness present. No spinous process tenderness present.       Cardiovascular: Normal rate.    Pulmonary/Chest: Effort normal.   Abdominal: Soft.   Musculoskeletal: Normal range of motion. She exhibits tenderness. She exhibits no edema or deformity.        Left shoulder: She exhibits tenderness, pain and spasm. She exhibits normal range of motion and normal strength.        Arms:  Rom wnl of internal and external rotation of the left shoulder.  There is slightly decreased strength to abduction on the left 4/5 versus the right which is 5 out of 5   Neurological: She is alert and oriented to person, place, and time.   Skin: Skin is warm and dry. No rash noted. She is not diaphoretic. No erythema. No pallor.   Psychiatric: " She has a normal mood and affect. Her behavior is normal. Judgment and thought content normal.   Slightly anxious       Diagnostics: xray per my review no acute fracture dislocation     Assessment/Plan:     1. Chronic pain in left shoulder  DX-SHOULDER 2+ LEFT    REFERRAL TO SPORTS MEDICINE    cyclobenzaprine (FLEXERIL) 5 MG tablet   2. Rotator cuff disorder, left  REFERRAL TO SPORTS MEDICINE    cyclobenzaprine (FLEXERIL) 5 MG tablet       Differential diagnosis, natural history, supportive care discussed. Follow-up with primary care provider within 7-10 days, emergency room precautions discussed.  Patient and/or family appears understanding of information.

## 2018-09-11 ENCOUNTER — OFFICE VISIT (OUTPATIENT)
Dept: NEUROLOGY | Facility: MEDICAL CENTER | Age: 38
End: 2018-09-11
Payer: COMMERCIAL

## 2018-09-11 VITALS
BODY MASS INDEX: 20.44 KG/M2 | TEMPERATURE: 98.2 F | DIASTOLIC BLOOD PRESSURE: 64 MMHG | WEIGHT: 122.7 LBS | OXYGEN SATURATION: 99 % | HEART RATE: 78 BPM | SYSTOLIC BLOOD PRESSURE: 96 MMHG | RESPIRATION RATE: 17 BRPM | HEIGHT: 65 IN

## 2018-09-11 DIAGNOSIS — G43.109 MIGRAINE WITH AURA AND WITHOUT STATUS MIGRAINOSUS, NOT INTRACTABLE: Primary | ICD-10-CM

## 2018-09-11 PROCEDURE — 99214 OFFICE O/P EST MOD 30 MIN: CPT | Performed by: PSYCHIATRY & NEUROLOGY

## 2018-09-11 RX ORDER — RIZATRIPTAN BENZOATE 10 MG/1
TABLET, ORALLY DISINTEGRATING ORAL
Qty: 12 TAB | Refills: 11 | Status: SHIPPED | OUTPATIENT
Start: 2018-09-11 | End: 2019-03-06

## 2018-09-11 RX ORDER — TOPIRAMATE 50 MG/1
150 TABLET, FILM COATED ORAL 2 TIMES DAILY
Qty: 540 TAB | Refills: 3 | Status: SHIPPED | OUTPATIENT
Start: 2018-09-11 | End: 2019-06-23 | Stop reason: SDUPTHER

## 2018-09-11 RX ORDER — CYCLOBENZAPRINE HCL 5 MG
5-10 TABLET ORAL 3 TIMES DAILY PRN
Qty: 50 TAB | Refills: 0 | Status: SHIPPED | OUTPATIENT
Start: 2018-09-11 | End: 2019-08-04

## 2018-09-11 ASSESSMENT — PAIN SCALES - GENERAL: PAINLEVEL: 4=SLIGHT-MODERATE PAIN

## 2018-09-11 ASSESSMENT — ENCOUNTER SYMPTOMS
HEADACHES: 1
NECK PAIN: 1

## 2018-09-12 NOTE — PROGRESS NOTES
"Subjective:      Dany Lambert is a 38 y.o. female who presents for follow-up, with a history of migraine with aura.    HPI    Over the last 6 months, she has continued to follow with Dr. Navarro, he did surgically ablate the contralateral occipital nerve, she is now receiving Botox injections every 3 months, the headaches have come under very good control. Still, she has upwards of 6 days/month with headache pain that requires rescue. She usually uses Maxalt as a single tablet, though she will repeat the dose on occasion. Overall she is quite happy in regards to the headache response she has been receiving with her present treatment regimen. She remains on Topamax 150 mg, twice a day.    Medical, surgical and family histories are reviewed in the electronic health record, there are no new drug allergies. She did tweak the left shoulder recently, was placed on Flexeril but has not had a chance to talk with her orthopedic specialist yet, is asking for a small amount of medicine to get her through. She is on Topamax 150 mg, twice a day, Maxalt-MLT 10 mg when necessary, Mevacor, Zofran, Zoloft, Prilosec, the rest as per the electronic health record, noncontributory from my standpoint.    Review of Systems   Musculoskeletal: Positive for joint pain and neck pain.   Neurological: Positive for headaches.   All other systems reviewed and are negative.       Objective:     BP (!) 96/64   Pulse 78   Temp 36.8 °C (98.2 °F)   Resp 17   Ht 1.651 m (5' 5\")   Wt 55.7 kg (122 lb 11.2 oz)   SpO2 99%   BMI 20.42 kg/m²      Physical Exam    She appears in no acute distress. Vital signs are stable. There is no malar rash. There is some mild tenderness of the occipital ridge bilaterally. Range of motion of the cervical spine is full. Cardiac evaluation was unremarkable. In the quick and cursory fashion, other than a mild facial droop, mental status, cranial nerve, motor, reflex, coordination and sensory evaluations " are all otherwise intact without sign of deficit nor toxicity.     Assessment/Plan:     1. Migraine with aura and without status migrainosus, not intractable  We will continue the Topamax and Maxalt-MLT regimen unchanged. She did receive samples of Aimovig from Dr. Navarro's office, but is very uncomfortable trying to drug because of concerns of side effects, loss of efficacy with her present regimen, etc. Face-to-face time was spent reviewing the nature of the drug, its data, side effect profiles, safety data in terms of concurrent drug use, etc. There is no reason to believe it will limit efficacy of ongoing treatments, a very specific drug in migraine, side effect profile was better than the actual placebo group when the drug was studied. Unfortunately, it has not been studied in combination with other maintenance therapies including Botox and Topamax. She was reassured it is an effective drug, and she should see benefit within the first 2 months of use if benefit is to be seen at all. She will let me know how things move along, obviously she will receive the drug from Dr. Navarro. A six-month follow-up is scheduled.    As a favor, Flexeril was provided as a single prescription to tide her over until she receives help from her orthopedist. Obviously controlling neck pain will help reduce the likelihood of headache recurrences.    - rizatriptan (MAXALT-MLT) 10 MG disintegrating tablet; TAKE 1 TABLET BY MOUTH AT HEADACHE ONSET REPEAT EVERY HOUR AS NEEDED UP TO 4 TABLETS IN 24 HOURS  Dispense: 12 Tab; Refill: 11  - topiramate (TOPAMAX) 50 MG tablet; Take 3 Tabs by mouth 2 times a day.  Dispense: 540 Tab; Refill: 3    Time: 25 minutes was spent face-to-face with the patient for exam come review, discussion, and education, of this over 60% of the time spent counseling and coordinating care.

## 2018-09-17 ENCOUNTER — DOCUMENTATION (OUTPATIENT)
Dept: OCCUPATIONAL MEDICINE | Facility: CLINIC | Age: 38
End: 2018-09-17

## 2018-09-19 ENCOUNTER — IMMUNIZATION (OUTPATIENT)
Dept: OCCUPATIONAL MEDICINE | Facility: CLINIC | Age: 38
End: 2018-09-19

## 2018-09-19 DIAGNOSIS — Z23 NEED FOR VACCINATION: ICD-10-CM

## 2018-09-19 PROCEDURE — 90686 IIV4 VACC NO PRSV 0.5 ML IM: CPT | Performed by: PREVENTIVE MEDICINE

## 2018-09-24 DIAGNOSIS — F33.1 MODERATE EPISODE OF RECURRENT MAJOR DEPRESSIVE DISORDER (HCC): ICD-10-CM

## 2018-09-24 NOTE — PROGRESS NOTES
Patient called to refill Zoloft today.  She stated that she still taking Zoloft 50 mg 1 tablet daily.  She has not follow with psychiatrist yet.  I refilled Zoloft with the same dose today.  Patient is advised to schedule with me to follow-up in clinic to discuss her medication.  Patient also advised to follow-up with psychiatrist as well.        Emerald Rico M.D.

## 2018-09-27 ENCOUNTER — HOSPITAL ENCOUNTER (OUTPATIENT)
Dept: LAB | Facility: MEDICAL CENTER | Age: 38
End: 2018-09-27
Payer: COMMERCIAL

## 2018-09-27 LAB
BDY FAT % MEASURED: 23.9 %
BP DIAS: 75 MMHG
BP SYS: 109 MMHG
CHOLEST SERPL-MCNC: 167 MG/DL (ref 100–199)
DIABETES HTDIA: NO
EVENT NAME HTEVT: NORMAL
FASTING HTFAS: YES
FASTING STATUS PATIENT QL REPORTED: NORMAL
GLUCOSE SERPL-MCNC: 91 MG/DL (ref 65–99)
HDLC SERPL-MCNC: 67 MG/DL
HYPERTENSION HTHYP: NO
LDLC SERPL CALC-MCNC: 90 MG/DL
SCREENING LOC CITY HTCIT: NORMAL
SCREENING LOC STATE HTSTA: NORMAL
SCREENING LOCATION HTLOC: NORMAL
SMOKING HTSMO: NO
SUBSCRIBER ID HTSID: NORMAL
TRIGL SERPL-MCNC: 51 MG/DL (ref 0–149)

## 2018-09-27 PROCEDURE — 36415 COLL VENOUS BLD VENIPUNCTURE: CPT

## 2018-09-27 PROCEDURE — 80061 LIPID PANEL: CPT

## 2018-09-27 PROCEDURE — 82947 ASSAY GLUCOSE BLOOD QUANT: CPT

## 2018-09-27 PROCEDURE — S5190 WELLNESS ASSESSMENT BY NONPH: HCPCS

## 2018-09-28 ENCOUNTER — EH NON-PROVIDER (OUTPATIENT)
Dept: OCCUPATIONAL MEDICINE | Facility: CLINIC | Age: 38
End: 2018-09-28

## 2018-09-28 DIAGNOSIS — Z02.89 ENCOUNTER FOR OCCUPATIONAL HEALTH EXAMINATION INVOLVING RESPIRATOR: Primary | ICD-10-CM

## 2018-09-28 PROCEDURE — 94375 RESPIRATORY FLOW VOLUME LOOP: CPT | Performed by: PREVENTIVE MEDICINE

## 2018-10-08 NOTE — PROGRESS NOTES
DR RETURNED CALL NO NEW ORDERS    Right PICC line dressing changed using sterile technique according to policy. Connector changed. Bio patch used. Stat lock used. Covered with tegaderm. Good blood return and flushed easily. Site clear and no signs of infection. Tolerated procedure well.

## 2018-11-11 ENCOUNTER — HOSPITAL ENCOUNTER (OUTPATIENT)
Facility: MEDICAL CENTER | Age: 38
End: 2018-11-11
Attending: NURSE PRACTITIONER
Payer: COMMERCIAL

## 2018-11-11 ENCOUNTER — OFFICE VISIT (OUTPATIENT)
Dept: URGENT CARE | Facility: MEDICAL CENTER | Age: 38
End: 2018-11-11
Payer: COMMERCIAL

## 2018-11-11 VITALS
HEIGHT: 65 IN | WEIGHT: 119 LBS | HEART RATE: 92 BPM | TEMPERATURE: 98.2 F | RESPIRATION RATE: 16 BRPM | DIASTOLIC BLOOD PRESSURE: 62 MMHG | SYSTOLIC BLOOD PRESSURE: 100 MMHG | BODY MASS INDEX: 19.83 KG/M2 | OXYGEN SATURATION: 100 %

## 2018-11-11 DIAGNOSIS — R10.9 LEFT FLANK PAIN: ICD-10-CM

## 2018-11-11 DIAGNOSIS — Z20.2 POSSIBLE EXPOSURE TO STD: ICD-10-CM

## 2018-11-11 DIAGNOSIS — N30.01 ACUTE CYSTITIS WITH HEMATURIA: ICD-10-CM

## 2018-11-11 LAB
APPEARANCE UR: CLEAR
BILIRUB UR STRIP-MCNC: NORMAL MG/DL
COLOR UR AUTO: YELLOW
GLUCOSE UR STRIP.AUTO-MCNC: NORMAL MG/DL
INT CON NEG: NORMAL
INT CON POS: NORMAL
KETONES UR STRIP.AUTO-MCNC: NORMAL MG/DL
LEUKOCYTE ESTERASE UR QL STRIP.AUTO: NORMAL
NITRITE UR QL STRIP.AUTO: NORMAL
PH UR STRIP.AUTO: 7 [PH] (ref 5–8)
POC URINE PREGNANCY TEST: NORMAL
PROT UR QL STRIP: NORMAL MG/DL
RBC UR QL AUTO: NORMAL
SP GR UR STRIP.AUTO: 1.01
UROBILINOGEN UR STRIP-MCNC: 0.2 MG/DL

## 2018-11-11 PROCEDURE — 81025 URINE PREGNANCY TEST: CPT | Performed by: NURSE PRACTITIONER

## 2018-11-11 PROCEDURE — 87480 CANDIDA DNA DIR PROBE: CPT

## 2018-11-11 PROCEDURE — 87591 N.GONORRHOEAE DNA AMP PROB: CPT

## 2018-11-11 PROCEDURE — 87510 GARDNER VAG DNA DIR PROBE: CPT

## 2018-11-11 PROCEDURE — 87186 SC STD MICRODIL/AGAR DIL: CPT

## 2018-11-11 PROCEDURE — 81002 URINALYSIS NONAUTO W/O SCOPE: CPT | Performed by: NURSE PRACTITIONER

## 2018-11-11 PROCEDURE — 87077 CULTURE AEROBIC IDENTIFY: CPT

## 2018-11-11 PROCEDURE — 87660 TRICHOMONAS VAGIN DIR PROBE: CPT

## 2018-11-11 PROCEDURE — 99214 OFFICE O/P EST MOD 30 MIN: CPT | Performed by: NURSE PRACTITIONER

## 2018-11-11 PROCEDURE — 87491 CHLMYD TRACH DNA AMP PROBE: CPT

## 2018-11-11 PROCEDURE — 87086 URINE CULTURE/COLONY COUNT: CPT

## 2018-11-11 RX ORDER — VENLAFAXINE HYDROCHLORIDE 150 MG/1
CAPSULE, EXTENDED RELEASE ORAL
COMMUNITY
Start: 2018-08-15 | End: 2018-11-11

## 2018-11-11 RX ORDER — NITROFURANTOIN 25; 75 MG/1; MG/1
100 CAPSULE ORAL 2 TIMES DAILY
Qty: 10 CAP | Refills: 0 | Status: SHIPPED | OUTPATIENT
Start: 2018-11-11 | End: 2018-11-16

## 2018-11-12 DIAGNOSIS — Z20.2 POSSIBLE EXPOSURE TO STD: ICD-10-CM

## 2018-11-12 DIAGNOSIS — N30.01 ACUTE CYSTITIS WITH HEMATURIA: ICD-10-CM

## 2018-11-12 DIAGNOSIS — R10.9 LEFT FLANK PAIN: ICD-10-CM

## 2018-11-12 LAB
CANDIDA DNA VAG QL PROBE+SIG AMP: NEGATIVE
G VAGINALIS DNA VAG QL PROBE+SIG AMP: POSITIVE
T VAGINALIS DNA VAG QL PROBE+SIG AMP: NEGATIVE

## 2018-11-12 NOTE — PROGRESS NOTES
Chief Complaint   Patient presents with   • UTI     smelly urine, urge ton urinate, poss STD exposure, wants checked,2 partners in 6 months,         HISTORY OF PRESENT ILLNESS: Patient is a 38 y.o. female who presents today due to three days of urinary foul smelling, urgency and frequency. Reports some associated pelvic pressure. Denies hematuria, fever, N/V. Denies a history of pyelonephritis or kidney stones. Denies a history of UTIs. Does admit to intermittent left sided flank pain for the past year, which is sharp and intense, occurring every few months. She has not seen her doctor for this and is concerned about potential for kidney stones. In addition, she is concerned about potential for STI. She has been sexually active with two male partners in the past 6 months. She denies change in vaginal discharge, sores, rashes or lesions. She is concerned her urinary symptoms today may also be STI related.       Patient Active Problem List    Diagnosis Date Noted   • Irritable bowel disease 03/04/2017     Priority: Low   • Dysmenorrhea 11/23/2015     Priority: Low   • Anxiety 08/26/2013     Priority: Low   • Alcohol dependence with withdrawal with complication (HCC) 07/19/2018   • Moderate episode of recurrent major depressive disorder (HCC) 07/19/2018   • Chest tightness or pressure 03/27/2018   • Weight loss 02/07/2018   • Hospital discharge follow-up 11/18/2017   • Cervical spondylosis without myelopathy 09/29/2017   • Family history of breast cancer in paternal grandmother at age 35 09/26/2017   • Vitamin D insufficiency 09/26/2017   • Gastroesophageal reflux disease without esophagitis 09/13/2017   • Nipple discharge in female 09/13/2017   • Chronic fatigue 09/13/2017   • Cervical spondylosis 07/28/2017   • Fear of public speaking 10/06/2016   • Hypercholesterolemia 11/23/2015   • Migraine with aura and without status migrainosus, not intractable 08/26/2013       Allergies:Benadryl allergy; Demerol; Medrol  [methylprednisolone]; Previfem [norgestimate-ethinyl estradiol]; and Reglan [metoclopramide]    Current Outpatient Prescriptions Ordered in [facility]   Medication Sig Dispense Refill   • sertraline (ZOLOFT) 50 MG Tab Take 1 Tab by mouth every day. 30 Tab 6   • rizatriptan (MAXALT-MLT) 10 MG disintegrating tablet TAKE 1 TABLET BY MOUTH AT HEADACHE ONSET REPEAT EVERY HOUR AS NEEDED UP TO 4 TABLETS IN 24 HOURS 12 Tab 11   • topiramate (TOPAMAX) 50 MG tablet Take 3 Tabs by mouth 2 times a day. 540 Tab 3   • cyclobenzaprine (FLEXERIL) 5 MG tablet Take 1-2 Tabs by mouth 3 times a day as needed for Mild Pain or Muscle Spasms. May cause sedation at 10mg 50 Tab 0   • ondansetron (ZOFRAN) 4 MG Tab tablet Take 1 Tab by mouth every four hours as needed for Nausea/Vomiting. 24 Tab 0   • lovastatin (MEVACOR) 10 MG tablet Take 1 Tab by mouth every evening. 90 Tab 3   • lidocaine (LIDODERM) 5 % Patch Apply 1 Patch to skin as directed every 24 hours. 10 Patch 0   • Cholecalciferol (VITAMIN D) 2000 UNIT Tab Take  by mouth every day.     • cetirizine (ZYRTEC) 10 MG Tab Take 10 mg by mouth every day.     • Magnesium 100 MG Tab Take 1 Tab by mouth 2 Times a Day.     • omeprazole (PRILOSEC) 20 MG delayed-release capsule Take 20 mg by mouth every evening.     • Coenzyme Q10 (CO Q 10 PO) Take 1 Cap by mouth every day.       No current [facility]-ordered facility-administered medications on file.        Past Medical History:   Diagnosis Date   • Allergy, unspecified not elsewhere classified    • Anxiety 8/26/2013   • Arthritis     Cervical joints, and hands   • High cholesterol    • Migraine with aura and without status migrainosus, not intractable 8/26/2013   • Pain 7/27/2017    Chronic pain- Migraine; neck pain, spasm   • Psychiatric problem     hx of anxiety   • Seizure (HCC) 1/2016    ? possibly r/t migraine        Social History   Substance Use Topics   • Smoking status: Former Smoker     Packs/day: 1.00     Years: 15.00     Types: Cigarettes      "Quit date: 2010   • Smokeless tobacco: Never Used   • Alcohol use No       Family Status   Relation Status   • MGFa    • PGFa    • PGMo    • Mo Alive   • MUnc    • MGMo    • Fa (Not Specified)     Family History   Problem Relation Age of Onset   • Diabetes Maternal Grandfather    • Cancer Paternal Grandfather    • Alzheimer's Disease Paternal Grandfather    • Cancer Paternal Grandmother 30        breast   • Suicide Attempts Maternal Uncle         Killed himself by GSW   • Hypertension Father        ROS:  Review of Systems   Constitutional: Negative for fever, chills, weight loss and malaise/fatigue.   HENT: Negative for ear pain, nosebleeds, congestion, sore throat and neck pain.    Eyes: Negative for vision changes.   Neuro: Negative for headache, sensory changes, weakness, seizure, LOC.   Cardiovascular: Negative for chest pain, palpitations, orthopnea and leg swelling.   Respiratory: Negative for cough, sputum production, shortness of breath and wheezing.   Gastrointestinal: Positive for lower abdominal pressure. Negative for abdominal pain, nausea, vomiting or diarrhea.   Genitourinary: Positive for foul smelling urine, urgency and frequency. Positive for intermittent flank pain. Negative for hematuria.  GYN: Negative for abnormal discharge, sores, lesions or rashes.   Skin: Negative for rash, diaphoresis.     Exam:  Blood pressure 100/62, pulse 92, temperature 36.8 °C (98.2 °F), temperature source Temporal, resp. rate 16, height 1.651 m (5' 5\"), weight 54 kg (119 lb), SpO2 100 %, not currently breastfeeding.  General: well-nourished, well-developed female in NAD  Head: normocephalic, atraumatic  Eyes: PERRLA, no conjunctival injection, acuity grossly intact, lids normal.  Lymph: no cervical adenopathy. No supraclavicular adenopathy.   Neuro: alert and oriented. Cranial nerves 1-12 grossly intact. No sensory deficit.   Cardiovascular: regular rate and rhythm. No " edema.  Pulmonary: no distress. Chest is symmetrical with respiration, no wheezes, crackles, or rhonchi.   Abdomen: soft, non-tender, no guarding, no hepatosplenomegaly. No CVA tenderness.   GYN: external genitalia normal in appearance, no sores or lesions. Internal examination shows moderate white, thick discharge, no CMT.   Musculoskeletal: no clubbing, appropriate muscle tone, gait is stable.  Skin: warm, dry, intact, no clubbing, no cyanosis, no rashes.   Psych: appropriate mood, affect, judgement.         Assessment/Plan:  1. Acute cystitis with hematuria  POCT Urinalysis    POCT Pregnancy    URINE CULTURE(NEW)    CHLAMYDIA/GC PCR URINE OR SWAB    US-RENAL    nitrofurantoin monohydr macro (MACROBID) 100 MG Cap   2. Possible exposure to STD  CHLAMYDIA/GC PCR URINE OR SWAB    VAGINAL PATHOGENS DNA PANEL    HIV ANTIBODIES    RPR (SYPHILIS)   3. Left flank pain  URINE CULTURE(NEW)    US-RENAL           Macrobid as directed for suspected cysitis. Increase fluid intake. Urine sent for culture.   G/C and pathogens collected. HIV and RPR ordered. Will call with results. Safe sex practices discussed.   US renal ordered on patient as well for intermittent flank pain. STRICT ER precautions advised.   Supportive care, differential diagnoses, and indications for immediate follow-up discussed with patient.   Pathogenesis of diagnosis discussed including typical length and natural progression.   Instructed to return to clinic or nearest emergency department for any change in condition, further concerns, or worsening of symptoms.  Patient states understanding of the plan of care and discharge instructions.  Instructed to make an appointment, for follow up, with her primary care provider.        Please note that this dictation was created using voice recognition software. I have made every reasonable attempt to correct obvious errors, but I expect that there are errors of grammar and possibly content that I did not discover  before finalizing the note.      MIRIAN Gotti.

## 2018-11-13 LAB
C TRACH DNA SPEC QL NAA+PROBE: NEGATIVE
N GONORRHOEA DNA SPEC QL NAA+PROBE: NEGATIVE
SPECIMEN SOURCE: NORMAL

## 2018-11-14 LAB
BACTERIA UR CULT: ABNORMAL
BACTERIA UR CULT: ABNORMAL
SIGNIFICANT IND 70042: ABNORMAL
SITE SITE: ABNORMAL
SOURCE SOURCE: ABNORMAL

## 2018-11-15 ENCOUNTER — HOSPITAL ENCOUNTER (OUTPATIENT)
Dept: LAB | Facility: MEDICAL CENTER | Age: 38
End: 2018-11-15
Attending: NURSE PRACTITIONER
Payer: COMMERCIAL

## 2018-11-15 DIAGNOSIS — Z20.2 POSSIBLE EXPOSURE TO STD: ICD-10-CM

## 2018-11-15 DIAGNOSIS — N76.0 BV (BACTERIAL VAGINOSIS): ICD-10-CM

## 2018-11-15 DIAGNOSIS — B96.89 BV (BACTERIAL VAGINOSIS): ICD-10-CM

## 2018-11-15 LAB
HIV 1+2 AB+HIV1 P24 AG SERPL QL IA: NON REACTIVE
TREPONEMA PALLIDUM IGG+IGM AB [PRESENCE] IN SERUM OR PLASMA BY IMMUNOASSAY: NON REACTIVE

## 2018-11-15 PROCEDURE — 86780 TREPONEMA PALLIDUM: CPT

## 2018-11-15 PROCEDURE — 36415 COLL VENOUS BLD VENIPUNCTURE: CPT

## 2018-11-15 PROCEDURE — 87389 HIV-1 AG W/HIV-1&-2 AB AG IA: CPT

## 2018-11-15 RX ORDER — METRONIDAZOLE 500 MG/1
500 TABLET ORAL 2 TIMES DAILY
Qty: 14 TAB | Refills: 0 | Status: SHIPPED | OUTPATIENT
Start: 2018-11-15 | End: 2018-11-22

## 2018-11-16 ENCOUNTER — TELEPHONE (OUTPATIENT)
Dept: URGENT CARE | Facility: CLINIC | Age: 38
End: 2018-11-16

## 2018-11-16 NOTE — TELEPHONE ENCOUNTER
The patient was called for re-evaluation, a message was left, HIV and syphilis negative, awaiting US, encouraged to call back to the clinic or return to clinic with any questions or concerns.         MIRIAN Gotti.

## 2019-01-06 ENCOUNTER — HOSPITAL ENCOUNTER (OUTPATIENT)
Facility: MEDICAL CENTER | Age: 39
End: 2019-01-06
Attending: PHYSICIAN ASSISTANT
Payer: COMMERCIAL

## 2019-01-06 ENCOUNTER — OFFICE VISIT (OUTPATIENT)
Dept: URGENT CARE | Facility: CLINIC | Age: 39
End: 2019-01-06
Payer: COMMERCIAL

## 2019-01-06 VITALS
HEIGHT: 65 IN | RESPIRATION RATE: 20 BRPM | TEMPERATURE: 98 F | OXYGEN SATURATION: 98 % | BODY MASS INDEX: 18.99 KG/M2 | SYSTOLIC BLOOD PRESSURE: 120 MMHG | HEART RATE: 78 BPM | WEIGHT: 114 LBS | DIASTOLIC BLOOD PRESSURE: 80 MMHG

## 2019-01-06 DIAGNOSIS — N76.0 ACUTE VAGINITIS: ICD-10-CM

## 2019-01-06 LAB
APPEARANCE UR: CLEAR
BILIRUB UR STRIP-MCNC: NORMAL MG/DL
COLOR UR AUTO: YELLOW
GLUCOSE UR STRIP.AUTO-MCNC: NORMAL MG/DL
INT CON NEG: NEGATIVE
INT CON POS: POSITIVE
KETONES UR STRIP.AUTO-MCNC: NORMAL MG/DL
LEUKOCYTE ESTERASE UR QL STRIP.AUTO: NORMAL
NITRITE UR QL STRIP.AUTO: NORMAL
PH UR STRIP.AUTO: 5.5 [PH] (ref 5–8)
POC URINE PREGNANCY TEST: NEGATIVE
PROT UR QL STRIP: NORMAL MG/DL
RBC UR QL AUTO: NORMAL
SP GR UR STRIP.AUTO: 1.03
UROBILINOGEN UR STRIP-MCNC: 0.2 MG/DL

## 2019-01-06 PROCEDURE — 87510 GARDNER VAG DNA DIR PROBE: CPT

## 2019-01-06 PROCEDURE — 87660 TRICHOMONAS VAGIN DIR PROBE: CPT

## 2019-01-06 PROCEDURE — 87591 N.GONORRHOEAE DNA AMP PROB: CPT

## 2019-01-06 PROCEDURE — 81025 URINE PREGNANCY TEST: CPT | Performed by: PHYSICIAN ASSISTANT

## 2019-01-06 PROCEDURE — 99214 OFFICE O/P EST MOD 30 MIN: CPT | Performed by: PHYSICIAN ASSISTANT

## 2019-01-06 PROCEDURE — 81002 URINALYSIS NONAUTO W/O SCOPE: CPT | Performed by: PHYSICIAN ASSISTANT

## 2019-01-06 PROCEDURE — 99000 SPECIMEN HANDLING OFFICE-LAB: CPT | Performed by: PHYSICIAN ASSISTANT

## 2019-01-06 PROCEDURE — 87491 CHLMYD TRACH DNA AMP PROBE: CPT

## 2019-01-06 PROCEDURE — 87480 CANDIDA DNA DIR PROBE: CPT

## 2019-01-06 ASSESSMENT — ENCOUNTER SYMPTOMS
FLANK PAIN: 0
FEVER: 0
MYALGIAS: 0
VAGINITIS: 1
PALPITATIONS: 0
BACK PAIN: 0
NAUSEA: 0
ABDOMINAL PAIN: 0
HEADACHES: 0
SHORTNESS OF BREATH: 0
COUGH: 0
CHILLS: 0
ANOREXIA: 0
VOMITING: 0

## 2019-01-06 NOTE — PROGRESS NOTES
Subjective:      Dany Lambert is a 38 y.o. female who presents with Dysmenorrhea (Few days abdominal cramps, STD Check)            Vaginitis   The patient's primary symptoms include vaginal discharge (slight abnormal vagina discharge ). The patient's pertinent negatives include no genital itching, genital lesions, genital odor, missed menses, pelvic pain or vaginal bleeding. This is a new problem. Episode onset: 1 week  The problem occurs intermittently. The problem has been waxing and waning. She is not pregnant. Pertinent negatives include no abdominal pain, anorexia, back pain, chills, discolored urine, dysuria, fever, flank pain, frequency, headaches, hematuria, nausea, painful intercourse, rash, urgency or vomiting. The vaginal discharge was white and thick. There has been no bleeding. She has not been passing clots. She has not been passing tissue. Nothing aggravates the symptoms. She has tried nothing for the symptoms. She is sexually active. No, her partner does not have an STD. She uses condoms for contraception. There is no history of an STD or vaginosis.     Patient did have intercourse with a new Boyfriend- protected sex with latex condom- she does have hx of irritation to latex gloves.  She denies any vaginal itching or pain.    Past Medical History:   Diagnosis Date   • Allergy, unspecified not elsewhere classified    • Anxiety 8/26/2013   • Arthritis     Cervical joints, and hands   • High cholesterol    • Migraine with aura and without status migrainosus, not intractable 8/26/2013   • Pain 7/27/2017    Chronic pain- Migraine; neck pain, spasm   • Psychiatric problem     hx of anxiety   • Seizure (HCC) 1/2016    ? possibly r/t migraine        Past Surgical History:   Procedure Laterality Date   • VA DSTR NROLYTC AGNT PARVERTEB FCT SNGL CRVCL/THORA Right 9/29/2017    Procedure: NEURO DEST FACET C/T W/IG SNGL C2-4;  Surgeon: Scotty Navarro D.O.;  Location: SURGERY [facility];   "Service: Pain Management   • AR DSTR NROLYTC AGNT PARVERTEB FCT ADDL CRVCL/THORA Right 9/29/2017    Procedure: NEURO DEST FACET C/T W/IG ADDL;  Surgeon: Scotty Navarro D.O.;  Location: [facility];  Service: Pain Management   • AR DSTR NROLYTC AGNT PARVERTEB FCT SNGL CRVCL/THORA Left 7/28/2017    Procedure: NEURO DEST FACET C/T W/IG SNGL - C2-4;  Surgeon: Scotty Navarro D.O.;  Location: SURGERY [facility];  Service: Pain Management   • AR DSTR NROLYTC AGNT PARVERTEB FCT ADDL CRVCL/THORA  7/28/2017    Procedure: NEURO DEST FACET C/T W/IG ADDL;  Surgeon: Scotty Navarro D.O.;  Location: SURGERY [facility];  Service: Pain Management   • TONSILLECTOMY  1997   • CYST EXCISION Left as child    cyst removal on L wrist       Family History   Problem Relation Age of Onset   • Diabetes Maternal Grandfather    • Cancer Paternal Grandfather    • Alzheimer's Disease Paternal Grandfather    • Cancer Paternal Grandmother 30        breast   • Suicide Attempts Maternal Uncle         Killed himself by GSW   • Hypertension Father        Allergies   Allergen Reactions   • Benadryl Allergy Anxiety   • Demerol Hives   • Medrol [Methylprednisolone] Itching   • Previfem [Norgestimate-Ethinyl Estradiol]      Nausea/vomiting   • Reglan [Metoclopramide] Anxiety       Medications, Allergies, and current problem list reviewed today in Epic    Review of Systems   Constitutional: Negative for chills, fever and malaise/fatigue.   Respiratory: Negative for cough and shortness of breath.    Cardiovascular: Negative for chest pain, palpitations and leg swelling.   Gastrointestinal: Negative for abdominal pain, anorexia, nausea and vomiting.   Genitourinary: Positive for vaginal discharge (slight abnormal vagina discharge ). Negative for dysuria, flank pain, frequency, hematuria, missed menses, pelvic pain and urgency.        \"different vaginal discharge.\"   Musculoskeletal: Negative for back pain and myalgias. " "  Skin: Negative for itching and rash.   Neurological: Negative for headaches.     All other systems reviewed and are negative.        Objective:     /80 (BP Location: Left arm, Patient Position: Sitting, BP Cuff Size: Adult)   Pulse 78   Temp 36.7 °C (98 °F) (Temporal)   Resp 20   Ht 1.651 m (5' 5\")   Wt 51.7 kg (114 lb)   SpO2 98%   BMI 18.97 kg/m²      Physical Exam   Constitutional: She is oriented to person, place, and time. She appears well-developed and well-nourished. No distress.   HENT:   Head: Normocephalic.   Eyes: Conjunctivae are normal.   Pulmonary/Chest: Effort normal. No respiratory distress.   Genitourinary: Uterus normal. There is no rash, tenderness or lesion on the right labia. There is no rash, tenderness or lesion on the left labia. Right adnexum displays no mass, no tenderness and no fullness. Left adnexum displays no mass, no tenderness and no fullness. No erythema, tenderness or bleeding in the vagina. No foreign body in the vagina. No signs of injury around the vagina. Vaginal discharge (slight white discharge) found.   Neurological: She is alert and oriented to person, place, and time. No cranial nerve deficit.   Skin: Skin is warm and dry. No rash noted.   Psychiatric: She has a normal mood and affect. Her behavior is normal. Judgment and thought content normal.          UA: Negative  hcg- negative      Assessment/Plan:     1. Acute vaginitis  POCT Urinalysis    POCT Pregnancy    VAGINAL PATHOGENS DNA PANEL    CHLAMYDIA/GC PCR URINE OR SWAB       Will check above and change treatment plan accordingly.     Differential diagnoses, Supportive care, and indications for immediate follow-up discussed with patient.   Instructed to return to clinic or nearest emergency department for any change in condition, further concerns, or worsening of symptoms.  The patient demonstrated a good understanding and agreed with the treatment plan.    Rimma Rodriguez P.A.-C.      "

## 2019-01-07 LAB
C TRACH DNA SPEC QL NAA+PROBE: NEGATIVE
CANDIDA DNA VAG QL PROBE+SIG AMP: NEGATIVE
G VAGINALIS DNA VAG QL PROBE+SIG AMP: POSITIVE
N GONORRHOEA DNA SPEC QL NAA+PROBE: NEGATIVE
SPECIMEN SOURCE: NORMAL
T VAGINALIS DNA VAG QL PROBE+SIG AMP: NEGATIVE

## 2019-01-10 ENCOUNTER — TELEPHONE (OUTPATIENT)
Dept: URGENT CARE | Facility: CLINIC | Age: 39
End: 2019-01-10

## 2019-01-10 DIAGNOSIS — B96.89 BACTERIAL VAGINOSIS: ICD-10-CM

## 2019-01-10 DIAGNOSIS — N76.0 BACTERIAL VAGINOSIS: ICD-10-CM

## 2019-01-10 RX ORDER — METRONIDAZOLE 500 MG/1
500 TABLET ORAL 2 TIMES DAILY
Qty: 14 TAB | Refills: 0 | Status: SHIPPED | OUTPATIENT
Start: 2019-01-10 | End: 2019-01-17

## 2019-01-11 NOTE — TELEPHONE ENCOUNTER
Discussed results with patient. Positive for BV. Will treat Flagyl. Advised patient to not use drink alcohol with medication. Patient verbalized understanding.  Rimma Rodriguez P.A.-C.

## 2019-03-06 ENCOUNTER — APPOINTMENT (OUTPATIENT)
Dept: RADIOLOGY | Facility: MEDICAL CENTER | Age: 39
End: 2019-03-06
Attending: EMERGENCY MEDICINE
Payer: COMMERCIAL

## 2019-03-06 ENCOUNTER — HOSPITAL ENCOUNTER (OUTPATIENT)
Facility: MEDICAL CENTER | Age: 39
End: 2019-03-07
Attending: EMERGENCY MEDICINE | Admitting: INTERNAL MEDICINE
Payer: COMMERCIAL

## 2019-03-06 DIAGNOSIS — G43.811 OTHER MIGRAINE WITH STATUS MIGRAINOSUS, INTRACTABLE: ICD-10-CM

## 2019-03-06 PROBLEM — G43.419: Status: ACTIVE | Noted: 2019-03-06

## 2019-03-06 LAB
ALBUMIN SERPL BCP-MCNC: 4.1 G/DL (ref 3.2–4.9)
ALBUMIN/GLOB SERPL: 1.5 G/DL
ALP SERPL-CCNC: 55 U/L (ref 30–99)
ALT SERPL-CCNC: 11 U/L (ref 2–50)
ANION GAP SERPL CALC-SCNC: 7 MMOL/L (ref 0–11.9)
AST SERPL-CCNC: 12 U/L (ref 12–45)
BASOPHILS # BLD AUTO: 0.9 % (ref 0–1.8)
BASOPHILS # BLD: 0.08 K/UL (ref 0–0.12)
BILIRUB SERPL-MCNC: 0.2 MG/DL (ref 0.1–1.5)
BUN SERPL-MCNC: 19 MG/DL (ref 8–22)
CALCIUM SERPL-MCNC: 9.2 MG/DL (ref 8.5–10.5)
CHLORIDE SERPL-SCNC: 109 MMOL/L (ref 96–112)
CO2 SERPL-SCNC: 22 MMOL/L (ref 20–33)
CREAT SERPL-MCNC: 0.93 MG/DL (ref 0.5–1.4)
EOSINOPHIL # BLD AUTO: 0.14 K/UL (ref 0–0.51)
EOSINOPHIL NFR BLD: 1.7 % (ref 0–6.9)
ERYTHROCYTE [DISTWIDTH] IN BLOOD BY AUTOMATED COUNT: 47.5 FL (ref 35.9–50)
GLOBULIN SER CALC-MCNC: 2.7 G/DL (ref 1.9–3.5)
GLUCOSE SERPL-MCNC: 92 MG/DL (ref 65–99)
HCG SERPL QL: NEGATIVE
HCT VFR BLD AUTO: 40.6 % (ref 37–47)
HGB BLD-MCNC: 13.4 G/DL (ref 12–16)
IMM GRANULOCYTES # BLD AUTO: 0.03 K/UL (ref 0–0.11)
IMM GRANULOCYTES NFR BLD AUTO: 0.4 % (ref 0–0.9)
LYMPHOCYTES # BLD AUTO: 3.09 K/UL (ref 1–4.8)
LYMPHOCYTES NFR BLD: 36.6 % (ref 22–41)
MCH RBC QN AUTO: 31.1 PG (ref 27–33)
MCHC RBC AUTO-ENTMCNC: 33 G/DL (ref 33.6–35)
MCV RBC AUTO: 94.2 FL (ref 81.4–97.8)
MONOCYTES # BLD AUTO: 0.67 K/UL (ref 0–0.85)
MONOCYTES NFR BLD AUTO: 7.9 % (ref 0–13.4)
NEUTROPHILS # BLD AUTO: 4.44 K/UL (ref 2–7.15)
NEUTROPHILS NFR BLD: 52.5 % (ref 44–72)
NRBC # BLD AUTO: 0 K/UL
NRBC BLD-RTO: 0 /100 WBC
PLATELET # BLD AUTO: 237 K/UL (ref 164–446)
PMV BLD AUTO: 11.2 FL (ref 9–12.9)
POTASSIUM SERPL-SCNC: 3.7 MMOL/L (ref 3.6–5.5)
PROT SERPL-MCNC: 6.8 G/DL (ref 6–8.2)
RBC # BLD AUTO: 4.31 M/UL (ref 4.2–5.4)
SODIUM SERPL-SCNC: 138 MMOL/L (ref 135–145)
WBC # BLD AUTO: 8.5 K/UL (ref 4.8–10.8)

## 2019-03-06 PROCEDURE — G0378 HOSPITAL OBSERVATION PER HR: HCPCS

## 2019-03-06 PROCEDURE — 700111 HCHG RX REV CODE 636 W/ 250 OVERRIDE (IP): Performed by: EMERGENCY MEDICINE

## 2019-03-06 PROCEDURE — 700102 HCHG RX REV CODE 250 W/ 637 OVERRIDE(OP): Performed by: INTERNAL MEDICINE

## 2019-03-06 PROCEDURE — 99285 EMERGENCY DEPT VISIT HI MDM: CPT

## 2019-03-06 PROCEDURE — 70496 CT ANGIOGRAPHY HEAD: CPT

## 2019-03-06 PROCEDURE — 85025 COMPLETE CBC W/AUTO DIFF WBC: CPT

## 2019-03-06 PROCEDURE — 700101 HCHG RX REV CODE 250: Performed by: INTERNAL MEDICINE

## 2019-03-06 PROCEDURE — 96366 THER/PROPH/DIAG IV INF ADDON: CPT

## 2019-03-06 PROCEDURE — 700101 HCHG RX REV CODE 250: Performed by: EMERGENCY MEDICINE

## 2019-03-06 PROCEDURE — 96375 TX/PRO/DX INJ NEW DRUG ADDON: CPT

## 2019-03-06 PROCEDURE — 700105 HCHG RX REV CODE 258: Performed by: INTERNAL MEDICINE

## 2019-03-06 PROCEDURE — 700111 HCHG RX REV CODE 636 W/ 250 OVERRIDE (IP): Performed by: PSYCHIATRY & NEUROLOGY

## 2019-03-06 PROCEDURE — 80053 COMPREHEN METABOLIC PANEL: CPT

## 2019-03-06 PROCEDURE — A9270 NON-COVERED ITEM OR SERVICE: HCPCS | Performed by: INTERNAL MEDICINE

## 2019-03-06 PROCEDURE — 700117 HCHG RX CONTRAST REV CODE 255: Performed by: EMERGENCY MEDICINE

## 2019-03-06 PROCEDURE — 96365 THER/PROPH/DIAG IV INF INIT: CPT

## 2019-03-06 PROCEDURE — 84703 CHORIONIC GONADOTROPIN ASSAY: CPT

## 2019-03-06 PROCEDURE — 70498 CT ANGIOGRAPHY NECK: CPT

## 2019-03-06 PROCEDURE — 96372 THER/PROPH/DIAG INJ SC/IM: CPT

## 2019-03-06 PROCEDURE — 99220 PR INITIAL OBSERVATION CARE,LEVL III: CPT | Performed by: INTERNAL MEDICINE

## 2019-03-06 RX ORDER — VALPROATE SODIUM 100 MG/ML
500 INJECTION INTRAVENOUS ONCE
Status: COMPLETED | OUTPATIENT
Start: 2019-03-06 | End: 2019-03-06

## 2019-03-06 RX ORDER — ONDANSETRON 4 MG/1
4 TABLET, ORALLY DISINTEGRATING ORAL EVERY 4 HOURS PRN
Status: DISCONTINUED | OUTPATIENT
Start: 2019-03-06 | End: 2019-03-07 | Stop reason: HOSPADM

## 2019-03-06 RX ORDER — LIDOCAINE 50 MG/G
1 PATCH TOPICAL EVERY 24 HOURS
Status: DISCONTINUED | OUTPATIENT
Start: 2019-03-06 | End: 2019-03-06

## 2019-03-06 RX ORDER — PROMETHAZINE HYDROCHLORIDE 25 MG/1
12.5-25 SUPPOSITORY RECTAL EVERY 4 HOURS PRN
Status: DISCONTINUED | OUTPATIENT
Start: 2019-03-06 | End: 2019-03-07 | Stop reason: HOSPADM

## 2019-03-06 RX ORDER — ACETAMINOPHEN 325 MG/1
650 TABLET ORAL EVERY 6 HOURS PRN
Status: DISCONTINUED | OUTPATIENT
Start: 2019-03-06 | End: 2019-03-07 | Stop reason: HOSPADM

## 2019-03-06 RX ORDER — MAGNESIUM SULFATE 1 G/100ML
1 INJECTION INTRAVENOUS ONCE
Status: COMPLETED | OUTPATIENT
Start: 2019-03-06 | End: 2019-03-06

## 2019-03-06 RX ORDER — LOVASTATIN 20 MG/1
10 TABLET ORAL EVERY EVENING
Status: DISCONTINUED | OUTPATIENT
Start: 2019-03-06 | End: 2019-03-07 | Stop reason: HOSPADM

## 2019-03-06 RX ORDER — CYCLOBENZAPRINE HCL 10 MG
5-10 TABLET ORAL 3 TIMES DAILY PRN
Status: DISCONTINUED | OUTPATIENT
Start: 2019-03-06 | End: 2019-03-07 | Stop reason: HOSPADM

## 2019-03-06 RX ORDER — RIZATRIPTAN BENZOATE 10 MG/1
10 TABLET, ORALLY DISINTEGRATING ORAL
Status: DISCONTINUED | OUTPATIENT
Start: 2019-03-06 | End: 2019-03-06

## 2019-03-06 RX ORDER — KETOROLAC TROMETHAMINE 30 MG/ML
15 INJECTION, SOLUTION INTRAMUSCULAR; INTRAVENOUS ONCE
Status: COMPLETED | OUTPATIENT
Start: 2019-03-06 | End: 2019-03-06

## 2019-03-06 RX ORDER — SUMATRIPTAN 50 MG/1
100 TABLET, FILM COATED ORAL
Status: COMPLETED | OUTPATIENT
Start: 2019-03-06 | End: 2019-03-06

## 2019-03-06 RX ORDER — KETAMINE HYDROCHLORIDE 50 MG/ML
INJECTION, SOLUTION INTRAMUSCULAR; INTRAVENOUS
Status: DISPENSED
Start: 2019-03-06 | End: 2019-03-06

## 2019-03-06 RX ORDER — OMEPRAZOLE 20 MG/1
20 CAPSULE, DELAYED RELEASE ORAL EVERY EVENING
Status: DISCONTINUED | OUTPATIENT
Start: 2019-03-06 | End: 2019-03-06

## 2019-03-06 RX ORDER — DEXAMETHASONE SODIUM PHOSPHATE 10 MG/ML
10 INJECTION, SOLUTION INTRAMUSCULAR; INTRAVENOUS ONCE
Status: COMPLETED | OUTPATIENT
Start: 2019-03-06 | End: 2019-03-06

## 2019-03-06 RX ORDER — SODIUM CHLORIDE 9 MG/ML
INJECTION, SOLUTION INTRAVENOUS CONTINUOUS
Status: DISCONTINUED | OUTPATIENT
Start: 2019-03-06 | End: 2019-03-07 | Stop reason: HOSPADM

## 2019-03-06 RX ORDER — KETOROLAC TROMETHAMINE 30 MG/ML
60 INJECTION, SOLUTION INTRAMUSCULAR; INTRAVENOUS ONCE
Status: COMPLETED | OUTPATIENT
Start: 2019-03-06 | End: 2019-03-06

## 2019-03-06 RX ORDER — RIZATRIPTAN BENZOATE 10 MG/1
10 TABLET ORAL PRN
COMMUNITY
End: 2019-03-19 | Stop reason: CLARIF

## 2019-03-06 RX ORDER — ONDANSETRON 2 MG/ML
4 INJECTION INTRAMUSCULAR; INTRAVENOUS EVERY 4 HOURS PRN
Status: DISCONTINUED | OUTPATIENT
Start: 2019-03-06 | End: 2019-03-07 | Stop reason: HOSPADM

## 2019-03-06 RX ADMIN — TOPIRAMATE 150 MG: 25 TABLET, FILM COATED ORAL at 21:11

## 2019-03-06 RX ADMIN — CYCLOBENZAPRINE HYDROCHLORIDE 10 MG: 10 TABLET, FILM COATED ORAL at 21:10

## 2019-03-06 RX ADMIN — SODIUM CHLORIDE: 9 INJECTION, SOLUTION INTRAVENOUS at 10:35

## 2019-03-06 RX ADMIN — IOHEXOL 80 ML: 350 INJECTION, SOLUTION INTRAVENOUS at 05:28

## 2019-03-06 RX ADMIN — SERTRALINE HYDROCHLORIDE 50 MG: 50 TABLET ORAL at 18:33

## 2019-03-06 RX ADMIN — TOPIRAMATE 150 MG: 25 TABLET, FILM COATED ORAL at 10:45

## 2019-03-06 RX ADMIN — VALPROATE SODIUM 500 MG: 500 INJECTION INTRAVENOUS at 21:13

## 2019-03-06 RX ADMIN — KETAMINE HYDROCHLORIDE 25 MG: 10 INJECTION, SOLUTION INTRAMUSCULAR; INTRAVENOUS at 08:39

## 2019-03-06 RX ADMIN — VALPROATE SODIUM 500 MG: 500 INJECTION INTRAVENOUS at 05:13

## 2019-03-06 RX ADMIN — DEXAMETHASONE SODIUM PHOSPHATE 10 MG: 10 INJECTION, SOLUTION INTRAMUSCULAR; INTRAVENOUS at 05:13

## 2019-03-06 RX ADMIN — KETOROLAC TROMETHAMINE 60 MG: 30 INJECTION, SOLUTION INTRAMUSCULAR; INTRAVENOUS at 21:33

## 2019-03-06 RX ADMIN — MAGNESIUM SULFATE IN DEXTROSE 1 G: 10 INJECTION, SOLUTION INTRAVENOUS at 05:44

## 2019-03-06 RX ADMIN — LOVASTATIN 10 MG: 20 TABLET ORAL at 18:32

## 2019-03-06 RX ADMIN — KETOROLAC TROMETHAMINE 15 MG: 30 INJECTION, SOLUTION INTRAMUSCULAR at 05:13

## 2019-03-06 RX ADMIN — MAGNESIUM SULFATE IN DEXTROSE 1 G: 10 INJECTION, SOLUTION INTRAVENOUS at 21:13

## 2019-03-06 RX ADMIN — SUMATRIPTAN SUCCINATE 100 MG: 50 TABLET ORAL at 11:47

## 2019-03-06 ASSESSMENT — ENCOUNTER SYMPTOMS
FEVER: 0
MYALGIAS: 0
DIZZINESS: 0
SPUTUM PRODUCTION: 0
SHORTNESS OF BREATH: 0
NERVOUS/ANXIOUS: 0
PALPITATIONS: 0
HEADACHES: 1
HEARTBURN: 0
EYE DISCHARGE: 0
FOCAL WEAKNESS: 0
CHILLS: 0
NECK PAIN: 0
COUGH: 0
NAUSEA: 0
DIARRHEA: 0
BLURRED VISION: 0
BACK PAIN: 0
WEIGHT LOSS: 0
EYE REDNESS: 0
DEPRESSION: 0
SEIZURES: 0
ABDOMINAL PAIN: 0
INSOMNIA: 0
VOMITING: 0
EYE PAIN: 0
STRIDOR: 0
ORTHOPNEA: 0

## 2019-03-06 ASSESSMENT — PATIENT HEALTH QUESTIONNAIRE - PHQ9
2. FEELING DOWN, DEPRESSED, IRRITABLE, OR HOPELESS: NOT AT ALL
SUM OF ALL RESPONSES TO PHQ9 QUESTIONS 1 AND 2: 0
1. LITTLE INTEREST OR PLEASURE IN DOING THINGS: NOT AT ALL

## 2019-03-06 ASSESSMENT — COPD QUESTIONNAIRES
IN THE PAST 12 MONTHS DO YOU DO LESS THAN YOU USED TO BECAUSE OF YOUR BREATHING PROBLEMS: DISAGREE/UNSURE
HAVE YOU SMOKED AT LEAST 100 CIGARETTES IN YOUR ENTIRE LIFE: NO/DON'T KNOW
COPD SCREENING SCORE: 0
DURING THE PAST 4 WEEKS HOW MUCH DID YOU FEEL SHORT OF BREATH: NONE/LITTLE OF THE TIME
DO YOU EVER COUGH UP ANY MUCUS OR PHLEGM?: NO/ONLY WITH OCCASIONAL COLDS OR INFECTIONS

## 2019-03-06 ASSESSMENT — LIFESTYLE VARIABLES: ALCOHOL_USE: NO

## 2019-03-06 NOTE — ED PROVIDER NOTES
ED Provider Note    Scribed for Richard Villanueva M.D. by Sloan Dent. 3/6/2019, 3:53 AM.    Primary care provider: Emerald Rico M.D.  Means of arrival: Walk-In  History obtained from: Patient  History limited by: None    CHIEF COMPLAINT  Chief Complaint   Patient presents with   • Headache     Pt presented to the ER with a complaint of a right side pin point headache x 5 days, with photosensitivity and sound sensitivity. Pt has significant history of Migraines, but complaines that this one is different. Pt tried all of her prescribed medications for headche, reports no relief.        HPI  Rubi Zaman is a 38 y.o. Female, with history of migraines, who presents to the Emergency Department for a pinpoint, right-sided headache onset 5 days ago. The patient's headaches are typically left-sided, and this episode is different from her usual migraines but expressly not the worst headache of her life. The pain is intermittent and will resolve for about 1 hour and then re-present for 5-6 hours. She has associated photophobia, phonophobia, and nausea. She denies any trauma, fevers and vision changes. The patient has tried her prescription Maxalt without relief which typically resolved her headaches. She was also recently started on Aimovig. Her neurologist is Dr. Yoder. The patient also has severe neck and back pain which are being treated by a pain specialist. The patient denies any chance of pregnancy secondary to abstinence    REVIEW OF SYSTEMS  Pertinent positives include headache, photophobia, sounds sensitivity, and nausea. Pertinent negatives include no vomiting, fevers, or vision changes. As above, all other systems reviewed and are negative.   See HPI for further details.     PAST MEDICAL HISTORY   has a past medical history of Allergy, unspecified not elsewhere classified; Anxiety (8/26/2013); Arthritis; High cholesterol; Migraine with aura and without status migrainosus, not intractable (8/26/2013); Pain  (7/27/2017); Psychiatric problem; and Seizure (HCC) (1/2016).    SURGICAL HISTORY   has a past surgical history that includes tonsillectomy (1997); dstr nrolytc agnt parverteb fct sngl crvcl/thora (Left, 7/28/2017); dstr nrolytc agnt parverteb fct addl crvcl/thora (7/28/2017); cyst excision (Left, as child); dstr nrolytc agnt parverteb fct sngl crvcl/thora (Right, 9/29/2017); and dstr nrolytc agnt parverteb fct addl crvcl/thora (Right, 9/29/2017).    SOCIAL HISTORY  Social History   Substance Use Topics   • Smoking status: Former Smoker     Packs/day: 1.00     Years: 15.00     Types: Cigarettes     Quit date: 1/1/2010   • Smokeless tobacco: Never Used   • Alcohol use No      FAMILY HISTORY  Family History   Problem Relation Age of Onset   • Diabetes Maternal Grandfather    • Cancer Paternal Grandfather    • Alzheimer's Disease Paternal Grandfather    • Cancer Paternal Grandmother 30        breast   • Suicide Attempts Maternal Uncle         Killed himself by GSW   • Hypertension Father        CURRENT MEDICATIONS  Home Medications     Reviewed by Bharath Dumas R.N. (Registered Nurse) on 03/06/19 at 0345  Med List Status: Not Addressed   Medication Last Dose Status   cetirizine (ZYRTEC) 10 MG Tab  Active   Cholecalciferol (VITAMIN D) 2000 UNIT Tab  Active   Coenzyme Q10 (CO Q 10 PO)  Active   cyclobenzaprine (FLEXERIL) 5 MG tablet  Active   lidocaine (LIDODERM) 5 % Patch  Active   lovastatin (MEVACOR) 10 MG tablet  Active   Magnesium 100 MG Tab  Active   omeprazole (PRILOSEC) 20 MG delayed-release capsule  Active   ondansetron (ZOFRAN) 4 MG Tab tablet  Active   rizatriptan (MAXALT-MLT) 10 MG disintegrating tablet  Active   sertraline (ZOLOFT) 50 MG Tab  Active   topiramate (TOPAMAX) 50 MG tablet  Active                ALLERGIES  Allergies   Allergen Reactions   • Benadryl Allergy Anxiety   • Demerol Hives   • Medrol [Methylprednisolone] Itching   • Previfem [Norgestimate-Ethinyl Estradiol]      Nausea/vomiting   •  "Reglan [Metoclopramide] Anxiety       PHYSICAL EXAM  VITAL SIGNS: Pulse 77   Temp 37 °C (98.6 °F) (Temporal)   Ht 1.676 m (5' 6\")   Wt 49.9 kg (110 lb)   LMP 02/16/2019 (Exact Date)   SpO2 97%   Breastfeeding? No   BMI 17.75 kg/m²   Vitals reviewed.  Constitutional: Alert, appears uncomfortable.  HENT: No signs of trauma, Bilateral external ears normal, Nose normal. Tacky mucous membranes.  Eyes: Pupils are equal and reactive, Conjunctiva normal, Non-icteric.   Neck: Normal range of motion, No tenderness, Supple, No stridor. No meningeal signs  Lymphatic: No lymphadenopathy noted.   Cardiovascular: Regular rate and rhythm, no murmurs.   Thorax & Lungs: Normal breath sounds, No respiratory distress, No wheezing, No chest tenderness.   Abdomen: Bowel sounds normal, Soft, No tenderness, No peritoneal signs, No masses, No pulsatile masses.   Skin: Warm, Dry, No erythema, No rash.   Back: Normal alignment.   Extremities: Intact distal pulses, No edema, No tenderness, No cyanosis  Musculoskeletal: Good range of motion in all major joints. No major deformities noted.   Neurologic: Appears to be having some word finding difficulties. Alert, Normal motor function, Decreased sensation in right face and right arm, CN II-XII intact beyond the decreased sensation in the right face. Strength equal bilaterally. No pronator drift. Normal finger to nose test. Normal heel to shin. Positive dysdiadochokinesia.  Psychiatric: Affect normal, Judgment normal, Mood normal.       DIAGNOSTIC STUDIES / PROCEDURES    LABS  Labs Reviewed   CBC WITH DIFFERENTIAL - Abnormal; Notable for the following:        Result Value    MCHC 33.0 (*)     All other components within normal limits   COMP METABOLIC PANEL   HCG QUAL SERUM   ESTIMATED GFR      All labs reviewed by me.    RADIOLOGY  CT-CTA HEAD WITH & W/O-POST PROCESS    (Results Pending)   CT-CTA NECK WITH & W/O-POST PROCESSING    (Results Pending)     The radiologist's interpretation of " all radiological studies have been reviewed by me.    COURSE & MEDICAL DECISION MAKING  Nursing notes, VS, PMSFHx reviewed in chart.  Differential diagnoses include but not limited to: Migraine, CVA, SAH, CNS infection, tension headache    3:53 AM Patient seen and examined at bedside. Patient arrives afebrile with normal vital signs. Patient appears dehydrated and non-toxic. The physical exam is remarkable for positive dysdiadochokinesia. Gait and stance were deferred. She also has some decreased sensation over the right face and arm as well as some apparent word finding difficulties. Patient is outside any window for TPA or embolectomy so stroke alert was not called. Additionally, this appears to be more migrainous in nature. Ordered for CT-CTA Head with and w/o, CT-CTA Neck with and w/o, CBC with differential, CMP, Quant Beta-HCG, estimated GFR to evaluate. Patient has a history of allergies to compazine, reglan, benadryl. Prior notes reviewed and patient has required neurology consult for atypical migraine medication. Will discuss with neuro for pain management recommendations. Given one dose of toradol.    Discussed patient with Dr. Ahmadi who recommends 10 mg dexamethasone, Magnesium Sulfate 1g, Valproic acid 500 mg. If headache is not improved with this cocktail, admit patient for further management.    Labs and imaging (CTA head/neck) were unremarkable. Patient has not gotten any relief from her symptoms. Will be started on low-dose ketamine infusion and admitted to hospitalist. Discussed with Dr. Dunham, who will admit the patient to his service.    Patient admitted in guarded condition.    FINAL IMPRESSION  1. Other migraine with status migrainosus, intractable          Sloan SILVEIRA), am scribing for, and in the presence of, Richard Villanueva M.D..    Electronically signed by: Sloan Mccarthy), 3/6/2019    Richard SILVEIRA M.D. personally performed the services described in this documentation, as  scribed by Sloan Dent in my presence, and it is both accurate and complete.    C    The note accurately reflects work and decisions made by me.  Richard Villanueva  3/6/2019  9:15 AM

## 2019-03-06 NOTE — H&P
Hospital Medicine History and Physical      Date of Service  3/6/2019    Chief Complaint  Chief Complaint   Patient presents with   • Headache     Pt presented to the ER with a complaint of a right side pin point headache x 5 days, with photosensitivity and sound sensitivity. Pt has significant history of Migraines, but complaines that this one is different. Pt tried all of her prescribed medications for headche, reports no relief.        History of Presenting Illness  Junie is a 38 y.o. female PMH of chronic migraine headache who presents with intractable migraine headache for the past 5 days.  The patient see Dr. Stubbs pain specialist and Dr. Yoder neurology for her chronic migraine headache.  She stated that since 5 days ago she started having stabbing pain on the right side of the head and the exact same spot mostly constant in nature, 10 out of 10 intensity, no radiation.  Worsen with bright light and loud noise.  She stated that this headache is different from previous headache since usually she get it on the left side of the head.  She use all her medication at home without improvement.  She finally presented to ER for further evaluation and management.  In the ER she had a CT scan of the head and CT angiogram of head and neck done with no acute finding.  Neurology was consulted in ER.  Patient will be admitted to the hospital for intractable headache.    Primary Care Physician  Emerald Rico M.D.      Code Status  Full code    Review of Systems  Review of Systems   Constitutional: Negative for chills, fever and weight loss.   HENT: Negative for congestion and nosebleeds.    Eyes: Negative for blurred vision, pain, discharge and redness.   Respiratory: Negative for cough, sputum production, shortness of breath and stridor.    Cardiovascular: Negative for chest pain, palpitations and orthopnea.   Gastrointestinal: Negative for abdominal pain, diarrhea, heartburn, nausea and vomiting.    Genitourinary: Negative for dysuria, frequency and urgency.   Musculoskeletal: Negative for back pain, myalgias and neck pain.   Skin: Negative for itching and rash.   Neurological: Positive for headaches. Negative for dizziness, focal weakness and seizures.   Psychiatric/Behavioral: Negative for depression. The patient is not nervous/anxious and does not have insomnia.      Please see HPI, all other systems were reviewed and are negative (AMA/CMS criteria)     Past Medical History  Past Medical History:   Diagnosis Date   • Pain 7/27/2017    Chronic pain- Migraine; neck pain, spasm   • Seizure (HCC) 1/2016    ? possibly r/t migraine    • Anxiety 8/26/2013   • Migraine with aura and without status migrainosus, not intractable 8/26/2013   • Allergy, unspecified not elsewhere classified    • Arthritis     Cervical joints, and hands   • High cholesterol    • Psychiatric problem     hx of anxiety       Surgical History  Past Surgical History:   Procedure Laterality Date   • KS DSTR NROLYTC AGNT PARVERTEB FCT SNGL CRVCL/THORA Right 9/29/2017    Procedure: NEURO DEST FACET C/T W/IG SNGL C2-4;  Surgeon: Scotty Navarro D.O.;  Location: [facility];  Service: Pain Management   • KS DSTR NROLYTC AGNT PARVERTEB FCT ADDL CRVCL/THORA Right 9/29/2017    Procedure: NEURO DEST FACET C/T W/IG ADDL;  Surgeon: Scotty Navarro D.O.;  Location: [facility];  Service: Pain Management   • KS DSTR NROLYTC AGNT PARVERTEB FCT SNGL CRVCL/THORA Left 7/28/2017    Procedure: NEURO DEST FACET C/T W/IG SNGL - C2-4;  Surgeon: Scotty Navarro D.O.;  Location: [facility];  Service: Pain Management   • KS DSTR NROLYTC AGNT PARVERTEB FCT ADDL CRVCL/THORA  7/28/2017    Procedure: NEURO DEST FACET C/T W/IG ADDL;  Surgeon: Scotty Navarro D.O.;  Location: [facility];  Service: Pain Management   • TONSILLECTOMY  1997   • CYST EXCISION Left as child    cyst removal on L wrist        Medications  No current facility-administered medications on file prior to encounter.      Current Outpatient Prescriptions on File Prior to Encounter   Medication Sig Dispense Refill   • sertraline (ZOLOFT) 50 MG Tab Take 1 Tab by mouth every day. 30 Tab 6   • topiramate (TOPAMAX) 50 MG tablet Take 3 Tabs by mouth 2 times a day. 540 Tab 3   • cyclobenzaprine (FLEXERIL) 5 MG tablet Take 1-2 Tabs by mouth 3 times a day as needed for Mild Pain or Muscle Spasms. May cause sedation at 10mg 50 Tab 0   • lovastatin (MEVACOR) 10 MG tablet Take 1 Tab by mouth every evening. 90 Tab 3   • cetirizine (ZYRTEC) 10 MG Tab Take 10 mg by mouth every day.       Family History  Family History   Problem Relation Age of Onset   • Diabetes Maternal Grandfather    • Cancer Paternal Grandfather    • Alzheimer's Disease Paternal Grandfather    • Cancer Paternal Grandmother 30        breast   • Suicide Attempts Maternal Uncle         Killed himself by GSW   • Hypertension Father          Social History  Social History   Substance Use Topics   • Smoking status: Former Smoker     Packs/day: 1.00     Years: 15.00     Types: Cigarettes     Quit date: 2010   • Smokeless tobacco: Never Used   • Alcohol use No       Allergies  Allergies   Allergen Reactions   • Benadryl Allergy Anxiety   • Demerol Hives   • Medrol [Methylprednisolone] Hives and Itching   • Previfem [Norgestimate-Ethinyl Estradiol]      Nausea/vomiting   • Reglan [Metoclopramide] Anxiety        Physical Exam  Laboratory   Hemodynamics  Temp (24hrs), Av °C (98.6 °F), Min:37 °C (98.6 °F), Max:37 °C (98.6 °F)   Temperature: 37 °C (98.6 °F)  Pulse  Av.4  Min: 77  Max: 99 Heart Rate (Monitored): 81  NIBP: 107/64      Respiratory      Respiration: 16, Pulse Oximetry: 96 %             Physical Exam   Constitutional: She is oriented to person, place, and time. No distress.   HENT:   Head: Normocephalic and atraumatic.   Mouth/Throat: Oropharynx is clear and moist.    Eyes: Pupils are equal, round, and reactive to light. Conjunctivae and EOM are normal.   Neck: Normal range of motion. Neck supple. No tracheal deviation present. No thyromegaly present.   Cardiovascular: Normal rate and regular rhythm.    No murmur heard.  Pulmonary/Chest: Effort normal and breath sounds normal. No respiratory distress. She has no wheezes.   Abdominal: Soft. Bowel sounds are normal. She exhibits no distension. There is no tenderness.   Musculoskeletal: She exhibits no edema or tenderness.   Neurological: She is alert and oriented to person, place, and time. No cranial nerve deficit.   Skin: Skin is warm and dry. She is not diaphoretic. No erythema.   Psychiatric: She has a normal mood and affect. Her behavior is normal. Thought content normal.       Recent Labs      03/06/19   0359   WBC  8.5   RBC  4.31   HEMOGLOBIN  13.4   HEMATOCRIT  40.6   MCV  94.2   MCH  31.1   MCHC  33.0*   RDW  47.5   PLATELETCT  237   MPV  11.2     Recent Labs      03/06/19   0359   SODIUM  138   POTASSIUM  3.7   CHLORIDE  109   CO2  22   GLUCOSE  92   BUN  19   CREATININE  0.93   CALCIUM  9.2     Recent Labs      03/06/19   0359   ALTSGPT  11   ASTSGOT  12   ALKPHOSPHAT  55   TBILIRUBIN  0.2   GLUCOSE  92                 Lab Results   Component Value Date    TROPONINI <0.01 10/09/2017       Imaging  CT-CTA NECK WITH & W/O-POST PROCESSING   Final Result         CT angiogram of the neck within normal limits.      CT-CTA HEAD WITH & W/O-POST PROCESS   Final Result         1.  CT angiogram of the [place] within normal limits.             Assessment/Plan     I anticipate this patient is appropriate for observation status at this time.    Headache, hemiplegic migraine, intractable- (present on admission)   Assessment & Plan    Continue outpatient medication with Imitrex, Topamax and Flexeril  CT scan of the head was negative  Neurology consulted  Pain control         Prophylaxis:  lovenox

## 2019-03-06 NOTE — ED NOTES
Report received from Anjel RN, assumed care of patient.  Call light and belongings within reach.  Bed in lowest position.

## 2019-03-06 NOTE — ED TRIAGE NOTES
Chief Complaint   Patient presents with   • Headache     Pt presented to the ER with a complaint of a right side pin point headache x 5 days, with photosensitivity and sound sensitivity. Pt has significant history of Migraines, but complaines that this one is different. Pt tried all of her prescribed medications for headche, reports no relief.

## 2019-03-06 NOTE — ED NOTES
"Pt c/o right sided parietal region pain.  Pt states \" this one just did not go away. \"  No nausea at this time.   "

## 2019-03-06 NOTE — ED NOTES
"Ketamine infusion completed.  Pt in no apparent distress.  Pt states \" it took the edge off but it is still there. \"   "

## 2019-03-06 NOTE — CONSULTS
"BRIEF TELEPHONE NEUROLOGY CONSULTATION NOTE:     The following is a brief summary of the phone consultation. I could not evaluate the patient myself since I did not have any face-to-face access to the patient. All examination and evaluation of the patient and information gathering from the patient and/or the family is entirely done solely by the requesting physician, Dr. Richard Villanueva M.D..  My recommendations were based on the information provided to me over the phone.     38 yr old lady with severe headaches diagnosed as migraine and managed by Dr. Yoder with Topamax and rizatriptan but with prior ED visits and hospitalizations for severe attacks presenting with 5 days of severe refractory headaches that failed to subside with rizatriptan. Since the patient is reporting allergic side effects w/ compazine and benadryl, Dr. Villanueva asking for alternative suggestions to attempt to abort her headaches.    Recommendations:   - IV combination of Dexamethasone 10 mg + Depacon 500 mg + Mg 1 gram all one time dose. The patient reported a history of good relief in the past with these agents.  - If these combination fails to improve her headaches intensity, then admit for more intense therapy such as DHE protocol. If admitted, please call inpatient neurology team in the morning for management.     The above was discussed with Dr. Richard Villanueva M.D..     Rob Ahmadi MD  Acute Neurology Consultant  Tiger Text ID \"Scottie Ahmadi\"    "

## 2019-03-06 NOTE — DISCHARGE PLANNING
Care Transition Team Assessment    Spoke with patient at bedside. Anticipate no needs @ present time. Family will be ride @ D/C.    Information Source  Orientation : Oriented x 4  Information Given By: Patient    Readmission Evaluation  Is this a readmission?: No    Interdisciplinary Discharge Planning  Does Admitting Nurse Feel This Could be a Complex Discharge?: No  Primary Care Physician: [facility]  Lives with - Patient's Self Care Capacity: Alone and Able to Care For Self  Patient or legal guardian wants to designate a caregiver (see row info): No  Support Systems: None  Housing / Facility: [address]  Do You Take your Prescribed Medications Regularly: Yes  Able to Return to Previous ADL's: Yes  Mobility Issues: No  Prior Services: None  Assistance Needed: No  Durable Medical Equipment: Not Applicable     Anticipated Discharge Information  Anticipated discharge disposition: Home  Discharge Address: [address] [place] [address]  [address]  Discharge Contact Phone Number: [phone]

## 2019-03-06 NOTE — ED NOTES
Called Pharmacy regarding Ketamine infusion. Per Pharmacy they will bring it to the [facility] shortly. Will continue to monitor.

## 2019-03-06 NOTE — ASSESSMENT & PLAN NOTE
Continue outpatient medication with Imitrex, Topamax and Flexeril  CT scan of the head was negative  Neurology consulted  Pain control

## 2019-03-07 ENCOUNTER — PATIENT OUTREACH (OUTPATIENT)
Dept: HEALTH INFORMATION MANAGEMENT | Facility: OTHER | Age: 39
End: 2019-03-07

## 2019-03-07 VITALS
HEIGHT: 66 IN | OXYGEN SATURATION: 96 % | BODY MASS INDEX: 17.89 KG/M2 | WEIGHT: 111.33 LBS | HEART RATE: 73 BPM | TEMPERATURE: 98.5 F | SYSTOLIC BLOOD PRESSURE: 95 MMHG | DIASTOLIC BLOOD PRESSURE: 55 MMHG | RESPIRATION RATE: 16 BRPM

## 2019-03-07 PROBLEM — G43.419: Status: RESOLVED | Noted: 2019-03-06 | Resolved: 2019-03-07

## 2019-03-07 PROCEDURE — 700102 HCHG RX REV CODE 250 W/ 637 OVERRIDE(OP): Performed by: INTERNAL MEDICINE

## 2019-03-07 PROCEDURE — 700105 HCHG RX REV CODE 258: Performed by: INTERNAL MEDICINE

## 2019-03-07 PROCEDURE — G0378 HOSPITAL OBSERVATION PER HR: HCPCS

## 2019-03-07 PROCEDURE — 99217 PR OBSERVATION CARE DISCHARGE: CPT | Performed by: INTERNAL MEDICINE

## 2019-03-07 PROCEDURE — 700101 HCHG RX REV CODE 250: Performed by: INTERNAL MEDICINE

## 2019-03-07 PROCEDURE — A9270 NON-COVERED ITEM OR SERVICE: HCPCS | Performed by: INTERNAL MEDICINE

## 2019-03-07 RX ADMIN — TOPIRAMATE 150 MG: 25 TABLET, FILM COATED ORAL at 10:48

## 2019-03-07 RX ADMIN — SODIUM CHLORIDE: 9 INJECTION, SOLUTION INTRAVENOUS at 07:51

## 2019-03-07 NOTE — DISCHARGE SUMMARY
Discharge Summary    CHIEF COMPLAINT ON ADMISSION  Chief Complaint   Patient presents with   • Headache     Pt presented to the ER with a complaint of a right side pin point headache x 5 days, with photosensitivity and sound sensitivity. Pt has significant history of Migraines, but complaines that this one is different. Pt tried all of her prescribed medications for headche, reports no relief.        Reason for Admission  Intractable migraine     Admission Date  3/6/19    Discharge Date  3/6/19    CODE STATUS  Full Code    HPI & HOSPITAL COURSE  This is a 38 y.o. female with PMH of migraines followed by Dr. Yoder neurology and chronic pain followed by Dr. Santa, pain specialist here with intractable migraine.  Neurology was consulted and recommended IV combination of dexamethasone 10 mg plus Depacon 500 mg plus MGS04 1 g as she has had good results from this in the past.  This combination did not completely resolve her migraine and she received ketamine and Toradol.  This morning she reports resolution of her headache.  She is tolerating a diet without nausea or vomiting.  She was seen and examined prior to discharge.  She is very sleepy but awakens easily and offers no complaints other than being sleepy.    Therefore, she is discharged in good and stable condition to home with close outpatient follow-up.    FOLLOW UP ITEMS POST DISCHARGE  -FU with PCP  -FU with Neurology  -Return to the nearest ED or call 911 for worsening symptoms.    DISCHARGE DIAGNOSES  Active Problems:    * No active hospital problems. *  Resolved Problems:    Headache, hemiplegic migraine, intractable POA: Yes      FOLLOW UP  Future Appointments  Date Time Provider Department Center   3/11/2019 1:00 PM Roberto Kaplan M.D. [address]   3/12/2019 4:20 PM Juan Manuel Griffin M.D. [facility] None     No follow-up provider specified.    MEDICATIONS ON DISCHARGE     Medication List      CONTINUE taking these medications      Instructions    cetirizine 10 MG Tabs  Commonly known as:  ZYRTEC   Take 10 mg by mouth every day.  Dose:  10 mg     cyclobenzaprine 5 MG tablet  Commonly known as:  FLEXERIL   Take 1-2 Tabs by mouth 3 times a day as needed for Mild Pain or Muscle Spasms. May cause sedation at 10mg  Dose:  5-10 mg     lovastatin 10 MG tablet  Commonly known as:  MEVACOR   Take 1 Tab by mouth every evening.  Dose:  10 mg     MAXALT 10 MG tablet  Generic drug:  rizatriptan   Take 10 mg by mouth as needed for Migraine.  Dose:  10 mg     sertraline 50 MG Tabs  Commonly known as:  ZOLOFT   Take 1 Tab by mouth every day.  Dose:  50 mg     topiramate 50 MG tablet  Commonly known as:  TOPAMAX   Take 3 Tabs by mouth 2 times a day.  Dose:  150 mg            Allergies  Allergies   Allergen Reactions   • Benadryl Allergy Anxiety   • Demerol Hives   • Medrol [Methylprednisolone] Hives and Itching   • Previfem [Norgestimate-Ethinyl Estradiol]      Nausea/vomiting   • Reglan [Metoclopramide] Anxiety       DIET  Orders Placed This Encounter   Procedures   • Diet Order Regular     Standing Status:   Standing     Number of Occurrences:   1     Order Specific Question:   Diet:     Answer:   Regular [1]       ACTIVITY  As tolerated.  Weight bearing as tolerated    CONSULTATIONS  Neurology    PROCEDURES  NA    LABORATORY  Lab Results   Component Value Date    SODIUM 138 03/06/2019    POTASSIUM 3.7 03/06/2019    CHLORIDE 109 03/06/2019    CO2 22 03/06/2019    GLUCOSE 92 03/06/2019    BUN 19 03/06/2019    CREATININE 0.93 03/06/2019        Lab Results   Component Value Date    WBC 8.5 03/06/2019    HEMOGLOBIN 13.4 03/06/2019    HEMATOCRIT 40.6 03/06/2019    PLATELETCT 237 03/06/2019        DARRYL Mena

## 2019-03-07 NOTE — PROGRESS NOTES
Received pt in bed. Meds verified start of shift w/ Pharmacist and Alcira RN. Afebrile/ denies chills. A&Ox 4. R/A and satting 90's. Lungs clr. Denies cough/sob. Pt given all scheduled meds NOC and slept comfortably throughout the night. Offered snack/ refused. PIV infusing NS @ 75 ml/hr. All needs met @ this time/ hourly and PRN rounding in place.

## 2019-03-07 NOTE — DISCHARGE INSTRUCTIONS
Discharge Instructions    Discharged to home by car with relative. Discharged via wheelchair, hospital escort: Yes.  Special equipment needed: Not Applicable    Be sure to schedule a follow-up appointment with your primary care doctor or any specialists as instructed.     Discharge Plan:   Diet Plan: Discussed  Confirmed Follow up Appointment: Patient to Call and Schedule Appointment  Confirmed Symptoms Management: Discussed  Medication Reconciliation Updated: Yes  Influenza Vaccine Indication: Not indicated: Previously immunized this influenza season and > 8 years of age    I understand that a diet low in cholesterol, fat, and sodium is recommended for good health. Unless I have been given specific instructions below for another diet, I accept this instruction as my diet prescription.   Other diet:     Special Instructions: None    · Is patient discharged on Warfarin / Coumadin?   No     Depression / Suicide Risk    As you are discharged from this [facility] facility, it is important to learn how to keep safe from harming yourself.    Recognize the warning signs:  · Abrupt changes in personality, positive or negative- including increase in energy   · Giving away possessions  · Change in eating patterns- significant weight changes-  positive or negative  · Change in sleeping patterns- unable to sleep or sleeping all the time   · Unwillingness or inability to communicate  · Depression  · Unusual sadness, discouragement and loneliness  · Talk of wanting to die  · Neglect of personal appearance   · Rebelliousness- reckless behavior  · Withdrawal from people/activities they love  · Confusion- inability to concentrate     If you or a loved one observes any of these behaviors or has concerns about self-harm, here's what you can do:  · Talk about it- your feelings and reasons for harming yourself  · Remove any means that you might use to hurt yourself (examples: pills, rope, extension cords, firearm)  · Get professional  help from the community (Mental Health, Substance Abuse, psychological counseling)  · Do not be alone:Call your Safe Contact- someone whom you trust who will be there for you.  · Call your local CRISIS HOTLINE 338-1440 or 697-214-4402  · Call your local Children's Mobile Crisis Response Team [place] (197) 762-0438 or www.PeptiVir  · Call the toll free National Suicide Prevention Hotlines   · National Suicide Prevention Lifeline 642-167-VRPK (9355)  · National Hope Line Network 800-SUICIDE (975-1601)

## 2019-03-19 ENCOUNTER — OFFICE VISIT (OUTPATIENT)
Dept: NEUROLOGY | Facility: MEDICAL CENTER | Age: 39
End: 2019-03-19
Payer: COMMERCIAL

## 2019-03-19 VITALS
OXYGEN SATURATION: 99 % | HEART RATE: 83 BPM | TEMPERATURE: 96.9 F | RESPIRATION RATE: 15 BRPM | HEIGHT: 66 IN | BODY MASS INDEX: 18.13 KG/M2 | WEIGHT: 112.8 LBS | SYSTOLIC BLOOD PRESSURE: 112 MMHG | DIASTOLIC BLOOD PRESSURE: 66 MMHG

## 2019-03-19 DIAGNOSIS — G43.109 MIGRAINE WITH AURA AND WITHOUT STATUS MIGRAINOSUS, NOT INTRACTABLE: ICD-10-CM

## 2019-03-19 DIAGNOSIS — G44.81 HYPNIC HEADACHE: Primary | ICD-10-CM

## 2019-03-19 PROCEDURE — 99214 OFFICE O/P EST MOD 30 MIN: CPT | Performed by: PSYCHIATRY & NEUROLOGY

## 2019-03-19 RX ORDER — RIZATRIPTAN BENZOATE 10 MG/1
TABLET ORAL
Qty: 10 TAB | Refills: 6 | Status: SHIPPED | OUTPATIENT
Start: 2019-03-19 | End: 2020-08-17 | Stop reason: CLARIF

## 2019-03-19 ASSESSMENT — PATIENT HEALTH QUESTIONNAIRE - PHQ9
5. POOR APPETITE OR OVEREATING: NOT AT ALL
4. FEELING TIRED OR HAVING LITTLE ENERGY: NOT AT ALL
1. LITTLE INTEREST OR PLEASURE IN DOING THINGS: NOT AT ALL
2. FEELING DOWN, DEPRESSED, IRRITABLE, OR HOPELESS: NOT AT ALL
SUM OF ALL RESPONSES TO PHQ QUESTIONS 1-9: 0
6. FEELING BAD ABOUT YOURSELF - OR THAT YOU ARE A FAILURE OR HAVE LET YOURSELF OR YOUR FAMILY DOWN: NOT AL ALL
7. TROUBLE CONCENTRATING ON THINGS, SUCH AS READING THE NEWSPAPER OR WATCHING TELEVISION: NOT AT ALL
SUM OF ALL RESPONSES TO PHQ9 QUESTIONS 1 AND 2: 0
3. TROUBLE FALLING OR STAYING ASLEEP OR SLEEPING TOO MUCH: NOT AT ALL
8. MOVING OR SPEAKING SO SLOWLY THAT OTHER PEOPLE COULD HAVE NOTICED. OR THE OPPOSITE, BEING SO FIGETY OR RESTLESS THAT YOU HAVE BEEN MOVING AROUND A LOT MORE THAN USUAL: NOT AT ALL
9. THOUGHTS THAT YOU WOULD BE BETTER OFF DEAD, OR OF HURTING YOURSELF: NOT AT ALL

## 2019-03-19 ASSESSMENT — ENCOUNTER SYMPTOMS
HEADACHES: 1
MEMORY LOSS: 0
NECK PAIN: 1

## 2019-03-19 NOTE — DISCHARGE SUMMARY
10:46 AM      Attestation signed by Demetrio Torres M.D. at 3/7/2019 12:14 PM   I have seen and examined the patient independently. All new labs and imaging studies were reviewed. Case was discussed in detail with the nurse practitioner and plan of care formulated with the provider. I agree with discharge plan.  38 years old female with chronic history of migraine headache came in with intractable migraine headache for the past 5 days.  Neurology was consulted and recommended IV dexamethasone, Depakote, and magnesium sulfate.  Her headache this morning has improved significantly and she stated that she would like to be discharged home.  Neurology was informed about the patient status and recommended okay to discharge the patient.  She will follow-up with her pain doctor as well as neurology as an outpatient.         []Hide copied text  []Alvinover for attribution information  Discharge Summary     CHIEF COMPLAINT ON ADMISSION       Chief Complaint   Patient presents with   • Headache       Pt presented to the ER with a complaint of a right side pin point headache x 5 days, with photosensitivity and sound sensitivity. Pt has significant history of Migraines, but complaines that this one is different. Pt tried all of her prescribed medications for headche, reports no relief.          Reason for Admission  Intractable migraine      Admission Date  3/6/19     Discharge Date  3/7/19     CODE STATUS  Full Code     HPI & HOSPITAL COURSE  This is a 38 y.o. female with PMH of migraines followed by Dr. Yoder neurology and chronic pain followed by Dr. Santa, pain specialist here with intractable migraine.  Neurology was consulted and recommended IV combination of dexamethasone 10 mg plus Depacon 500 mg plus MGS04 1 g as she has had good results from this in the past.  This combination did not completely resolve her migraine and she received ketamine and Toradol.  This morning she reports resolution of her headache.  She is  tolerating a diet without nausea or vomiting.  She was seen and examined prior to discharge.  She is very sleepy but awakens easily and offers no complaints other than being sleepy.     Therefore, she is discharged in good and stable condition to home with close outpatient follow-up.     FOLLOW UP ITEMS POST DISCHARGE  -FU with PCP  -FU with Neurology  -Return to the nearest ED or call 911 for worsening symptoms.     DISCHARGE DIAGNOSES  Active Problems:    * No active hospital problems. *  Resolved Problems:    Headache, hemiplegic migraine, intractable POA: Yes        FOLLOW UP  Future Appointments  Date Time Provider Department Center   3/11/2019 1:00 PM Roberto Kaplan M.D. [facility]   3/12/2019 4:20 PM Juan Manuel Griffin M.D. [facility] None      No follow-up provider specified.     MEDICATIONS ON DISCHARGE          Medication List           CONTINUE taking these medications      Instructions   cetirizine 10 MG Tabs  Commonly known as:  ZYRTEC    Take 10 mg by mouth every day.  Dose:  10 mg      cyclobenzaprine 5 MG tablet  Commonly known as:  FLEXERIL    Take 1-2 Tabs by mouth 3 times a day as needed for Mild Pain or Muscle Spasms. May cause sedation at 10mg  Dose:  5-10 mg      lovastatin 10 MG tablet  Commonly known as:  MEVACOR    Take 1 Tab by mouth every evening.  Dose:  10 mg      MAXALT 10 MG tablet  Generic drug:  rizatriptan    Take 10 mg by mouth as needed for Migraine.  Dose:  10 mg      sertraline 50 MG Tabs  Commonly known as:  ZOLOFT    Take 1 Tab by mouth every day.  Dose:  50 mg      topiramate 50 MG tablet  Commonly known as:  TOPAMAX    Take 3 Tabs by mouth 2 times a day.  Dose:  150 mg                Allergies        Allergies   Allergen Reactions   • Benadryl Allergy Anxiety   • Demerol Hives   • Medrol [Methylprednisolone] Hives and Itching   • Previfem [Norgestimate-Ethinyl Estradiol]         Nausea/vomiting   • Reglan [Metoclopramide] Anxiety         DIET        Orders Placed This  Encounter   Procedures   • Diet Order Regular       Standing Status:   Standing       Number of Occurrences:   1       Order Specific Question:   Diet:       Answer:   Regular [1]         ACTIVITY  As tolerated.  Weight bearing as tolerated     CONSULTATIONS  Neurology     PROCEDURES  NA     LABORATORY        Lab Results   Component Value Date     SODIUM 138 03/06/2019     POTASSIUM 3.7 03/06/2019     CHLORIDE 109 03/06/2019     CO2 22 03/06/2019     GLUCOSE 92 03/06/2019     BUN 19 03/06/2019     CREATININE 0.93 03/06/2019               Lab Results   Component Value Date     WBC 8.5 03/06/2019     HEMOGLOBIN 13.4 03/06/2019     HEMATOCRIT 40.6 03/06/2019     PLATELETCT 237 03/06/2019         DARRYL Mena         Cosigned by: Demetrio Torres M.D. at 3/7/2019 12:14 PM

## 2019-03-20 NOTE — PROGRESS NOTES
"Subjective:      Dany Lambert is a 38 y.o. female who presents for follow-up, with a history of migraine with aura, but who suffered from a very severe headache of a different type about 1 week ago, warranting hospitalization for 2 days.    HPI    She states that the migraine headaches have been doing fairly well though she has over time noted things getting little worse.  She feels that they are happening a little more often, maybe even a little more intense, though Maxalt still does provide consistent relief.  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  Her neck and back pain symptoms have also been getting worse during this time.  She is coming up on a 2-year anniversary of her cervical ablation, she is also on Botox as well as Aimovig, and this cocktail has proven even more beneficial than Botox alone.  Despite this, she still feels that things might be going south slowly.    Last week, she suffered from a very different type of headache.  She describes a right vertex ice pick going into her head, and repeatedly stabbing.  It was excruciatingly painful, wholly different than her migraines, had no migrainous stigmata.  She tried Maxalt with no benefit.  She had been compliant with her Topamax.  She actually came to the emergency room after she left her shift.    She was admitted for a couple of days, migraine \"cocktail\" were used, the headaches seem to improve slowly on their own.  CT/CTA scans of the brain were unremarkable.  These headaches have not recurred, she has no history of similar headaches.    Medical, surgical and family histories are reviewed in the electronic health record, there are no new drug allergies.  She is on Topamax 150 mg, twice daily, Maxalt 10 mg as needed, Mevacor, Zoloft, and Flexeril.    Review of Systems   Constitutional: Negative for malaise/fatigue.   Musculoskeletal: Positive for neck pain.   Neurological: Positive for headaches. " "  Psychiatric/Behavioral: Negative for memory loss.   All other systems reviewed and are negative.       Objective:     /66 (BP Location: Left arm, Patient Position: Sitting)   Pulse 83   Temp 36.1 °C (96.9 °F)   Resp 15   Ht 1.676 m (5' 6\")   Wt 51.2 kg (112 lb 12.8 oz)   SpO2 99%   BMI 18.21 kg/m²      Physical Exam    She appears in some mild distress, she is still quite cooperative.  Vital signs are stable.  There is no malar rash or temporal tenderness.  There is no jaw claudication, but there is noticeable tenderness bilaterally over the trapezius and cervical paraspinal muscle bodies bilaterally.  The occipital ridge and notch are nontender bilaterally.  The neck shows a full range of motion without meningismus.  Cardiac evaluation is unremarkable.  Carotid pulses are present bilaterally without asymmetry on palpation.    In quick and cursory fashion, mental status, cranial nerve, musculoskeletal, reflex and coordination evaluations are unremarkable, no sign of deficit or toxicity being found.     Assessment/Plan:     1. Hypnic headache  A different type of headache pattern, quite often seen in migraine suffers, it is considered benign but is still excruciating.  Migraine medications are not effective, not surprising that she got no benefit, though they do resolve spontaneously.  Given the severity and their persistence, MRI of the brain and MRA of the brain will be done for thoroughness though previous studies from 3 years ago, done for migraine, were unremarkable.  The fact that she has a normal neurologic examination and a full recovery are also good prognostic signs.  We will call her with results if they are abnormal, otherwise we talk specifics when she follows up.    - MR-BRAIN-W/O; Future  - MR-MRA HEAD-W/O; Future    2. Migraine with aura and without status migrainosus, not intractable  A bigger issue, though to a lesser degree, she may in fact need a note indicating she must remain on " day shift only, it is still difficult to know whether this change in headaches is really there or it is simply she is quite afraid that they will get to a more severe and refractory state spontaneously.  She will continue the Aimovig and Botox, she will be speaking with Dr. Navarro in regards to a redo of her cervical ablation.  She was first told to inquire of her employer about having her transferred to day shift.  In this regard, FMLA forms will be completed.  We will follow-up otherwise in 5 months, phone contact in the interim.  Topamax will be continued.    - rizatriptan (MAXALT) 10 MG tablet; 1 tab at headache onset; repeat 1 tab every 1 hour prn up to #4 tab/24 hours  Dispense: 10 Tab; Refill: 6    Time: 25 minutes spent face-to-face for exam, review, discussion, and education, of this over 60% of the time spent counseling and coordinating care surrounding all of the above issues.

## 2019-03-28 ENCOUNTER — HOSPITAL ENCOUNTER (OUTPATIENT)
Dept: RADIOLOGY | Facility: MEDICAL CENTER | Age: 39
End: 2019-03-28
Attending: PSYCHIATRY & NEUROLOGY
Payer: COMMERCIAL

## 2019-03-28 DIAGNOSIS — G44.81 HYPNIC HEADACHE: ICD-10-CM

## 2019-03-28 PROCEDURE — 70544 MR ANGIOGRAPHY HEAD W/O DYE: CPT

## 2019-03-28 PROCEDURE — 70551 MRI BRAIN STEM W/O DYE: CPT

## 2019-04-03 DIAGNOSIS — E78.00 HYPERCHOLESTEROLEMIA: ICD-10-CM

## 2019-04-03 RX ORDER — LOVASTATIN 10 MG/1
10 TABLET ORAL EVERY EVENING
Qty: 90 TAB | Refills: 3 | Status: SHIPPED | OUTPATIENT
Start: 2019-04-03 | End: 2020-06-03

## 2019-05-20 ENCOUNTER — APPOINTMENT (OUTPATIENT)
Dept: MEDICAL GROUP | Age: 39
End: 2019-05-20
Payer: COMMERCIAL

## 2019-05-24 ENCOUNTER — TELEPHONE (OUTPATIENT)
Dept: NEUROLOGY | Facility: MEDICAL CENTER | Age: 39
End: 2019-05-24

## 2019-05-24 NOTE — TELEPHONE ENCOUNTER
Patient called for Dr. Griffin in regards to her effexor. Her PCP is no longer with the practice and she is trying to establish with a new doctor, but she is going to run out of her effexor. She is wondering if you can fill this for her for (2-3 months) till she can establish with a new doctor? Please advise.

## 2019-05-24 NOTE — TELEPHONE ENCOUNTER
I am willing to do this, but as we looked at her chart together, did she truly mean Effexor or is it Zoloft.  Please call the patient and find out what drug and what dose, telling her that Effexor is not in her chart.

## 2019-05-28 RX ORDER — VENLAFAXINE HYDROCHLORIDE 150 MG/1
150 CAPSULE, EXTENDED RELEASE ORAL DAILY
Qty: 90 CAP | Refills: 3 | Status: SHIPPED | OUTPATIENT
Start: 2019-05-28 | End: 2020-04-30

## 2019-06-23 DIAGNOSIS — G43.109 MIGRAINE WITH AURA AND WITHOUT STATUS MIGRAINOSUS, NOT INTRACTABLE: ICD-10-CM

## 2019-06-24 RX ORDER — TOPIRAMATE 50 MG/1
150 TABLET, FILM COATED ORAL 2 TIMES DAILY
Qty: 540 TAB | Refills: 3 | Status: SHIPPED | OUTPATIENT
Start: 2019-06-24 | End: 2020-07-17

## 2019-08-04 ENCOUNTER — APPOINTMENT (OUTPATIENT)
Dept: RADIOLOGY | Facility: MEDICAL CENTER | Age: 39
End: 2019-08-04
Attending: EMERGENCY MEDICINE
Payer: COMMERCIAL

## 2019-08-04 ENCOUNTER — HOSPITAL ENCOUNTER (EMERGENCY)
Facility: MEDICAL CENTER | Age: 39
End: 2019-08-04
Attending: EMERGENCY MEDICINE
Payer: COMMERCIAL

## 2019-08-04 VITALS
TEMPERATURE: 99.7 F | HEART RATE: 83 BPM | RESPIRATION RATE: 16 BRPM | HEIGHT: 66 IN | WEIGHT: 110 LBS | OXYGEN SATURATION: 97 % | BODY MASS INDEX: 17.68 KG/M2 | SYSTOLIC BLOOD PRESSURE: 109 MMHG | DIASTOLIC BLOOD PRESSURE: 74 MMHG

## 2019-08-04 DIAGNOSIS — S39.012A STRAIN OF LUMBAR REGION, INITIAL ENCOUNTER: ICD-10-CM

## 2019-08-04 DIAGNOSIS — M54.41 ACUTE BILATERAL LOW BACK PAIN WITH BILATERAL SCIATICA: ICD-10-CM

## 2019-08-04 DIAGNOSIS — M54.42 ACUTE BILATERAL LOW BACK PAIN WITH BILATERAL SCIATICA: ICD-10-CM

## 2019-08-04 DIAGNOSIS — M54.12 CERVICAL RADICULOPATHY: ICD-10-CM

## 2019-08-04 PROCEDURE — 99284 EMERGENCY DEPT VISIT MOD MDM: CPT

## 2019-08-04 PROCEDURE — A9270 NON-COVERED ITEM OR SERVICE: HCPCS | Performed by: EMERGENCY MEDICINE

## 2019-08-04 PROCEDURE — 700102 HCHG RX REV CODE 250 W/ 637 OVERRIDE(OP): Performed by: EMERGENCY MEDICINE

## 2019-08-04 PROCEDURE — 700111 HCHG RX REV CODE 636 W/ 250 OVERRIDE (IP): Performed by: EMERGENCY MEDICINE

## 2019-08-04 PROCEDURE — 72100 X-RAY EXAM L-S SPINE 2/3 VWS: CPT

## 2019-08-04 PROCEDURE — 96372 THER/PROPH/DIAG INJ SC/IM: CPT

## 2019-08-04 PROCEDURE — 72220 X-RAY EXAM SACRUM TAILBONE: CPT

## 2019-08-04 RX ORDER — CYCLOBENZAPRINE HCL 10 MG
10 TABLET ORAL EVERY 8 HOURS PRN
Qty: 21 TAB | Refills: 0 | Status: SHIPPED | OUTPATIENT
Start: 2019-08-04 | End: 2019-10-10

## 2019-08-04 RX ORDER — NAPROXEN 500 MG/1
500 TABLET ORAL 2 TIMES DAILY WITH MEALS
Qty: 20 TAB | Refills: 0 | Status: SHIPPED | OUTPATIENT
Start: 2019-08-04 | End: 2019-08-04 | Stop reason: SDUPTHER

## 2019-08-04 RX ORDER — NAPROXEN 500 MG/1
500 TABLET ORAL 2 TIMES DAILY WITH MEALS
Qty: 20 TAB | Refills: 0 | Status: SHIPPED | OUTPATIENT
Start: 2019-08-04 | End: 2021-02-09

## 2019-08-04 RX ORDER — DIAZEPAM 5 MG/1
5 TABLET ORAL ONCE
Status: COMPLETED | OUTPATIENT
Start: 2019-08-04 | End: 2019-08-04

## 2019-08-04 RX ORDER — CYCLOBENZAPRINE HCL 10 MG
10 TABLET ORAL EVERY 8 HOURS PRN
Qty: 21 TAB | Refills: 0 | Status: SHIPPED | OUTPATIENT
Start: 2019-08-04 | End: 2019-08-04 | Stop reason: SDUPTHER

## 2019-08-04 RX ORDER — KETOROLAC TROMETHAMINE 30 MG/ML
30 INJECTION, SOLUTION INTRAMUSCULAR; INTRAVENOUS ONCE
Status: COMPLETED | OUTPATIENT
Start: 2019-08-04 | End: 2019-08-04

## 2019-08-04 RX ADMIN — DIAZEPAM 5 MG: 5 TABLET ORAL at 02:59

## 2019-08-04 RX ADMIN — KETOROLAC TROMETHAMINE 30 MG: 30 INJECTION, SOLUTION INTRAMUSCULAR at 03:08

## 2019-08-04 ASSESSMENT — LIFESTYLE VARIABLES
HAVE PEOPLE ANNOYED YOU BY CRITICIZING YOUR DRINKING: NO
EVER FELT BAD OR GUILTY ABOUT YOUR DRINKING: NO
TOTAL SCORE: 0
HAVE YOU EVER FELT YOU SHOULD CUT DOWN ON YOUR DRINKING: NO
DO YOU DRINK ALCOHOL: NO
TOTAL SCORE: 0
CONSUMPTION TOTAL: INCOMPLETE
EVER HAD A DRINK FIRST THING IN THE MORNING TO STEADY YOUR NERVES TO GET RID OF A HANGOVER: NO
TOTAL SCORE: 0

## 2019-08-04 NOTE — ED NOTES
Patient awake alert and oriented x 4, Glascow 15, bed in low position, call light within reach, on room air, attached to cardiac monitor, unlabored breathing noted, no cough noted, interacts with staff, interactions noted as appropriate, speaking with Dr. Mar at this time.

## 2019-08-04 NOTE — ED TRIAGE NOTES
Chief Complaint   Patient presents with   • Back Pain     Recently slept on new couch, history of intermitant pain

## 2019-08-04 NOTE — ED NOTES
Patient verbalized understanding of discharge instructions, provided with discharge paperwork, gait steady, ambulated independently to HUMAIRA pollard.

## 2019-08-04 NOTE — ED PROVIDER NOTES
ED Provider Note    ED Provider Note      Primary care provider: Emerald Rico M.D.    CHIEF COMPLAINT  Chief Complaint   Patient presents with   • Back Pain     Recently slept on new couch, history of intermitant pain       HPI  Eight Danielle is a 39 y.o. female who presents to the Emergency Department with chief complaint of low back pain.  Patient reports that she has had a history of ongoing intermittent back pain after remote trauma.  She reports that she has had episodes of pain that been so severe that it caused episodes of syncope she is had a total of 3 of these events.  Patient reports no syncope denies she is had no chest pain palpitation shortness of breath.  Patient does have however report that she is having severe stabbing lower back pain.  She reports that it is worse with any ambulation or lifting heavy objects positional at times no alleviating factors at this time.  She states that she has occasional pain shooting in the signs of her legs she also reports that she has had some minor neck pain and feels abnormal pain sensation in her upper extremities with certain positions of the head specifically looking down.  Patient also reports that she felt an abnormality of her tailbone when palpating the area of pain yesterday and feels as though her tailbone is deviated to the left.  She is had no fevers no chills there is no history of IV recent instrumentation she has no abdominal pain she denies any numbness or tingling in her genitalia or in her lower extremities.  No other acute symptoms or concerns at this time.    REVIEW OF SYSTEMS  10 systems reviewed and otherwise negative, pertinent positives and negatives listed in the history of present illness.    PAST MEDICAL HISTORY   has a past medical history of Allergy, unspecified not elsewhere classified, Anxiety (8/26/2013), Arthritis, High cholesterol, Migraine with aura and without status migrainosus, not intractable (8/26/2013), Pain  "(2017), Psychiatric problem, and Seizure (HCC) (2016).    SURGICAL HISTORY   has a past surgical history that includes tonsillectomy (); dstr nrolytc agnt parverteb fct sngl crvcl/thora (Left, 2017); dstr nrolytc agnt parverteb fct addl crvcl/thora (2017); cyst excision (Left, as child); dstr nrolytc agnt parverteb fct sngl crvcl/thora (Right, 2017); and dstr nrolytc agnt parverteb fct addl crvcl/thora (Right, 2017).    SOCIAL HISTORY  Social History     Tobacco Use   • Smoking status: Former Smoker     Packs/day: 1.00     Years: 15.00     Pack years: 15.00     Types: Cigarettes     Last attempt to quit: 2010     Years since quittin.5   • Smokeless tobacco: Never Used   Substance Use Topics   • Alcohol use: No   • Drug use: No      Social History     Substance and Sexual Activity   Drug Use No       FAMILY HISTORY  Non-Contributory    CURRENT MEDICATIONS  Home Medications    **Home medications have not yet been reviewed for this encounter**         ALLERGIES  Allergies   Allergen Reactions   • Benadryl Allergy Anxiety   • Demerol Hives   • Medrol [Methylprednisolone] Hives and Itching   • Previfem [Norgestimate-Ethinyl Estradiol]      Nausea/vomiting   • Reglan [Metoclopramide] Anxiety       PHYSICAL EXAM  VITAL SIGNS: /74   Pulse 83   Temp 37.6 °C (99.7 °F) (Temporal)   Resp 16   Ht 1.676 m (5' 6\")   Wt 49.9 kg (110 lb)   SpO2 97%   BMI 17.75 kg/m²   Pulse ox interpretation: I interpret this pulse ox as normal.  Constitutional: Alert and oriented x 3, moderate distress  HEENT: Atraumatic normocephalic, pupils are equal round reactive to light extraocular movements are intact. The nares is clear, external ears are normal, mouth shows moist mucous membranes  Neck: Supple, no JVD no tracheal deviation  Cardiovascular: Regular rate and rhythm no murmur rub or gallop 2+ pulses peripherally x4  Thorax & Lungs: No respiratory distress, no wheezes rales or rhonchi, No " chest tenderness.   GI: Soft nontender nondistended positive bowel sounds, no peritoneal signs  Skin: Warm dry no acute rash or lesion  Musculoskeletal: Moving all extremities with full range and 5 of 5 strength, no acute  deformity, minimal midline and paraspinal tenderness in the low part distribution with slight radiation into the buttock bilaterally  Neurologic: Cranial nerves III through XII are grossly intact, no sensory deficit, no cerebellar dysfunction, 2+ DTRs bilateral patellar and Achilles tendons ambulates without difficulty  Psychiatric: Appropriate affect for situation at this time      DIAGNOSTIC STUDIES / PROCEDURES        RADIOLOGY  DX-LUMBAR SPINE-2 OR 3 VIEWS   Final Result      Normal limited lumbar spine series.      DX-SACRUM AND COCCYX 2+   Final Result      Negative sacrum and coccyx exam.        The radiologist's interpretation of all radiological studies have been reviewed by me.    COURSE & MEDICAL DECISION MAKING  Pertinent Labs & Imaging studies reviewed. (See chart for details)    2:42 AM - Patient seen and examined at bedside.  Will perform plain films of the lumbar spine as well as the sacrum and coccyx.  Patient has no abdominal pain no fevers or chills there is no sign of any intra-abdominal or aortic pathology there is no evidence of any neurologic compromise or infectious process.  Will attempt symptomatic management with Toradol and Valium.    Patient reports minor ongoing pain much improved from presentation x-rays show no acute abnormalities at this time.  Patient be referred to primary care foot and call if this schedule her to arrange this also referred to neurosurgery as she has multiple areas that demonstrate radiculopathy cervical and lumbar.  Return to the emergency department for worsening pain numbness tingling weakness in extremities saddle anesthesia fecal incontinence urinary retention any other acute symptoms or concerns otherwise discharged stable and improved  "condition        /74   Pulse 83   Temp 37.6 °C (99.7 °F) (Temporal)   Resp 16   Ht 1.676 m (5' 6\")   Wt 49.9 kg (110 lb)   SpO2 97%   BMI 17.75 kg/m²     Primary care  as contacted        Petros Stanley M.D.  [address]  [place] [address]  [phone]    Schedule an appointment as soon as possible for a visit             FINAL IMPRESSION  1. Acute bilateral low back pain with bilateral sciatica Active   2. Strain of lumbar region, initial encounter Active   3. Cervical radiculopathy Active         This dictation has been created using voice recognition software and/or scribes. The accuracy of the dictation is limited by the abilities of the software and the expertise of the scribes. I expect there may be some errors of grammar and possibly content. I made every attempt to manually correct the errors within my dictation. However, errors related to voice recognition software and/or scribes may still exist and should be interpreted within the appropriate context.            "

## 2019-08-04 NOTE — ED NOTES
Patient awake alert and oriented x 4, Glascow 15, bed in low position, call light within reach, on room air, attached to cardiac monitor, unlabored breathing noted, no cough noted, interacts with staff, interactions noted as appropriate, attempting to sleep at times, awaiting x-ray.

## 2019-08-14 ENCOUNTER — OFFICE VISIT (OUTPATIENT)
Dept: MEDICAL GROUP | Age: 39
End: 2019-08-14
Payer: COMMERCIAL

## 2019-08-14 VITALS
OXYGEN SATURATION: 98 % | WEIGHT: 114 LBS | DIASTOLIC BLOOD PRESSURE: 76 MMHG | HEART RATE: 103 BPM | BODY MASS INDEX: 18.99 KG/M2 | HEIGHT: 65 IN | SYSTOLIC BLOOD PRESSURE: 102 MMHG | TEMPERATURE: 99.8 F

## 2019-08-14 DIAGNOSIS — M54.16 LUMBAR BACK PAIN WITH RADICULOPATHY AFFECTING LOWER EXTREMITY: ICD-10-CM

## 2019-08-14 DIAGNOSIS — E55.9 VITAMIN D INSUFFICIENCY: ICD-10-CM

## 2019-08-14 DIAGNOSIS — F41.9 ANXIETY: ICD-10-CM

## 2019-08-14 DIAGNOSIS — Z12.31 VISIT FOR SCREENING MAMMOGRAM: ICD-10-CM

## 2019-08-14 DIAGNOSIS — R55 SYNCOPE, NEAR: ICD-10-CM

## 2019-08-14 DIAGNOSIS — Z23 NEED FOR VACCINATION: ICD-10-CM

## 2019-08-14 DIAGNOSIS — I45.9 SKIPPED HEART BEATS: ICD-10-CM

## 2019-08-14 DIAGNOSIS — F33.1 MODERATE EPISODE OF RECURRENT MAJOR DEPRESSIVE DISORDER (HCC): ICD-10-CM

## 2019-08-14 DIAGNOSIS — G43.109 MIGRAINE WITH AURA AND WITHOUT STATUS MIGRAINOSUS, NOT INTRACTABLE: ICD-10-CM

## 2019-08-14 DIAGNOSIS — Z30.41 USES ORAL CONTRACEPTION: ICD-10-CM

## 2019-08-14 DIAGNOSIS — E78.00 HYPERCHOLESTEROLEMIA: ICD-10-CM

## 2019-08-14 PROCEDURE — 90471 IMMUNIZATION ADMIN: CPT | Performed by: INTERNAL MEDICINE

## 2019-08-14 PROCEDURE — 90746 HEPB VACCINE 3 DOSE ADULT IM: CPT | Performed by: INTERNAL MEDICINE

## 2019-08-14 PROCEDURE — 99214 OFFICE O/P EST MOD 30 MIN: CPT | Mod: 25 | Performed by: INTERNAL MEDICINE

## 2019-08-14 RX ORDER — ERENUMAB-AOOE 70 MG/ML
INJECTION SUBCUTANEOUS
Refills: 12 | COMMUNITY
Start: 2019-08-04 | End: 2019-10-01

## 2019-08-14 RX ORDER — LEVONORGESTREL / ETHINYL ESTRADIOL AND ETHINYL ESTRADIOL 150-30(84)
1 KIT ORAL DAILY
Qty: 84 TAB | Refills: 3 | Status: SHIPPED | OUTPATIENT
Start: 2019-08-14 | End: 2020-08-04 | Stop reason: SDUPTHER

## 2019-08-14 ASSESSMENT — PAIN SCALES - GENERAL: PAINLEVEL: 8=MODERATE-SEVERE PAIN

## 2019-08-14 NOTE — PROGRESS NOTES
Subjective:   Viraj Lambert is a 39 y.o. female here today for evaluation and management of:    Skipped heart beats  Syncope, near  Patient complains of 3-4 episodes of near syncope over the last 6 months with associated symptoms of intermittent heart palpitations. Last CTA Head and neck on 3/6/19 and was normal.  MRA brain on 3/28/2019 showed no intracranial aneurysm or focal stenosis or any evidence of vasculitis.  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  Patient follows with neurologist Dr. Yoder regularly for her migraineLast Cardiac stress test ida on 5/2/18 and was normal.  Patient reported having palpitation frequently and also feels skipped heartbeat before fainting attack.  She is interested to follow with cardiologist for further work-up to rule out arrhythmia.  Patient stated that she stopped drinking alcohol since last visit with me on 7/19/2018.  She also states that she drinks limited amount of caffeine may be a cup of coffee the most.    Hypercholesterolemia  Stable. Currently taking Lovastatin 10 mg every evening as directed. She denies any side effects. Most recent lipid panel on 9/27/18 was normal.  Her liver enzymes on 3/6/2019 was within normal.    Results for VIRAJ LAMBERT (MRN [id number]) as of 8/14/2019 17:33   Ref. Range 9/27/2018 08:54   Cholesterol,Tot Latest Ref Range: 100 - 199 mg/dL 167   Triglycerides Latest Ref Range: 0 - 149 mg/dL 51   HDL Latest Ref Range: >=40 mg/dL 67   LDL Latest Ref Range: <100 mg/dL 90       Migraine with aura and without status migrainosus, not intractable  Patient manages her migraines with Aimovig 70 mg injections once montlhy, followed by Dr. Navarro, pain management. Patient reports her migraines are under good control with Aimovig 70 mg injections every month. Patient also followed by Dr. Griffin, Neurology, who is aware of  Aimovig injections.  She also takes Topamax 150 mg twice a day as prescribed by Dr. Griffin, neurologist.  She denies side effects from taking Aimovig and Topamax.    Lumbar back pain with radiculopathy affecting lower extremity  Patient complains of a chronic history of lower back pain, worse with movement, bending, and twisting. Patient reports her pain is localized to her mid lower back, but states pain has been becoming more frequent in the last few months.  She reports that her legs are weaker due to the back pain.  She is not able to walk for long distance because of the pain.  She states that her legs gave up and she sometimes needs to crawl due to the back pain. She denies bowel or bladder incontinence.  Patient had Sacrum and Coccyx X Ray and Lumbar Spine X Ray on 8/4/19 that were both negative.   Patient requested to refer to neurosurgeon for further evaluation.  She will also discuss with her pain management Dr. Navarro regarding her lower back pain and neck pain.  Patient stated that she has has cervical degenerative disc disease and has neck tightness and pain chronically.    Uses oral contraception  Patient has a positive family history of breast cancer in her grandmother. Patient is interested in possibly starting oral birth control for migraine prevention.  She states that her migraine is worsening during menstruation and would like to restart Seasonique oral contraceptive pill.  I discussed potential risks and benefits of oral contraceptive pill and high risk of stroke from taking oral contraceptive pill and smoking tobacco.  Patient stated that she agreed to cut down smoking by herself.  She could not take Chantix due to her underlying depression and anxiety.  Patient also understands the possible breast cancer risks from taking oral contraceptive pill.  She stated that she did not have nipple discharge anymore.  She was evaluated by breast surgeon in the past who did not think she needed to do  additional intervention rather than regular screening mammogram.  Patient also denies any palpable lump.  She insists to retake Seasonique oral contraceptive pill.  She denies side effects from taking it in the past.  She also feels benefits to decrease migraine by taking Seasonique.    Moderate episode of recurrent major depressive disorder (HCC)   Anxiety  Patient was recently switched from Sertraline to Venlafaxine 150 mg XR in May 2019 by Dr. Griffin, Neurology. Patient has tolerated Venlafaxine well and denies any side effects. Patient denies any suicidal ideation or hallucinations.  Patient states that she could not tolerate sertraline which caused her more depressed by taking it.  Patient was able to completely quit alcohol since last office visit on 7/19/18.       I discussed the previous blood test with the patient in clinic today.      Current medicines (including changes today)  Current Outpatient Medications   Medication Sig Dispense Refill   • AIMOVIG 70 MG/ML Solution Auto-injector Q30 DAYS.  12   • Levonorgest-Eth Estrad 91-Day (SEASONIQUE) 0.15-0.03 &0.01 MG Tab Take 1 Tab by mouth every day. 84 Tab 3   • cyclobenzaprine (FLEXERIL) 10 MG Tab Take 1 Tab by mouth every 8 hours as needed. 21 Tab 0   • naproxen (NAPROSYN) 500 MG Tab Take 1 Tab by mouth 2 times a day, with meals. 20 Tab 0   • topiramate (TOPAMAX) 50 MG tablet TAKE 3 TABS BY MOUTH 2 TIMES A DAY. 540 Tab 3   • venlafaxine (EFFEXOR-XR) 150 MG extended-release capsule Take 1 Cap by mouth every day. 90 Cap 3   • lovastatin (MEVACOR) 10 MG tablet TAKE 1 TAB BY MOUTH EVERY EVENING. 90 Tab 3   • rizatriptan (MAXALT) 10 MG tablet 1 tab at headache onset; repeat 1 tab every 1 hour prn up to #4 tab/24 hours 10 Tab 6   • cetirizine (ZYRTEC) 10 MG Tab Take 10 mg by mouth every day.       No current facility-administered medications for this visit.      She  has a past medical history of Allergy, unspecified not elsewhere classified, Anxiety  "(8/26/2013), Arthritis, High cholesterol, Migraine with aura and without status migrainosus, not intractable (8/26/2013), Pain (7/27/2017), Psychiatric problem, and Seizure (HCC) (1/2016).    ROS   Positive for lower back pain, skipped heartbeat and palpitation. No chest pain, no shortness of breath, no abdominal pain       Objective:     /76 (BP Location: Left arm, Patient Position: Sitting, BP Cuff Size: Adult)   Pulse (!) 103   Temp 37.7 °C (99.8 °F) (Temporal)   Ht 1.647 m (5' 4.84\")   Wt 51.7 kg (114 lb)   SpO2 98%  Body mass index is 19.06 kg/m².   Physical Exam:  General: Alert, oriented and no acute distress.  Eye contact is good, speech goal directed, affect calm  HEENT: conjunctiva non-injected, sclera non-icteric.  Oral mucous membranes pink and moist with no lesions  Lungs: Normal respiratory effort, clear to auscultation bilaterally with good excursion.  CV: regular rate and rhythm. No murmurs.   Abdomen: soft, non distended, non tender. Bowel sound normal.  Ext: no edema, color normal, vascularity normal, temperature normal      Assessment and Plan:   The following treatment plan was discussed     1. Skipped heart beats  - Patient reports increasing frequency of heart palpitations over the last few months. Last Last cardiac stress test on 5/2/18 was normal.  - Patient instructed to monitor heart rate at home and seek urgent medical care if heart rate increases above 110 bpm.  Patient is advised to go to ER if she has any recurrent fainting or chest pain or tachycardia or shortness of breath.  - Avoid excessive caffeine intake.  Counseled to continuously still quitting alcohol.  Advised to drink enough water to keep well-hydrated.  - Referred to cardiologist for further work-up.  - REFERRAL TO CARDIOLOGY  - CBC WITH DIFFERENTIAL; Future  - Comp Metabolic Panel; Future  - TSH; Future  - FREE THYROXINE; Future    2. Syncope, near  - Discussed possible causes with the patient. Patient reports " 3-4 episodes of recurrent near syncopal episodes over the last 6 months. Patient is asymptomatic today. Patient was referred to Cardiology for further evaluation.   - REFERRAL TO CARDIOLOGY  - CBC WITH DIFFERENTIAL; Future  - Comp Metabolic Panel; Future  - TSH; Future  - FREE THYROXINE; Future    3. Hypercholesterolemia  - Well-controlled. Continue current regimen of Lovastatin 10 mg every evening. Recheck lab 1-2 weeks before next follow up visit.  - Reviewed the risks and benefits of treatment and potential side effects of medication.  - Advised to eat low fat, low carbohydrate and high fiber diet as well as do cardio physical exercise regularly.  - Comp Metabolic Panel; Future  - Lipid Profile; Future      4. Migraine with aura and without status migrainosus, not intractable  - Stable with Aimovig 70 mg injections every month. Patient is followed by Dr. Navarro, pain management, for Aimovig injections. Patient also followed by Dr. Griffin, Neurology, who is aware of Aimovig injections.  She also takes Topamax 150 mg twice a day as prescribed by neurologist.  - Patient is interested in starting oral contraception for preventative control of migraines.   - Patient was prescribed 84 day regimen of Seasonique for control of migraines.   - Discussed risks, benefits, as well as potential side effects of Seasonique.   - Patient verbalizes understanding of the risks of Seasonique.     5. Lumbar back pain with radiculopathy affecting lower extremity  - Patient reports worsening lumbar back pain in the last few months.  She states that her symptom is worsening and affecting her lifestyle.  She also reported having radicular pain on walking. Patient requested to refer to Neurosurgery for further evaluation.   - I also advised patient to follow-up with Dr. Navarro, pain management for her back pain and neck pain.  She is currently taking Flexeril daily as needed and naproxen twice daily with meals as prescribed by   Conor, ER physician.  - REFERRAL TO NEUROSURGERY    6. Vitamin D insufficiency  - She has history of low vitamin D in the past.  She does not take any vitamin supplements currently.  She reported feeling tired fatigue easily.  She agrees to recheck her vitamin D level and thyroid function test.  - VITAMIN D,25 HYDROXY; Future    7. Uses oral contraception  - Patient is interested in starting oral contraception for preventative control of migraines during menstruation.  Patient insists to refill Seasonique oral contraceptive pill.  - Patient was prescribed 84 day regimen of Seasonique.   - Discussed risks, benefits, as well as potential side effects of Seasonique.   - Patient verbalizes understanding of the risks of Seasonique including cardiovascular risk and breast cancer risk.  Patient also advised to stop smoking and tobacco use if she is taking oral contraceptive pill.  She agreed to quit smoking and tobacco.  Patient is not a good candidate for Chantix as she has underlying depression and anxiety.  - I also discussed with patient that there is the potential interaction between Seasonique and Topamax as Topamax can cause decreased efficacy of Seasonique.  So I advised patient to use condom to prevent unplanned pregnancy and sexual transmitted infection.  Patient agrees with the plan.  - Levonorgest-Eth Estrad 91-Day (SEASONIQUE) 0.15-0.03 &0.01 MG Tab; Take 1 Tab by mouth every day.  Dispense: 84 Tab; Refill: 3  - CBC WITH DIFFERENTIAL; Future  - Comp Metabolic Panel; Future    8. Visit for screening mammogram  - Counseled for breast cancer screening.  Order screening mammogram.  - MA-SCREEN MAMMO W/CAD-BILAT; Future     9. Need for vaccination  - 3rd dose of Hepatitis B vaccine was given today after reviewing risks and benefits as well as side effects of vaccine.   - Hepatitis B Vaccine Adult IM     10. Moderate episode of recurrent major depressive disorder (HCC)   11. Anxiety  - Stable.  Continue Effexor XR  150 mg daily.  - Discussed with patient regarding the use and side effects of medication as well as black box warning of suicidality risk with medication. Patient verbally understands. Recommend to call 911 or go to ER if patient has suicidal ideation or plan.     12. Health Maintenance   - Patient due for 3rd dose of Hepatitis B vaccine. Patient is agreeable to receive 3rd dose of Hep B vaccine today on 8/14/19. Patient will schedule appointment for pap smear in September 2019.       Followup: Return in about 4 weeks (around 9/11/2019), or if symptoms worsen or fail to improve, for Pap.      Please note that this dictation was created using voice recognition software. I have made every reasonable attempt to correct obvious errors, but I expect that there may have unintended errors in text, spelling, punctuation, or grammar that I did not discover.    IDhruv (Scribe), am scribing for, and in the presence of, Emerald Rico M.D..    Electronically signed by: Dhruv Langston (Alexandriaibe), 8/14/2019    I, Emerald Rico M.D., personally performed the services described in this documentation, as scribed by Dhruv Langston in my presence, and it is both accurate and complete.

## 2019-09-24 DIAGNOSIS — G43.109 MIGRAINE WITH AURA AND WITHOUT STATUS MIGRAINOSUS, NOT INTRACTABLE: ICD-10-CM

## 2019-09-24 RX ORDER — RIZATRIPTAN BENZOATE 10 MG/1
TABLET, ORALLY DISINTEGRATING ORAL
Qty: 9 TAB | Refills: 11 | Status: SHIPPED | OUTPATIENT
Start: 2019-09-24 | End: 2019-10-01

## 2019-09-26 ENCOUNTER — HOSPITAL ENCOUNTER (OUTPATIENT)
Dept: LAB | Facility: MEDICAL CENTER | Age: 39
End: 2019-09-26
Payer: COMMERCIAL

## 2019-09-26 LAB
BDY FAT % MEASURED: 22 %
BP DIAS: 76 MMHG
BP SYS: 112 MMHG
CHOLEST SERPL-MCNC: 192 MG/DL (ref 100–199)
DIABETES HTDIA: NO
EVENT NAME HTEVT: NORMAL
FASTING HTFAS: YES
GLUCOSE SERPL-MCNC: 87 MG/DL (ref 65–99)
HDLC SERPL-MCNC: 60 MG/DL
HYPERTENSION HTHYP: NO
LDLC SERPL CALC-MCNC: 112 MG/DL
SCREENING LOC CITY HTCIT: NORMAL
SCREENING LOC STATE HTSTA: NORMAL
SCREENING LOCATION HTLOC: NORMAL
SMOKING HTSMO: NO
SUBSCRIBER ID HTSID: NORMAL
TRIGL SERPL-MCNC: 101 MG/DL (ref 0–149)

## 2019-09-26 PROCEDURE — 36415 COLL VENOUS BLD VENIPUNCTURE: CPT

## 2019-09-26 PROCEDURE — 82947 ASSAY GLUCOSE BLOOD QUANT: CPT

## 2019-09-26 PROCEDURE — 80061 LIPID PANEL: CPT

## 2019-09-26 PROCEDURE — S5190 WELLNESS ASSESSMENT BY NONPH: HCPCS

## 2019-09-30 ENCOUNTER — HOSPITAL ENCOUNTER (OUTPATIENT)
Dept: RADIOLOGY | Facility: MEDICAL CENTER | Age: 39
End: 2019-09-30
Attending: INTERNAL MEDICINE
Payer: COMMERCIAL

## 2019-09-30 DIAGNOSIS — Z12.31 VISIT FOR SCREENING MAMMOGRAM: ICD-10-CM

## 2019-09-30 PROCEDURE — 77063 BREAST TOMOSYNTHESIS BI: CPT

## 2019-10-01 ENCOUNTER — OFFICE VISIT (OUTPATIENT)
Dept: CARDIOLOGY | Facility: MEDICAL CENTER | Age: 39
End: 2019-10-01
Payer: COMMERCIAL

## 2019-10-01 VITALS
BODY MASS INDEX: 19.49 KG/M2 | SYSTOLIC BLOOD PRESSURE: 112 MMHG | HEIGHT: 65 IN | WEIGHT: 117 LBS | OXYGEN SATURATION: 96 % | HEART RATE: 100 BPM | DIASTOLIC BLOOD PRESSURE: 70 MMHG

## 2019-10-01 DIAGNOSIS — R55 SYNCOPE AND COLLAPSE: ICD-10-CM

## 2019-10-01 DIAGNOSIS — E78.00 HYPERCHOLESTEROLEMIA: ICD-10-CM

## 2019-10-01 DIAGNOSIS — I49.9 IRREGULAR HEART BEAT: ICD-10-CM

## 2019-10-01 LAB — EKG IMPRESSION: NORMAL

## 2019-10-01 PROCEDURE — 93000 ELECTROCARDIOGRAM COMPLETE: CPT | Performed by: INTERNAL MEDICINE

## 2019-10-01 PROCEDURE — 99204 OFFICE O/P NEW MOD 45 MIN: CPT | Performed by: INTERNAL MEDICINE

## 2019-10-01 RX ORDER — ERENUMAB-AOOE 140 MG/ML
INJECTION, SOLUTION SUBCUTANEOUS
Refills: 2 | COMMUNITY
Start: 2019-08-29 | End: 2020-02-05 | Stop reason: SDUPTHER

## 2019-10-01 NOTE — PROGRESS NOTES
"CARDIOLOGY NEW PATIENT CONSULTATION    PCP: Emerald Rico M.D.    1. Syncope and collapse  Suggestive of vasovagal mechanism. Has PVCs and resting tachycardia  -Echo advised  -counseled about mechanisma of syncope, abortive maneuvers, importance of hydration and avoidance of triggers.     2. Irregular heart beat. Infrequent and prior monitoring confirmed PVCs as the cause  - Echocardiogram as above  - she is not interested in medication; just reassurance.     3. Hypercholesterolemia  Reasonable to take lovastatin    4. Mitral Click    Follow up with Samuel Burris M.D. as needed    Chief Complaint   Patient presents with   • Irregular Heart Beat     NP       History: Dany Lambert is a 39 y.o. female with a past medical history of Vasovagal syncope presenting for consultation regarding syncope and palpitations.  For the past 8 months she has had increased palpitations as well as 3 syncopal episodes.  She describes the spells occurring when standing, particularly when arising from a seated or supine position.  She typically has a long prodrome which includes a feeling of weakness as well as nausea before losing consciousness.  She has had a history of vasovagal syncope which was triggered by blood draws in the past.  This has been improved though she still takes preventative measures to avoid fainting with blood draws.    She also reports an increased frequency of palpitations lately this began after a spinal surgery.  She was evaluated in the emergency department and was found to have symptomatic PVCs.  She gets these episodes approximately once every 10 days.    ROS:   All other systems reviewed and negative except as per the HPI    PE:  /70 (BP Location: Left arm, Patient Position: Sitting, BP Cuff Size: Adult)   Pulse 100   Ht 1.651 m (5' 5\")   Wt 53.1 kg (117 lb)   SpO2 96%   BMI 19.47 kg/m²   GEN: Well appearing  HEENT: Symmetric face. Anicteric sclerae. Moist mucus membranes  NECK: No " JVD. No lymphadenopathy  CARDIAC: Normal PMI, regular, normal S1, S2.  There is a systolic click which moves earlier when sitting upright compared to supine  VASCULATURE: Normal carotid amplitude without bruit.   RESP: Clear to auscultation bilaterally  ABD: Soft, non-tender, non-distended  EXT: No  edema, no clubbing or cyanosis  SKIN: Warm and dry  NEURO: No gross deficits   PSYCH: Appropriate affect, participates in conversation    Past Medical History:   Diagnosis Date   • Allergy, unspecified not elsewhere classified    • Anxiety 8/26/2013   • Arthritis     Cervical joints, and hands   • High cholesterol    • Migraine with aura and without status migrainosus, not intractable 8/26/2013   • Pain 7/27/2017    Chronic pain- Migraine; neck pain, spasm   • Psychiatric problem     hx of anxiety   • Seizure (HCC) 1/2016    ? possibly r/t migraine      Past Surgical History:   Procedure Laterality Date   • IL DSTR NROLYTC AGNT PARVERTEB FCT SNGL CRVCL/THORA Right 9/29/2017    Procedure: NEURO DEST FACET C/T W/IG SNGL C2-4;  Surgeon: Scotty Navarro D.O.;  Location: [facility];  Service: Pain Management   • IL DSTR NROLYTC AGNT PARVERTEB FCT ADDL CRVCL/THORA Right 9/29/2017    Procedure: NEURO DEST FACET C/T W/IG ADDL;  Surgeon: Scotty Navarro D.O.;  Location: [facility];  Service: Pain Management   • IL DSTR NROLYTC AGNT PARVERTEB FCT SNGL CRVCL/THORA Left 7/28/2017    Procedure: NEURO DEST FACET C/T W/IG SNGL - C2-4;  Surgeon: Scotty Navarro D.O.;  Location: [facility];  Service: Pain Management   • IL DSTR NROLYTC AGNT PARVERTEB FCT ADDL CRVCL/THORA  7/28/2017    Procedure: NEURO DEST FACET C/T W/IG ADDL;  Surgeon: Scotty Navarro D.O.;  Location: [facility];  Service: Pain Management   • TONSILLECTOMY  1997   • CYST EXCISION Left as child    cyst removal on L wrist     Allergies   Allergen Reactions   • Benadryl Allergy Anxiety   • Demerol  Hives   • Medrol [Methylprednisolone] Hives and Itching   • Previfem [Norgestimate-Ethinyl Estradiol]      Nausea/vomiting   • Reglan [Metoclopramide] Anxiety     Outpatient Encounter Medications as of 10/1/2019   Medication Sig Dispense Refill   • AIMOVIG 140 MG/ML Solution Auto-injector INJECT ONCE A MONTH SUBCUTANEOUSLY  2   • Levonorgest-Eth Estrad 91-Day (SEASONIQUE) 0.15-0.03 &0.01 MG Tab Take 1 Tab by mouth every day. 84 Tab 3   • cyclobenzaprine (FLEXERIL) 10 MG Tab Take 1 Tab by mouth every 8 hours as needed. 21 Tab 0   • naproxen (NAPROSYN) 500 MG Tab Take 1 Tab by mouth 2 times a day, with meals. 20 Tab 0   • topiramate (TOPAMAX) 50 MG tablet TAKE 3 TABS BY MOUTH 2 TIMES A DAY. 540 Tab 3   • venlafaxine (EFFEXOR-XR) 150 MG extended-release capsule Take 1 Cap by mouth every day. 90 Cap 3   • lovastatin (MEVACOR) 10 MG tablet TAKE 1 TAB BY MOUTH EVERY EVENING. 90 Tab 3   • rizatriptan (MAXALT) 10 MG tablet 1 tab at headache onset; repeat 1 tab every 1 hour prn up to #4 tab/24 hours 10 Tab 6   • cetirizine (ZYRTEC) 10 MG Tab Take 10 mg by mouth every day.     • [DISCONTINUED] rizatriptan (MAXALT-MLT) 10 MG disintegrating tablet TAKE 1 TABLET BY MOUTH AT HEADACHE ONSET REPEAT EVERY HOUR AS NEEDED UP TO 4 TABLETS IN 24 HOURS (Patient not taking: Reported on 10/1/2019) 9 Tab 11   • [DISCONTINUED] AIMOVIG 70 MG/ML Solution Auto-injector Q30 DAYS.  12     No facility-administered encounter medications on file as of 10/1/2019.        Family History   Problem Relation Age of Onset   • Diabetes Maternal Grandfather    • Cancer Paternal Grandfather    • Alzheimer's Disease Paternal Grandfather    • Cancer Paternal Grandmother 30        breast   • Suicide Attempts Maternal Uncle         Killed himself by GSW   • Hypertension Father      Social History     Socioeconomic History   • Marital status: Single     Spouse name: Not on file   • Number of children: Not on file   • Years of education: Not on file   • Highest  education level: Not on file   Occupational History   • Not on file   Social Needs   • Financial resource strain: Not on file   • Food insecurity:     Worry: Not on file     Inability: Not on file   • Transportation needs:     Medical: Not on file     Non-medical: Not on file   Tobacco Use   • Smoking status: Former Smoker     Packs/day: 1.00     Years: 15.00     Pack years: 15.00     Types: Cigarettes     Last attempt to quit: 2010     Years since quittin.7   • Smokeless tobacco: Never Used   Substance and Sexual Activity   • Alcohol use: No   • Drug use: No   • Sexual activity: Not Currently   Lifestyle   • Physical activity:     Days per week: Not on file     Minutes per session: Not on file   • Stress: Not on file   Relationships   • Social connections:     Talks on phone: Not on file     Gets together: Not on file     Attends [employer] service: Not on file     Active member of club or organization: Not on file     Attends meetings of clubs or organizations: Not on file     Relationship status: Not on file   • Intimate partner violence:     Fear of current or ex partner: Not on file     Emotionally abused: Not on file     Physically abused: Not on file     Forced sexual activity: Not on file   Other Topics Concern   • Not on file   Social History Narrative   • Not on file       Studies  Lab Results   Component Value Date/Time    CHOLSTRLTOT 192 2019 03:18 PM     (H) 2019 03:18 PM    HDL 60 2019 03:18 PM    TRIGLYCERIDE 101 2019 03:18 PM       Lab Results   Component Value Date/Time    SODIUM 138 2019 03:59 AM    POTASSIUM 3.7 2019 03:59 AM    CHLORIDE 109 2019 03:59 AM    CO2 22 2019 03:59 AM    GLUCOSE 87 2019 03:18 PM    BUN 19 2019 03:59 AM    CREATININE 0.93 2019 03:59 AM     Lab Results   Component Value Date/Time    ALKPHOSPHAT 55 2019 03:59 AM    ASTSGOT 12 2019 03:59 AM    ALTSGPT 11 2019 03:59 AM     TBILIRUBIN 0.2 03/06/2019 03:59 AM        For this encounter I directly reviewed ECG tracings and medical records I agree with the interpretations in the electronic health record

## 2019-10-02 ENCOUNTER — APPOINTMENT (OUTPATIENT)
Dept: RADIOLOGY | Facility: MEDICAL CENTER | Age: 39
End: 2019-10-02
Attending: EMERGENCY MEDICINE
Payer: COMMERCIAL

## 2019-10-02 ENCOUNTER — HOSPITAL ENCOUNTER (EMERGENCY)
Facility: MEDICAL CENTER | Age: 39
End: 2019-10-03
Attending: EMERGENCY MEDICINE
Payer: COMMERCIAL

## 2019-10-02 DIAGNOSIS — S60.221A CONTUSION OF RIGHT HAND, INITIAL ENCOUNTER: ICD-10-CM

## 2019-10-02 DIAGNOSIS — S40.011A CONTUSION OF RIGHT SHOULDER, INITIAL ENCOUNTER: ICD-10-CM

## 2019-10-02 DIAGNOSIS — S00.03XA CONTUSION OF SCALP, INITIAL ENCOUNTER: ICD-10-CM

## 2019-10-02 DIAGNOSIS — S09.90XA CLOSED HEAD INJURY, INITIAL ENCOUNTER: ICD-10-CM
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
## 2019-10-02 DIAGNOSIS — M25.551 RIGHT HIP PAIN: ICD-10-CM

## 2019-10-02 PROCEDURE — 72170 X-RAY EXAM OF PELVIS: CPT

## 2019-10-02 PROCEDURE — 70450 CT HEAD/BRAIN W/O DYE: CPT

## 2019-10-02 PROCEDURE — 72125 CT NECK SPINE W/O DYE: CPT

## 2019-10-02 PROCEDURE — 700111 HCHG RX REV CODE 636 W/ 250 OVERRIDE (IP): Performed by: EMERGENCY MEDICINE

## 2019-10-02 PROCEDURE — 73030 X-RAY EXAM OF SHOULDER: CPT | Mod: RT

## 2019-10-02 PROCEDURE — 96372 THER/PROPH/DIAG INJ SC/IM: CPT

## 2019-10-02 PROCEDURE — 99285 EMERGENCY DEPT VISIT HI MDM: CPT

## 2019-10-02 PROCEDURE — 73130 X-RAY EXAM OF HAND: CPT | Mod: RT

## 2019-10-02 RX ORDER — OXYCODONE HYDROCHLORIDE AND ACETAMINOPHEN 5; 325 MG/1; MG/1
2 TABLET ORAL ONCE
Status: COMPLETED | OUTPATIENT
Start: 2019-10-03 | End: 2019-10-03

## 2019-10-02 RX ORDER — KETOROLAC TROMETHAMINE 30 MG/ML
15 INJECTION, SOLUTION INTRAMUSCULAR; INTRAVENOUS ONCE
Status: COMPLETED | OUTPATIENT
Start: 2019-10-02 | End: 2019-10-02

## 2019-10-02 RX ADMIN — KETOROLAC TROMETHAMINE 15 MG: 30 INJECTION, SOLUTION INTRAMUSCULAR at 23:00

## 2019-10-02 ASSESSMENT — LIFESTYLE VARIABLES: DO YOU DRINK ALCOHOL: NO
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
## 2019-10-03 ENCOUNTER — NON-PROVIDER VISIT (OUTPATIENT)
Dept: OCCUPATIONAL MEDICINE | Facility: CLINIC | Age: 39
End: 2019-10-03
Payer: COMMERCIAL

## 2019-10-03 VITALS
BODY MASS INDEX: 19.49 KG/M2 | HEIGHT: 65 IN | TEMPERATURE: 99.9 F | DIASTOLIC BLOOD PRESSURE: 72 MMHG | RESPIRATION RATE: 16 BRPM | OXYGEN SATURATION: 98 % | SYSTOLIC BLOOD PRESSURE: 101 MMHG | WEIGHT: 117 LBS | HEART RATE: 80 BPM

## 2019-10-03 DIAGNOSIS — Z02.83 ENCOUNTER FOR DRUG SCREENING: ICD-10-CM

## 2019-10-03 DIAGNOSIS — Z02.1 PRE-EMPLOYMENT DRUG SCREENING: ICD-10-CM

## 2019-10-03 LAB
AMP AMPHETAMINE: NORMAL
BAR BARBITURATES: NORMAL
BREATH ALCOHOL COMMENT: NORMAL
BZO BENZODIAZEPINES: NORMAL
COC COCAINE: NORMAL
INT CON NEG: NORMAL
INT CON POS: NORMAL
MDMA ECSTASY: NORMAL
MET METHAMPHETAMINES: NORMAL
MTD METHADONE: NORMAL
OPI OPIATES: NORMAL
OXY OXYCODONE: NORMAL
PCP PHENCYCLIDINE: NORMAL
POC BREATHALIZER: 0 PERCENT (ref 0–0.01)
POC URINE DRUG SCREEN OCDRS: NEGATIVE
THC: NORMAL

## 2019-10-03 PROCEDURE — 99026 IN-HOSPITAL ON CALL SERVICE: CPT | Performed by: PREVENTIVE MEDICINE

## 2019-10-03 PROCEDURE — 700102 HCHG RX REV CODE 250 W/ 637 OVERRIDE(OP): Performed by: EMERGENCY MEDICINE

## 2019-10-03 PROCEDURE — A9270 NON-COVERED ITEM OR SERVICE: HCPCS | Performed by: EMERGENCY MEDICINE

## 2019-10-03 PROCEDURE — 80305 DRUG TEST PRSMV DIR OPT OBS: CPT | Performed by: PREVENTIVE MEDICINE

## 2019-10-03 PROCEDURE — 82075 ASSAY OF BREATH ETHANOL: CPT | Performed by: PREVENTIVE MEDICINE

## 2019-10-03 RX ORDER — HYDROCODONE BITARTRATE AND ACETAMINOPHEN 5; 325 MG/1; MG/1
1-2 TABLET ORAL EVERY 4 HOURS PRN
Qty: 12 TAB | Refills: 0 | Status: SHIPPED | OUTPATIENT
Start: 2019-10-03 | End: 2019-10-03 | Stop reason: SDUPTHER

## 2019-10-03 RX ORDER — CYCLOBENZAPRINE HCL 10 MG
10 TABLET ORAL 3 TIMES DAILY PRN
Qty: 15 TAB | Refills: 0 | Status: SHIPPED | OUTPATIENT
Start: 2019-10-03 | End: 2019-10-03 | Stop reason: SDUPTHER

## 2019-10-03 RX ORDER — CYCLOBENZAPRINE HCL 10 MG
10 TABLET ORAL 3 TIMES DAILY PRN
Qty: 15 TAB | Refills: 0 | Status: SHIPPED | OUTPATIENT
Start: 2019-10-03 | End: 2020-08-04

## 2019-10-03 RX ORDER — HYDROCODONE BITARTRATE AND ACETAMINOPHEN 5; 325 MG/1; MG/1
1-2 TABLET ORAL EVERY 4 HOURS PRN
Qty: 12 TAB | Refills: 0 | Status: SHIPPED | OUTPATIENT
Start: 2019-10-03 | End: 2019-10-06

## 2019-10-03 RX ADMIN — OXYCODONE HYDROCHLORIDE AND ACETAMINOPHEN 2 TABLET: 5; 325 TABLET ORAL at 00:36
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
## 2019-10-03 NOTE — ED NOTES
Break RN:  Pt medicated with Percocet for pain (see MAR).   Pt reporting dizziness when she walked to the bathroom earlier and sits up in the bed. ERP notified.

## 2019-10-03 NOTE — ED NOTES
Patient discharged in stable condition per orders. Wristband removed per protocol. Patient verbalized understanding of all discharge instructions. All belongings accounted for. Ambulatory to lobby with steady gait accompanied by friend. Friend to give patient ride home.

## 2019-10-09 ENCOUNTER — OCCUPATIONAL MEDICINE (OUTPATIENT)
Dept: OCCUPATIONAL MEDICINE | Facility: CLINIC | Age: 39
End: 2019-10-09
Payer: COMMERCIAL

## 2019-10-09 ENCOUNTER — APPOINTMENT (OUTPATIENT)
Dept: RADIOLOGY | Facility: IMAGING CENTER | Age: 39
End: 2019-10-09
Attending: NURSE PRACTITIONER
Payer: COMMERCIAL

## 2019-10-09 VITALS
BODY MASS INDEX: 20.16 KG/M2 | DIASTOLIC BLOOD PRESSURE: 80 MMHG | SYSTOLIC BLOOD PRESSURE: 110 MMHG | WEIGHT: 121 LBS | HEIGHT: 65 IN
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
## 2019-10-09 DIAGNOSIS — S09.90XD CLOSED HEAD INJURY, SUBSEQUENT ENCOUNTER: ICD-10-CM

## 2019-10-09 DIAGNOSIS — R07.89 OTHER CHEST PAIN: ICD-10-CM

## 2019-10-09 DIAGNOSIS — M25.551 RIGHT HIP PAIN: ICD-10-CM

## 2019-10-09 DIAGNOSIS — S60.221D CONTUSION OF RIGHT HAND, SUBSEQUENT ENCOUNTER: ICD-10-CM

## 2019-10-09 DIAGNOSIS — S00.03XD CONTUSION OF SCALP, SUBSEQUENT ENCOUNTER: ICD-10-CM

## 2019-10-09 DIAGNOSIS — S40.011D CONTUSION OF RIGHT SHOULDER, SUBSEQUENT ENCOUNTER: ICD-10-CM

## 2019-10-09 PROCEDURE — 99214 OFFICE O/P EST MOD 30 MIN: CPT | Mod: 29 | Performed by: NURSE PRACTITIONER

## 2019-10-09 PROCEDURE — 71048 X-RAY EXAM CHEST 4+ VIEWS: CPT | Mod: TC,29 | Performed by: PHYSICIAN ASSISTANT
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
## 2019-10-10 ENCOUNTER — OFFICE VISIT (OUTPATIENT)
Dept: MEDICAL GROUP | Age: 39
End: 2019-10-10
Payer: COMMERCIAL

## 2019-10-10 VITALS
DIASTOLIC BLOOD PRESSURE: 60 MMHG | OXYGEN SATURATION: 98 % | HEART RATE: 99 BPM | TEMPERATURE: 98.5 F | WEIGHT: 121.2 LBS | SYSTOLIC BLOOD PRESSURE: 98 MMHG | HEIGHT: 65 IN | BODY MASS INDEX: 20.19 KG/M2

## 2019-10-10 DIAGNOSIS — R11.0 NAUSEA: ICD-10-CM

## 2019-10-10 DIAGNOSIS — R42 LIGHTHEADEDNESS: ICD-10-CM

## 2019-10-10 DIAGNOSIS — S09.90XD CLOSED HEAD INJURY, SUBSEQUENT ENCOUNTER: ICD-10-CM

## 2019-10-10 DIAGNOSIS — Z09 HOSPITAL DISCHARGE FOLLOW-UP: ICD-10-CM

## 2019-10-10 PROBLEM — S09.90XA CLOSED HEAD INJURY: Status: ACTIVE | Noted: 2019-10-10

## 2019-10-10 PROCEDURE — 99214 OFFICE O/P EST MOD 30 MIN: CPT | Performed by: INTERNAL MEDICINE

## 2019-10-10 ASSESSMENT — ENCOUNTER SYMPTOMS: CONSTITUTIONAL NEGATIVE: 1

## 2019-10-10 ASSESSMENT — PAIN SCALES - GENERAL: PAINLEVEL: 4=SLIGHT-MODERATE PAIN
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
## 2019-10-16 ENCOUNTER — OCCUPATIONAL MEDICINE (OUTPATIENT)
Dept: OCCUPATIONAL MEDICINE | Facility: CLINIC | Age: 39
End: 2019-10-16
Payer: COMMERCIAL

## 2019-10-16 VITALS
RESPIRATION RATE: 18 BRPM | DIASTOLIC BLOOD PRESSURE: 84 MMHG | SYSTOLIC BLOOD PRESSURE: 118 MMHG | BODY MASS INDEX: 20.33 KG/M2 | HEART RATE: 95 BPM | WEIGHT: 122 LBS | TEMPERATURE: 97.6 F | OXYGEN SATURATION: 98 % | HEIGHT: 65 IN

## 2019-10-16 DIAGNOSIS — S09.90XD CLOSED HEAD INJURY, SUBSEQUENT ENCOUNTER: ICD-10-CM

## 2019-10-16 DIAGNOSIS — S00.03XD CONTUSION OF SCALP, SUBSEQUENT ENCOUNTER: ICD-10-CM

## 2019-10-16 DIAGNOSIS — S40.011D CONTUSION OF RIGHT SHOULDER, SUBSEQUENT ENCOUNTER: ICD-10-CM
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
## 2019-10-16 DIAGNOSIS — R07.89 OTHER CHEST PAIN: ICD-10-CM

## 2019-10-16 PROCEDURE — 99213 OFFICE O/P EST LOW 20 MIN: CPT | Mod: 29 | Performed by: NURSE PRACTITIONER

## 2019-10-16 ASSESSMENT — ENCOUNTER SYMPTOMS
SPEECH CHANGE: 0
MYALGIAS: 1
PSYCHIATRIC NEGATIVE: 1
CONSTITUTIONAL NEGATIVE: 1
CARDIOVASCULAR NEGATIVE: 1
LOSS OF CONSCIOUSNESS: 0
HEADACHES: 1
RESPIRATORY NEGATIVE: 1
DIZZINESS: 1
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
## 2019-10-18 ENCOUNTER — HOSPITAL ENCOUNTER (OUTPATIENT)
Dept: CARDIOLOGY | Facility: MEDICAL CENTER | Age: 39
End: 2019-10-18
Attending: INTERNAL MEDICINE
Payer: COMMERCIAL

## 2019-10-18 DIAGNOSIS — R55 SYNCOPE AND COLLAPSE: ICD-10-CM

## 2019-10-18 DIAGNOSIS — I49.9 IRREGULAR HEART BEAT: ICD-10-CM

## 2019-10-18 PROCEDURE — 93306 TTE W/DOPPLER COMPLETE: CPT

## 2019-10-19 LAB
LV EJECT FRACT  99904: 65
LV EJECT FRACT MOD 2C 99903: 59.66
LV EJECT FRACT MOD 4C 99902: 59.83
LV EJECT FRACT MOD BP 99901: 59.46

## 2019-10-19 PROCEDURE — 93306 TTE W/DOPPLER COMPLETE: CPT | Mod: 26 | Performed by: INTERNAL MEDICINE

## 2019-10-23 ENCOUNTER — OCCUPATIONAL MEDICINE (OUTPATIENT)
Dept: OCCUPATIONAL MEDICINE | Facility: CLINIC | Age: 39
End: 2019-10-23
Payer: COMMERCIAL

## 2019-10-23 VITALS
RESPIRATION RATE: 12 BRPM | TEMPERATURE: 98.4 F | OXYGEN SATURATION: 100 % | HEIGHT: 65 IN | BODY MASS INDEX: 19.16 KG/M2 | HEART RATE: 109 BPM | WEIGHT: 115 LBS
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
## 2019-10-23 DIAGNOSIS — R07.89 OTHER CHEST PAIN: ICD-10-CM

## 2019-10-23 DIAGNOSIS — S09.90XD CLOSED HEAD INJURY, SUBSEQUENT ENCOUNTER: ICD-10-CM

## 2019-10-23 DIAGNOSIS — S40.011D CONTUSION OF RIGHT SHOULDER, SUBSEQUENT ENCOUNTER: ICD-10-CM

## 2019-10-23 DIAGNOSIS — S00.03XD CONTUSION OF SCALP, SUBSEQUENT ENCOUNTER: ICD-10-CM

## 2019-10-23 PROCEDURE — 99213 OFFICE O/P EST LOW 20 MIN: CPT | Mod: 29 | Performed by: NURSE PRACTITIONER

## 2019-10-23 ASSESSMENT — ENCOUNTER SYMPTOMS
EYES NEGATIVE: 1
HEADACHES: 1
CARDIOVASCULAR NEGATIVE: 1
PSYCHIATRIC NEGATIVE: 1
BLURRED VISION: 0
PHOTOPHOBIA: 0
DOUBLE VISION: 0
MYALGIAS: 1
WEAKNESS: 0
LOSS OF CONSCIOUSNESS: 0
RESPIRATORY NEGATIVE: 1
FEVER: 0
DIZZINESS: 0
SENSORY CHANGE: 1
SPEECH CHANGE: 0

## 2019-10-23 ASSESSMENT — PAIN SCALES - GENERAL: PAINLEVEL: 4=SLIGHT-MODERATE PAIN
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
## 2019-10-29 ENCOUNTER — TELEPHONE (OUTPATIENT)
Dept: OCCUPATIONAL MEDICINE | Facility: CLINIC | Age: 39
End: 2019-10-29

## 2019-10-31 ENCOUNTER — HOSPITAL ENCOUNTER (OUTPATIENT)
Dept: RADIOLOGY | Facility: MEDICAL CENTER | Age: 39
End: 2019-10-31
Attending: NURSE PRACTITIONER
Payer: COMMERCIAL

## 2019-10-31 ENCOUNTER — OCCUPATIONAL MEDICINE (OUTPATIENT)
Dept: OCCUPATIONAL MEDICINE | Facility: CLINIC | Age: 39
End: 2019-10-31
Payer: COMMERCIAL

## 2019-10-31 VITALS
WEIGHT: 115 LBS | HEIGHT: 65 IN | DIASTOLIC BLOOD PRESSURE: 80 MMHG | RESPIRATION RATE: 12 BRPM | SYSTOLIC BLOOD PRESSURE: 114 MMHG | BODY MASS INDEX: 19.16 KG/M2

## 2019-10-31 DIAGNOSIS — M54.50 LOW BACK PAIN: ICD-10-CM
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
## 2019-10-31 DIAGNOSIS — S06.9X9A CLOSED HEAD INJURY WITH LOSS OF CONSCIOUSNESS OF UNKNOWN DURATION (HCC): ICD-10-CM

## 2019-10-31 PROCEDURE — 72100 X-RAY EXAM L-S SPINE 2/3 VWS: CPT

## 2019-10-31 PROCEDURE — 72148 MRI LUMBAR SPINE W/O DYE: CPT

## 2019-10-31 PROCEDURE — 99213 OFFICE O/P EST LOW 20 MIN: CPT | Mod: 29 | Performed by: NURSE PRACTITIONER

## 2019-10-31 ASSESSMENT — ENCOUNTER SYMPTOMS
HEADACHES: 1
MYALGIAS: 1
LOSS OF CONSCIOUSNESS: 0
PHOTOPHOBIA: 0
DIZZINESS: 0
CARDIOVASCULAR NEGATIVE: 1
RESPIRATORY NEGATIVE: 1
PSYCHIATRIC NEGATIVE: 1
BLURRED VISION: 0
DOUBLE VISION: 0
CONSTITUTIONAL NEGATIVE: 1
WEAKNESS: 0

## 2019-10-31 ASSESSMENT — PAIN SCALES - GENERAL: PAINLEVEL: 3=SLIGHT PAIN
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
## 2019-11-05 ENCOUNTER — TELEPHONE (OUTPATIENT)
Dept: OCCUPATIONAL MEDICINE | Facility: CLINIC | Age: 39
End: 2019-11-05

## 2019-11-06 ENCOUNTER — OCCUPATIONAL MEDICINE (OUTPATIENT)
Dept: OCCUPATIONAL MEDICINE | Facility: CLINIC | Age: 39
End: 2019-11-06
Payer: COMMERCIAL

## 2019-11-06 VITALS
RESPIRATION RATE: 16 BRPM | WEIGHT: 121.4 LBS | BODY MASS INDEX: 20.23 KG/M2 | TEMPERATURE: 98.3 F | DIASTOLIC BLOOD PRESSURE: 70 MMHG | HEIGHT: 65 IN | SYSTOLIC BLOOD PRESSURE: 118 MMHG | OXYGEN SATURATION: 97 % | HEART RATE: 116 BPM
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
## 2019-11-06 DIAGNOSIS — S06.9X9A CLOSED HEAD INJURY WITH LOSS OF CONSCIOUSNESS OF UNKNOWN DURATION (HCC): ICD-10-CM

## 2019-11-06 PROCEDURE — 99213 OFFICE O/P EST LOW 20 MIN: CPT | Mod: 29 | Performed by: NURSE PRACTITIONER

## 2019-11-06 ASSESSMENT — ENCOUNTER SYMPTOMS
RESPIRATORY NEGATIVE: 1
DIZZINESS: 0
BLURRED VISION: 0
PHOTOPHOBIA: 0
DOUBLE VISION: 0
EYES NEGATIVE: 1
CONSTITUTIONAL NEGATIVE: 1
PSYCHIATRIC NEGATIVE: 1
CARDIOVASCULAR NEGATIVE: 1
MUSCULOSKELETAL NEGATIVE: 1
VOMITING: 0
HEADACHES: 1
NAUSEA: 0
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
## 2019-11-19 ENCOUNTER — TELEPHONE (OUTPATIENT)
Dept: OCCUPATIONAL MEDICINE | Facility: CLINIC | Age: 39
End: 2019-11-19

## 2019-11-20 ENCOUNTER — OCCUPATIONAL MEDICINE (OUTPATIENT)
Dept: OCCUPATIONAL MEDICINE | Facility: CLINIC | Age: 39
End: 2019-11-20
Payer: COMMERCIAL

## 2019-11-20 ENCOUNTER — HOSPITAL ENCOUNTER (OUTPATIENT)
Dept: LAB | Facility: MEDICAL CENTER | Age: 39
End: 2019-11-20
Attending: INTERNAL MEDICINE
Payer: COMMERCIAL

## 2019-11-20 VITALS
WEIGHT: 121 LBS | BODY MASS INDEX: 20.16 KG/M2 | SYSTOLIC BLOOD PRESSURE: 106 MMHG | DIASTOLIC BLOOD PRESSURE: 70 MMHG | HEART RATE: 100 BPM | HEIGHT: 65 IN | OXYGEN SATURATION: 100 % | TEMPERATURE: 98 F

## 2019-11-20 DIAGNOSIS — I45.9 SKIPPED HEART BEATS: ICD-10-CM

## 2019-11-20 DIAGNOSIS — R55 SYNCOPE, NEAR: ICD-10-CM

## 2019-11-20 DIAGNOSIS — E55.9 VITAMIN D INSUFFICIENCY: ICD-10-CM

## 2019-11-20 DIAGNOSIS — S06.9X9A CLOSED HEAD INJURY WITH LOSS OF CONSCIOUSNESS OF UNKNOWN DURATION (HCC): ICD-10-CM
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
## 2019-11-20 DIAGNOSIS — Z30.41 USES ORAL CONTRACEPTION: ICD-10-CM

## 2019-11-20 DIAGNOSIS — E78.00 HYPERCHOLESTEROLEMIA: ICD-10-CM

## 2019-11-20 LAB
25(OH)D3 SERPL-MCNC: 17 NG/ML (ref 30–100)
ALBUMIN SERPL BCP-MCNC: 4 G/DL (ref 3.2–4.9)
ALBUMIN/GLOB SERPL: 1.4 G/DL
ALP SERPL-CCNC: 45 U/L (ref 30–99)
ALT SERPL-CCNC: 7 U/L (ref 2–50)
ANION GAP SERPL CALC-SCNC: 9 MMOL/L (ref 0–11.9)
AST SERPL-CCNC: 14 U/L (ref 12–45)
BASOPHILS # BLD AUTO: 0.6 % (ref 0–1.8)
BASOPHILS # BLD: 0.04 K/UL (ref 0–0.12)
BILIRUB SERPL-MCNC: 0.3 MG/DL (ref 0.1–1.5)
BUN SERPL-MCNC: 11 MG/DL (ref 8–22)
CALCIUM SERPL-MCNC: 8.7 MG/DL (ref 8.5–10.5)
CHLORIDE SERPL-SCNC: 109 MMOL/L (ref 96–112)
CHOLEST SERPL-MCNC: 196 MG/DL (ref 100–199)
CO2 SERPL-SCNC: 21 MMOL/L (ref 20–33)
CREAT SERPL-MCNC: 0.92 MG/DL (ref 0.5–1.4)
EOSINOPHIL # BLD AUTO: 0.08 K/UL (ref 0–0.51)
EOSINOPHIL NFR BLD: 1.2 % (ref 0–6.9)
ERYTHROCYTE [DISTWIDTH] IN BLOOD BY AUTOMATED COUNT: 51.2 FL (ref 35.9–50)
FASTING STATUS PATIENT QL REPORTED: NORMAL
GLOBULIN SER CALC-MCNC: 2.8 G/DL (ref 1.9–3.5)
GLUCOSE SERPL-MCNC: 96 MG/DL (ref 65–99)
HCT VFR BLD AUTO: 41.9 % (ref 37–47)
HDLC SERPL-MCNC: 57 MG/DL
HGB BLD-MCNC: 13.6 G/DL (ref 12–16)
IMM GRANULOCYTES # BLD AUTO: 0.03 K/UL (ref 0–0.11)
IMM GRANULOCYTES NFR BLD AUTO: 0.4 % (ref 0–0.9)
LDLC SERPL CALC-MCNC: 121 MG/DL
LYMPHOCYTES # BLD AUTO: 1.92 K/UL (ref 1–4.8)
LYMPHOCYTES NFR BLD: 27.9 % (ref 22–41)
MCH RBC QN AUTO: 32 PG (ref 27–33)
MCHC RBC AUTO-ENTMCNC: 32.5 G/DL (ref 33.6–35)
MCV RBC AUTO: 98.6 FL (ref 81.4–97.8)
MONOCYTES # BLD AUTO: 0.39 K/UL (ref 0–0.85)
MONOCYTES NFR BLD AUTO: 5.7 % (ref 0–13.4)
NEUTROPHILS # BLD AUTO: 4.43 K/UL (ref 2–7.15)
NEUTROPHILS NFR BLD: 64.2 % (ref 44–72)
NRBC # BLD AUTO: 0 K/UL
NRBC BLD-RTO: 0 /100 WBC
PLATELET # BLD AUTO: 268 K/UL (ref 164–446)
PMV BLD AUTO: 11.2 FL (ref 9–12.9)
POTASSIUM SERPL-SCNC: 4.2 MMOL/L (ref 3.6–5.5)
PROT SERPL-MCNC: 6.8 G/DL (ref 6–8.2)
RBC # BLD AUTO: 4.25 M/UL (ref 4.2–5.4)
SODIUM SERPL-SCNC: 139 MMOL/L (ref 135–145)
T4 FREE SERPL-MCNC: 0.8 NG/DL (ref 0.53–1.43)
TRIGL SERPL-MCNC: 89 MG/DL (ref 0–149)
TSH SERPL DL<=0.005 MIU/L-ACNC: 3.11 UIU/ML (ref 0.38–5.33)
WBC # BLD AUTO: 6.9 K/UL (ref 4.8–10.8)

## 2019-11-20 PROCEDURE — 36415 COLL VENOUS BLD VENIPUNCTURE: CPT

## 2019-11-20 PROCEDURE — 84443 ASSAY THYROID STIM HORMONE: CPT

## 2019-11-20 PROCEDURE — 99213 OFFICE O/P EST LOW 20 MIN: CPT | Mod: 29 | Performed by: NURSE PRACTITIONER

## 2019-11-20 PROCEDURE — 85025 COMPLETE CBC W/AUTO DIFF WBC: CPT

## 2019-11-20 PROCEDURE — 80053 COMPREHEN METABOLIC PANEL: CPT

## 2019-11-20 PROCEDURE — 82306 VITAMIN D 25 HYDROXY: CPT

## 2019-11-20 PROCEDURE — 80061 LIPID PANEL: CPT

## 2019-11-20 PROCEDURE — 84439 ASSAY OF FREE THYROXINE: CPT

## 2019-11-20 ASSESSMENT — ENCOUNTER SYMPTOMS
LOSS OF CONSCIOUSNESS: 0
PHOTOPHOBIA: 0
WEAKNESS: 0
DOUBLE VISION: 0
CARDIOVASCULAR NEGATIVE: 1
RESPIRATORY NEGATIVE: 1
DIZZINESS: 1
BLURRED VISION: 0
DEPRESSION: 0
MYALGIAS: 1
NERVOUS/ANXIOUS: 1
HEADACHES: 1
SENSORY CHANGE: 0
INSOMNIA: 0
CONSTITUTIONAL NEGATIVE: 1
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
## 2019-11-27 ENCOUNTER — TELEPHONE (OUTPATIENT)
Dept: MEDICAL GROUP | Facility: CLINIC | Age: 39
End: 2019-11-27

## 2019-11-30 ENCOUNTER — TELEPHONE (OUTPATIENT)
Dept: URGENT CARE | Facility: CLINIC | Age: 39
End: 2019-11-30

## 2019-11-30 NOTE — TELEPHONE ENCOUNTER
I called the patient at [phone], left a message regarding referral to Sports Medicine and to schedule an appointment. I asked the patient to call our clinic at [phone] to make an appointment.

## 2019-12-04 ENCOUNTER — HOSPITAL ENCOUNTER (OUTPATIENT)
Dept: RADIOLOGY | Facility: MEDICAL CENTER | Age: 39
End: 2019-12-04
Attending: NEUROLOGICAL SURGERY
Payer: COMMERCIAL

## 2019-12-04 DIAGNOSIS — M47.22 CERVICAL SPONDYLOSIS WITH RADICULOPATHY: ICD-10-CM

## 2019-12-04 PROCEDURE — 72050 X-RAY EXAM NECK SPINE 4/5VWS: CPT

## 2019-12-04 PROCEDURE — 72141 MRI NECK SPINE W/O DYE: CPT

## 2019-12-05 ENCOUNTER — OCCUPATIONAL MEDICINE (OUTPATIENT)
Dept: MEDICAL GROUP | Facility: CLINIC | Age: 39
End: 2019-12-05
Payer: COMMERCIAL

## 2019-12-05 VITALS
BODY MASS INDEX: 20.16 KG/M2 | TEMPERATURE: 97.8 F | DIASTOLIC BLOOD PRESSURE: 76 MMHG | OXYGEN SATURATION: 98 % | HEART RATE: 116 BPM | WEIGHT: 121 LBS | SYSTOLIC BLOOD PRESSURE: 116 MMHG | HEIGHT: 65 IN | RESPIRATION RATE: 16 BRPM

## 2019-12-05 DIAGNOSIS — R41.1 ANTEROGRADE AMNESIA: ICD-10-CM

## 2019-12-05 DIAGNOSIS — S06.0X9A CONCUSSION WITH < 1 HR LOSS OF CONSCIOUSNESS: ICD-10-CM

## 2019-12-05 DIAGNOSIS — H55.81 SACCADIC EYE MOVEMENTS: ICD-10-CM

## 2019-12-05 PROCEDURE — 99204 OFFICE O/P NEW MOD 45 MIN: CPT | Performed by: FAMILY MEDICINE
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
## 2020-01-02 ENCOUNTER — OCCUPATIONAL MEDICINE (OUTPATIENT)
Dept: MEDICAL GROUP | Facility: CLINIC | Age: 40
End: 2020-01-02
Payer: COMMERCIAL

## 2020-01-02 VITALS
SYSTOLIC BLOOD PRESSURE: 112 MMHG | DIASTOLIC BLOOD PRESSURE: 72 MMHG | RESPIRATION RATE: 16 BRPM | HEIGHT: 65 IN | WEIGHT: 121 LBS | OXYGEN SATURATION: 98 % | HEART RATE: 68 BPM | TEMPERATURE: 97.8 F | BODY MASS INDEX: 20.16 KG/M2

## 2020-01-02 DIAGNOSIS — R41.1 ANTEROGRADE AMNESIA: ICD-10-CM

## 2020-01-02 DIAGNOSIS — S06.0X9A CONCUSSION WITH < 1 HR LOSS OF CONSCIOUSNESS: ICD-10-CM

## 2020-01-02 DIAGNOSIS — H55.81 SACCADIC EYE MOVEMENTS: ICD-10-CM

## 2020-01-02 DIAGNOSIS — R47.01 BROCA'S APHASIA: ICD-10-CM

## 2020-01-02 PROCEDURE — 99214 OFFICE O/P EST MOD 30 MIN: CPT | Performed by: FAMILY MEDICINE
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
## 2020-01-03 ENCOUNTER — PHYSICAL THERAPY (OUTPATIENT)
Dept: PHYSICAL THERAPY | Facility: REHABILITATION | Age: 40
End: 2020-01-03
Attending: FAMILY MEDICINE
Payer: COMMERCIAL

## 2020-01-03 DIAGNOSIS — S06.0X9A CONCUSSION WITH < 1 HR LOSS OF CONSCIOUSNESS: ICD-10-CM

## 2020-01-03 PROCEDURE — 97162 PT EVAL MOD COMPLEX 30 MIN: CPT

## 2020-01-03 PROCEDURE — 97535 SELF CARE MNGMENT TRAINING: CPT

## 2020-01-03 ASSESSMENT — ENCOUNTER SYMPTOMS
PAIN SCALE AT LOWEST: 1
PAIN SCALE AT HIGHEST: 9
PAIN TIMING: UNABLE TO SPECIFY
PAIN SCALE: 3
MIGRAINE HEADACHES: 1
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
## 2020-01-06 ENCOUNTER — PHYSICAL THERAPY (OUTPATIENT)
Dept: PHYSICAL THERAPY | Facility: REHABILITATION | Age: 40
End: 2020-01-06
Attending: FAMILY MEDICINE
Payer: COMMERCIAL

## 2020-01-06 DIAGNOSIS — S06.0X9A CONCUSSION WITH < 1 HR LOSS OF CONSCIOUSNESS: ICD-10-CM

## 2020-01-06 PROCEDURE — 97110 THERAPEUTIC EXERCISES: CPT

## 2020-01-06 PROCEDURE — 97112 NEUROMUSCULAR REEDUCATION: CPT

## 2020-01-06 NOTE — OP THERAPY DAILY TREATMENT
Outpatient Physical Therapy  DAILY TREATMENT     [facility] Physical [address].  [address]  [place] [address]  Phone:  [phone]  Fax:  [phone]    Date: 01/06/2020    Patient: Dany Lambert  YOB: 1980  MRN: [id number]     Time Calculation  Start time: 0902  Stop time: 0932 Time Calculation (min): 30 minutes       Chief Complaint: Vertigo and Headache    Visit #: 2    SUBJECTIVE:  Had a migraine all weekend.  States she did have an opportunity to do HEP, tries to do it through her work shift.     OBJECTIVE:      Therapeutic Exercises (CPT 19518):     1. Roller, OA nod, nod with c/s OA rotation, alt GHJ flex, sarah    Therapeutic Treatments and Modalities:     1. Neuromuscular Re-education (CPT 27112)    Therapeutic Treatment and Modalities Summary:   - Supine roller: EC balance, horiz and vert pursuits x 10 ea way  - Jean Carlos string: L eye abduction at near point and with fatigue.  Able to see near bead successfully at 14 cm from nose.  2nd and 3rd bead clear, but with the string crossing midway bw the beads.  Caused nausea, so d/c'd after 1 bug walk attempt.   - Seated: near to far with objects at 15 feet and 30 feet away (well tolerated).     Time-based treatments/modalities:  Therapeutic exercise minutes (CPT 66804): 10 minutes  Neuromusc re-ed, balance, coor, post minutes (CPT 86259): 20 minutes       ASSESSMENT:   Response to treatment: Improved to indep postural correction, but lacks endurance/strength to maintain.  Oculomotor tasks best tolerated in supine.  Unable to tolerate convergence tasks, hoping to be able to add next session.  L eye noted to be primary contributor to convergence insufficiency.     PLAN/RECOMMENDATIONS:   Plan for treatment: therapy treatment to continue next visit.  Planned interventions for next visit: continue with current treatment.

## 2020-01-08 ENCOUNTER — APPOINTMENT (OUTPATIENT)
Dept: PHYSICAL THERAPY | Facility: REHABILITATION | Age: 40
End: 2020-01-08
Attending: FAMILY MEDICINE
Payer: COMMERCIAL

## 2020-01-10 ENCOUNTER — PHYSICAL THERAPY (OUTPATIENT)
Dept: PHYSICAL THERAPY | Facility: REHABILITATION | Age: 40
End: 2020-01-10
Attending: FAMILY MEDICINE
Payer: COMMERCIAL

## 2020-01-10 DIAGNOSIS — S06.0X9A CONCUSSION WITH < 1 HR LOSS OF CONSCIOUSNESS: ICD-10-CM

## 2020-01-10 PROCEDURE — 97112 NEUROMUSCULAR REEDUCATION: CPT

## 2020-01-10 NOTE — OP THERAPY DAILY TREATMENT
"  Outpatient Physical Therapy  DAILY TREATMENT     [facility] Physical Therapy [address].  [address]  [place] [address]  Phone:  [phone]  Fax:  [phone]    Date: 01/10/2020    Patient: Dany Lambert  YOB: 1980  MRN: [id number]     Time Calculation  Start time: 1300  Stop time: 1331 Time Calculation (min): 31 minutes       Chief Complaint: Headache and Vertigo    Visit #: 3    SUBJECTIVE:  I've been getting spinning dizziness. Started upon getting up from table while at PT for the neck the other day.  Reproduced now with tipping the head or quick motions    OBJECTIVE:  POS L hallpike, latent 3\", duration 7\".     Therapeutic Treatments and Modalities:     1. Neuromuscular Re-education (CPT 10499)    Therapeutic Treatment and Modalities Summary:   - L epley completed x 2.  Home instruction and HO provided  - Seated mardsen ball: 3 way x 15 ea  - Kathia string seated: able to perform x 5 min with bug walks x 2.  Near bead at 10cm.     Time-based treatments/modalities:  Neuromusc re-ed, balance, coor, post minutes (CPT 49909): 30 minutes       ASSESSMENT:   Response to treatment: Pt testing positive for L posterior canalithasis today.  Indicated CRM well tolerated with nystagmus resolved on 2nd attempt.  Improved control with kathia string in seated.      PLAN/RECOMMENDATIONS:   Plan for treatment: therapy treatment to continue next visit.  Planned interventions for next visit: continue with current treatment. Reassess positional exam, return to VRT if clear, cont to work toward normal convergence.        "

## 2020-01-13 ENCOUNTER — APPOINTMENT (OUTPATIENT)
Dept: PHYSICAL THERAPY | Facility: REHABILITATION | Age: 40
End: 2020-01-13
Attending: FAMILY MEDICINE
Payer: COMMERCIAL

## 2020-01-15 ENCOUNTER — APPOINTMENT (OUTPATIENT)
Dept: PHYSICAL THERAPY | Facility: REHABILITATION | Age: 40
End: 2020-01-15
Attending: FAMILY MEDICINE
Payer: COMMERCIAL

## 2020-01-17 ENCOUNTER — PHYSICAL THERAPY (OUTPATIENT)
Dept: PHYSICAL THERAPY | Facility: REHABILITATION | Age: 40
End: 2020-01-17
Attending: FAMILY MEDICINE
Payer: COMMERCIAL

## 2020-01-17 DIAGNOSIS — S06.0X9A CONCUSSION WITH < 1 HR LOSS OF CONSCIOUSNESS: ICD-10-CM

## 2020-01-17 PROCEDURE — 97112 NEUROMUSCULAR REEDUCATION: CPT

## 2020-01-17 NOTE — OP THERAPY DAILY TREATMENT
Outpatient Physical Therapy  DAILY TREATMENT     [facility] Physical Therapy [address]  [address]  [place] [address]  Phone:  [phone]  Fax:  [phone]    Date: 01/17/2020    Patient: Dany Lambert  YOB: 1980  MRN: [id number]     Time Calculation  Start time: 1404  Stop time: 1430 Time Calculation (min): 26 minutes       Chief Complaint: No chief complaint on file.    Visit #: 4    SUBJECTIVE:  Apologizes for canceling last visit, has had a migraine for the past 5 days. Dizziness reported to be improved.     OBJECTIVE:  Current objective measures: Positional testing neg for hallpike, roll and hang.       Therapeutic Treatments and Modalities:     1. Neuromuscular Re-education (CPT 46487)    Therapeutic Treatment and Modalities Summary:   - Positional exam as above  - Kathia string seated: able to perform x 5 min with bug walks x 2, progressing to 3 bead saccades.  Near bead at 10cm.   - Standing on trampoline: stable gaze with small bounce x 1 min, EC balance x 1 min    Time-based treatments/modalities:  Neuromusc re-ed, balance, coor, post minutes (CPT 81627): 26 minutes       ASSESSMENT:   Response to treatment: BPPV resolved.  Showing improved vision control with kathia string and given for home with instruction.  Able to advance balance challenges.     PLAN/RECOMMENDATIONS:   Plan for treatment: therapy treatment to continue next visit.  Planned interventions for next visit: continue with current treatment. Cont VRT progressions, monitor for BPPV relapse if pt complains of spinning returned.

## 2020-01-20 ENCOUNTER — PHYSICAL THERAPY (OUTPATIENT)
Dept: PHYSICAL THERAPY | Facility: REHABILITATION | Age: 40
End: 2020-01-20
Attending: FAMILY MEDICINE
Payer: COMMERCIAL

## 2020-01-20 DIAGNOSIS — S06.0X9A CONCUSSION WITH < 1 HR LOSS OF CONSCIOUSNESS: ICD-10-CM

## 2020-01-20 PROCEDURE — 97112 NEUROMUSCULAR REEDUCATION: CPT

## 2020-01-20 NOTE — OP THERAPY DAILY TREATMENT
"  Outpatient Physical Therapy  DAILY TREATMENT     [facility] Physical Therapy [address].  [address]  [place] [address]  Phone:  [phone]  Fax:  [phone]    Date: 01/20/2020    Patient: Dany Lambert  YOB: 1980  MRN: [id number]     Time Calculation  Start time: 1307  Stop time: 1400 Time Calculation (min): 53 minutes       Chief Complaint: Vertigo and Headache    Visit #: 5    SUBJECTIVE:  No more spinning episodes.  Had a bad headache over the weekend that she thinks was triggered by computer work at work.  Thinks she was working for a couple hours. As a result, chose not to do kathia string.  Was worried it would make it worse.     OBJECTIVE:      Therapeutic Treatments and Modalities:     1. Neuromuscular Re-education (CPT 71056)    Therapeutic Treatment and Modalities Summary:   - EC ankle sways (F/B and circular)  - Airex: standard EC  - Airex: wide with HTs and EC  - Tandem gait: fwd and back, x 2 laps ea (notable increase in trunkal ataxia backwards).   - Supine proprio grounding  - \" with eye tracking 3 way, x 10 ea  - EC guided convergence (imagery)  - Pencil through straw, seated on ball  -     Time-based treatments/modalities:  Neuromusc re-ed, balance, coor, post minutes (CPT 22701): 53 minutes       ASSESSMENT:   Response to treatment: Cont trunk ataxia during balance challenges.  Reproduces nausea, but able to be brought down through proprioceptive grounding.  Interestingly, pt reporting inability to perform imagery on the L or in near field.  To work on this at home when HA doesn't allow kathia string.     PLAN/RECOMMENDATIONS:   Plan for treatment: therapy treatment to continue next visit.  Planned interventions for next visit: continue with current treatment. Reassess kathia string, VOR when near point is more stable, balance progressions to tolerance.        "

## 2020-01-22 ENCOUNTER — APPOINTMENT (OUTPATIENT)
Dept: PHYSICAL THERAPY | Facility: REHABILITATION | Age: 40
End: 2020-01-22
Attending: FAMILY MEDICINE
Payer: COMMERCIAL

## 2020-01-22 ENCOUNTER — TELEPHONE (OUTPATIENT)
Dept: OPHTHALMOLOGY | Facility: MEDICAL CENTER | Age: 40
End: 2020-01-22

## 2020-01-24 ENCOUNTER — PHYSICAL THERAPY (OUTPATIENT)
Dept: PHYSICAL THERAPY | Facility: REHABILITATION | Age: 40
End: 2020-01-24
Attending: FAMILY MEDICINE
Payer: COMMERCIAL

## 2020-01-24 DIAGNOSIS — S06.0X9A CONCUSSION WITH < 1 HR LOSS OF CONSCIOUSNESS: ICD-10-CM

## 2020-01-24 PROCEDURE — 97112 NEUROMUSCULAR REEDUCATION: CPT

## 2020-01-24 NOTE — OP THERAPY DAILY TREATMENT
"  Outpatient Physical Therapy  DAILY TREATMENT     [facility] Physical Therapy [address].  [address]  [place] [address]  Phone:  [phone]  Fax:  [phone]    Date: 01/24/2020    Patient: Dany Lambert  YOB: 1980  MRN: [id number]     Time Calculation  Start time: 1434  Stop time: 1500 Time Calculation (min): 26 minutes       Chief Complaint: Headache    Visit #: 6    SUBJECTIVE:  HA today.  Notes a HA set on 2 hrs after last visit.  Yesterday, tried the kathia string at home and again had a HA approx 2 hrs after.  Lasts about 24 hrs. Describes intense pressure and pain around and behind the R eye.  \"It definitely seems to be things in my near vision.\" Approved for exam with Dr. Alfaro.     OBJECTIVE:      Therapeutic Treatments and Modalities:     1. Neuromuscular Re-education (CPT 40719)    Therapeutic Treatment and Modalities Summary:   - Ankle sways on rocker board. EO  - Balance on tilt EC, 1 min attempts ea way.  Stable for about 10 sec, the trunkal ataxia setting in and needing to open eyes to reset.  Beginning to report mild nausea  - Foam balance EC x 1 min  - Foam wide with EO head circles: x 10 CCW-> producing severe nausea response and having to stop session.  Improved with rest and CP to back of neck.     Time-based treatments/modalities:  Neuromusc re-ed, balance, coor, post minutes (CPT 13972): 26 minutes       ASSESSMENT:   Response to treatment: Avoiding visual stim today due to presence of HA.  Cont vestibular abnormalities, imbalance sabrina with EC or on offset surface. Head circles not well tolerated through full range.    PLAN/RECOMMENDATIONS:   Plan for treatment: therapy treatment to continue next visit.  Planned interventions for next visit: continue with current treatment. Seated head circles, static foam balance       "

## 2020-01-27 ENCOUNTER — APPOINTMENT (OUTPATIENT)
Dept: PHYSICAL THERAPY | Facility: REHABILITATION | Age: 40
End: 2020-01-27
Attending: FAMILY MEDICINE
Payer: COMMERCIAL

## 2020-01-27 ENCOUNTER — TELEPHONE (OUTPATIENT)
Dept: OPHTHALMOLOGY | Facility: MEDICAL CENTER | Age: 40
End: 2020-01-27

## 2020-01-27 DIAGNOSIS — S06.0X9A CONCUSSION WITH < 1 HR LOSS OF CONSCIOUSNESS: ICD-10-CM

## 2020-01-27 PROCEDURE — 97112 NEUROMUSCULAR REEDUCATION: CPT

## 2020-01-27 NOTE — OP THERAPY DAILY TREATMENT
Outpatient Physical Therapy  DAILY TREATMENT     [facility] Physical Therapy [address].  [address]  [place] [address]  Phone:  [phone]  Fax:  [phone]    Date: 01/27/2020    Patient: Dany Lambert  YOB: 1980  MRN: [id number]     Time Calculation  Start time: 1431  Stop time: 1500 Time Calculation (min): 29 minutes       Chief Complaint: Vertigo    Visit #: 7    SUBJECTIVE:  Pt states she recovered well after LV.  Took it easy on the HEP and able to report little to no headache today.     OBJECTIVE:      Therapeutic Treatments and Modalities:     1. Neuromuscular Re-education (CPT 90806)    Therapeutic Treatment and Modalities Summary:   - Ball balance: narrow EO, marching, wide EC, HTs small side to side  - Airex tall kneeling balance  - Airex 1/2 kneeling balance  - C/S kinesth. Seated in rolling stool.   - Ball squat: x 10  - Multif step outs: x 10 ea side with L2    Time-based treatments/modalities:  Neuromusc re-ed, balance, coor, post minutes (CPT 79140): 29 minutes       ASSESSMENT:   Response to treatment: Pt with apparent improvement in cognition and recall bw sessions. Notable trunkal ataxia with less nausea response in ball or kneeling positions. To try for home.     PLAN/RECOMMENDATIONS:   Plan for treatment: therapy treatment to continue next visit.  Planned interventions for next visit: continue with current treatment. Cont to target core stab (ball bridge), kneeling positions, ball balance.

## 2020-01-28 ENCOUNTER — TELEPHONE (OUTPATIENT)
Dept: OPHTHALMOLOGY | Facility: MEDICAL CENTER | Age: 40
End: 2020-01-28

## 2020-01-29 ENCOUNTER — APPOINTMENT (OUTPATIENT)
Dept: PHYSICAL THERAPY | Facility: REHABILITATION | Age: 40
End: 2020-01-29
Attending: FAMILY MEDICINE
Payer: COMMERCIAL

## 2020-01-30 ENCOUNTER — OCCUPATIONAL MEDICINE (OUTPATIENT)
Dept: MEDICAL GROUP | Facility: CLINIC | Age: 40
End: 2020-01-30
Payer: COMMERCIAL

## 2020-01-30 VITALS
DIASTOLIC BLOOD PRESSURE: 68 MMHG | RESPIRATION RATE: 16 BRPM | BODY MASS INDEX: 20.16 KG/M2 | TEMPERATURE: 98.3 F | HEIGHT: 65 IN | HEART RATE: 78 BPM | WEIGHT: 121 LBS | SYSTOLIC BLOOD PRESSURE: 110 MMHG | OXYGEN SATURATION: 97 %

## 2020-01-30 DIAGNOSIS — H55.81 SACCADIC EYE MOVEMENTS: ICD-10-CM

## 2020-01-30 DIAGNOSIS — S09.90XD CLOSED HEAD INJURY, SUBSEQUENT ENCOUNTER: ICD-10-CM

## 2020-01-30 DIAGNOSIS — R41.1 ANTEROGRADE AMNESIA: ICD-10-CM

## 2020-01-30 DIAGNOSIS — S06.0X9A CONCUSSION WITH < 1 HR LOSS OF CONSCIOUSNESS: ICD-10-CM

## 2020-01-30 DIAGNOSIS — R47.01 BROCA'S APHASIA: ICD-10-CM

## 2020-01-30 PROCEDURE — 99213 OFFICE O/P EST LOW 20 MIN: CPT | Performed by: FAMILY MEDICINE
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
## 2020-01-31 ENCOUNTER — APPOINTMENT (OUTPATIENT)
Dept: PHYSICAL THERAPY | Facility: REHABILITATION | Age: 40
End: 2020-01-31
Attending: FAMILY MEDICINE
Payer: COMMERCIAL
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
## 2020-02-03 ENCOUNTER — SPEECH THERAPY (OUTPATIENT)
Dept: SPEECH THERAPY | Facility: REHABILITATION | Age: 40
End: 2020-02-03
Attending: FAMILY MEDICINE
Payer: COMMERCIAL

## 2020-02-03 ENCOUNTER — PHYSICAL THERAPY (OUTPATIENT)
Dept: PHYSICAL THERAPY | Facility: REHABILITATION | Age: 40
End: 2020-02-03
Attending: FAMILY MEDICINE
Payer: COMMERCIAL

## 2020-02-03 DIAGNOSIS — S09.90XS LATE EFFECT OF HEAD TRAUMA, COGNITIVE DEFICITS: ICD-10-CM

## 2020-02-03 DIAGNOSIS — S06.0X9A CONCUSSION WITH < 1 HR LOSS OF CONSCIOUSNESS: ICD-10-CM

## 2020-02-03 DIAGNOSIS — R41.1 ANTEROGRADE AMNESIA: ICD-10-CM

## 2020-02-03 DIAGNOSIS — F09 LATE EFFECT OF HEAD TRAUMA, COGNITIVE DEFICITS: ICD-10-CM

## 2020-02-03 PROCEDURE — 97112 NEUROMUSCULAR REEDUCATION: CPT

## 2020-02-03 PROCEDURE — 92523 SPEECH SOUND LANG COMPREHEN: CPT

## 2020-02-03 ASSESSMENT — ENCOUNTER SYMPTOMS
PAIN LOCATION: NECK
PAIN SCALE: 4

## 2020-02-03 NOTE — OP THERAPY DAILY TREATMENT
"  Outpatient Physical Therapy  DAILY TREATMENT     [facility] Physical Therapy [address].  [address]  [place] [address]  Phone:  [phone]  Fax:  [phone]    Date: 02/03/2020    Patient: Dany Lambert  YOB: 1980  MRN: [id number]     Time Calculation  Start time: 1032  Stop time: 1100 Time Calculation (min): 28 minutes       Chief Complaint: Vertigo and Headache    Visit #: 8    SUBJECTIVE:  Had to call out last visit due to migraine.  \"This one came out of the blue.\"  Cluster migraines for the last 3 days, tried to hit it hard with meds, but didn't seem to help.  Doesn't think it was trigger by our session.  Has speech therapy after this and cervical injections later today.    OBJECTIVE:      Therapeutic Treatments and Modalities:     1. Neuromuscular Re-education (CPT 09776)    Therapeutic Treatment and Modalities Summary:   - Airex tall kneeling balance  - Airex tall kneeling balance with horiz HTs: 2x8 ea  - STS: EO x5, EC x5, on airex with EO  - Airex 1/2 kneeling balance  - C/S kinesth. Seated in rolling stool.   - Ball bridge: 5\" holds x 15     Time-based treatments/modalities:  Neuromusc re-ed, balance, coor, post minutes (CPT 50237): 28 minutes       ASSESSMENT:   Response to treatment: Decreased sway during kneeling tasks, still having to be broken up with firm surface/proprio rest to keep nausea to a minimum.  Happy with progress to STS.      PLAN/RECOMMENDATIONS:   Plan for treatment: therapy treatment to continue next visit.  Planned interventions for next visit: continue with current treatment. Attempt EC STS if able.  HTs in kneeling.        "
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
## 2020-02-05 ENCOUNTER — OFFICE VISIT (OUTPATIENT)
Dept: NEUROLOGY | Facility: MEDICAL CENTER | Age: 40
End: 2020-02-05
Payer: COMMERCIAL

## 2020-02-05 VITALS
BODY MASS INDEX: 19.83 KG/M2 | WEIGHT: 119 LBS | SYSTOLIC BLOOD PRESSURE: 118 MMHG | HEART RATE: 76 BPM | HEIGHT: 65 IN | OXYGEN SATURATION: 98 % | DIASTOLIC BLOOD PRESSURE: 70 MMHG

## 2020-02-05 DIAGNOSIS — S09.90XD CLOSED HEAD INJURY, SUBSEQUENT ENCOUNTER: ICD-10-CM

## 2020-02-05 DIAGNOSIS — G43.109 MIGRAINE WITH AURA AND WITHOUT STATUS MIGRAINOSUS, NOT INTRACTABLE: Primary | ICD-10-CM

## 2020-02-05 PROCEDURE — 99213 OFFICE O/P EST LOW 20 MIN: CPT | Performed by: PSYCHIATRY & NEUROLOGY

## 2020-02-05 RX ORDER — ERENUMAB-AOOE 140 MG/ML
140 INJECTION, SOLUTION SUBCUTANEOUS
Qty: 1 PEN | Refills: 11 | Status: SHIPPED | OUTPATIENT
Start: 2020-02-05 | End: 2021-02-09 | Stop reason: SDUPTHER

## 2020-02-05 ASSESSMENT — ENCOUNTER SYMPTOMS
MYALGIAS: 1
MEMORY LOSS: 0
NECK PAIN: 1
DIZZINESS: 1
HEADACHES: 1
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
## 2020-02-07 ENCOUNTER — SPEECH THERAPY (OUTPATIENT)
Dept: SPEECH THERAPY | Facility: REHABILITATION | Age: 40
End: 2020-02-07
Attending: FAMILY MEDICINE
Payer: COMMERCIAL

## 2020-02-07 ENCOUNTER — PHYSICAL THERAPY (OUTPATIENT)
Dept: PHYSICAL THERAPY | Facility: REHABILITATION | Age: 40
End: 2020-02-07
Attending: FAMILY MEDICINE
Payer: COMMERCIAL

## 2020-02-07 DIAGNOSIS — S06.0X9A CONCUSSION WITH < 1 HR LOSS OF CONSCIOUSNESS: ICD-10-CM

## 2020-02-07 DIAGNOSIS — R41.1 ANTEROGRADE AMNESIA: ICD-10-CM

## 2020-02-07 DIAGNOSIS — F09 LATE EFFECT OF HEAD TRAUMA, COGNITIVE DEFICITS: ICD-10-CM

## 2020-02-07 DIAGNOSIS — S09.90XS LATE EFFECT OF HEAD TRAUMA, COGNITIVE DEFICITS: ICD-10-CM

## 2020-02-07 PROCEDURE — 92507 TX SP LANG VOICE COMM INDIV: CPT

## 2020-02-07 PROCEDURE — 97112 NEUROMUSCULAR REEDUCATION: CPT

## 2020-02-07 NOTE — OP THERAPY DAILY TREATMENT
"  Outpatient Speech Therapy  DAILY TREATMENT     [facility] Speech [place] [address] [address]  [address]  [place] [address]  Phone:  [phone]  Fax:  [phone]    Date: 02/07/2020    Patient: Dany Lambert  YOB: 1980  MRN: [id number]     Time Calculation  Start time: 1530  Stop time: 1600 Time Calculation (min): 30 minutes       Chief Complaint: Aphasia (\"most frustrating for today is word finding. I just get stuck when I'm speaking.\" )    Visit #: 2    Subjective:   Reason for Therapy:     Reason For Evaluation:  Aphasia, Speech/Language, Cognition and TBI  Social Support:     Social support system: unaccompanied.    Patient Mental Status:  Alert and Responsive  Additional Subjective Comments:      \"I'm good today. It's a good day today.\" \"When I'm talking I know what I want to say, but I just have a hard time getting it out.\"     Patient reports a \"new symptoms is words looked weird\" when typing, writing especially with words like homonyms.       Objective:   Treatments/Interventions Performed:  Patient/Caregiver education, Compensatory strategy training, Home program, Speech/Language treatment and Cognitive-Linguistic training    Speech therapy initiated addressing cognitive linguistic function through completion of structured tasks specific to patient area of increased concern for today's session:  Word finding.    Semantic feature analysis educated/ trained/ implemented as part of today's session. Patient demonstrated good understanding regarding parameters of SFA, demonstrating accurate use of SFA for object description given direct visual (picture) cues of objects 2/2 = 100% accuracy.    Word fluency addressed through participation in and completion of generative naming tasks to concrete category (animals).  Initial trial given no instruction completed with patient naming 18 items in one-minute; however, perseverations were present x 3, resulting in a perseveration ratio " "of 0.16, which is significant.      Education/ training in compensatory strategies to increase thought organization and accuracy during generative naming tasks targeted through understanding of subcategorization.  To same category, patient demonstrated accuracy in naming of animals naming 12 items in one-minute with no perseverations as seen during previous task. Patient was provided with word list and asked to identify subcategories to concrete categories as part of home program.     Speech Therapy Assessment:     Expressive Language Assessment:     Ability to exhibit appropriate naming: Minimal.    Cognitive Linguistic Assessment:     Patient immediate memory: Minimal (to task of word fluency)    Cognitive-Linguistic comments: Patient encouraged to implement daily journal, given patient appears to be seeking more activities involving written expression to address writing, thought organization and memory recall for personally relevant information in a daily structured task.  Patient was receptive to this recommendation.         Speech Therapy Plan :   Prognosis & Recommendations  Impression Summary:  Dany Lambert, a 39 year old female, participated in her first follow up session of outpatient speech therapy addressing cognitive linguistic deficits in the setting of post concussive syndrome.    Patient provided with education in strategies to address presence of word finding difficulties, given patient noted increased frustration regarding difficulty in \"saying what I want to say\" at the start of today's session.    Patient was provided with education regarding strategies to improve accuracy in naming, specific to Semantic Feature Analysis, and identifying subcategories to increase accuracy in thought organization/ working memory during generative naming tasks.   Prognosis:  Good  Prognosis Details:  Patient very motivated to improve cognitive linguistic function with patient verbalizing insight into deficits as " identified during initial evaluation. Patient is open to completing tasks as part of home program with use of daily journal recommended at this time.   Therapy Recommendations  Recommendation:  Individual Speech Therapy,  Planned Therapy Interventions:  Home Program, Compensatory Strategy Training, Patient/Caregiver Education, Speech/Language training and Cognitive-Linguistic training,   Plan Details:  Continue with current POC.

## 2020-02-07 NOTE — OP THERAPY DAILY TREATMENT
"  Outpatient Physical Therapy  DAILY TREATMENT     [facility] Physical Therapy [address].  [address]  [place] [address]  Phone:  [phone]  Fax:  [phone]    Date: 02/07/2020    Patient: Dany Lambert  YOB: 1980  MRN: [id number]     Time Calculation  Start time: 1502  Stop time: 1531 Time Calculation (min): 29 minutes       Chief Complaint: Vertigo    Visit #: 9    SUBJECTIVE:  Head has been feeling good.  Was shocked at some of the difficulties she had during the speech eval. See neuro-op on the 25th.     OBJECTIVE:      Therapeutic Treatments and Modalities:     1. Neuromuscular Re-education (CPT 57225)    Therapeutic Treatment and Modalities Summary:   - Airex tall kneeling balance  - Airex tall kneeling balance with horiz HTs: 2x8 ea  - STS: to airex EO x 5, EC x 5  - C/S kinesth: laser guided  - Eyes then head auto saccades, seated, laser guided.     Time-based treatments/modalities:  Neuromusc re-ed, balance, coor, post minutes (CPT 14970): 29 minutes       ASSESSMENT:   Response to treatment: Steady progression with ability to tolerate balance challenges with less nausea.  Very slow auto saccades, \"it was difficult to keep my eyes focused.\" No HA to follow, but did stimulate mild nausea.  Given HO to get foam for home.     PLAN/RECOMMENDATIONS:   Plan for treatment: therapy treatment to continue next visit.  Planned interventions for next visit: continue with current treatment.       "

## 2020-02-14 ENCOUNTER — SPEECH THERAPY (OUTPATIENT)
Dept: SPEECH THERAPY | Facility: REHABILITATION | Age: 40
End: 2020-02-14
Attending: FAMILY MEDICINE
Payer: COMMERCIAL

## 2020-02-14 ENCOUNTER — PHYSICAL THERAPY (OUTPATIENT)
Dept: PHYSICAL THERAPY | Facility: REHABILITATION | Age: 40
End: 2020-02-14
Attending: FAMILY MEDICINE
Payer: COMMERCIAL

## 2020-02-14 DIAGNOSIS — S09.90XS LATE EFFECT OF HEAD TRAUMA, COGNITIVE DEFICITS: ICD-10-CM

## 2020-02-14 DIAGNOSIS — S06.0X9A CONCUSSION WITH < 1 HR LOSS OF CONSCIOUSNESS: ICD-10-CM

## 2020-02-14 DIAGNOSIS — F09 LATE EFFECT OF HEAD TRAUMA, COGNITIVE DEFICITS: ICD-10-CM

## 2020-02-14 PROCEDURE — 92507 TX SP LANG VOICE COMM INDIV: CPT

## 2020-02-14 PROCEDURE — 97112 NEUROMUSCULAR REEDUCATION: CPT

## 2020-02-14 NOTE — OP THERAPY DAILY TREATMENT
"  Outpatient Physical Therapy  DAILY TREATMENT     [facility] Physical Therapy [address]  [address]  [place] [address]  Phone:  [phone]  Fax:  [phone]    Date: 02/14/2020    Patient: Dany Lambert  YOB: 1980  MRN: [id number]     Time Calculation  Start time: 1501  Stop time: 1530 Time Calculation (min): 29 minutes       Chief Complaint: Vertigo; Loss Of Balance; and Headache    Visit #: 10    SUBJECTIVE:  Apologetic for missing last visit. Slept thorough her alarm, had been up nearly 24 hrs the day before.  A little HA today.      OBJECTIVE:      Therapeutic Treatments and Modalities:     1. Neuromuscular Re-education (CPT 24704)    Therapeutic Treatment and Modalities Summary:   - Ball balance: EC balance with standard stance, marching EO hands on ball, horizontal HTs with targets.   - Ankle rocker board: AP and lateral x 1 min ea (Needing CGA for lateral due to LOB)  - Standing balance EC with 1 foot on 4\" step, x 1 min ea side (needed break due to nausea)  - No additional tx complete due to severity level of nausea    Time-based treatments/modalities:  Neuromusc re-ed, balance, coor, post minutes (CPT 61069): 29 minutes       ASSESSMENT:   Response to treatment: Able to return to full standing balance challenges, but tolerance is limited. Improved trunk control on ball and in kneeling.  Notable lack of control when dominantly over the L side in step hold and rocker board tipped L.      PLAN/RECOMMENDATIONS:   Plan for treatment: therapy treatment to continue next visit.  Planned interventions for next visit: continue with current treatment. Cont VRT as needed     "

## 2020-02-14 NOTE — OP THERAPY DAILY TREATMENT
"  Outpatient Speech Therapy  DAILY TREATMENT     [facility] Speech [address].  [address]  [place] [address]  Phone:  [phone]  Fax:  [phone]    Date: 02/14/2020    Patient: Dany Lambert  YOB: 1980  MRN: [id number]     Time Calculation  Start time: 1415  Stop time: 1500 Time Calculation (min): 45 minutes       Chief Complaint: Other (\"it was a rought week\")    Visit #: 3    Subjective:   Reason for Therapy:     Reason For Evaluation:  Cognition, Aphasia, Speech/Language and TBI  Social Support:     Social support system: unaccompanied.    Patient Mental Status:  Alert and Responsive  Progress Factors:     Aggravating Factors:  Noise    Alleviating Factors:  Quiet atmosphere and Reduce Stimuli  Additional Subjective Comments:      Patient reports she is waiting to see the NeuroOpthamologist which is scheduled for 2/25/2020.  Patient reports that return to work has been difficult related to sleep/ rest cycles. Patient reports she missed Mondays speech therapy appointment because she \"slept for 24 hours\" following completion of shift at work.       Objective:   Treatments/Interventions Performed:  Patient/Caregiver education, Compensatory strategy training, Speech/Language treatment, Cognitive-Linguistic training and Home program    Speech therapy targeted cognitive linguistic function through completion of the following structured therapy activities:    Complex, divided attention addressed through completion of structured activity involving following 2 step, complex directions. Patient completed structured task with 8/10 = 80% accuracy, independent.  Use of strategies to increase accuracy during structured task reviewed with focus on use of visual cues in the form of highlighting important information, making small notes to recall what has been read. Using strategies, to same task, patient completed with 10/10 = 100% accuracy.         Speech Therapy Assessment: "     Receptive Language Assessment:     Patient follows 2 step complex commands: Minimal (to written commands)    Cognitive Linguistic Assessment:     Patient attention divided: Minimal        Speech Therapy Plan :   Prognosis & Recommendations  Impression Summary:  Strategies to address environmental modification to meet patient needs in situations of increased loudness discussed with patient verbalizing understanding.    Importance in use of compensatory strategies as evidenced by improvement in performance during structured, complex direction following tasks reinforced for use in home/ community settings.    Therapy Recommendations  Recommendation: Individual Speech Therapy and Other, 1.) Consider referral to Audiologist for formal hearing evaluation given patient reports of changes to hearing/ noted inner ear involvement in patient current condition  Planned Therapy Interventions:  Home Program, Compensatory Strategy Training, Patient/Caregiver Education, Cognitive-Linguistic training and Speech/Language training,   Plan Details:  Continue with current POC.

## 2020-02-17 ENCOUNTER — SPEECH THERAPY (OUTPATIENT)
Dept: SPEECH THERAPY | Facility: REHABILITATION | Age: 40
End: 2020-02-17
Attending: FAMILY MEDICINE
Payer: COMMERCIAL

## 2020-02-17 DIAGNOSIS — S09.90XS LATE EFFECT OF HEAD TRAUMA, COGNITIVE DEFICITS: ICD-10-CM

## 2020-02-17 DIAGNOSIS — F09 LATE EFFECT OF HEAD TRAUMA, COGNITIVE DEFICITS: ICD-10-CM

## 2020-02-17 DIAGNOSIS — S06.0X9A CONCUSSION WITH < 1 HR LOSS OF CONSCIOUSNESS: ICD-10-CM

## 2020-02-17 PROCEDURE — 92507 TX SP LANG VOICE COMM INDIV: CPT

## 2020-02-17 NOTE — OP THERAPY DAILY TREATMENT
"  Outpatient Speech Therapy  DAILY TREATMENT     [facility] Speech [place] [address] [address]  [address]  [place] [address]  Phone:  [phone]  Fax:  [phone]    Date: 02/17/2020    Patient: Dany Lambert  YOB: 1980  MRN: [id number]     Time Calculation  Start time: 0930  Stop time: 1030 Time Calculation (min): 60 minutes       Chief Complaint: Concussion (\"I've noticed that I'm starting to get really jumbled up speaking\")    Visit #: 4    Subjective:   Reason for Therapy:     Reason For Evaluation:  Aphasia, Cognition, Speech/Language and TBI  Social Support:     Social support system: unaccompanied.    Patient Mental Status:  Alert and Responsive  Progress Factors:     Progression:  Getting Better  Additional Subjective Comments:      Patient reports she is seeing \"Dr. Navarro for pain management\" and \"Dr. Griffin for Neurology.\" Patient reports she is scheduled to have a cervical ablasion on the 28th of this month to address with Dr. Navarro.     \"I notice that I'm getting back to do really funny things again. I'm almost up to doing the same things I did day 1 after my injury. I get stopped up when I'm talking and then I'm doing things like (I just get stopped up when I'm trying to talk and I don't know why). Like when I'm doing stuff, like today when I put my contacts in, I poured the saline solution in the trash versus putting my contacts int he trash.\"       Objective:   Treatments/Interventions Performed:  Cognitive-Linguistic training, Home program, Speech/Language treatment, Compensatory strategy training and Patient/Caregiver education    Speech therapy targeted cognitive linguistic function through completion of the following structured therapy activities:     Complex, divided attention addressed through completion of structured activity involving following 2 step, complex directions. Patient completed structured task with 52/54 = 96.2% accuracy, independent. " Patient independent in recall/ implementation of strategies used during previous session that assisted with accuracy to structured therapy task.    Expressive language skills targeted in combination with reasoning skills, specific to structured task addressing patient provided with single word and asked to provide 2 meanings for the single word given phrase/ simple sentence level productions. Patient completed with 19/20 = 95% accuracy, independent. Increased processing time necessary to complete structured language generation task.     Word fluency/ working memory addressed through structured generative naming to items given concrete categories. Use of subcategorization strategy reviewed with patient necessitating direct support/ cues prior to generative naming of each category presented to increase accuracy.  Using strategy and verbal support/ instruction/ cues from the clinician, patient completed the structured task naming 17 animals in one-minute; 15 things to drink in one-minute and 13 things to eat in one minute. Patient with noted increased difficulty to name of more general categories during structured task.     Speech Therapy Assessment:     Cognitive Linguistic Assessment:     Cognitive-Linguistic comments: Patient provided with education regarding strategies to address noted concern/ deficits in planning/ organizing in the home setting.  Strategies included:  Planning activity through talking aloud before starting activity to increase planning/ organizing in task completion.  Organizing activities before starting such as having all material present prior to beginning task also discussed.            Speech Therapy Plan :   Prognosis & Recommendations  Impression Summary:  Patient demonstrated increased verbalizations regarding insight to deficits and changes in cognitive linguistic function following TBI. Patient provided with education that, increased awareness into deficits can be indicative of improved  insight, which shows progress in cognitive linguistic function following TBI.    Patient continues to require use of external strategies to increase accuracy in performance given structured tasks, such as use of sticky notes and other visual aids, to increase recall to newly learned information.   Therapy Recommendations  Recommendation:  Individual Speech Therapy,  Planned Therapy Interventions:  Home Program, Compensatory Strategy Training, Patient/Caregiver Education, Speech/Language training and Cognitive-Linguistic training,   Plan Details:  Continue with current POC. Continue on activities addressing working memory given need for direct cues to increase accuracy during structured generative naming tasks to address planning/ thought organization.

## 2020-02-20 ENCOUNTER — SPEECH THERAPY (OUTPATIENT)
Dept: SPEECH THERAPY | Facility: REHABILITATION | Age: 40
End: 2020-02-20
Attending: FAMILY MEDICINE
Payer: COMMERCIAL

## 2020-02-20 DIAGNOSIS — S06.0X9A CONCUSSION WITH < 1 HR LOSS OF CONSCIOUSNESS: ICD-10-CM

## 2020-02-20 DIAGNOSIS — F09 LATE EFFECT OF HEAD TRAUMA, COGNITIVE DEFICITS: ICD-10-CM

## 2020-02-20 DIAGNOSIS — S09.90XS LATE EFFECT OF HEAD TRAUMA, COGNITIVE DEFICITS: ICD-10-CM

## 2020-02-20 DIAGNOSIS — R41.1 ANTEROGRADE AMNESIA: ICD-10-CM

## 2020-02-20 PROCEDURE — 92507 TX SP LANG VOICE COMM INDIV: CPT

## 2020-02-20 NOTE — OP THERAPY DAILY TREATMENT
"  Outpatient Speech Therapy  DAILY TREATMENT     [facility] Speech [place] [address] [address]  [address]  [place] [address]  Phone:  [phone]  Fax:  [phone]    Date: 02/20/2020    Patient: Dany Lambert  YOB: 1980  MRN: [id number]     Time Calculation  Start time: 1333  Stop time: 1400 Time Calculation (min): 27 minutes       Chief Complaint: Aphasia (\"I feel like when I'm talking I just stop and get stuck\")    Visit #: 5    Subjective:   Reason for Therapy:     Reason For Evaluation:  Cognition, TBI, Speech/Language and Aphasia  Social Support:     Social support system: unaccompanied.    Patient Mental Status:  Alert and Responsive  Additional Subjective Comments:      \"My vocabulary used to be so much bigger. I am just frustrated that when I'm talking there are all these pauses.\" \"I noticed a new kind of thing I was with someone I'm seeing and I was talking and I was trying to explain something about a show and then I almost had a stutter, I was trying to get it out and I couldn't.\"       Objective:   Treatments/Interventions Performed:  Patient/Caregiver education, Speech/Language treatment, Compensatory strategy training and Home program    Word fluency/ working memory addressed through structured generative naming to items given concrete categories. Use of subcategorization strategy recalled independent with patient using to independent level given structured generative naming tasks.  Using strategy, patient completed the structured task naming:  15 states, 10 vegetables and 22 articles of clothing given one-minute intervals.      Accuracy in expressive language skills targeted patient providing word definitions stating a minimum of 3 details: patient completed structured task with 3/4 = 75% accuracy, independent.          Speech Therapy Assessment:     Expressive Language Assessment:     Expressive language comments: Given structured, speech generation task addressing " "defining words to parameters, patient demonstrated a significant increase in presence of word finding difficulty in addition to need for increased processing time to ensure accuracy in speech production.  Patient stated \"I know what I want to say. I just can't.\" Strategies to address word finding difficulties to more novel speech production tasks discussed.       Speech Therapy Plan :   Therapy Recommendations  Recommendation: Individual Speech Therapy,  Planned Therapy Interventions:  Home Program, Compensatory Strategy Training, Patient/Caregiver Education, Speech/Language training and Cognitive-Linguistic training,   Plan Details:  Continue with current POC.                "

## 2020-02-24 ENCOUNTER — PHYSICAL THERAPY (OUTPATIENT)
Dept: PHYSICAL THERAPY | Facility: REHABILITATION | Age: 40
End: 2020-02-24
Attending: FAMILY MEDICINE
Payer: COMMERCIAL

## 2020-02-24 ENCOUNTER — SPEECH THERAPY (OUTPATIENT)
Dept: SPEECH THERAPY | Facility: REHABILITATION | Age: 40
End: 2020-02-24
Attending: FAMILY MEDICINE
Payer: COMMERCIAL

## 2020-02-24 DIAGNOSIS — R41.1 ANTEROGRADE AMNESIA: ICD-10-CM

## 2020-02-24 DIAGNOSIS — S06.0X9A CONCUSSION WITH < 1 HR LOSS OF CONSCIOUSNESS: ICD-10-CM

## 2020-02-24 DIAGNOSIS — F09 LATE EFFECT OF HEAD TRAUMA, COGNITIVE DEFICITS: ICD-10-CM

## 2020-02-24 DIAGNOSIS — S09.90XS LATE EFFECT OF HEAD TRAUMA, COGNITIVE DEFICITS: ICD-10-CM

## 2020-02-24 PROCEDURE — 92507 TX SP LANG VOICE COMM INDIV: CPT

## 2020-02-24 PROCEDURE — 97112 NEUROMUSCULAR REEDUCATION: CPT

## 2020-02-24 ASSESSMENT — GAIT ASSESSMENTS
STEP AROUND OBSTACLES: MODERATE IMPAIRMENT: IS ABLE TO CLEAR CONES BUT MUST SIGNIFICANTLY SLOW, SPEED TO ACCOMPLISH TASK, OR REQUIRES VERBAL CUEING
STEPS: MILD IMPAIRMENT: ALTERNATING FEET, MUST USE RAIL
GAIT AND PIVOT TURN: MODERATE IMPAIRMENT: TURNS SLOWLY, REQUIRES VERBAL CUEING, REQUIRES SEVERAL SMALL STEPS TO CATCH BALANCE FOLLOWING TURN AND STOP
GAIT LEVEL SURFACE: MILD IMPAIRMENT: WALKS 20', USES ASSISTIVE DEVICES, MILD GAIT DEVIATIONS
GAIT WITH VERTICAL HEAD TURNS: MILD IMPAIRMENT: PERFORMS HEAD TURNS SMOOTHLY WITH SLIGHT CHANGE IN GAIT VELOCITY, IE, MINOR DISRUPTION TO SMOOTH GAIT PATH OR USES WALKING AID
DYNAMIC GAIT INDEX SCORE: 58.33
GAIT WITH HORIZONTAL HEAD TURNS: MILD IMPAIRMENT: PERFORMS HEAD TURNS SMOOTHLY WITH SLIGHT CHANGE IN GAIT VELOCITY, IE, MINOR DISRUPTION TO SMOOTH GAIT PATH OR USES WALKING AID
CHANGE IN GAIT SPEED: MILD IMPAIRMENT: IS ABLE TO CHANGE SPEED BUT DEMONSTRATES MILD GAIT DEVIATIONS, OR NO GAIT DEVIATIONS BUT UNABLE TO ACHIEVE A SIGNIFICANT CHANGE IN VELOCITY, OR USES AN ASSISTIVE DEVICE
STEP OVER OBSTACLE: MILD IMPAIRMENT: IS ABLE TO STEP OVER BOX, BUT MUST SLOW DOWN AND ADJUST TO CLEAR THE BOX SAFELY

## 2020-02-24 NOTE — OP THERAPY DAILY TREATMENT
"  Outpatient Speech Therapy  DAILY TREATMENT     [facility] Speech [address].  [address]  [place] [address]  Phone:  [phone]  Fax:  [phone]    Date: 02/24/2020    Patient: Dany Lambert  YOB: 1980  MRN: [id number]     Time Calculation  Start time: 1510  Stop time: 1540 Time Calculation (min): 30 minutes       Chief Complaint: Inability To Get Words Out    Visit #: 6    Subjective:   Social Support:     Patient Mental Status:  Alert and Responsive  Additional Subjective Comments:      \"I feel llike I keep getting stuck on words\".  \"Doesn't look like a real word\" when completing word tasks.  Completed HEP.      Objective:   Treatments/Interventions Performed:  Cognitive-Linguistic training, Home program, Patient/Caregiver education, Speech/Language treatment and Compensatory strategy training  Objective Details:  Unscrambling words min assist for 10/16 correct.  Spelling words and transferring then required extended processing.           Speech Therapy Assessment:     Expressive Language Assessment:     Expressive language comments: Phonemic cues assisted with word retrieval    Cognitive Linguistic Assessment:     Cognitive-Linguistic comments: Decreased ability to complete multiple tasks in 1 session.  Slow processing.        Speech Therapy Plan :   Prognosis & Recommendations  Impression Summary:  Cognition fluctuates day to day.  Phonemic cues assist with retrieval.    Goals  Short Term Goals:  -Patient will improve attention completing complex, divided attention tasks with 80% accuracy, independent.   -Patient will independently state and implement compensatory memory strategies to increase memory recall to 4, progressing to 5 items given immediate/short term/ time delay recall tasks 4/5, 80% accuracy.   -Patient will complete problem solving/reasoning tasks including deductive reasoning puzzles, clock drawings, given minimal verbal prompts/ instruction/ " cues completing to 85% accuracy.   -When experience word finding difficulty, patient will independently initiate use of circumlocution and/or semantic feature analysis to describe item to increase accuracy and independence in speech production 90% accuracy supervision only cues.   -Patient to complete word fluency tasks to 90% accuracy with slight to min assist.  -Patient to complete 3 complex tasks with min assist to 90% accuracy in 15 minutes.      Potential barriers to Goal Achievement:  None  Therapy Recommendations  Recommendation:  Individual Speech Therapy,  Planned Therapy Interventions:  Development of Cognitive Skills, Home Program, Patient/Caregiver Education, Compensatory Strategy Training, Cognitive-Linguistic training, Self-care home management and Speech/Language training,   Plan Details:  Continue POC

## 2020-02-24 NOTE — OP THERAPY DAILY TREATMENT
"  Outpatient Physical Therapy  DAILY TREATMENT     [facility] Physical [address].  [address]  [place] [address]  Phone:  [phone]  Fax:  [phone]    Date: 02/24/2020    Patient: Dany Lambert  YOB: 1980  MRN: [id number]     Time Calculation  Start time: 1400  Stop time: 1432 Time Calculation (min): 32 minutes       Chief Complaint: Other (Imbalance)    Visit #: 11    SUBJECTIVE:  Patient states that today is a \"bad\" day. Has 2-3 bad days within a 7 day period. Does feel that her dizziness has decreased and her unsteadiness has become more of an issue. Has not had any falls but is fearful of her balance and is constantly looking for handhold supports to assist her balance at home and work. She is excited to hear what the neuro-op will say tomorrow.    OBJECTIVE:  Current objective measures:   Dizziness Handicap Inventory - Physical Items Score: 26  Dizziness Handicap Inventory - Emotional Items Score: 20  Dizziness Handicap Inventory - Functional Items Score: 20  Dizziness Handicap Inventory - Total Score: 66  Dynamic Gait Index Total Score: 58.33  (14/24)      Therapeutic Treatments and Modalities:     1. Neuromuscular Re-education (CPT 08439), MCTSIB, See below , DGI, See above , DHI , See above     Therapeutic Treatment and Modalities Summary: Firm, eyes open: 28% below normal   Firm, eyes closed: 29% below normal   Foam, eyes open: 54% below normal   Foam, eyes closed: 66% below normal   Composite Score: 72% below normal     One supine rest break required in the middle of the DGI x 3 minutes     Time-based treatments/modalities:  Neuromusc re-ed, balance, coor, post minutes (CPT 04601): 32 minutes       ASSESSMENT:   Response to treatment: Based on objective measures, patient has declined in the areas of subjective dizziness and balance. It will be good to collaborate with Dr. Alfaro to address these impairments. Patient would benefit from continued " vestibular therapy as she has not been able to return to typical work and home activities.     PLAN/RECOMMENDATIONS:   Plan for treatment: therapy treatment to continue next visit.  Planned interventions for next visit: continue with current treatment.

## 2020-02-25 ENCOUNTER — TELEPHONE (OUTPATIENT)
Dept: PHYSICAL THERAPY | Facility: REHABILITATION | Age: 40
End: 2020-02-25

## 2020-02-25 ENCOUNTER — OFFICE VISIT (OUTPATIENT)
Dept: OPHTHALMOLOGY | Facility: MEDICAL CENTER | Age: 40
End: 2020-02-25
Payer: COMMERCIAL

## 2020-02-25 DIAGNOSIS — S06.0X9A CONCUSSION WITH < 1 HR LOSS OF CONSCIOUSNESS: ICD-10-CM

## 2020-02-25 DIAGNOSIS — H49.9 OPHTHALMOPLEGIA: ICD-10-CM

## 2020-02-25 DIAGNOSIS — H52.13 MYOPIA OF BOTH EYES: ICD-10-CM

## 2020-02-25 DIAGNOSIS — S09.90XS CLOSED HEAD INJURY, SEQUELA: ICD-10-CM

## 2020-02-25 DIAGNOSIS — H18.823 CONTACT LENS OVERWEAR OF BOTH EYES: ICD-10-CM

## 2020-02-25 PROCEDURE — 92250 FUNDUS PHOTOGRAPHY W/I&R: CPT | Performed by: OPHTHALMOLOGY

## 2020-02-25 PROCEDURE — 92060 SENSORIMOTOR EXAMINATION: CPT | Performed by: OPHTHALMOLOGY

## 2020-02-25 PROCEDURE — 99244 OFF/OP CNSLTJ NEW/EST MOD 40: CPT | Mod: 25 | Performed by: OPHTHALMOLOGY

## 2020-02-25 PROCEDURE — 92015 DETERMINE REFRACTIVE STATE: CPT | Performed by: OPHTHALMOLOGY

## 2020-02-25 ASSESSMENT — ENCOUNTER SYMPTOMS
CARDIOVASCULAR NEGATIVE: 1
WEAKNESS: 1
CONSTITUTIONAL NEGATIVE: 1
DIZZINESS: 1
RESPIRATORY NEGATIVE: 1
NECK PAIN: 1
NAUSEA: 1
DOUBLE VISION: 1
EYE PAIN: 1
TREMORS: 1
HEADACHES: 1
BLURRED VISION: 1

## 2020-02-25 ASSESSMENT — REFRACTION
OS_SPHERE: -5.00
OS_AXIS: 098
OD_CYLINDER: +0.50
OD_SPHERE: -4.75
OS_CYLINDER: +0.50
OD_AXIS: 091

## 2020-02-25 ASSESSMENT — SLIT LAMP EXAM - LIDS
COMMENTS: NORMAL
COMMENTS: NORMAL

## 2020-02-25 ASSESSMENT — TONOMETRY
OD_IOP_MMHG: 16
OS_IOP_MMHG: 16

## 2020-02-25 ASSESSMENT — REFRACTION_MANIFEST
OS_SPHERE: +0.25
OS_AXIS: 129
OD_AXIS: 000
OD_SPHERE: +0.50
OS_CYLINDER: +0.25
OD_CYLINDER: +0.00
METHOD_AUTOREFRACTION: 1

## 2020-02-25 ASSESSMENT — CUP TO DISC RATIO
OD_RATIO: 0.1
OS_RATIO: 0.1

## 2020-02-25 ASSESSMENT — EXTERNAL EXAM - LEFT EYE: OS_EXAM: NORMAL

## 2020-02-25 ASSESSMENT — VISUAL ACUITY
OS_CC: 20/20
CORRECTION_TYPE: CONTACTS
OD_CC: 20/20
OD_CC: J1
OS_CC: J1
METHOD: SNELLEN - LINEAR

## 2020-02-25 ASSESSMENT — EXTERNAL EXAM - RIGHT EYE: OD_EXAM: NORMAL

## 2020-02-25 ASSESSMENT — CONF VISUAL FIELD
OD_NORMAL: 1
OS_NORMAL: 1

## 2020-02-25 NOTE — ASSESSMENT & PLAN NOTE
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 PPT by head injury but also in presbyopic age range. Therefore discussed use of +1.50 OTC readers over contact lenses. However with contact lens holiday gave rx for distance with progressive +1.50 Add

## 2020-02-25 NOTE — ASSESSMENT & PLAN NOTE
2/25/2020 - OCT NR thickness 85 OD an 85 OS, good ganglion cell thickness. Therefore no evidence of optic nerve injury or retrograde axonal degeneration.

## 2020-02-25 NOTE — OP THERAPY PROGRESS SUMMARY
"  Outpatient Speech Therapy  PROGRESS SUMMARY NOTE      [facility] Speech [place] [address]  [address]  [place] [address]  Phone:  [phone]  Fax:  [phone]    Date of Visit: 02/24/2020    Patient: Dany Lambert  YOB: 1980  MRN: [id number]     Referring Provider: Mina Anderson M.D.  [address] [place] [address] [place] [address]   Referring Diagnosis Concussion with loss of consciousness of unspecified duration, initial encounter [S06.0X9A]      Visit #: 6    Time Calculation  Start time: 1510  Stop time: 1540 Time Calculation (min): 30 minutes       Speech Therapy Occurrence Codes    Date of Onset of Impairment:  10/2/19   Date speech therapy care plan established or reviewed:  2/3/20   Date speech therapy treatment started:  2/3/20          Chief Complaint: Inability To Get Words Out    Visit Diagnoses     ICD-10-CM   1. Concussion with < 1 hr loss of consciousness S06.0X9A   2. 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  \"Doesn't look like a real word\" when completing word tasks.  Completed HEP.      Objective:   Treatments/Interventions Performed:  Home program, Patient/Caregiver education, Speech/Language treatment and Compensatory strategy training  Objective Details:  Unscrambling words min assist for 10/16 correct.  Spelling words and transferring then required extended processing.      Speech Therapy Assessment:     Expressive Language Assessment:     Patient's ability to formulate complex/abstract language is Supervision Required.    Written Language Assessment:     Ability to write single words (spontaneously) Supervision Required.     Cognitive Linguistic Assessment:     Patient attention divided: Minimal    Patient immediate memory: Minimal    Patient " recent and short term memory: Minimal    Patient ability to organize thoughts: Minimal    Patient executive functioning ability: Minimal    Patient decision making and planning ability: Minimal    Patient initiation and self monitoring ability: Minimal      Speech Mechanism Assessment:     Patient's oral movements are voluntary and coordination: Supervision Required    ST Speech Mechanism/Voice Assessment L12: irregular.      Speech Therapy Plan :   Prognosis & Recommendations  Impression Summary:  Dany has been attending speech therapy since Feb 2020 to address cognitive linguistic deficits.  She has demonstrated gains in word fluency and cognitive function, however this varies from session to session.  She had learned strategies for deficits and continues to require reminders on how to implement in other environments. She has met 3/6 goals and demonstrates carry over from learned information.    Patient continues to require use of external strategies to increase accuracy in performance given structured tasks, such as use of sticky notes and other visual aids, to increase recall to newly learned information. Fatigue directly impacts cognitive function.  Prognosis:  Excellent  Prognosis Details:  Continued skilled speech therapy to address above deficits would benefit Dany to assist in return to PLOF.  Goals  Short Term Goals:  -Patient will improve attention completing complex, divided attention tasks with 80% accuracy, independent. MET  -Patient will independently state and implement compensatory memory strategies to increase memory recall to 4, progressing to 5 items given immediate/short term/ time delay recall tasks 4/5, 80% accuracy. Progressing, continue  -Patient will complete problem solving/reasoning tasks including deductive reasoning puzzles, clock drawings, given minimal verbal prompts/ instruction/ cues completing to 85% accuracy. MET  -When experience word finding difficulty, patient will  independently initiate use of circumlocution and/or semantic feature analysis to describe item to increase accuracy and independence in speech production 90% accuracy supervision only cues. Progressing, continue  -Patient to complete word fluency tasks to 90% accuracy with slight to min assist. MET  -Patient to complete 3 complex tasks with min assist to 90% accuracy in 15 minutes. Not met, continue    NEW GOALS:  -Patient will improve attention completing complex, divided attention tasks with 90% accuracy, independent.   -Patient will complete problem solving/reasoning tasks including deductive reasoning puzzles, clock drawings, completing to 90% accuracy, independent.  -Patient to complete word fluency tasks to 90% accuracy, independent  Short Term Goal Duration (Weeks):  4-6 weeks  Potential barriers to Goal Achievement:  None  Therapy Recommendations  Recommendation:  Individual Speech Therapy,  Planned Therapy Interventions:  Development of Cognitive Skills, Home Program, Patient/Caregiver Education, Compensatory Strategy Training, Cognitive-Linguistic training, Self-care home management and Speech/Language training,   Plan Details:  [address] x 8  Frequency:  2x week  Duration (in weeks):  4      Referring provider co-signature:  I have reviewed this plan of care and my co-signature certifies the need for services.  Certification Dates:   From 02/24/20     To 03/23/20    Physician Signature: ________________________________ Date: ______________

## 2020-02-25 NOTE — ASSESSMENT & PLAN NOTE
2/25/2020 - Mild contact lens over wear. Discussed contact lens holiday with use of ketotifen bid and artificial tears.

## 2020-02-25 NOTE — PROGRESS NOTES
"Peds/Neuro Ophthalmology:   Marcio Alfaro M.D.    Date & Time note created:    2/25/2020   12:35 PM     Referring MD / APRN:  Emerald Rico M.D., Elias    Patient ID:  Name:             Dany Lambert   YOB: 1980  Age:                 39 y.o.  female   MRN:               [id number]    Chief Complaint/Reason for Visit:     Dizziness (Post concussion)      History of Present Illness:    Dany Lambert is a 39 y.o. female   38 y/o female referred by Dr. Anderson for post concussive syndrome, saccadic eye movements.  Pt states concussion occurred approx 4 mos ago.  Since concussion she has had headaches, nausea when moving her eyes, bilateral eye pain, dizziness when \"scrolling\" on computer or watching fast movements and double vision when looking downward.  Pt also states that she feels pressure behind the right eye and notices that rt lid feels and looks as though it droops more than left.  Pt has stopped visual portion of vestibular therapy, as it gives her migraines and causes nausea.  Pt states that double vision is worse with Lt eye, when looking at things up close as well as blurred vision when looking at things up close.  Pt also states that she has a lot of watering from Rt eye.  Constant pressure and throbbing. Sometimes having difficulty focusing especially up close. Contacts since 6th grade. Contacts a few days a weeks. -4.50 in both eyes.       Review of Systems:  Review of Systems   Constitutional: Negative.    HENT: Negative.    Eyes: Positive for blurred vision, double vision and pain.   Respiratory: Negative.    Cardiovascular: Negative.    Gastrointestinal: Positive for nausea.   Genitourinary: Negative.    Musculoskeletal: Positive for neck pain.   Skin: Negative.    Neurological: Positive for dizziness, tremors, weakness and headaches.   Endo/Heme/Allergies: Positive for environmental allergies.       Past Medical History:   Past Medical History: "   Diagnosis Date   • Allergy, unspecified not elsewhere classified    • Anxiety 8/26/2013   • Arthritis     Cervical joints, and hands   • High cholesterol    • Migraine with aura and without status migrainosus, not intractable 8/26/2013   • Pain 7/27/2017    Chronic pain- Migraine; neck pain, spasm   • Psychiatric problem     hx of anxiety   • Seizure (HCC) 1/2016    ? possibly r/t migraine        Past Surgical History:  Past Surgical History:   Procedure Laterality Date   • MO DSTR NROLYTC AGNT PARVERTEB FCT SNGL CRVCL/THORA Right 9/29/2017    Procedure: NEURO DEST FACET C/T W/IG SNGL C2-4;  Surgeon: Scotty Navarro D.O.;  Location: [facility];  Service: Pain Management   • MO DSTR NROLYTC AGNT PARVERTEB FCT ADDL CRVCL/THORA Right 9/29/2017    Procedure: NEURO DEST FACET C/T W/IG ADDL;  Surgeon: Scotty Navarro D.O.;  Location: [facility];  Service: Pain Management   • MO DSTR NROLYTC AGNT PARVERTEB FCT SNGL CRVCL/THORA Left 7/28/2017    Procedure: NEURO DEST FACET C/T W/IG SNGL - C2-4;  Surgeon: Scotty Navarro D.O.;  Location: SURGERY [facility];  Service: Pain Management   • MO DSTR NROLYTC AGNT PARVERTEB FCT ADDL CRVCL/THORA  7/28/2017    Procedure: NEURO DEST FACET C/T W/IG ADDL;  Surgeon: Scotty Navarro D.O.;  Location: SURGERY [facility];  Service: Pain Management   • TONSILLECTOMY  1997   • CYST EXCISION Left as child    cyst removal on L wrist       Current Outpatient Medications:  Current Outpatient Medications   Medication Sig Dispense Refill   • AIMOVIG 140 MG/ML Solution Auto-injector Inject 140 mg as instructed Q 4 Weeks. 1 PEN 11   • cyclobenzaprine (FLEXERIL) 10 MG Tab Take 1 Tab by mouth 3 times a day as needed. 15 Tab 0   • Levonorgest-Eth Estrad 91-Day (SEASONIQUE) 0.15-0.03 &0.01 MG Tab Take 1 Tab by mouth every day. 84 Tab 3   • topiramate (TOPAMAX) 50 MG tablet TAKE 3 TABS BY MOUTH 2 TIMES A DAY. 540 Tab 3   • venlafaxine (EFFEXOR-XR)  150 MG extended-release capsule Take 1 Cap by mouth every day. 90 Cap 3   • lovastatin (MEVACOR) 10 MG tablet TAKE 1 TAB BY MOUTH EVERY EVENING. 90 Tab 3   • rizatriptan (MAXALT) 10 MG tablet 1 tab at headache onset; repeat 1 tab every 1 hour prn up to #4 tab/24 hours 10 Tab 6   • cetirizine (ZYRTEC) 10 MG Tab Take 10 mg by mouth every day.     • naproxen (NAPROSYN) 500 MG Tab Take 1 Tab by mouth 2 times a day, with meals. (Patient not taking: Reported on 2/25/2020) 20 Tab 0     No current facility-administered medications for this visit.        Allergies:  Allergies   Allergen Reactions   • Benadryl Allergy Anxiety   • Demerol Hives   • Medrol [Methylprednisolone] Hives and Itching   • Previfem [Norgestimate-Ethinyl Estradiol]      Nausea/vomiting   • Reglan [Metoclopramide] Anxiety   • Seasonal        Family History:  Family History   Problem Relation Age of Onset   • Diabetes Maternal Grandfather    • Migraines Maternal Grandfather    • Cancer Paternal Grandfather    • Alzheimer's Disease Paternal Grandfather    • Cancer Paternal Grandmother 30        breast   • Glasses Mother    • Migraines Mother    • Suicide Attempts Maternal Uncle         Killed himself by GSW   • Hypertension Father    • Glasses Father        Social History:  Social History     Socioeconomic History   • Marital status: Single     Spouse name: Not on file   • Number of children: Not on file   • Years of education: Not on file   • Highest education level: Not on file   Occupational History   • Not on file   Social Needs   • Financial resource strain: Not on file   • Food insecurity     Worry: Not on file     Inability: Not on file   • Transportation needs     Medical: Not on file     Non-medical: Not on file   Tobacco Use   • Smoking status: Former Smoker     Packs/day: 1.00     Years: 15.00     Pack years: 15.00     Types: Cigarettes     Last attempt to quit: 1/1/2010     Years since quitting: 10.1   • Smokeless tobacco: Never Used    Substance and Sexual Activity   • Alcohol use: No   • Drug use: No   • Sexual activity: Not Currently   Lifestyle   • Physical activity     Days per week: Not on file     Minutes per session: Not on file   • Stress: Not on file   Relationships   • Social connections     Talks on phone: Not on file     Gets together: Not on file     Attends [employer] service: Not on file     Active member of club or organization: Not on file     Attends meetings of clubs or organizations: Not on file     Relationship status: Not on file   • Intimate partner violence     Fear of current or ex partner: Not on file     Emotionally abused: Not on file     Physically abused: Not on file     Forced sexual activity: Not on file   Other Topics Concern   • Not on file   Social History Narrative    38 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           Physical Exam:  Physical Exam    Oriented x 3  Weight/BMI: There is no height or weight on file to calculate BMI.  There were no vitals taken for this visit.    Base Eye Exam     Visual Acuity (Snellen - Linear)       Right Left    Dist cc 20/20 20/20    Near cc J1 J1    Correction:  Contacts          Tonometry (11:51 AM)       Right Left    Pressure 16 16          Pupils       Pupils    Right PERRL    Left PERRL          Visual Fields       Right Left     Full Full          Extraocular Movement       Right Left     Full Full          Neuro/Psych     Oriented x3:  Yes    Mood/Affect:  Normal          Dilation     Both eyes:  Phenylephrine (NEOSYNEPHRINE) ophthalmic solution 2.5%, Cyclopentolate (CYCLOGYL) 1% ophthalmic solution @ 11:51 AM            Additional Tests     Color       Right Left    Ishihara 10/10 10/10          Stereo     Fly:  +    Animals:  3/3    Circles:  7/9            Strabismus Exam     Correction:  cc    Distance Near Near +3DS N Bifocals     E(T)' 6 Ortho               0 0 0   0 0 0                       0  0  Ortho  0  0                       0 0 0   0 0 0                2/25/2020 -  Good NPC, however over converges and then states discomfort and occasional double     Slit Lamp and Fundus Exam     External Exam       Right Left    External Normal Normal          Slit Lamp Exam       Right Left    Lids/Lashes Normal Normal    Conjunctiva/Sclera Conjunctival papillae Papilla    Cornea Clear Clear    Anterior Chamber Deep and quiet Deep and quiet    Iris Round and reactive Round and reactive    Lens Clear Clear    Vitreous Normal Normal          Fundus Exam       Right Left    Disc Normal Normal    C/D Ratio 0.1 0.1    Macula Normal Normal    Vessels Normal Normal    Periphery Normal Normal            Refraction     Manifest Refraction (Auto)       Sphere Cylinder Axis    Right +0.50 +0.00 000    Left +0.25 +0.25 129          Cycloplegic Refraction (Auto)       Sphere Cylinder Axis    Right -4.75 +0.50 091    Left -5.00 +0.50 098          Final Rx       Sphere Cylinder Axis Add    Right -4.75   +1.50    Left -4.75 +0.25 090 +1.50                Pertinent Lab/Test/Imaging Review:      Assessment and Plan:     Closed head injury  2/25/2020 - OCT NR thickness 85 OD an 85 OS, good ganglion cell thickness. Therefore no evidence of optic nerve injury or retrograde axonal degeneration.     Contact lens overwear of both eyes  2/25/2020 - Mild contact lens over wear. Discussed contact lens holiday with use of ketotifen bid and artificial tears.     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 PPT by head injury but also in presbyopic age range. Therefore discussed use of +1.50 OTC readers over contact lenses. However with contact lens holiday gave rx for distance with progressive +1.50 Add    Myopia of both eyes  2/25/2020 - gave new rx with +1.50 Add ou        Marcio Alfaro M.D.

## 2020-02-25 NOTE — OP THERAPY PROGRESS SUMMARY
Outpatient Physical Therapy  PROGRESS SUMMARY NOTE      [facility] Physical Therapy [address]  [address]  [place] [address]  Phone:  [phone]  Fax:  [phone]    Date of Visit: 02/25/2020    Patient: Dany Lambert  YOB: 1980  MRN: [id number]     Referring Provider: No referring provider defined for this encounter.   Referring Diagnosis No admission diagnoses are documented for this encounter.     Visit Diagnoses     ICD-10-CM   1. Concussion with < 1 hr loss of consciousness S06.0X9A       Rehab Potential: good    Physical Therapy Occurrence Codes    Date of onset of impairment:  10/2/19   Date physical therapy care plan established or reviewed:  1/3/20   Date physical therapy treatment started:  1/3/20        Progress Report Period: 1/3/20-2/25/20    Functional Assessment Used          Objective Findings and Assessment:   Patient progression towards goals:   Ms. Lambert has been seen for 11 total PT sessions treating her dizziness, imbalance and headache.  Subjectively, pt reports her sx have improved in regard to headaches becoming slightly less frequent, improved tolerance for work activity and feeling more mentally clear.  Her reassessment indicates worsened stability compared to eval; however, I believe this was related to fluctuating intensity of sx and initial measures being on a particularly good day. Early in the treatment course she had tested positive for BPPV, which was easily corrected with CRM.  Follow up visits have focused on VRT progressions for adaptation and habituation.  Tolerance is fair with frequent breaks needing to be taken for nausea and noted trunk instability/ataxia?  She continues to report visual triggers and will undergo neuro-ophthalmology eval with Dr. Alfaro today to give better clarity to this component.  Based on the objective measures below, pt continues to demonstrate need for skilled PT services to assist with return to PLOF.      Objective findings and assessment details:   Dizziness Handicap Inventory - Total Score: 66    Dynamic Gait Index Total Score: 58.33  (14/24)    MCTSIB:  Firm, eyes open: 28% below normal   Firm, eyes closed: 29% below normal   Foam, eyes open: 54% below normal   Foam, eyes closed: 66% below normal   Composite Score: 72% below normal     Goals:   Short Term Goals:   - Improve cond 1 and 2 MCTSIB to WNL  - Able to tolerate 30 min PT session with no more than 1 seated rest break  - Able to tolerate HTs in narrow stance with no nausea x 10 reps  Short term goal time span:  1-2 weeks      Long Term Goals:    - Improve MCTSIB comp to no more than 20% below norm  - Improve DGI to at least 20/24  - DHI less than 30%  - Indep with HEP  Long term goal time span:  6-8 weeks    Plan:   Planned therapy interventions:  Manual Therapy (CPT 29235), Neuromuscular Re-education (CPT 13146), Therapeutic Activities (CPT 12499) and Therapeutic Exercise (CPT 55766)  Frequency:  2x week  Duration in weeks:  6        Referring provider co-signature:  I have reviewed this plan of care and my co-signature certifies the need for services.    Physician Signature: ________________________________ Date: ______________

## 2020-02-27 DIAGNOSIS — S09.90XD CLOSED HEAD INJURY, SUBSEQUENT ENCOUNTER: ICD-10-CM

## 2020-02-27 DIAGNOSIS — H55.81 SACCADIC EYE MOVEMENTS: ICD-10-CM

## 2020-02-27 DIAGNOSIS — S06.0X9A CONCUSSION WITH < 1 HR LOSS OF CONSCIOUSNESS: ICD-10-CM

## 2020-02-27 DIAGNOSIS — R47.01 BROCA'S APHASIA: ICD-10-CM

## 2020-02-28 ENCOUNTER — APPOINTMENT (OUTPATIENT)
Dept: SPEECH THERAPY | Facility: REHABILITATION | Age: 40
End: 2020-02-28
Attending: FAMILY MEDICINE
Payer: COMMERCIAL

## 2020-02-28 NOTE — PROGRESS NOTES
1. Concussion with < 1 hr loss of consciousness  REFERRAL TO SPEECH THERAPY Reason for Therapy: Eval/Treat/Report   2. Broca's aphasia  REFERRAL TO SPEECH THERAPY Reason for Therapy: Eval/Treat/Report   3. Closed head injury, subsequent encounter  REFERRAL TO SPEECH THERAPY Reason for Therapy: Eval/Treat/Report   4. Saccadic eye movements         Re-ordered speech therapy as requested

## 2020-03-02 ENCOUNTER — OCCUPATIONAL MEDICINE (OUTPATIENT)
Dept: MEDICAL GROUP | Facility: CLINIC | Age: 40
End: 2020-03-02
Payer: COMMERCIAL

## 2020-03-02 ENCOUNTER — APPOINTMENT (OUTPATIENT)
Dept: SPEECH THERAPY | Facility: REHABILITATION | Age: 40
End: 2020-03-02
Attending: FAMILY MEDICINE
Payer: COMMERCIAL

## 2020-03-02 VITALS
OXYGEN SATURATION: 98 % | HEIGHT: 65 IN | BODY MASS INDEX: 19.83 KG/M2 | HEART RATE: 96 BPM | SYSTOLIC BLOOD PRESSURE: 106 MMHG | WEIGHT: 119 LBS | RESPIRATION RATE: 14 BRPM | DIASTOLIC BLOOD PRESSURE: 70 MMHG | TEMPERATURE: 98.3 F

## 2020-03-02 DIAGNOSIS — R47.01 BROCA'S APHASIA: ICD-10-CM

## 2020-03-02 DIAGNOSIS — S06.0X9A CONCUSSION WITH < 1 HR LOSS OF CONSCIOUSNESS: ICD-10-CM

## 2020-03-02 DIAGNOSIS — H55.81 SACCADIC EYE MOVEMENTS: ICD-10-CM

## 2020-03-02 DIAGNOSIS — S09.90XD CLOSED HEAD INJURY, SUBSEQUENT ENCOUNTER: ICD-10-CM

## 2020-03-02 PROCEDURE — 99213 OFFICE O/P EST LOW 20 MIN: CPT | Performed by: FAMILY MEDICINE

## 2020-03-02 ASSESSMENT — FIBROSIS 4 INDEX: FIB4 SCORE: 0.81

## 2020-03-02 NOTE — OP THERAPY DAILY TREATMENT
Outpatient Speech Therapy  DAILY TREATMENT     [facility] Speech [address]  [address]  [place] [address]  Phone:  [phone]  Fax:  [phone]    Date: 03/02/2020    Patient: Dany Lambert  YOB: 1980  MRN: [id number]     Time Calculation  {Time Calculation:06461}    Chief Complaint: No chief complaint on file.    Visit #: 7    Subjective Evaluation    Speech Therapy Objective       Assessments    Speech Therapy Plan :   Prognosis & Recommendations  Impression Summary: Dany has been attending speech therapy since Feb 2020 to address cognitive linguistic deficits. She has demonstrated gains in word fluency and cognitive function, however this varies from session to session.  She had learned strategies for deficits and continues to require reminders on how to implement in other environments. She has met 3/6 goals and demonstrates carry over from learned information.    Patient continues to require use of external strategies to increase accuracy in performance given structured tasks, such as use of sticky notes and other visual aids, to increase recall to newly learned information. Fatigue directly impacts cognitive function.  Prognosis:  Excellent  Prognosis Details:  Continued skilled speech therapy to address above deficits would benefit Dany to assist in return to PLOF.  Goals  Short Term Goals:  -Patient will independently state and implement compensatory memory strategies to increase memory recall to 4, progressing to 5 items given immediate/short term/ time delay recall tasks 4/5, 80% accuracy.   -When experience word finding difficulty, patient will independently initiate use of circumlocution and/or semantic feature analysis to describe item to increase accuracy and independence in speech production 90% accuracy supervision only cues.  -Patient to complete 3 complex tasks with min assist to 90% accuracy in 15 minutes.   -Patient will improve attention  completing complex, divided attention tasks with 90% accuracy, independent.   -Patient will complete problem solving/reasoning tasks including deductive reasoning puzzles, clock drawings, completing to 90% accuracy, independent.  -Patient to complete word fluency tasks to 90% accuracy, independent  Potential barriers to Goal Achievement:  None  Therapy Recommendations  Recommendation:  Individual Speech Therapy,  Planned Therapy Interventions:  Development of Cognitive Skills, Home Program, Patient/Caregiver Education, Compensatory Strategy Training, Cognitive-Linguistic training, Self-care home management and Speech/Language training,   Plan Details:  Continue POC
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
## 2020-03-05 ENCOUNTER — APPOINTMENT (OUTPATIENT)
Dept: PHYSICAL THERAPY | Facility: REHABILITATION | Age: 40
End: 2020-03-05
Attending: FAMILY MEDICINE
Payer: COMMERCIAL

## 2020-03-06 ENCOUNTER — APPOINTMENT (OUTPATIENT)
Dept: SPEECH THERAPY | Facility: REHABILITATION | Age: 40
End: 2020-03-06
Attending: FAMILY MEDICINE
Payer: COMMERCIAL

## 2020-03-09 ENCOUNTER — SPEECH THERAPY (OUTPATIENT)
Dept: SPEECH THERAPY | Facility: REHABILITATION | Age: 40
End: 2020-03-09
Attending: FAMILY MEDICINE
Payer: COMMERCIAL

## 2020-03-09 DIAGNOSIS — S09.90XS LATE EFFECT OF HEAD TRAUMA, COGNITIVE DEFICITS: ICD-10-CM

## 2020-03-09 DIAGNOSIS — S06.0X9A CONCUSSION WITH < 1 HR LOSS OF CONSCIOUSNESS: ICD-10-CM

## 2020-03-09 DIAGNOSIS — F09 LATE EFFECT OF HEAD TRAUMA, COGNITIVE DEFICITS: ICD-10-CM

## 2020-03-09 PROCEDURE — 92507 TX SP LANG VOICE COMM INDIV: CPT

## 2020-03-09 ASSESSMENT — ENCOUNTER SYMPTOMS: PAIN SCALE: 8

## 2020-03-09 NOTE — OP THERAPY DAILY TREATMENT
"  Outpatient Speech Therapy  DAILY TREATMENT     [facility] Speech Therapy [address].  [address]  [place] [address]  Phone:  [phone]  Fax:  [phone]    Date: 2020    Patient: Dany Lambert  YOB: 1980  MRN: [id number]     Time Calculation  Start time: 1500  Stop time: 1530 Time Calculation (min): 30 minutes       Chief Complaint: Poor Speech (prosody, intonation)    Visit #: 7    Subjective:   Progress Factors:     Progression:  Staying the same    Aggravating Factors:  Physical Activity  Pain:     Current pain ratin    Location:  Neck down to middle of back  Additional Subjective Comments:      Had spinal ablation 10 days ago and still with significant pain.  MD stated that she's doing \"ok\". Headaches still causing trouble and slowed speech. Feeling that there is \"no flow with speech when word finding occurs.\"  Noted flat affect and monotone. Haven't been able to address HEP tasks as often would have liked due to ablastin.  Use first letter cueing to assist with recall of words and reported it to be helpful.  Wanting a short session due to level of pain.        Objective:   Treatments/Interventions Performed:  Home program, Patient/Caregiver education, Speech/Language treatment and Compensatory strategy training  Objective Details:  Completed homophone tasks easy to hard extended processing for 7, independent.    Completed deduction puzzle 4 x1 with mod to max assist for 100% accuracy x 2.         Speech Therapy Assessment:     Cognitive Linguistic Assessment:     Patient simple reasoning ability: Minimal    Patient complex reasoning ability: Moderate    Patient complex problem solving ability: Moderate        Speech Therapy Plan :   Prognosis & Recommendations  Impression Summary:  Patient required increased assist to deduct information and felt that she wouldn't have been able to complete tasks without assist.  Has had to cancel therapy due to pain, but " felt a shorter session would be better than cancelling again.  Goals  Short Term Goals:  -Patient will independently state and implement compensatory memory strategies to increase memory recall to 4, progressing to 5 items given immediate/short term/ time delay recall tasks 4/5, 80% accuracy.   -When experience word finding difficulty, patient will independently initiate use of circumlocution and/or semantic feature analysis to describe item to increase accuracy and independence in speech production 90% accuracy supervision only cues.   -Patient to complete 3 complex tasks with min assist to 90% accuracy in 15 minutes.  -Patient will improve attention completing complex, divided attention tasks with 90% accuracy, independent.   -Patient will complete problem solving/reasoning tasks including deductive reasoning puzzles, clock drawings, completing to 90% accuracy, independent.  -Patient to complete word fluency tasks to 90% accuracy, independent  Therapy Recommendations  Recommendation:  Individual Speech Therapy,  Planned Therapy Interventions:  Development of Cognitive Skills, Home Program, Patient/Caregiver Education, Compensatory Strategy Training, Cognitive-Linguistic training, Self-care home management and Speech/Language training,   Plan Details:  Continue POC

## 2020-03-10 DIAGNOSIS — S09.90XD CLOSED HEAD INJURY, SUBSEQUENT ENCOUNTER: ICD-10-CM

## 2020-03-11 NOTE — PROGRESS NOTES
1. Closed head injury, subsequent encounter  REFERRAL TO PHYSICAL THERAPY Reason for Therapy: Eval/Treat/Report     Renewal order for vestibular therapy  Patient continues to have postconcussion symptoms  It is common for vestibular therapy to worsen symptoms prior to making them better  Fortunately, patient had been progressing with vestibular therapy  Even though her symptoms were somewhat worsening, she seem to be progressing with more challenging therapy activity

## 2020-03-13 ENCOUNTER — SPEECH THERAPY (OUTPATIENT)
Dept: SPEECH THERAPY | Facility: REHABILITATION | Age: 40
End: 2020-03-13
Attending: FAMILY MEDICINE
Payer: COMMERCIAL

## 2020-03-13 DIAGNOSIS — S09.90XS LATE EFFECT OF HEAD TRAUMA, COGNITIVE DEFICITS: ICD-10-CM

## 2020-03-13 DIAGNOSIS — S06.0X9A CONCUSSION WITH < 1 HR LOSS OF CONSCIOUSNESS: ICD-10-CM

## 2020-03-13 DIAGNOSIS — F09 LATE EFFECT OF HEAD TRAUMA, COGNITIVE DEFICITS: ICD-10-CM

## 2020-03-13 PROCEDURE — 92507 TX SP LANG VOICE COMM INDIV: CPT

## 2020-03-13 NOTE — OP THERAPY DAILY TREATMENT
"  Outpatient Speech Therapy  DAILY TREATMENT     [facility] Speech [address]  [address]  [place] [address]  Phone:  [phone]  Fax:  [phone]    Date: 03/13/2020    Patient: Dany Lambert  YOB: 1980  MRN: [id number]     Time Calculation  Start time: 1530  Stop time: 1605 Time Calculation (min): 35 minutes       Chief Complaint: Aphasia (Word finding)    Visit #: 8    Subjective:   Reason for Therapy:     Reason For Evaluation:  TBI and Cognition  Social Support:     Social support system: unaccompanied.    Patient Mental Status:  Alert and Responsive  Progress Factors:     Progression:  Staying the same  Additional Subjective Comments:      Patient reports use of medications to address pain following Cervical spine surgery \"Gabapentin.\" Patient reports increased stress regarding present work situation \"but am dealing with it ok.\"     Patient noting improvement in speech; continuation of difficulty in word finding to independent productions.       Objective:   Treatments/Interventions Performed:  Patient/Caregiver education, Compensatory strategy training, Cognitive-Linguistic training and Home program    Speech therapy targeted cognitive linguistic function through participation in and completion of the following structured activities:    Language comprehension to complex paragraph targeted in combination with complex reasoning specific to determining the sequence of 7 stops. Patient completed the structured activity requiring direct cues to complete with 7/7 = 100% accuracy. Direct cues were provided in the form of reinforcement of compensatory strategies that improved accuracy during structured task completing, such as making notes, highlighting important information to address patient noted feelings of overwhelmed to structured therapy task.     Speech Therapy Assessment:     Cognitive Linguistic Assessment:     Patient complex problem solving ability: " Minimal    Patient decision making and planning ability: Minimal        Speech Therapy Plan :   Prognosis & Recommendations  Impression Summary:  Patient provided with task as part of home program to reinforce use of strategies that proved helpful in obtaining accurate responses during today's structured therapy session.   Therapy Recommendations  Recommendation:  Individual Speech Therapy,  Planned Therapy Interventions:  Home Program, Compensatory Strategy Training, Patient/Caregiver Education and Cognitive-Linguistic training,   Plan Details:  Continue with current POC.

## 2020-03-16 ENCOUNTER — APPOINTMENT (OUTPATIENT)
Dept: SPEECH THERAPY | Facility: REHABILITATION | Age: 40
End: 2020-03-16
Attending: FAMILY MEDICINE
Payer: COMMERCIAL

## 2020-03-20 ENCOUNTER — APPOINTMENT (OUTPATIENT)
Dept: SPEECH THERAPY | Facility: REHABILITATION | Age: 40
End: 2020-03-20
Attending: FAMILY MEDICINE
Payer: COMMERCIAL

## 2020-03-23 ENCOUNTER — APPOINTMENT (OUTPATIENT)
Dept: SPEECH THERAPY | Facility: REHABILITATION | Age: 40
End: 2020-03-23
Attending: FAMILY MEDICINE
Payer: COMMERCIAL

## 2020-03-24 ENCOUNTER — APPOINTMENT (OUTPATIENT)
Dept: PHYSICAL THERAPY | Facility: REHABILITATION | Age: 40
End: 2020-03-24
Attending: FAMILY MEDICINE
Payer: COMMERCIAL

## 2020-03-30 ENCOUNTER — SPEECH THERAPY (OUTPATIENT)
Dept: SPEECH THERAPY | Facility: REHABILITATION | Age: 40
End: 2020-03-30
Attending: FAMILY MEDICINE
Payer: COMMERCIAL

## 2020-03-30 ENCOUNTER — PHYSICAL THERAPY (OUTPATIENT)
Dept: PHYSICAL THERAPY | Facility: REHABILITATION | Age: 40
End: 2020-03-30
Attending: FAMILY MEDICINE
Payer: COMMERCIAL

## 2020-03-30 DIAGNOSIS — S06.0X9A CONCUSSION WITH < 1 HR LOSS OF CONSCIOUSNESS: ICD-10-CM

## 2020-03-30 DIAGNOSIS — S09.90XS LATE EFFECT OF HEAD TRAUMA, COGNITIVE DEFICITS: ICD-10-CM

## 2020-03-30 DIAGNOSIS — F09 LATE EFFECT OF HEAD TRAUMA, COGNITIVE DEFICITS: ICD-10-CM

## 2020-03-30 PROCEDURE — 92507 TX SP LANG VOICE COMM INDIV: CPT

## 2020-03-30 PROCEDURE — 97112 NEUROMUSCULAR REEDUCATION: CPT

## 2020-03-30 NOTE — OP THERAPY DAILY TREATMENT
Outpatient Physical Therapy  DAILY TREATMENT     [facility] Physical Therapy [place] [address]  [address]  [place] [address]  Phone:  [phone]  Fax:  [phone]    Date: 03/30/2020    Patient: Dany Lambert  YOB: 1980  MRN: [id number]     Time Calculation  Start time: 0930  Stop time: 1008 Time Calculation (min): 38 minutes       Chief Complaint: Headache and Vertigo    Visit #: 12    SUBJECTIVE:  Pt returns to PT.  Delay in care related to scheduling followed by 2 week quarantine due to COVID19 exposure (ultimately did not end up getting ill).    Reports this week she has been having a lot of migraines. Has tried to rest more and deload, thinks this is resulting in slightly better balance.  Feels she is not stumbling or running into objects as much. Had appt with Dr. Alfaro: no optical nerve damage, suggested no more contacts and just to wear glasses. Suggesting a pair of progressives.     OBJECTIVE:  Current objective measures: VOMS= 30 total      Therapeutic Treatments and Modalities:     1. Neuromuscular Re-education (CPT 56013)    Therapeutic Treatment and Modalities Summary:   - Reassessment of VOMS  - Review of kathia string bug walks for home    Time-based treatments/modalities:  Neuromusc re-ed, balance, coor, post minutes (CPT 53118): 38 minutes       ASSESSMENT:   Response to treatment: Notable improvement in VOMS score with primary trigger being saccades (vertical) and VOR.  Convergence much better tolerated with improved L eye adduction to target and actually showed good learning effect through the 3 trials.  To start kathia string again, only 1-2 min at a time.  Stop if increases migraines.      PLAN/RECOMMENDATIONS:   Plan for treatment: therapy treatment to continue next visit.  Planned interventions for next visit: continue with current treatment.

## 2020-03-30 NOTE — OP THERAPY DAILY TREATMENT
"  Outpatient Speech Therapy  DAILY TREATMENT     [address]  [address]  [place] [address]  Phone:  [phone]  Fax:  [phone]    Date: 03/30/2020    Patient: Dany Lambert  YOB: 1980  MRN: [id number]     Time Calculation  Start time: 1503  Stop time: 1600 Time Calculation (min): 57 minutes       Chief Complaint: Concussion    Visit #: 9    Subjective:   Additional Subjective Comments:      Patient returns to outpatient speech therapy following 2 week break for medical reasons.     \"My favorite thing I am doing now is Words with Friends.\" Patient states that participation in this type of technology is helping. Patient also states that \"speech is faster\" with \"little bit of pauses but the speed has picked up a bit.\"     Patient has returned to speech therapy this afternoon completing Physical Therapy earlier this morning has resulted in increased \"tired\"ness for today's speech therapy session. Patient stated that \"visual fatigue\" is most impactful at this time.     Patient states that she is now \"experiencing an increased in migraines which had been a little bit under control.\"       Objective:   Treatments/Interventions Performed:  Patient/Caregiver education, Compensatory strategy training, Speech/Language treatment, Cognitive-Linguistic training and Home program    Speech therapy targeted cognitive linguistic function through participation in and completion of the following structured activities:    Expressive language skills in combination with complex, divided attention addressed through completion of structured crossword puzzle of 14 clues. Patient completed the crossword puzzle with 14/14 = 100% accuracy, independent.     Complex, deductive reasoning targeted through completion of 3 x 4 deductive reasoning puzzle with patient completing with 12/ 12 = 100% given supervision only cues for use of compensatory strategies upon initially " encountering difficulty with task.     Speech Therapy Assessment:     Cognitive Linguistic Assessment:     Patient complex reasoning ability: Supervision Required (given complex, deductive reasoning puzzle)    Cognitive-Linguistic comments: Patient demonstrating improved planning for activities in the home environment, such as breaking things down into smaller pieces; and noting timelines to make sure that things are completed on time.     Use of compensatory strategies to improve accuracy during more complex therapy tasks reviewed; specific to patient participation in structured, complex deductive reasoning puzzle.         Speech Therapy Plan :   Prognosis & Recommendations  Impression Summary:  Patient was noted to present with increased shakiness during today's session; although presence did not appear to impact patient attention or accuracy during structured speech therapy tasks.     Given patient noted success with Words with Friend application, application based home program through [facility] was introduced. Patient was provided with outline on how program is consistent with speech therapy goals/ objectives at this time.     Patient was noted to demonstrate increased independence in use of compensatory strategies as a response to improve accuracy during more complex, deductive reasoning task completed as part of today's session.   Prognosis:  Good  Prognosis Details:  Patient continues to demonstrate improvements in activity tolerance and overall accuracy and independence when encountering more complex therapy tasks in the structured setting. Carryover and generalization of skills recommended in the home setting.   Therapy Recommendations  Recommendation:  Individual Speech Therapy,  Planned Therapy Interventions:  Home Program, Compensatory Strategy Training, Patient/Caregiver Education, Cognitive-Linguistic training and Speech/Language training,   Plan Details:  Continue with current POC.

## 2020-04-03 ENCOUNTER — PHYSICAL THERAPY (OUTPATIENT)
Dept: PHYSICAL THERAPY | Facility: REHABILITATION | Age: 40
End: 2020-04-03
Attending: FAMILY MEDICINE
Payer: COMMERCIAL

## 2020-04-03 ENCOUNTER — SPEECH THERAPY (OUTPATIENT)
Dept: SPEECH THERAPY | Facility: REHABILITATION | Age: 40
End: 2020-04-03
Attending: FAMILY MEDICINE
Payer: COMMERCIAL

## 2020-04-03 DIAGNOSIS — S09.90XS LATE EFFECT OF HEAD TRAUMA, COGNITIVE DEFICITS: ICD-10-CM

## 2020-04-03 DIAGNOSIS — F09 LATE EFFECT OF HEAD TRAUMA, COGNITIVE DEFICITS: ICD-10-CM

## 2020-04-03 DIAGNOSIS — S06.0X9A CONCUSSION WITH < 1 HR LOSS OF CONSCIOUSNESS: ICD-10-CM

## 2020-04-03 PROCEDURE — 92507 TX SP LANG VOICE COMM INDIV: CPT

## 2020-04-03 PROCEDURE — 97110 THERAPEUTIC EXERCISES: CPT

## 2020-04-03 PROCEDURE — 97112 NEUROMUSCULAR REEDUCATION: CPT

## 2020-04-03 ASSESSMENT — ENCOUNTER SYMPTOMS: PAIN SCALE: 2

## 2020-04-03 NOTE — OP THERAPY DAILY TREATMENT
"  Outpatient Speech Therapy  DAILY TREATMENT     [facility] Speech [address].  [address]  [place] [address]  Phone:  [phone]  Fax:  [phone]    Date: 2020    Patient: Dany Lambert  YOB: 1980  MRN: [id number]     Time Calculation  Start time: 1020  Stop time: 1120 Time Calculation (min): 60 minutes       Chief Complaint: Other (Patient with no big complaints; \"I'm feeling ok, I think my speech is getting better.\" )    Visit #: 10    Subjective:   Reason for Therapy:     Reason For Evaluation:  Cognition, Speech/Language and TBI  Social Support:     Social support system: unaccompanied.    Patient Mental Status:  Alert and Responsive  Progress Factors:     Progression:  Getting Better  Pain:     Current pain ratin    Location:  Headache/ following vestibular therapy  Additional Subjective Comments:      Patient reporting less headaches or improved headaches with participation in more difficult therapy.       Objective:   Treatments/Interventions Performed:  Patient/Caregiver education, Compensatory strategy training, Speech/Language treatment, Home program and Cognitive-Linguistic training    Speech therapy targeted cognitive linguistic function through participation in and completion of the following structured activities:    Organization specific to reasoning, thought organization, planning targeted through participation in structured activity addressing: putting words lists (10 words) into alphabetical order. Patient completed the structured task with 15/15 = 100% accuracy, independent.     Task difficulty increased to patient provided with 4 letter words, scrambled and asked to determine correct word: patient completed the structured task with 5/5 = 100% accuracy, independent. Task difficulty increased to 5 letters completing with 4/5 = 80% accuracy, independent. Accuracy improved to 5/5 when provided with single, verbal prompt (first letter) " cue.     Complex, deductive reasoning targeted through completion of 3 x 4 deductive reasoning puzzle with patient initially completing with 4/12 to independent level; min verbal cues provided in form of recall of previously used strategies with patient then implementing strategies to solve puzzle with 12/12 = 100% accuracy, given min only verbal cues in the form of directing to highlighted information to assist in accurately solving puzzle.        Speech Therapy Assessment:     Cognitive Linguistic Assessment:     Patient ability to organize thoughts: [facility]    Patient simple reasoning ability: [facility]    Patient decision making and planning ability: Supervision Required (improved ability to independently plan for tasks, resulting in increased accuracy during structured task completion)    Cognitive-Linguistic comments: Patient is demonstrating improved independence in identifying strategies that she can use independently to increase accuracy during structured tasks; additionally, patient with increased independence in implementing strategies to work towards improved accuracy during more complex problem solving/ reasoning tasks.         Speech Therapy Plan :   Prognosis & Recommendations  Impression Summary:  Patient is demonstrating improved accuracy and independence in completion of more complex problem solving and reasoning tasks using strategies independently identified by the patient.   Therapy Recommendations  Recommendation:  Individual Speech Therapy,  Planned Therapy Interventions:  Home Program, Compensatory Strategy Training, Patient/Caregiver Education, Cognitive-Linguistic training and Speech/Language training,   Plan Details:  Continue with POC.

## 2020-04-03 NOTE — OP THERAPY DAILY TREATMENT
"  Outpatient Physical Therapy  DAILY TREATMENT     [facility] Physical Therapy [address]  [address]  [place] [address]  Phone:  [phone]  Fax:  [phone]    Date: 04/03/2020    Patient: Dany Lambert  YOB: 1980  MRN: [id number]     Time Calculation  Start time: 0929  Stop time: 1022 Time Calculation (min): 53 minutes       Chief Complaint: Vertigo and Headache    Visit #: 13    SUBJECTIVE:  Doing ok. Few headaches the last couple days.  Started kathia string just once.  Doing HEP has been challenging with stress from work.     OBJECTIVE:      Therapeutic Exercises (CPT 60987):     1. Upright bike, 8 min with 30\" on/off(slow) intervals, L4    Therapeutic Treatments and Modalities:     1. Neuromuscular Re-education (CPT 24536)    Therapeutic Treatment and Modalities Summary:   - Kathia string: trombone, 3 bead bug walk.  X 2 min ea  - Mardsen ball: seated.  Ball catch and release-> letter call out -> lateral with cog challenge  - Standing self ball toss (hand to hand). Eyes only -> eyes and head  - Gait with HTs: horiz, vert x 1 lap ea.  Attempted diagonal, but too much nausea and had to lay down.     Time-based treatments/modalities:  Therapeutic exercise minutes (CPT 90046): 8 minutes  Neuromusc re-ed, balance, coor, post minutes (CPT 19197): 45 minutes       ASSESSMENT:   Response to treatment: Able to return to beginning level vision therapy with improved overall tolerance.  Having to stop after gait with HTs due to quick nausea response and little warning sx prior.    PLAN/RECOMMENDATIONS:   Plan for treatment: therapy treatment to continue next visit.  Planned interventions for next visit: continue with current treatment. Cont slow reintro of vision challenges, seated HTs, kneeling tasks until VRT better tolerated.        "

## 2020-04-06 ENCOUNTER — APPOINTMENT (OUTPATIENT)
Dept: PHYSICAL THERAPY | Facility: REHABILITATION | Age: 40
End: 2020-04-06
Attending: FAMILY MEDICINE
Payer: COMMERCIAL

## 2020-04-06 NOTE — OP THERAPY DAILY TREATMENT
"  Outpatient Physical Therapy  DAILY TREATMENT     [facility] Physical Therapy [address]  [address]  [place] [address]  Phone:  [phone]  Fax:  [phone]    Date: 04/06/2020    Patient: Dany Lambert  YOB: 1980  MRN: [id number]     Time Calculation               Chief Complaint: No chief complaint on file.    Visit #: 14    SUBJECTIVE:  ***    OBJECTIVE:      Therapeutic Exercises (CPT 07788):     1. Upright bike, 8 min with 30\" on/off(slow) intervals, L4    Therapeutic Treatments and Modalities:     1. Neuromuscular Re-education (CPT 42866)    Therapeutic Treatment and Modalities Summary:   - Jean Carlos string: trombone, 3 bead bug walk.  X 2 min ea  - Mardsen ball: seated.  Ball catch and release-> letter call out -> lateral with cog challenge  - Standing self ball toss (hand to hand). Eyes only -> eyes and head  - Gait with HTs: horiz, vert x 1 lap ea.  Attempted diagonal, but too much nausea and had to lay down.     Time-based treatments/modalities:          ASSESSMENT:   Response to treatment: ***    PLAN/RECOMMENDATIONS:   Plan for treatment: therapy treatment to continue next visit.  Planned interventions for next visit: continue with current treatment.       "

## 2020-04-10 ENCOUNTER — PHYSICAL THERAPY (OUTPATIENT)
Dept: PHYSICAL THERAPY | Facility: REHABILITATION | Age: 40
End: 2020-04-10
Attending: FAMILY MEDICINE
Payer: COMMERCIAL

## 2020-04-10 ENCOUNTER — SPEECH THERAPY (OUTPATIENT)
Dept: SPEECH THERAPY | Facility: REHABILITATION | Age: 40
End: 2020-04-10
Attending: FAMILY MEDICINE
Payer: COMMERCIAL

## 2020-04-10 DIAGNOSIS — F09 LATE EFFECT OF HEAD TRAUMA, COGNITIVE DEFICITS: ICD-10-CM

## 2020-04-10 DIAGNOSIS — S09.90XS LATE EFFECT OF HEAD TRAUMA, COGNITIVE DEFICITS: ICD-10-CM

## 2020-04-10 DIAGNOSIS — S06.0X9A CONCUSSION WITH < 1 HR LOSS OF CONSCIOUSNESS: ICD-10-CM

## 2020-04-10 PROCEDURE — 97535 SELF CARE MNGMENT TRAINING: CPT

## 2020-04-10 PROCEDURE — 92507 TX SP LANG VOICE COMM INDIV: CPT

## 2020-04-10 ASSESSMENT — ENCOUNTER SYMPTOMS: PAIN SCALE: 3

## 2020-04-10 NOTE — OP THERAPY DAILY TREATMENT
"  Outpatient Physical Therapy  DAILY TREATMENT     [facility] Physical Therapy [address].  [address]  [place] [address]  Phone:  [phone]  Fax:  [phone]    Date: 04/10/2020    Patient: Dany Lambert  YOB: 1980  MRN: [id number]     Time Calculation  Start time: 1400  Stop time: 1430 Time Calculation (min): 30 minutes       Chief Complaint: Vertigo and Headache    Visit #: 14    SUBJECTIVE:  \"I'm at my limit.\"  Feels the stress (related to injury, recent loss of family member, work concerns, living situation, current COVID19) has become too much to handle.  Is finding that coming to the clinic twice a week is too much for her. All resulting in increased headache frequency.  Feels bad for having to cancel appts due to the severity of her migraines. Has not been able to get into her .      OBJECTIVE:  Current objective measures:   Dizziness Handicap Inventory - Physical Items Score: 14  Dizziness Handicap Inventory - Emotional Items Score: 8  Dizziness Handicap Inventory - Functional Items Score: 12  Dizziness Handicap Inventory - Total Score: 34     Therapeutic Treatments and Modalities:     1. Functional Training, Self Care (CPT 43697)    Therapeutic Treatment and Modalities Summary:   - Discussed concerns as above and offered stress management options. In relation to PT: decreased frequency of sessions, focus on CV training as it may be better to decrease sx triggers and stabilize mood.  - HEP: when feeling stable enough, cont convergence tasks to create near vision endurance and cont balance with HTs.     Time-based treatments/modalities:  Functional training, self care minutes (CPT 90713): 30 minutes    ASSESSMENT:   Response to treatment: Decreased VRT in clinic today, as pt was feeling overwhelmed.  Overall has improved (disability score down from 54% to 34%, VOMS down to 30 pts and able to reintegrate vision therapy, balance improving); however, may be " more appropriate to focus on the mood/anxiety trajectory of her condition if she is feeling this is the primary limitation at this time.  Will decrease freq to 1x/week.  If still overwhelming, may be better to take a break from PT with HEP only.  Monitor response.     PLAN/RECOMMENDATIONS:   Plan for treatment: therapy treatment to continue next visit.  Planned interventions for next visit: continue with current treatment.

## 2020-04-10 NOTE — OP THERAPY DAILY TREATMENT
"  Outpatient Speech Therapy  DAILY TREATMENT     [facility] Speech [address]  [address]  [place] [address]  Phone:  [phone]  Fax:  [phone]    Date: 04/10/2020    Patient: Dany Lambert  YOB: 1980  MRN: [id number]     Time Calculation  Start time: 1300  Stop time: 1400 Time Calculation (min): 60 minutes       Chief Complaint: Other (stress \"I think it's a combination of everything over the last 6 months\")    Visit #: 11    Subjective:   Reason for Therapy:     Reason For Evaluation:  Cognition and TBI  Social Support:     Social support system: unaccompanied.    Patient Mental Status:  Alert and Responsive  Pain:     Current pain rating:  3    Location:  Headache identified as \"not severe\"  Additional Subjective Comments:      Patient arrived to outpatient speech therapy reporting \"increased stress\" in personal life, work life stating \"I am getting so many migraines because of my stress right now.\" Patient reports that levels of stress she is experiencing is negatively impacting all aspects of her life.      Patient reports she has started to see a psychologist to address trauma/ PTSD following initial experience; but left secondary to discomfort.  Patient states she is seeking a female psychologist at this time to address increased \"emotional stress\".     Patient stated that she has chosen to \"not move because it feels like it would be too much stress at this time.\"       Objective:   Treatments/Interventions Performed:  Patient/Caregiver education, Compensatory strategy training, Home program and Cognitive-Linguistic training    Complex problem solving targeted through completion of complex, 4 step written directions incorporating understanding of if, then statements. Patient completed the structured task with 24/30 = 80% accuracy, independent.  Patient was independent in identifying errors; although min verbal prompts/ instruction/ cues were necessary to " "generate corrections.         Speech Therapy Assessment:     Cognitive Linguistic Assessment:     Cognitive-Linguistic comments: Given complex direction following task, patient was noted to be independent in implementing strategies to increase recall and understanding to previously read material, highlighting and making notes of important information.     Despite progress, patient with continued perseverations in thought increasingly during more complex problem solving tasks requiring multiple re-reads and clinician assistance to increase understanding.         Speech Therapy Plan :   Prognosis & Recommendations  Impression Summary:  Patient continues to require increased processing time to more complex activities to ensure accuracy. Patient is demonstrating independence in use of strategies that improve accuracy during more complex tasks; although perseverations in thought persist that can negatively impact accuracy to more complex problem solving tasks.   Therapy Recommendations  Recommendation:  Individual Speech Therapy, Patient states she will follow up with referral for Behavioral/ Mental health to address identified \"increased stress\" with Dr. Anderson, during next scheduled follow up, which is scheduled for next Monday.   Planned Therapy Interventions:  Cognitive-Linguistic training, Home Program, Patient/Caregiver Education and Compensatory Strategy Training,   Plan Details:  Continue with current POC.               "

## 2020-04-13 ENCOUNTER — TELEPHONE (OUTPATIENT)
Dept: MEDICAL GROUP | Facility: CLINIC | Age: 40
End: 2020-04-13

## 2020-04-14 NOTE — TELEPHONE ENCOUNTER
Patient did not show for her appointment on April 13, 2020.    Called and left voicemail.  Also received a message from vestibular therapist regarding spacing out appointments and allowing her to continue working on her home exercise program.

## 2020-04-15 ENCOUNTER — TELEPHONE (OUTPATIENT)
Dept: PHYSICAL THERAPY | Facility: REHABILITATION | Age: 40
End: 2020-04-15

## 2020-04-15 ENCOUNTER — TELEPHONE (OUTPATIENT)
Dept: NEUROLOGY | Facility: MEDICAL CENTER | Age: 40
End: 2020-04-15

## 2020-04-15 NOTE — TELEPHONE ENCOUNTER
Dr. Griffin,    Patient came in ( I think last week)  And dropped off paper work for us to fill out its called Reasonable Accommodation Questionnaire, she stated its just like her [place] paper work some of the questions are familiar but, worded a little Differently. I have put this on your desk to fill out because, it seems like a doctors hand writing has to be on It.

## 2020-04-17 ENCOUNTER — OCCUPATIONAL MEDICINE (OUTPATIENT)
Dept: MEDICAL GROUP | Facility: CLINIC | Age: 40
End: 2020-04-17
Payer: COMMERCIAL

## 2020-04-17 ENCOUNTER — APPOINTMENT (OUTPATIENT)
Dept: PHYSICAL THERAPY | Facility: REHABILITATION | Age: 40
End: 2020-04-17
Attending: FAMILY MEDICINE
Payer: COMMERCIAL

## 2020-04-17 VITALS
WEIGHT: 119 LBS | SYSTOLIC BLOOD PRESSURE: 114 MMHG | RESPIRATION RATE: 16 BRPM | HEART RATE: 78 BPM | HEIGHT: 65 IN | TEMPERATURE: 97.8 F | DIASTOLIC BLOOD PRESSURE: 70 MMHG | BODY MASS INDEX: 19.83 KG/M2 | OXYGEN SATURATION: 98 %

## 2020-04-17 DIAGNOSIS — S06.0X9A CONCUSSION WITH < 1 HR LOSS OF CONSCIOUSNESS: ICD-10-CM

## 2020-04-17 DIAGNOSIS — R41.1 ANTEROGRADE AMNESIA: ICD-10-CM

## 2020-04-17 DIAGNOSIS — R47.01 BROCA'S APHASIA: ICD-10-CM

## 2020-04-17 DIAGNOSIS — H55.81 SACCADIC EYE MOVEMENTS: ICD-10-CM

## 2020-04-17 DIAGNOSIS — S09.90XD CLOSED HEAD INJURY, SUBSEQUENT ENCOUNTER: ICD-10-CM

## 2020-04-17 PROCEDURE — 99213 OFFICE O/P EST LOW 20 MIN: CPT | Performed by: FAMILY MEDICINE

## 2020-04-17 ASSESSMENT — FIBROSIS 4 INDEX: FIB4 SCORE: 0.81
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
## 2020-04-20 ENCOUNTER — SPEECH THERAPY (OUTPATIENT)
Dept: SPEECH THERAPY | Facility: REHABILITATION | Age: 40
End: 2020-04-20
Attending: FAMILY MEDICINE
Payer: COMMERCIAL

## 2020-04-20 ENCOUNTER — PHYSICAL THERAPY (OUTPATIENT)
Dept: PHYSICAL THERAPY | Facility: REHABILITATION | Age: 40
End: 2020-04-20
Attending: FAMILY MEDICINE
Payer: COMMERCIAL

## 2020-04-20 DIAGNOSIS — S06.0X9A CONCUSSION WITH < 1 HR LOSS OF CONSCIOUSNESS: ICD-10-CM

## 2020-04-20 DIAGNOSIS — F09 LATE EFFECT OF HEAD TRAUMA, COGNITIVE DEFICITS: ICD-10-CM

## 2020-04-20 DIAGNOSIS — S09.90XS LATE EFFECT OF HEAD TRAUMA, COGNITIVE DEFICITS: ICD-10-CM

## 2020-04-20 PROCEDURE — 97112 NEUROMUSCULAR REEDUCATION: CPT

## 2020-04-20 PROCEDURE — 92507 TX SP LANG VOICE COMM INDIV: CPT

## 2020-04-20 NOTE — OP THERAPY DAILY TREATMENT
"  Outpatient Speech Therapy  DAILY TREATMENT     [facility] Speech [address].  [address]  [place] [address]  Phone:  [phone]  Fax:  [phone]    Date: 04/20/2020    Patient: Dany Lambert  YOB: 1980  MRN: [id number]     Time Calculation  Start time: 1505  Stop time: 1600 Time Calculation (min): 55 minutes       Chief Complaint: Poor Memory    Visit #: 12    Subjective:   Reason for Therapy:     Reason For Evaluation:  Cognition and TBI  Social Support:     Social support system: unaccompanied.    Patient Mental Status:  Alert and Responsive  Progress Factors:     Progression:  Getting Better  Additional Subjective Comments:      \"I have to unload a little off my plate, because I don't want to stop therapy completely; but I need to get into another therapist (mental/ behavioral).\"     Patient stated she decided to not move and was allowed a month-to-month rent at this time and was very excited regarding this reporting \"that is a good thing.\"     \"I feel like my speech is a lot better. I am not getting hung up.\" Patient stated \"overall memory has been really, really good but short term still has a little bit of trouble.\" Patient reports improvement in headaches with a reduction in stress.       Objective:   Treatments/Interventions Performed:  Patient/Caregiver education, Compensatory strategy training, Cognitive-Linguistic training and Home program    Speech therapy address cognitive linguistic function through completion of the following structured therapy activities:    Problem solving for every day tasks addressed with patient asked to read advertisement and answer sentence level questions regarding. Structured task incorporated concepts of time and money. Patient completed the structured task with 5/5 - 100% accuracy, independent.         Speech Therapy Assessment:     Cognitive Linguistic Assessment:     Patient complex problem solving ability: WFL (to " "concepts of time and money )    Cognitive-Linguistic comments: Patient described deficits in short term memory as \"a little short circuit\" reporting \"I remember it later, its not gone forever but I will remember a short time later.\" Patient stated she is independent in visual cues, such as putting it out in sight to help remember, making notes or other strategies to increase recall.             Speech Therapy Plan :   Prognosis & Recommendations  Impression Summary:  Patient endorses improvement in memory, reporting reduced need for use of post it notes and other written reminders to recall. Patient does report \"forgetfulness\" but states that despite an increase delay in recall, she will now often remember what she was thinking of.      Prognosis:  Excellent  Prognosis Details:  Continue with outpatient speech therapy given patient noted improvement in cognitive linguistic function.   Planned Therapy Interventions:  Home Program, Compensatory Strategy Training, Patient/Caregiver Education and Cognitive-Linguistic training,   Plan Details:  Plan of care to be modified to 1 time per week at patient request regarding need to seek additional support through Behavioral/ Mental Health               "

## 2020-04-20 NOTE — OP THERAPY DAILY TREATMENT
"  Outpatient Physical Therapy  DAILY TREATMENT     [facility] Physical [address]  [address]  [place] [address]  Phone:  [phone]  Fax:  [phone]    Date: 04/20/2020    Patient: Dany Lambert  YOB: 1980  MRN: [id number]     Time Calculation  Start time: 1432  Stop time: 1500 Time Calculation (min): 28 minutes       Chief Complaint: Vertigo; Headache; and Loss Of Balance    Visit #: 15    SUBJECTIVE:  No new complaints, \"just trying to take it easy.\"    OBJECTIVE:      Therapeutic Exercises (CPT 96465):     1. Upright bike, 30\" on/off intervals.  L4 x 5 min    Therapeutic Treatments and Modalities:     1. Neuromuscular Re-education (CPT 97166)    Therapeutic Treatment and Modalities Summary:   - Mardsen ball: seated.  Ball catch and release-> letter call out -> lateral with cog challenge  - Self hand to hand ball toss  - Ball toss to PT seated on ball: through bounce and in the air.      Time-based treatments/modalities:  Therapeutic exercise minutes (CPT 84474): 5 minutes  Neuromusc re-ed, balance, coor, post minutes (CPT 61481): 23 minutes       ASSESSMENT:   Response to treatment: Overall better tolerance for vision therapy due to decreased nausea. Compensates with c/s motion vs pursuit/saccades.  Misses the ball often due to decreased coordination/deapth perception. Seems to have more trouble through the L UE/LE. 1 LOB to the L when on the ball, good self recovery.     PLAN/RECOMMENDATIONS:   Plan for treatment: therapy treatment to continue next visit.  Planned interventions for next visit: continue with current treatment.       "

## 2020-04-24 ENCOUNTER — APPOINTMENT (OUTPATIENT)
Dept: PHYSICAL THERAPY | Facility: REHABILITATION | Age: 40
End: 2020-04-24
Attending: FAMILY MEDICINE
Payer: COMMERCIAL

## 2020-04-27 ENCOUNTER — PHYSICAL THERAPY (OUTPATIENT)
Dept: PHYSICAL THERAPY | Facility: REHABILITATION | Age: 40
End: 2020-04-27
Attending: FAMILY MEDICINE
Payer: COMMERCIAL

## 2020-04-27 ENCOUNTER — SPEECH THERAPY (OUTPATIENT)
Dept: SPEECH THERAPY | Facility: REHABILITATION | Age: 40
End: 2020-04-27
Attending: FAMILY MEDICINE
Payer: COMMERCIAL

## 2020-04-27 DIAGNOSIS — S06.0X9A CONCUSSION WITH < 1 HR LOSS OF CONSCIOUSNESS: ICD-10-CM

## 2020-04-27 DIAGNOSIS — S09.90XS LATE EFFECT OF HEAD TRAUMA, COGNITIVE DEFICITS: ICD-10-CM

## 2020-04-27 DIAGNOSIS — F09 LATE EFFECT OF HEAD TRAUMA, COGNITIVE DEFICITS: ICD-10-CM

## 2020-04-27 PROCEDURE — 92507 TX SP LANG VOICE COMM INDIV: CPT

## 2020-04-27 PROCEDURE — 97110 THERAPEUTIC EXERCISES: CPT

## 2020-04-27 PROCEDURE — 97112 NEUROMUSCULAR REEDUCATION: CPT

## 2020-04-27 NOTE — OP THERAPY PROGRESS SUMMARY
Outpatient Speech Therapy  PROGRESS NOTE      [facility] Speech [address]  [address]  [place] [address]  Phone:  [phone]  Fax:  [phone]    Date of Visit: 03/30/2020    Patient: Dany Lambert  YOB: 1980  MRN: [id number]     Referring Provider: Mina Anderson M.D.  [address] [place] [address] [place] [address]   Referring Diagnosis No admission diagnoses are documented for this encounter.      Visit #: 9      Speech Therapy Occurrence Codes    Date of Onset of Impairment:  10/2/19   Date speech therapy care plan established or reviewed:  2/3/20   Date speech therapy treatment started:  2/3/20          Chief Complaint: Concussion    Visit Diagnoses     ICD-10-CM   1. Late effect of head trauma, cognitive deficits F09    S09.90XS       Subjective:   Reason for Therapy:     Reason For Evaluation:  TBI    Onset Description:  Dany has been attending speech therapy since Feb 2020 to address cognitive linguistic deficits in the setting of post concussive syndrome.       Objective:   Treatments/Interventions Performed:  Compensatory strategy training, Patient/Caregiver education, Home program and Cognitive-Linguistic training      Speech Therapy Assessment:     Expressive Language Assessment:     Patient's ability to formulate complex/abstract language is Supervision Required.    Cognitive Linguistic Assessment:     Patient attention divided: Minimal    Patient immediate memory: Minimal    Patient recent and short term memory: Minimal    Patient prospective and time delay memory: Minimal    Patient ability to organize thoughts: Minimal    Patient complex reasoning ability: Supervision Required    Patient decision making and planning ability: Supervision Required    Patient initiation and self monitoring ability: Supervision Required    Cognitive-Linguistic comments: Patient demonstrating improved planning for activities in the home environment, such as breaking  things down into smaller pieces; and noting timelines to make sure that things are completed on time.      Use of compensatory strategies to improve accuracy during more complex therapy tasks reviewed; specific to patient participation in structured, complex deductive reasoning puzzle.            Speech Therapy Plan :   Prognosis & Recommendations  Impression Summary:  Patient participated in 3 sessions since progress note dated 2/24/2020 completed. Patient recently returned to outpatient speech therapy following 2 week break in service for personal reasons. Patient current plan of care has been reviewed, with continued participation in direct, outpatient speech therapy addressing goals and objectives as outlined by current plan of care indicated and appropriate.   Prognosis:  Good  Prognosis Details:  Patient continues to demonstrate improvements in activity tolerance and overall accuracy and independence when encountering more complex therapy tasks in the structured setting. Carryover and generalization of skills recommended in the home setting. Continued, direct support from outpatient speech therapy to improve accuracy in this area recommended and warranted.   Goals  Short Term Goals:  -Patient will improve attention completing complex, divided attention tasks with 90% accuracy, independent.   -Patient will complete problem solving/reasoning tasks including deductive reasoning puzzles, clock drawings, completing to 90% accuracy, independent.  -Patient to complete word fluency tasks to 90% accuracy, independent    Short Term Goal Duration (Weeks):  6-8 weeks  Long Term Goals:  Patient will be independent with use of strategies to complete a variety of cognitive linguistic tasks in various environments to 90% accuracy.  Long Term Goal Duration (Weeks):  2-4 months        Functional Assessment Used  Data collection/ patient report during outpatient speech therapy sessions      Referring provider co-signature:  I  have reviewed this plan of care and my co-signature certifies the need for services.  Certification Dates:   From 03/23/2020     To 05/11/2020    Physician Signature: ________________________________ Date: ______________

## 2020-04-27 NOTE — OP THERAPY DAILY TREATMENT
"  Outpatient Speech Therapy  DAILY TREATMENT     [facility] Speech [address]  [address]  [place] [address]  Phone:  [phone]  Fax:  [phone]    Date: 04/27/2020    Patient: Dany Lambert  YOB: 1980  MRN: [id number]     Time Calculation  Start time: 1535  Stop time: 1610 Time Calculation (min): 35 minutes       Chief Complaint: Other (\"I feel down my stairs at home\")    Visit #: 13    Subjective:   Reason for Therapy:     Reason For Evaluation:  Cognition  Social Support:     Social support system: unaccompanied.    Patient Mental Status:  Responsive and Alert  Additional Subjective Comments:      \"I fell down my stairs; but I didn't hit my head.\" Patient stated she continues to look into Mental Health providers.       Objective:   Treatments/Interventions Performed:  Compensatory strategy training, Patient/Caregiver education, Home program and Cognitive-Linguistic training    Speech therapy address cognitive linguistic function through completion of the following structured therapy activities:    Complex, divided attention addressed in combination with complex reasoning/ problem solving through solving 4 step, complex directions incorporating if, then statements. Patient completed the task with 32/32 = 100% accuracy, independent.     Given 15 words, patient was asked to recall items. Patient was provided with no instruction regarding strategies to increase accuracy in recall. Patient was independent in identifying and implementing categorization to recall items to large categories, completing short term memory recall with 15/15 = 100% accuracy, independent.     Speech Therapy Assessment:     Written Language Assessment:     Ability to write single words (spontaneously) WFL.     Language comments: Patient no longer demonstrating complaints regarding word formulation for completion of written expression tasks.     Cognitive Linguistic Assessment:     Patient " attention divided: [facility]    Patient recent and short term memory: [facility]        Speech Therapy Plan :   Prognosis & Recommendations  Impression Summary: Given progress demonstrated during today's session with structured therapy tasks; it is recommended patient participate in reassessment of cognitive linguistic skills during next scheduled ST visit.   Goals  Short Term Goals:  -Patient will improve attention completing complex, divided attention tasks with 90% accuracy, independent: GOAL MET 4/27/2020.   -Patient will complete problem solving/reasoning tasks including deductive reasoning puzzles, clock drawings, completing to 90% accuracy, independent.  -Patient to complete word fluency tasks to 90% accuracy, independent  Therapy Recommendations  Recommendation:  Individual Speech Therapy, D/C planning.   Planned Therapy Interventions:  Home Program, Compensatory Strategy Training, Patient/Caregiver Education and Cognitive-Linguistic training,   Plan Details:  Reassessment of cognitive linguistic function at next scheduled ST visit.

## 2020-04-27 NOTE — OP THERAPY DAILY TREATMENT
"  Outpatient Physical Therapy  DAILY TREATMENT     [facility] Physical [address].  [address]  [place] [address]  Phone:  [phone]  Fax:  [phone]    Date: 04/27/2020    Patient: Dany Lambert  YOB: 1980  MRN: [id number]     Time Calculation  Start time: 1501  Stop time: 1530 Time Calculation (min): 29 minutes       Chief Complaint: Vertigo    Visit #: 16    SUBJECTIVE:  I took a bad fall down my stairs on the 23rd.  Thinks she missed the step on the L and slid down.  Did not hit head, but sore all over.  \"This is the first day I have been able to move more easily.\"     OBJECTIVE:      Therapeutic Exercises (CPT 28941):     1. Upright bike, L2 x 5 min at steady pace.     2. Shuttle, 4C x 20 bilat squat    Therapeutic Treatments and Modalities:     1. Neuromuscular Re-education (CPT 97898)    Therapeutic Treatment and Modalities Summary:   - kneeling airex: EC balance, EO head circles (EO).   - 1/2 kneeling airex: tennis ball toss (with bounce)  - rebounder: ball toss with rotation.  Narrow stance.     Time-based treatments/modalities:  Therapeutic exercise minutes (CPT 29018): 9 minutes  Neuromusc re-ed, balance, coor, post minutes (CPT 43415): 20 minutes       ASSESSMENT:   Response to treatment: Pt cautions with quick movements due to contusions from recent fall, but overall tolerated the session well.  She continues to be quite unstable in a kneeling/eyes closed scenario, which is surprising considering improvements being seen in full stand.  Cont core ataxia quality. Reassess MCTSIB in the next couple visits to assess change objectively.     PLAN/RECOMMENDATIONS:   Plan for treatment: therapy treatment to continue next visit.  Planned interventions for next visit: continue with current treatment.       "

## 2020-04-30 RX ORDER — VENLAFAXINE HYDROCHLORIDE 150 MG/1
CAPSULE, EXTENDED RELEASE ORAL
Qty: 90 CAP | Refills: 3 | Status: SHIPPED | OUTPATIENT
Start: 2020-04-30 | End: 2021-03-16 | Stop reason: SDUPTHER

## 2020-04-30 NOTE — TELEPHONE ENCOUNTER
Received request via: Patient    Was the patient seen in the last year in this department? Yes    Does the patient have an active prescription (recently filled or refills available) for medication(s) requested? Yes. .

## 2020-05-01 ENCOUNTER — APPOINTMENT (OUTPATIENT)
Dept: SPEECH THERAPY | Facility: REHABILITATION | Age: 40
End: 2020-05-01
Attending: FAMILY MEDICINE
Payer: COMMERCIAL

## 2020-05-01 ENCOUNTER — APPOINTMENT (OUTPATIENT)
Dept: PHYSICAL THERAPY | Facility: REHABILITATION | Age: 40
End: 2020-05-01
Attending: FAMILY MEDICINE
Payer: COMMERCIAL

## 2020-05-04 ENCOUNTER — PHYSICAL THERAPY (OUTPATIENT)
Dept: PHYSICAL THERAPY | Facility: REHABILITATION | Age: 40
End: 2020-05-04
Attending: FAMILY MEDICINE
Payer: COMMERCIAL

## 2020-05-04 ENCOUNTER — SPEECH THERAPY (OUTPATIENT)
Dept: SPEECH THERAPY | Facility: REHABILITATION | Age: 40
End: 2020-05-04
Attending: FAMILY MEDICINE
Payer: COMMERCIAL

## 2020-05-04 DIAGNOSIS — S06.0X9A CONCUSSION WITH < 1 HR LOSS OF CONSCIOUSNESS: ICD-10-CM

## 2020-05-04 DIAGNOSIS — F09 LATE EFFECT OF HEAD TRAUMA, COGNITIVE DEFICITS: ICD-10-CM

## 2020-05-04 DIAGNOSIS — S09.90XS LATE EFFECT OF HEAD TRAUMA, COGNITIVE DEFICITS: ICD-10-CM

## 2020-05-04 PROCEDURE — 92507 TX SP LANG VOICE COMM INDIV: CPT

## 2020-05-04 PROCEDURE — 97112 NEUROMUSCULAR REEDUCATION: CPT

## 2020-05-04 NOTE — OP THERAPY DISCHARGE SUMMARY
"Outpatient Speech Therapy  DISCHARGE SUMMARY NOTE      [address]  [address]  [place] [address]  Phone:  [phone]  Fax:  [phone]        Patient Name:  Dany Lambert  :  1980  MR#:  [id number]    HICN:       Visits:  14       Cancel/No-Show: 2    Diagnosis/ICD-10:     1. Late effect of head trauma, cognitive deficits F09     S09.90XS       Referring Provider: Mina Anderson M.D.    SOC Date: 2/3/20   Onset Date: 10/2/19      Your patient is being discharged from Speech therapy with the following comments:        Comments:    Ms. Dany Lambert, a 39 year old female, participated in direct, outpatient speech therapy addressing cognitive linguistic deficits in the setting of post concussive syndrome/ TBI.     Patient demonstrated steady progress with outpatient speech therapy intervention, with patient reporting an improvement in overall thinking skills as evidenced by performance on standardized assessment measures through administration of the Cognitive Linguistic Quick Test revealing an overall composite score of 3.8 consistent with Within Normal Limits, improved from (3.2) mild deficits at the start of outpatient speech therapy services.      At this time, patient is agreeable to discharge from outpatient speech therapy as patient has likely reached optimal level of function for the current treatment period. Patient was encouraged to continue in use of strategies that have been learned through participation in outpatient speech therapy in the home/ community settings as appropriate to continue to demonstrate accuracy and independence experienced during structured speech therapy sessions.     Patient stated she is encouraged to \"lighten the load\" with regards to therapy services, stating she is actively seeking additional emotional support through Behavioral health to address other considerations given trauma associated with patient TBI. "      Patient was encouraged to continue in some form of home program addressing higher level thinking skills in area of mental flexibility/ to encourage continue growth in complex problem solving, reasoning for executive functions. Application based home program through [facility] was recommended.   Goals  Short Term Goals:  -Patient will improve attention completing complex, divided attention tasks with 90% accuracy, independent: GOAL MET 4/27/2020.   -Patient will complete problem solving/reasoning tasks including deductive reasoning puzzles, clock drawings, completing to 90% accuracy, independent: GOAL MET 5/4/2020.   -Patient to complete word fluency tasks to 90% accuracy, independent: GOAL MET 5/4/2020.      Patient progression on Short Term Goals:     Long Term Goals:   Patient will be independent with use of strategies to complete a variety of cognitive linguistic tasks in various environments to 90% accuracy: GOAL MET 5/4/2020.     Limitations/Remaining:Patient may continue to experience deficits in mental flexibility, given results of CLQT.  Patient provided with information and encouraged in application based home program to continue.         Recommendations:D/C outpatient speech therapy; no further outpatient speech therapy indicated at this time.            Loulou Thakur, SLP

## 2020-05-04 NOTE — OP THERAPY DAILY TREATMENT
Outpatient Physical Therapy  DAILY TREATMENT     [facility] Physical [address].  [address]  [place] [address]  Phone:  [phone]  Fax:  [phone]    Date: 05/04/2020    Patient: Dany Lambert  YOB: 1980  MRN: [id number]     Time Calculation  Start time: 1458  Stop time: 1530 Time Calculation (min): 32 minutes       Chief Complaint: Loss Of Balance    Visit #: 17    SUBJECTIVE:  Comes in happy due to improvements in speech therapy testing.  States she has been working on the kneeling balance.  Concerned about some pain and popping she has been noticing around the R ear.     OBJECTIVE:      Therapeutic Treatments and Modalities:     1. Manual Therapy (CPT 98437), Brief assessment of TMJ mobility, noting lateral shift and popping with opening.  Trial of STM through R subocc and TMJ resulted in less popping.  Instructed in resting position    2. Neuromuscular Re-education (CPT 47406), MCTSIB reassessed.  All measures WNL except cond 2 still 10% increased sway compared to norms.     Time-based treatments/modalities:  Manual therapy minutes (CPT 20103): 5 minutes  Neuromusc re-ed, balance, coor, post minutes (CPT 62431): 25 minutes       ASSESSMENT:   Response to treatment: R ear pain/popping seems more consistent with TMD vs middle ear pathology.  May be related to ongoing upper c/s dysfunction and cervicogenic headaches.  Otherwise, notable improvements are being made in balance, coordination and vision control.  Would benefit from completion of remaining sessions.      PLAN/RECOMMENDATIONS:   Plan for treatment: therapy treatment to continue next visit.  Planned interventions for next visit: continue with current treatment.

## 2020-05-04 NOTE — OP THERAPY DAILY TREATMENT
Outpatient Speech Therapy  DAILY TREATMENT     [facility] Speech [address]  [address]  [place] [address]  Phone:  [phone]  Fax:  [phone]    Date: 2020    Patient: Dany Lambert  YOB: 1980  MRN: [id number]     Time Calculation  Start time: 1400  Stop time: 1453 Time Calculation (min): 53 minutes       Chief Complaint: Other (my lower back hurts)    Visit #: Visit count could not be calculated. Make sure you are using a visit which is associated with an episode.    Subjective Evaluation    Objective:   Other Treatment Interventions:  Readministration of the Cognitive Linguistic Quick Test (CLQT)  Treatment Intervention tool(s) used:  The Cognitive Linguistic Quick Test (CLQT) was re-administered to assess strengths and weaknesses in 5 cognitive domains related to cognitive linguistic function and provide comparative results regarding patient cognitive linguistic function from beginning of ST treatment to today.     Objective Details:  Subtest (TASK) scores:    Personal Facts: 8  Symbol Cancellation: 12  Confrontation Naming: 10   Clock drawin IMPROVED from 8 moderate   Story Retellin  Symbol Trials: 10 IMPROVED from 7  Generative namin  Design Memory: 6 IMPROVED from 5  Mazes: 4* (7)  Design Generation: 2* (6)     *indicates a score that is below criterion score for specific task with criterion score listed in paranthesis     Task scores were combined to obtain an overall severity rating. Results revealed the following cognitive domain scores/ severity ratings:    Attention 184 Within Functional Limits IMPROVED FROM 174 mild    Memory 169 Within Functional Limits IMPROVED FROM 159     Executive function 21 MILD IMPROVED FROM 19 moderate    Language 31 WFL   Visuospatial skills 82 Within Functional Limits IMPROVED FROM 73 mild     Individual sub tests she was below criterion cut off were mazes, and design generation.    Cognitive domain  scores were combined to obtain an overall severity rating. Results revealed a composite severity rating of 3.8 Within Functional Limits IMPROVED from 3.2 mild (3.4-2.5) cognitive linguistic deficits at the start of outpatient speech therapy intervention.            Speech Therapy Assessment:     Expressive Language Assessment:     Portions of the Other (Cognitive Linguistic Quick Test (CLQT)) are used.    Ability to exhibit appropriate naming: WFL.    Patient's ability to verbalize wants and needs is WFL.    Patient's ability to sustain dialogues within a given topic is WFL.    Patient's ability to formulate complex/abstract language is WFL.    Cognitive Linguistic Assessment:     Portions of the Other (Cognitive Linguistic Quick Test (CLQT)) are used.    Patient attention sustained: L    Patient attention selective: L    Patient attention divided: L    Patient orientation to day: WFL    Patient orientation to month: WFL    Patient orientation to time: L    Patient orientation to self: [facility]    Patient orientation to place: WFL    Patient immediate memory: WFL    Patient recent and short term memory: L    Patient remote and long term memory: L    Patient procedural memory: WFL    Patient biographical information memory: WFL    Patient ability to organize thoughts: WFL    Patient executive functioning ability: Minimal    Patient insight to safety awareness: WFL    Patient decision making and planning ability: [facility]    Patient initiation and self monitoring ability: [facility]    Patient spontaneous clock drawing ability: [facility]    Cognitive-Linguistic comments: Overall results of re-administration of the Cognitive Linguistic Quick Test (CLQT) revealed improvement in patient performance from mild deficits at the start of outpatient speech therapy intervention, to within normal limits as evidenced by today's reassessment.    Patient encouraged in implementation of home program to address continued, below average  "performance in areas of mazes and design generations, representing suggested deficits in mental flexibility. Patient has demonstrated effective use of strategies during outpatient speech therapy sessions to improve planning, self monitoring for adequate safety awareness for every day language related activities.         Speech Therapy Plan :   Prognosis & Recommendations  Impression Summary:  Ms. Dany Lambert, a 39 year old female, participated in direct, outpatient speech therapy addressing cognitive linguistic deficits in the setting of post concussive syndrome/ TBI.    Patient demonstrated steady progress with outpatient speech therapy intervention, with patient reporting an improvement in overall thinking skills as evidenced by performance on standardized assessment measures through administration of the Cognitive Linguistic Quick Test revealing an overall composite score of 3.8 consistent with Within Normal Limits, improved from (3.2) mild deficits at the start of outpatient speech therapy services.     At this time, patient is agreeable to discharge from outpatient speech therapy as patient has likely reached optimal level of function for the current treatment period. Patient was encouraged to continue in use of strategies that have been learned through participation in outpatient speech therapy in the home/ community settings as appropriate to continue to demonstrate accuracy and independence experienced during structured speech therapy sessions.    Patient stated she is encouraged to \"lighten the load\" with regards to therapy services, stating she is actively seeking additional emotional support through Behavioral health to address other considerations given trauma associated with patient TBI.     Patient was encouraged to continue in some form of home program addressing higher level thinking skills in area of mental flexibility/ to encourage continue growth in complex problem solving, reasoning for " executive functions. Application based home program through [facility] was recommended.   Goals  Short Term Goals:  -Patient will improve attention completing complex, divided attention tasks with 90% accuracy, independent: GOAL MET 4/27/2020.   -Patient will complete problem solving/reasoning tasks including deductive reasoning puzzles, clock drawings, completing to 90% accuracy, independent: GOAL MET 5/4/2020.   -Patient to complete word fluency tasks to 90% accuracy, independent: GOAL MET 5/4/2020.     Patient progression on Short Term Goals:     Long Term Goals:   Patient will be independent with use of strategies to complete a variety of cognitive linguistic tasks in various environments to 90% accuracy: GOAL MET 5/4/2020.   Therapy Recommendations  Recommendation:  No further speech therapy indicated at this time,  Plan Details:  D/C outpatient speech therapy; no further outpatient speech therapy indicated at this time.

## 2020-05-08 ENCOUNTER — OCCUPATIONAL MEDICINE (OUTPATIENT)
Dept: MEDICAL GROUP | Facility: CLINIC | Age: 40
End: 2020-05-08
Payer: COMMERCIAL

## 2020-05-08 VITALS
TEMPERATURE: 98.9 F | DIASTOLIC BLOOD PRESSURE: 70 MMHG | BODY MASS INDEX: 19.83 KG/M2 | HEIGHT: 65 IN | WEIGHT: 119 LBS | OXYGEN SATURATION: 100 % | RESPIRATION RATE: 16 BRPM | HEART RATE: 110 BPM | SYSTOLIC BLOOD PRESSURE: 114 MMHG

## 2020-05-08 DIAGNOSIS — M26.621 TMJ TENDERNESS, RIGHT: ICD-10-CM

## 2020-05-08 DIAGNOSIS — R47.01 BROCA'S APHASIA: ICD-10-CM

## 2020-05-08 DIAGNOSIS — S09.90XD CLOSED HEAD INJURY, SUBSEQUENT ENCOUNTER: ICD-10-CM

## 2020-05-08 DIAGNOSIS — S06.0X9A CONCUSSION WITH < 1 HR LOSS OF CONSCIOUSNESS: ICD-10-CM

## 2020-05-08 DIAGNOSIS — F43.10 PTSD (POST-TRAUMATIC STRESS DISORDER): ICD-10-CM

## 2020-05-08 DIAGNOSIS — H55.81 SACCADIC EYE MOVEMENTS: ICD-10-CM

## 2020-05-08 DIAGNOSIS — R41.1 ANTEROGRADE AMNESIA: ICD-10-CM

## 2020-05-08 PROCEDURE — 99214 OFFICE O/P EST MOD 30 MIN: CPT | Performed by: FAMILY MEDICINE

## 2020-05-08 ASSESSMENT — FIBROSIS 4 INDEX: FIB4 SCORE: 0.81

## 2020-05-08 NOTE — LETTER
May 19, 2020         Patient: Dany Lambert   YOB: 1980   Date of Visit: 5/8/2020           To Whom it May Concern:    Dany Lambert was seen in my clinic on 5/8/2020.  Patient had to call OUT of work due to not feeling well including worsening of her headaches related to her work injury.  Dates she called out were: 4/22, 4/25, 4/29 and 5/2.    If you have any questions or concerns, please don't hesitate to call.        Sincerely,           Mina Anderson M.D.  Electronically Signed
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
## 2020-06-01 ENCOUNTER — APPOINTMENT (OUTPATIENT)
Dept: PHYSICAL THERAPY | Facility: REHABILITATION | Age: 40
End: 2020-06-01
Attending: FAMILY MEDICINE
Payer: COMMERCIAL

## 2020-06-03 DIAGNOSIS — E78.00 HYPERCHOLESTEROLEMIA: ICD-10-CM

## 2020-06-03 RX ORDER — LOVASTATIN 10 MG/1
TABLET ORAL
Qty: 90 TAB | Refills: 0 | Status: SHIPPED | OUTPATIENT
Start: 2020-06-03 | End: 2020-08-04 | Stop reason: SDUPTHER

## 2020-06-08 ENCOUNTER — PHYSICAL THERAPY (OUTPATIENT)
Dept: PHYSICAL THERAPY | Facility: REHABILITATION | Age: 40
End: 2020-06-08
Attending: FAMILY MEDICINE
Payer: COMMERCIAL

## 2020-06-08 DIAGNOSIS — S06.0X9A CONCUSSION WITH < 1 HR LOSS OF CONSCIOUSNESS: ICD-10-CM

## 2020-06-08 PROCEDURE — 97112 NEUROMUSCULAR REEDUCATION: CPT

## 2020-06-08 NOTE — OP THERAPY DAILY TREATMENT
"  Outpatient Physical Therapy  DAILY TREATMENT     [facility] [address]  [address]  [place] [address]  Phone:  [phone]  Fax:  [phone]    Date: 06/08/2020    Patient: Dany Lambert  YOB: 1980  MRN: [id number]     Time Calculation    Start time: 1430  Stop time: 1532 Time Calculation (min): 62 minutes         Chief Complaint: Vertigo and Difficulty Walking    Visit #: 18    SUBJECTIVE:  Pt has been on hold x 1 month for a 'brain break.' States she has mostly been at home, doing housework, yoga and taking on light hobbies.  Started with a psychologist who has taken her off work for now.  She feels positively about this addition to her care. \"It feels like I can breath again.\" Reports headaches improved between starting psych and her recent cervical epidural. ENT appt has been made (next week). Cont complaint of R TMJ sharp pain and popping. Notes her body gets 'shakey' after exertion.     OBJECTIVE:      Therapeutic Treatments and Modalities:     1. Neuromuscular Re-education (CPT 58067)    Therapeutic Treatment and Modalities Summary:   - Review of progress over the last month  - Review of positional exam: negative for hallpike and roll   - VOMS completed. Able to complete with minimal rest.  Total score= 16 points.  Most bothered by horiz VOR and VOR-C.   - Review of just right challenge.  How to integrate VRT HEP into her yoga practice  - What to expect from upcoming ENT appt.     Time-based treatments/modalities:    Physical Therapy Timed Treatment Charges  Neuromusc re-ed, balance, coor, post minutes (CPT 21713): 62 minutes    ASSESSMENT:   Response to treatment: Improved mental health status and hold from stressors has resulted in improved symptom stability.  VOMS score improved from 76 pts (severe) on eval, now to 16 pts total.  Vision/vestibular triggers are mild, but still present mainly with horizontal head motions. Pt to get back to 10 min " VRT/day. Focusing on kneeling ball toss, VORx1 horiz and standing balance EC. Would benefit from completion of remaining sessions to maximize sx stability and LOF.     PLAN/RECOMMENDATIONS:   Plan for treatment: therapy treatment to continue next visit.  Planned interventions for next visit: continue with current treatment.

## 2020-06-15 ENCOUNTER — PHYSICAL THERAPY (OUTPATIENT)
Dept: PHYSICAL THERAPY | Facility: REHABILITATION | Age: 40
End: 2020-06-15
Attending: FAMILY MEDICINE
Payer: COMMERCIAL

## 2020-06-15 DIAGNOSIS — S06.0X9A CONCUSSION WITH < 1 HR LOSS OF CONSCIOUSNESS: ICD-10-CM

## 2020-06-15 PROCEDURE — 97112 NEUROMUSCULAR REEDUCATION: CPT

## 2020-06-15 PROCEDURE — 97110 THERAPEUTIC EXERCISES: CPT

## 2020-06-15 NOTE — OP THERAPY DAILY TREATMENT
"  Outpatient Physical Therapy  DAILY TREATMENT     [facility] [address].  [address]  [place] [address]  Phone:  [phone]  Fax:  [phone]    Date: 06/15/2020    Patient: Dany Lambert  YOB: 1980  MRN: [id number]     Time Calculation    Start time: 1430  Stop time: 1524 Time Calculation (min): 54 minutes         Chief Complaint: Vertigo and Loss Of Balance    Visit #: 19    SUBJECTIVE:  Sees ENT this week. No new complaints. Admits she hasn't been doing much of the VRT, but has been doing a lot of mindfullness/meditation tasks.     OBJECTIVE:        Therapeutic Exercises (CPT 16292):     1. Upright bike, L2 x 8 min, stopped due to mild dizziness reproduction.     Therapeutic Treatments and Modalities:     1. Neuromuscular Re-education (CPT 36889)    Therapeutic Treatment and Modalities Summary:   - Rocker board: AP and lateral.  4 way balance on tilt with EC x 30\" ea  - Warrior pose rotations with stable gaze: x 10 ea side  - fwd fold to overhead reach: x 10  - VORx1 standing: horiz and vert x 1 min ea (seated rest bw each due to nausea)  - gait with ball toss: vert and hand to hand, x 2 laps ea  - Airex stance: ball toss to PT x 20      Time-based treatments/modalities:    Physical Therapy Timed Treatment Charges  Neuromusc re-ed, balance, coor, post minutes (CPT 34067): 46 minutes  Therapeutic exercise minutes (CPT 41481): 8 minutes    ASSESSMENT:   Response to treatment: Pt visibly fatigued and yawning after today's session. 'Mentally exhausted' but low nausea, which had limited tolerance in the past. Given 'explain pain' workbook to better ID sx and stress management strategies.     PLAN/RECOMMENDATIONS:   Plan for treatment: therapy treatment to continue next visit.  Planned interventions for next visit: continue with current treatment.       "

## 2020-06-19 ENCOUNTER — OCCUPATIONAL MEDICINE (OUTPATIENT)
Dept: MEDICAL GROUP | Facility: CLINIC | Age: 40
End: 2020-06-19
Payer: COMMERCIAL

## 2020-06-19 VITALS
HEIGHT: 65 IN | TEMPERATURE: 98.1 F | OXYGEN SATURATION: 97 % | RESPIRATION RATE: 18 BRPM | DIASTOLIC BLOOD PRESSURE: 72 MMHG | BODY MASS INDEX: 19.83 KG/M2 | WEIGHT: 119 LBS | SYSTOLIC BLOOD PRESSURE: 116 MMHG | HEART RATE: 112 BPM

## 2020-06-19 DIAGNOSIS — R42 LIGHTHEADEDNESS: ICD-10-CM

## 2020-06-19 DIAGNOSIS — S06.0X9A CONCUSSION WITH < 1 HR LOSS OF CONSCIOUSNESS: ICD-10-CM

## 2020-06-19 DIAGNOSIS — H55.81 SACCADIC EYE MOVEMENTS: ICD-10-CM

## 2020-06-19 DIAGNOSIS — R47.01 BROCA'S APHASIA: ICD-10-CM

## 2020-06-19 DIAGNOSIS — F41.9 ANXIETY: ICD-10-CM

## 2020-06-19 DIAGNOSIS — S09.90XD CLOSED HEAD INJURY, SUBSEQUENT ENCOUNTER: ICD-10-CM

## 2020-06-19 PROCEDURE — 99214 OFFICE O/P EST MOD 30 MIN: CPT | Performed by: FAMILY MEDICINE

## 2020-06-19 ASSESSMENT — FIBROSIS 4 INDEX: FIB4 SCORE: 0.81
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
## 2020-06-22 ENCOUNTER — PHYSICAL THERAPY (OUTPATIENT)
Dept: PHYSICAL THERAPY | Facility: REHABILITATION | Age: 40
End: 2020-06-22
Attending: FAMILY MEDICINE
Payer: COMMERCIAL

## 2020-06-22 DIAGNOSIS — S06.0X9A CONCUSSION WITH < 1 HR LOSS OF CONSCIOUSNESS: ICD-10-CM

## 2020-06-22 PROCEDURE — 97112 NEUROMUSCULAR REEDUCATION: CPT

## 2020-06-22 PROCEDURE — 97110 THERAPEUTIC EXERCISES: CPT

## 2020-06-22 NOTE — OP THERAPY DAILY TREATMENT
"  Outpatient Physical Therapy  DAILY TREATMENT     [facility] [address]  [address]  [place] [address]  Phone:  [phone]  Fax:  [phone]    Date: 06/22/2020    Patient: Dany Lambert  YOB: 1980  MRN: [id number]     Time Calculation    Start time: 1430  Stop time: 1530 Time Calculation (min): 60 minutes         Chief Complaint: Loss Of Balance    Visit #: 20    SUBJECTIVE:  I'm tired.  My body feels really weak, \"like my legs can barely hold me up.\" States she tried the head circles with standing balance in her HEP.  \"I was really trying to challenge myself to go farther, but I think its what sparked a 4 day long migraine.\"  Required rescue meds. Body has felt weak ever since.     OBJECTIVE:  Current objective measures:   Reflexes: C5 1/4, C6 2/4, L4 1/4 bilat.  Unable to elicit S1.   Reports electrical pains \"all the time\" in the gastrocs, thighs, upper arms.    Thenar eminences are intact  Sensation: diminished to LT in distal finger tips bilat  Strength: UEs grossly 3+/5. LE 3+/5 except knee ext 2/5 (lacking 20 degrees from full ext.  Was able to regain full range after HEP)  Power : (avg of 3 trials). R= 23.3#, L= 46.6#  Notable trunkal ataxia when sitting.           Therapeutic Exercises (CPT 89580):     1. Upright bike, L0 x 5 min    2. Shuttle, 4C- completed 5 reps, but unable to keep feet on the mat, Held further tx to re-eval    4. Supine LTR, x 10 ea    5. Supine heel slides, x 10 ea    6. Supine march, x 10 ea    7. Standard bridge, 10x5\" hold    Therapeutic Treatments and Modalities:     1. Neuromuscular Re-education (CPT 12159)    Therapeutic Treatment and Modalities Summary:   - Re-eval as above  - Supine EC HTs x 10 ea way (horiz and vert)  - Postural re-ed x 5 min, discussed concept of OA nod to increase space    Time-based treatments/modalities:    Physical Therapy Timed Treatment Charges  Neuromusc re-ed, balance, coor, post " minutes (CPT 60389): 30 minutes  Therapeutic exercise minutes (CPT 19076): 30 minutes      ASSESSMENT:   Response to treatment: Pt presents today with a concerning amount of weakness throughout her body, which is a drastic change from the previous session.  Considering the onset, pattern of decreased strength, diminished reflexes, question whether this is cervicogenic.  Central stenosis?  Will reach out to referring. Would benefit from cont of VRT, but only if cervical ROM wont add to the problem.     PLAN/RECOMMENDATIONS:   Plan for treatment: therapy treatment to continue next visit.  Planned interventions for next visit: continue with current treatment.

## 2020-06-23 ENCOUNTER — OFFICE VISIT (OUTPATIENT)
Dept: URGENT CARE | Facility: CLINIC | Age: 40
End: 2020-06-23
Payer: COMMERCIAL

## 2020-06-23 ENCOUNTER — HOSPITAL ENCOUNTER (EMERGENCY)
Facility: MEDICAL CENTER | Age: 40
End: 2020-06-23
Attending: EMERGENCY MEDICINE
Payer: COMMERCIAL

## 2020-06-23 VITALS
SYSTOLIC BLOOD PRESSURE: 102 MMHG | WEIGHT: 125.88 LBS | OXYGEN SATURATION: 99 % | BODY MASS INDEX: 20.97 KG/M2 | HEIGHT: 65 IN | TEMPERATURE: 98.4 F | RESPIRATION RATE: 18 BRPM | DIASTOLIC BLOOD PRESSURE: 72 MMHG | HEART RATE: 68 BPM

## 2020-06-23 VITALS
RESPIRATION RATE: 20 BRPM | BODY MASS INDEX: 19.83 KG/M2 | OXYGEN SATURATION: 98 % | HEART RATE: 82 BPM | DIASTOLIC BLOOD PRESSURE: 62 MMHG | HEIGHT: 65 IN | TEMPERATURE: 98.7 F | SYSTOLIC BLOOD PRESSURE: 112 MMHG | WEIGHT: 119 LBS

## 2020-06-23 DIAGNOSIS — F41.9 ANXIETY: ICD-10-CM

## 2020-06-23 DIAGNOSIS — M54.2 NECK PAIN: ICD-10-CM

## 2020-06-23 DIAGNOSIS — R53.1 GENERALIZED WEAKNESS: ICD-10-CM

## 2020-06-23 DIAGNOSIS — R68.83 SHAKING CHILLS: ICD-10-CM

## 2020-06-23 DIAGNOSIS — R53.83 FATIGUE, UNSPECIFIED TYPE: ICD-10-CM

## 2020-06-23 DIAGNOSIS — R55 SYNCOPE, NEAR: ICD-10-CM

## 2020-06-23 DIAGNOSIS — Z87.820 HISTORY OF CONCUSSION: ICD-10-CM

## 2020-06-23 DIAGNOSIS — R42 LIGHTHEADEDNESS: ICD-10-CM

## 2020-06-23 DIAGNOSIS — S06.0X9A CONCUSSION WITH < 1 HR LOSS OF CONSCIOUSNESS: ICD-10-CM

## 2020-06-23 DIAGNOSIS — S09.90XD CLOSED HEAD INJURY, SUBSEQUENT ENCOUNTER: ICD-10-CM

## 2020-06-23 DIAGNOSIS — R94.31 ABNORMAL EKG: ICD-10-CM

## 2020-06-23 LAB
ALBUMIN SERPL BCP-MCNC: 4 G/DL (ref 3.2–4.9)
ALBUMIN/GLOB SERPL: 1.7 G/DL
ALP SERPL-CCNC: 41 U/L (ref 30–99)
ALT SERPL-CCNC: 10 U/L (ref 2–50)
ANION GAP SERPL CALC-SCNC: 11 MMOL/L (ref 7–16)
AST SERPL-CCNC: 14 U/L (ref 12–45)
BASOPHILS # BLD AUTO: 0.5 % (ref 0–1.8)
BASOPHILS # BLD: 0.03 K/UL (ref 0–0.12)
BILIRUB SERPL-MCNC: 0.2 MG/DL (ref 0.1–1.5)
BUN SERPL-MCNC: 12 MG/DL (ref 8–22)
CALCIUM SERPL-MCNC: 8.8 MG/DL (ref 8.4–10.2)
CHLORIDE SERPL-SCNC: 109 MMOL/L (ref 96–112)
CO2 SERPL-SCNC: 17 MMOL/L (ref 20–33)
COVID ORDER STATUS COVID19: NORMAL
CREAT SERPL-MCNC: 0.84 MG/DL (ref 0.5–1.4)
EKG IMPRESSION: NORMAL
EOSINOPHIL # BLD AUTO: 0.05 K/UL (ref 0–0.51)
EOSINOPHIL NFR BLD: 0.8 % (ref 0–6.9)
ERYTHROCYTE [DISTWIDTH] IN BLOOD BY AUTOMATED COUNT: 47.5 FL (ref 35.9–50)
GLOBULIN SER CALC-MCNC: 2.4 G/DL (ref 1.9–3.5)
GLUCOSE SERPL-MCNC: 101 MG/DL (ref 65–99)
HCG SERPL QL: NEGATIVE
HCT VFR BLD AUTO: 36.1 % (ref 37–47)
HGB BLD-MCNC: 12 G/DL (ref 12–16)
IMM GRANULOCYTES # BLD AUTO: 0.02 K/UL (ref 0–0.11)
IMM GRANULOCYTES NFR BLD AUTO: 0.3 % (ref 0–0.9)
LYMPHOCYTES # BLD AUTO: 1.97 K/UL (ref 1–4.8)
LYMPHOCYTES NFR BLD: 30 % (ref 22–41)
MCH RBC QN AUTO: 31.7 PG (ref 27–33)
MCHC RBC AUTO-ENTMCNC: 33.2 G/DL (ref 33.6–35)
MCV RBC AUTO: 95.3 FL (ref 81.4–97.8)
MONOCYTES # BLD AUTO: 0.38 K/UL (ref 0–0.85)
MONOCYTES NFR BLD AUTO: 5.8 % (ref 0–13.4)
NEUTROPHILS # BLD AUTO: 4.12 K/UL (ref 2–7.15)
NEUTROPHILS NFR BLD: 62.6 % (ref 44–72)
NRBC # BLD AUTO: 0 K/UL
NRBC BLD-RTO: 0 /100 WBC
PLATELET # BLD AUTO: 242 K/UL (ref 164–446)
PMV BLD AUTO: 10.4 FL (ref 9–12.9)
POTASSIUM SERPL-SCNC: 3.6 MMOL/L (ref 3.6–5.5)
PROT SERPL-MCNC: 6.4 G/DL (ref 6–8.2)
RBC # BLD AUTO: 3.79 M/UL (ref 4.2–5.4)
SODIUM SERPL-SCNC: 137 MMOL/L (ref 135–145)
TROPONIN T SERPL-MCNC: <6 NG/L (ref 6–19)
WBC # BLD AUTO: 6.6 K/UL (ref 4.8–10.8)

## 2020-06-23 PROCEDURE — 84703 CHORIONIC GONADOTROPIN ASSAY: CPT

## 2020-06-23 PROCEDURE — 93000 ELECTROCARDIOGRAM COMPLETE: CPT | Performed by: PHYSICIAN ASSISTANT

## 2020-06-23 PROCEDURE — 93005 ELECTROCARDIOGRAM TRACING: CPT | Performed by: EMERGENCY MEDICINE

## 2020-06-23 PROCEDURE — 85025 COMPLETE CBC W/AUTO DIFF WBC: CPT

## 2020-06-23 PROCEDURE — 99284 EMERGENCY DEPT VISIT MOD MDM: CPT

## 2020-06-23 PROCEDURE — 99214 OFFICE O/P EST MOD 30 MIN: CPT | Performed by: PHYSICIAN ASSISTANT

## 2020-06-23 PROCEDURE — 80053 COMPREHEN METABOLIC PANEL: CPT

## 2020-06-23 PROCEDURE — C9803 HOPD COVID-19 SPEC COLLECT: HCPCS | Performed by: EMERGENCY MEDICINE

## 2020-06-23 PROCEDURE — 93005 ELECTROCARDIOGRAM TRACING: CPT

## 2020-06-23 PROCEDURE — 84484 ASSAY OF TROPONIN QUANT: CPT

## 2020-06-23 PROCEDURE — U0003 INFECTIOUS AGENT DETECTION BY NUCLEIC ACID (DNA OR RNA); SEVERE ACUTE RESPIRATORY SYNDROME CORONAVIRUS 2 (SARS-COV-2) (CORONAVIRUS DISEASE [COVID-19]), AMPLIFIED PROBE TECHNIQUE, MAKING USE OF HIGH THROUGHPUT TECHNOLOGIES AS DESCRIBED BY CMS-2020-01-R: HCPCS

## 2020-06-23 RX ORDER — RIZATRIPTAN BENZOATE 10 MG/1
TABLET, ORALLY DISINTEGRATING ORAL
COMMUNITY
Start: 2020-04-29 | End: 2020-08-17 | Stop reason: SDUPTHER

## 2020-06-23 ASSESSMENT — ENCOUNTER SYMPTOMS
FEVER: 0
COUGH: 0
PALPITATIONS: 1
WEAKNESS: 1
DIZZINESS: 0
CHILLS: 1
HEADACHES: 0
SHORTNESS OF BREATH: 1
MYALGIAS: 1

## 2020-06-23 ASSESSMENT — FIBROSIS 4 INDEX
FIB4 SCORE: 0.81
FIB4 SCORE: 0.81

## 2020-06-23 NOTE — ED NOTES
Patient resting comfortably, denies needs at this time. Given warm blanket. Bed in low position. A&O x 4. Awaiting results.

## 2020-06-23 NOTE — ED TRIAGE NOTES
"Pt from urgent care for abnormal ekg-currentty in process in our er. Denies c/o pain , though states hx of generalized weakness x 5 days and would \"like to be tested for covid\" . Denies known exposure to same or recent travel  "
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
## 2020-06-24 LAB
SARS-COV-2 RNA RESP QL NAA+PROBE: NOTDETECTED
SPECIMEN SOURCE: NORMAL

## 2020-06-24 NOTE — DISCHARGE INSTRUCTIONS
Viral syndrome and Novel Coronavirus (COVID-19)       You have a viral syndrome which may include symptoms like muscle aches, fevers, chills, runny nose, cough, sneezing, sore throat, vomiting or diarrhea.  One of the potential viruses you may have is SARSCoV-2, the virus that causes COVID-19, also known as the novel coronavirus.  You may be just as likely to have a different viral infection such as the common cold or flu.  Most patients with COVID -19 have mild symptoms and recover on their own. Resting, staying hydrated, and sleeping are typically helpful.  As of today's visit, you are well enough to go home and treat your symptoms with oral fluids, medicines for fevers, cough, pain, etc.        COVID 19 testing is not performed on most people with mild symptoms who are being discharged from the Emergency Department or Clinic.      If COVID 19 testing was performed, the results will not be available for up to 4 days.  Please DO NOT CONTACT THE EMERGENCY DEPARTMENT OR CLINIC FOR RESULTS OF THIS TEST.       You will be contacted by a member of the [facility] team with your results and for further discussion.       Please follow the precautions below:      • Stay home except to get medical care.   • As advised by the Centers for Disease Control and Prevention (CDC), we recommend you stay in your home and minimize contact with others to avoid spreading this infection.   • The elderly or anyone with significant medical issues may have more severe symptoms from this infection. We recommend separation, also known as self-isolation, for at least 7 days after your first day of symptoms and several more after that if you are still sick. The most important action is wait for at least  a week and several more days after you feel well before returning to you regular activities, work or school. If you become sicker, like difficulty breathing, chest pain, you are unable to eat or drink enough, or have severe vomiting,  "diarrhea or weakness, you may need to return to the Emergency Department or contact your clinic provider for re-evaluation.    • You should restrict activities outside your home, except for getting medical care. Do not go to work, school, or public areas. Avoid using public transportation, ride-sharing, or taxis.   • Separate yourself from other people and animals in your home.   • As much as possible, you should stay in a specific room and away from other people in your home. Also, you should use a separate bathroom, if available.   • Avoid sharing personal household items. You should not share dishes, drinking glasses, cups, eating utensils, towels, or bedding with other people in your home. After using these items, they should be washed thoroughly with soap and water.         Precautions continued:    • Clean all \"high-touch\" surfaces every day high touch surfaces include counters, tabletops, doorknobs, bathroom fixtures, toilets, phones, keyboards, tablets, and bedside tables. Also, clean any surfaces that may have blood, stool, or body fluids on them. Use a household cleaning   spray or wipe, according to the label instructions. Labels contain instructions for safe and effective use of the cleaning product including precautions you should take when applying the product, such as wearing gloves and making sure you have good ventilation during use of the product.   • Clean your hands often. Wash your hands often with soap and water for at least 20 seconds. If soap and water are not available, clean your hands with an alcohol-based hand  that contains at least 60% alcohol, covering all surfaces of your hands and rubbing them together until they feel dry. Soap and water should be used preferentially if hands are visibly dirty. Avoid touching your eyes, nose, and mouth with unwashed hands.   • Cover your coughs and sneezes    • Cover your mouth and nose with a tissue when you cough or sneeze   • Throw used " tissues in a lined trash can; immediately wash your hands with soap and water for at least 20 seconds or clean your hands with an alcohol-based hand  that contains at least 60 to 95% alcohol, covering all surfaces of your hands and rubbing them together until they feel dry. Soap and water should be used preferentially if hands are visibly dirty.     • When seeking care at a healthcare facility:    · Seek prompt medical attention if your illness is worsening (e.g., difficulty breathing).    · Put on a facemask before you enter the facility.    · These steps will help the healthcare provider's office to keep other people in the office or waiting room from getting infected or exposed.    · If possible, put on a facemask before emergency medical services arrive.      Please see the resources below for more information.      CDC Corona Website https://www.cdc.gov/coronavirus/2019-ncov/index.html    General Information https://www.cdc.gov/coronavirus/2019-ncov/faq.html      [facility] Health: [phone]     [facility] Health Line [phone]

## 2020-06-24 NOTE — ED NOTES
Pt given discharge instructions. RN answered questions. VSS. Pt ambulated steadily out to [facility].
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
## 2020-06-25 ENCOUNTER — PATIENT MESSAGE (OUTPATIENT)
Dept: MEDICAL GROUP | Facility: CLINIC | Age: 40
End: 2020-06-25

## 2020-06-26 NOTE — ED NOTES
"ED Positive Culture Follow-up/Notification Note:    Date: 6/25/20     Patient seen in the ED on 6/23/2020 for fatigue x 5 to 6 days. The pt denied any cough or SOB.      1. Generalized weakness    2. Neck pain       Discharge Medication List as of 6/23/2020  5:25 PM          Allergies: Benadryl allergy; Demerol; Medrol [methylprednisolone]; Previfem [norgestimate-ethinyl estradiol]; Reglan [metoclopramide]; and Seasonal     Vitals:    06/23/20 1340 06/23/20 1347 06/23/20 1504 06/23/20 1704   BP: 115/77  110/75 102/72   Pulse: 82  67 68   Resp: 18  18 18   Temp: 36.9 °C (98.4 °F)      TempSrc: Temporal      SpO2: 100%  97% 99%   Weight:  57.1 kg (125 lb 14.1 oz)     Height: 1.651 m (5' 5\")        Medications given in the ED:  -None    Final cultures:   Results     Procedure Component Value Units Date/Time    SARS-CoV-2, PCR (In-House) [[id number]] Collected:  06/23/20 1518    Order Status:  Completed Updated:  06/24/20 1418     SARS-CoV-2 Source NP Swab     SARS-CoV-2 by PCR NotDetected     Comment: [facility] providers: PLEASE REFER TO DE-ESCALATION AND RETESTING PROTOCOL  on [email or web address]  **The TaqPath COVID-19 SARS-CoV-2 test has been made available for use under  the Emergency Use Authorization (EUA) only.         Narrative:       Droplet, Contact, and Eye Protection  Rule-out COVID-19 panel, includes droplet/contact/eye  isolation order.  Check influenza to order this test only if  specifically indicated.  Expected Turn around time?->Routine (In-House PCR up to 24  hours)    COVID/SARS CoV-2 PCR [[id number]] Collected:  06/23/20 1518    Order Status:  Completed Specimen:  Respirate from Nasopharyngeal Updated:  06/23/20 1533     COVID Order Status Received    Narrative:       Droplet, Contact, and Eye Protection  Rule-out COVID-19 panel, includes droplet/contact/eye  isolation order.  Check influenza to order this test only if  specifically indicated.  Expected Turn around time?->Routine (In-House PCR up to " 24  hours)        Results for VIRAJ MCMILLAN (MRN [id number]) as of 6/25/2020 17:31   Ref. Range 6/23/2020 15:18   SARS-CoV-2 by PCR Unknown NotDetected   SARS-CoV-2 Source Unknown NP Swab     Plan:   COVID-19 Test Follow Up  06/25/20        Patient tested negative for COVID-19. Attempted to inform patient of result, but there was no answer. Left a message to call for results. If they return the call, I will discuss staying at home until no fever for 72 hours without the use of fever reducing medications and symptoms improving. Informed there is no need for further self-isolatation for 14 days for COVID-19 unless otherwise directed by the Health Dept. I have sent the patient a secure [employer] message informing her of results.     They are advised to return to the ER for worsening symptoms including difficulty breathing, ongoing fever, weakness or chest pain.      Petros Luz, AdrianD       Addendum 6/25/20@6133:    The pt called back. I informed her of results and counseled her on points above.     Petros Luz, PharmD

## 2020-06-26 NOTE — TELEPHONE ENCOUNTER
----- Message from Anjelica Ruffin, PT, DPT sent at 6/24/2020  7:23 AM PDT -----  Regarding: RE: Observation in PT today  Oh yes!  That would be a great thing to rule out!  I don't have her on the schedule until Monday, but I will certainly let you know!  ----- Message -----  From: Mina Anderson M.D.  Sent: 6/23/2020   7:26 PM PDT  To: Anjelica Ruffin PT, DPT  Subject: RE: Observation in PT today                      Hi Anjeliac,     After further thought...  The leg weakness, unsteadiness, shakiness and severe headaches are concerning.    There is the possibility of hypopituitarism related to her head trauma.  I've ordered some labs to rule this out...       I called her and left voicemails, but I have not been able to reach her over the past 2 days.  C-spine MRI is certainly something to consider as well.    Please let me know if you hear from her.  Thanks for helping me brainstorm!    Respectfully,    L    ----- Message -----  From: Anjelica Ruffin PT, DPT  Sent: 6/23/2020   7:54 AM PDT  To: Mina Anderson M.D.  Subject: RE: Observation in PT today                      Oh, that's an interesting thought.  The presentation is very odd, but it seems too consistent to be psychogenic.  The only thing that made some sense to me was cervical stenosis, although the MRI findings didn't seem to indicate central compression that would lead to this much dysfunction. I definitely agree it is not post-concussive.      Thanks for the collaboration!  ----- Message -----  From: Mina Anderson M.D.  Sent: 6/22/2020   7:11 PM PDT  To: Anjelica Ruffin PT, DPT  Subject: RE: Observation in PT today                      She should be Covid tested. She has also been tachycardic as well.   The sudden weakness thing is bizarre and I am not convinced it is related to her head injury.    Thanks for the update!    L  ----- Message -----  From: Anjelica Ruffin PT, DPT  Sent: 6/22/2020   3:39 PM PDT  To: Mina Anderson,  M.D.  Subject: Observation in PT today                          Hi Dr. Anderson,     I saw Dany today.  The details are outlined in my most recent note, but she presented with a concerning amount of general weakness in comparison to the previous session.  Her LEs were so weak that she was unable to keep them on the platform for the leg press.  R hand power  was only 20#.  She demonstrated notable trunkal ataxia.      Considering her case is so complex, when she has had similar episodes in the past, I've assumed these were central symptoms.  Today it seems more clear that a majority of these symptoms may be cervicogenic.  She was hyporeflexic, neg for clonus or babinski and had positive neural tension signs in the slump. She complains of electrical pains throughout her body. Her control did improve after therex today.  The fluctuating nature would make more sense as an orthopedic issue.       She wanted me to reach out to you mainly because she hasn't been happy with the care provided by Dr. Navarro recently. It's sounding like she may go through with her upcoming cervical epidural, but is wanting to seek referral for second opinion.  I've referred her back to you to discuss options.     Hope you are well!    Anjelica

## 2020-06-29 ENCOUNTER — PHYSICAL THERAPY (OUTPATIENT)
Dept: PHYSICAL THERAPY | Facility: REHABILITATION | Age: 40
End: 2020-06-29
Attending: FAMILY MEDICINE
Payer: COMMERCIAL

## 2020-06-29 DIAGNOSIS — S06.0X9A CONCUSSION WITH < 1 HR LOSS OF CONSCIOUSNESS: ICD-10-CM

## 2020-06-29 PROCEDURE — 97110 THERAPEUTIC EXERCISES: CPT

## 2020-06-29 NOTE — OP THERAPY DAILY TREATMENT
"  Outpatient Physical Therapy  DAILY TREATMENT     [facility] Physical [address].  [address]  [place] [address]  Phone:  [phone]  Fax:  [phone]    Date: 06/29/2020    Patient: Dany Lambert  YOB: 1980  MRN: [id number]     Time Calculation    Start time: 1433  Stop time: 1530 Time Calculation (min): 57 minutes         Chief Complaint: Loss Of Balance    Visit #: 21    SUBJECTIVE:  After presenting with odd//sudden onset of weakness, Dr. Anderson sent her to  for a COVID19 test.  Upon presenting, noted abnormalities on her EKG and sent to ED.  States her labs were ok there, except being low in CO2.  COVID was negative.  Suggested she have a cervical MRI, but having to wait for approval for that, as well as further labs suggested by Dr. Anderson.     States she feels a little stronger than last visit.     OBJECTIVE:  Re-eval is as noted in previous session with little change. Slightly improved knee ext to lacking 10 deg actively due to weakness.  NEG slump, NEG clonus.         Therapeutic Exercises (CPT 59082):     1. LTR,  x 10 ea    2. Ball roll, x 20    3. Ball bridge, 5\" x 15    4. Dowel GH flexion in hooklying, x 10    5. Core set, 5\" x 20    6. Supine march, x 10 ea    7. BKFO, x 20 ea    8. Supine flasher, L0    9. C/S AROM, small range focusing on upper c/s , 3 planes      Therapeutic Exercise Summary: Breaks req bw due to fatigue      Time-based treatments/modalities:    Physical Therapy Timed Treatment Charges  Therapeutic exercise minutes (CPT 30275): 55 minutes    ASSESSMENT:   Response to treatment: Limited VRT due to this recent onset general weakness.  C/S ROM is cautiously incorporated. Focus is on recruiting stabilizers in supine position.  Is able to regain full knee ext following above program.     PLAN/RECOMMENDATIONS:   Plan for treatment: therapy treatment to continue next visit.  Planned interventions for next visit: continue with current " treatment.

## 2020-07-02 ENCOUNTER — HOSPITAL ENCOUNTER (OUTPATIENT)
Dept: LAB | Facility: MEDICAL CENTER | Age: 40
End: 2020-07-02
Attending: FAMILY MEDICINE
Payer: COMMERCIAL

## 2020-07-02 DIAGNOSIS — S09.90XD CLOSED HEAD INJURY, SUBSEQUENT ENCOUNTER: ICD-10-CM

## 2020-07-02 DIAGNOSIS — S06.0X9A CONCUSSION WITH < 1 HR LOSS OF CONSCIOUSNESS: ICD-10-CM

## 2020-07-02 DIAGNOSIS — R55 SYNCOPE, NEAR: ICD-10-CM

## 2020-07-02 LAB
CORTIS SERPL-MCNC: 27.9 UG/DL (ref 0–23)
ESTRADIOL SERPL-MCNC: <5 PG/ML
FSH SERPL-ACNC: 1 MIU/ML
LH SERPL-ACNC: 0.5 IU/L
PROLACTIN SERPL-MCNC: 23.6 NG/ML (ref 2.8–26)
T4 FREE SERPL-MCNC: 1.02 NG/DL (ref 0.93–1.7)
TSH SERPL DL<=0.005 MIU/L-ACNC: 6.49 UIU/ML (ref 0.38–5.33)

## 2020-07-02 PROCEDURE — 82530 CORTISOL FREE: CPT

## 2020-07-02 PROCEDURE — 83002 ASSAY OF GONADOTROPIN (LH): CPT

## 2020-07-02 PROCEDURE — 84443 ASSAY THYROID STIM HORMONE: CPT

## 2020-07-02 PROCEDURE — 83001 ASSAY OF GONADOTROPIN (FSH): CPT

## 2020-07-02 PROCEDURE — 84305 ASSAY OF SOMATOMEDIN: CPT

## 2020-07-02 PROCEDURE — 82533 TOTAL CORTISOL: CPT

## 2020-07-02 PROCEDURE — 84146 ASSAY OF PROLACTIN: CPT

## 2020-07-02 PROCEDURE — 82670 ASSAY OF TOTAL ESTRADIOL: CPT

## 2020-07-02 PROCEDURE — 36415 COLL VENOUS BLD VENIPUNCTURE: CPT

## 2020-07-02 PROCEDURE — 84439 ASSAY OF FREE THYROXINE: CPT

## 2020-07-05 LAB
IGF-I SERPL-MCNC: 176 NG/ML (ref 75–267)
IGF-I Z-SCORE SERPL: 0.5

## 2020-07-07 LAB
ANNOTATION COMMENT IMP: NORMAL
COLLECT DURATION TIME SPEC: NORMAL HRS
CORTIS F 24H UR HPLC-MCNC: 57 UG/L
CORTIS F 24H UR-MRATE: NORMAL UG/D
CORTIS F/CREAT 24H UR: 29.08 UG/G CRT
CREAT 24H UR-MCNC: 196 MG/DL
CREAT 24H UR-MRATE: NORMAL MG/D (ref 700–1600)
SPECIMEN VOL ?TM UR: NORMAL ML

## 2020-07-16 ENCOUNTER — APPOINTMENT (OUTPATIENT)
Dept: PHYSICAL THERAPY | Facility: REHABILITATION | Age: 40
End: 2020-07-16
Attending: FAMILY MEDICINE
Payer: COMMERCIAL

## 2020-07-17 ENCOUNTER — OCCUPATIONAL MEDICINE (OUTPATIENT)
Dept: MEDICAL GROUP | Facility: CLINIC | Age: 40
End: 2020-07-17
Payer: COMMERCIAL

## 2020-07-17 ENCOUNTER — PHYSICAL THERAPY (OUTPATIENT)
Dept: PHYSICAL THERAPY | Facility: REHABILITATION | Age: 40
End: 2020-07-17
Attending: FAMILY MEDICINE
Payer: COMMERCIAL

## 2020-07-17 VITALS
WEIGHT: 125.88 LBS | HEART RATE: 82 BPM | HEIGHT: 65 IN | DIASTOLIC BLOOD PRESSURE: 76 MMHG | BODY MASS INDEX: 20.97 KG/M2 | RESPIRATION RATE: 16 BRPM | TEMPERATURE: 98.2 F | OXYGEN SATURATION: 97 % | SYSTOLIC BLOOD PRESSURE: 116 MMHG

## 2020-07-17 DIAGNOSIS — S06.0X9A CONCUSSION WITH < 1 HR LOSS OF CONSCIOUSNESS: ICD-10-CM

## 2020-07-17 DIAGNOSIS — R27.0 ATAXIA: ICD-10-CM

## 2020-07-17 DIAGNOSIS — E34.9 ENDOCRINOPATHY: ICD-10-CM

## 2020-07-17 DIAGNOSIS — H55.81 SACCADIC EYE MOVEMENTS: ICD-10-CM

## 2020-07-17 DIAGNOSIS — S09.90XD CLOSED HEAD INJURY, SUBSEQUENT ENCOUNTER: ICD-10-CM

## 2020-07-17 DIAGNOSIS — F43.10 PTSD (POST-TRAUMATIC STRESS DISORDER): ICD-10-CM

## 2020-07-17 DIAGNOSIS — F41.9 ANXIETY: ICD-10-CM

## 2020-07-17 PROCEDURE — 99214 OFFICE O/P EST MOD 30 MIN: CPT | Performed by: FAMILY MEDICINE

## 2020-07-17 PROCEDURE — 97110 THERAPEUTIC EXERCISES: CPT

## 2020-07-17 PROCEDURE — 97140 MANUAL THERAPY 1/> REGIONS: CPT

## 2020-07-17 ASSESSMENT — FIBROSIS 4 INDEX: FIB4 SCORE: 0.75
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
## 2020-07-17 NOTE — OP THERAPY DAILY TREATMENT
"  Outpatient Physical Therapy  DAILY TREATMENT     [facility] Physical [address].  [address]  [place] [address]  Phone:  [phone]  Fax:  [phone]    Date: 07/17/2020    Patient: Dany Lambert  YOB: 1980  MRN: [id number]     Time Calculation    Start time: 1434  Stop time: 1528 Time Calculation (min): 54 minutes         Chief Complaint: Difficulty Walking; Loss Of Balance; and Weakness    Visit #: 22    SUBJECTIVE:  Labs were completed and has appt with Dr. Anderson to discuss the results tonight. States the weakness is about the same.  Is reporting her muscles to be achy all over.  Feeling overly fatigued all the time. C/S epidural was repeated last week.  Had similar response of B UE numbness and nausea.     OBJECTIVE:      Therapeutic Exercises (CPT 01434):     1. LTR,  x 10 ea    2. Ball roll, x 20    3. Ball bridge, 5\" x 15    4. Dowel GH flexion in hooklying, x 10    5. Core set, 5\" x 20    6. Supine march, x 10 ea    7. BKFO, x 20 ea    8. Supine flasher, L0    9. C/S AROM, small range focusing on upper c/s , 3 planes    Therapeutic Treatments and Modalities:     1. Mechanical Traction (CPT 26150), Attempted c/s manual txn to determine if this would produce strength changes, but no improvement in strength to follow.  Completed x 10 min    Time-based treatments/modalities:    Physical Therapy Timed Treatment Charges  Manual therapy minutes (CPT 97595): 10 minutes  Therapeutic exercise minutes (CPT 56646): 44 minutes    ASSESSMENT:   Response to treatment: Continued tremulous movement with sustained postures or effort.  Strength no better, but no worse than when previously documented.  LE strength grossly 2 to 3+/5 throughout. Weakest with hip flexion, abd, knee extension.  Extreme effort required to extend knees R>L.  Was able to elicit 1/4 L4 reflex on the R today. L cervical rotation also elicits tremulous quality to the trunk.     PLAN/RECOMMENDATIONS: "   Plan for treatment: therapy treatment to continue next visit.  Planned interventions for next visit: continue with current treatment. Little progress has been made with balance/strength training due to this new onset of weakness.  Etiology still unknown. Await Dr. Anderson's assessment of lab results.

## 2020-07-17 NOTE — Clinical Note
Hi Dr. Yoder, I hope you are well.  I have been seeing Dany for her work-related head injury.  I ran some labs to check her pituitary function and things seem a bit off.  I have recommended we have her see endocrinology for a formal work-up to see if this is related to head trauma.  Since you are her neurologist, I be interested in your perspective on her findings and the possibility of pituitary versus hypothalamic failure post head trauma.  Thanks in advance for your help.    Respectfully,   Mina

## 2020-07-17 NOTE — Clinical Note
Mikey Stevens,  Would you mind reviewing this case and telling me what you think?  I am fairly concerned about her since she seems to be worsening and the abnormal endocrine work-up makes me wonder if she has pituitary or hypothalamic failure.  Your thoughts are appreciated!  L
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
## 2020-07-24 ENCOUNTER — PHYSICAL THERAPY (OUTPATIENT)
Dept: PHYSICAL THERAPY | Facility: REHABILITATION | Age: 40
End: 2020-07-24
Attending: FAMILY MEDICINE
Payer: COMMERCIAL

## 2020-07-24 DIAGNOSIS — S06.0X9A CONCUSSION WITH < 1 HR LOSS OF CONSCIOUSNESS: ICD-10-CM

## 2020-07-24 PROCEDURE — 97112 NEUROMUSCULAR REEDUCATION: CPT

## 2020-07-24 NOTE — OP THERAPY DISCHARGE SUMMARY
Outpatient Physical Therapy  DISCHARGE SUMMARY NOTE      [address]  [address]  [place] [address]  Phone:  [phone]  Fax:  [phone]    Date of Visit: 07/24/2020    Patient: Dany Lambert  YOB: 1980  MRN: [id number]     Referring Provider: Mina Anderson M.D.  [address] [place] [address] [place] [address]   Referring Diagnosis Concussion with loss of consciousness of unspecified duration, initial encounter [S06.0X9A]         Functional Assessment Used  Dizziness Handicap Inventory - Physical Items Score: 20  Dizziness Handicap Inventory - Emotional Items Score: 20  Dizziness Handicap Inventory - Functional Items Score: 20  Dizziness Handicap Inventory - Total Score: 60     Your patient is being discharged from Physical Therapy with the following comments:   · Goals partially met    Comments:  Ms. Lambert has been seen for a total of 23 PT sessions for strength, imbalance and dizziness.  The course of treatment has been complex.  Early on, she was positive for BPPV and treated accordingly with CRM.  Upon BPPV resolving, VRT progressions were initiated.  Treatment was impacted by notable oculomotor deficits/sensitivity, migraines, neck pain and anxiety/depression issues.  Despite this, she was able to demonstrate improvement to balance WNL and was able to maintain this over the last 2 months.  With dizziness remaining during quick HTs, reading, community ambulation, further VRT progressions were recommended.  Unfortunately, approx 1 month ago pt presented with new onset of severe weakness of unknown origin.  As a result, progress with VRT has plateaued.  There is evidence of possible pituitary source and further imaging is being recommended for the head and neck.  Pt is indep with her current HEP.           Recommendations:  Recommend d/c'ing from PT to allow time for further testing to be completed to I.D reason for new onset weakness.  Pt to  continue HEP.  Thank you for having me participate in this patient's care!     Anjelica Ruffin, PT, DPT    Date: 7/24/2020

## 2020-07-24 NOTE — OP THERAPY DAILY TREATMENT
Outpatient Physical Therapy  DAILY TREATMENT     [facility] Physical [address]  [address]  [place] [address]  Phone:  [phone]  Fax:  [phone]    Date: 07/24/2020    Patient: Dany Lambert  YOB: 1980  MRN: [id number]     Time Calculation    Start time: 1359  Stop time: 1452 Time Calculation (min): 53 minutes         Chief Complaint: Weakness and Vertigo    Visit #: 23    SUBJECTIVE:  Followed up with Dr. Anderson, who notes the abnormalities in her labs and suspects pituitary damage.  Recommending endocrinology consult and brain MRI.  Neither scheduled yet.     OBJECTIVE:  Dizziness Handicap Inventory - Physical Items Score: 20  Dizziness Handicap Inventory - Emotional Items Score: 20  Dizziness Handicap Inventory - Functional Items Score: 20  Dizziness Handicap Inventory - Total Score: 60      Power  (avg of 3 trials): R= 18#, L=36#  Reflexes: C5 1/4, C6 2/4, L4 1/4 bilat.  Unable to elicit S1.   Sensation: diminished to LT in distal finger tips bilat  Strength: UEs grossly 3+/5. LE 3+/5 except knee ext 2/5 (lacking 20 degrees from full ext)  Notable trunkal ataxia when sitting  MCTSIB: cond 1 WNL, cond 2= 29% more sway, cond 3 and 4 WNL, comp= 1%    Therapeutic Treatments and Modalities:     1. Neuromuscular Re-education (CPT 99601)    Therapeutic Treatment and Modalities Summary:   - Reassessment as reported above  - Review of importance of regular walks  - Review 'just right' vestibular challenge for HEP  - Review oculomotor exam: specifically convergence, as well as 'pencil push up' exercises to work on.     Time-based treatments/modalities:    Physical Therapy Timed Treatment Charges  Neuromusc re-ed, balance, coor, post minutes (CPT 16761): 53 minutes    ASSESSMENT:   Response to treatment: See d/c note    PLAN/RECOMMENDATIONS:   Plan for treatment: no further treatment needed.

## 2020-07-28 ENCOUNTER — HOSPITAL ENCOUNTER (OUTPATIENT)
Dept: RADIOLOGY | Facility: MEDICAL CENTER | Age: 40
End: 2020-07-28
Attending: FAMILY MEDICINE
Payer: COMMERCIAL

## 2020-07-28 DIAGNOSIS — E34.9 ENDOCRINOPATHY: ICD-10-CM

## 2020-07-28 DIAGNOSIS — S09.90XD CLOSED HEAD INJURY, SUBSEQUENT ENCOUNTER: ICD-10-CM

## 2020-07-28 DIAGNOSIS — R27.0 ATAXIA: ICD-10-CM

## 2020-07-28 DIAGNOSIS — S06.0X9A CONCUSSION WITH < 1 HR LOSS OF CONSCIOUSNESS: ICD-10-CM

## 2020-07-28 PROCEDURE — 70551 MRI BRAIN STEM W/O DYE: CPT

## 2020-07-29 ENCOUNTER — TELEPHONE (OUTPATIENT)
Dept: SCHEDULING | Facility: IMAGING CENTER | Age: 40
End: 2020-07-29

## 2020-07-30 DIAGNOSIS — Z30.41 USES ORAL CONTRACEPTION: ICD-10-CM

## 2020-07-30 RX ORDER — LEVONORGESTREL AND ETHINYL ESTRADIOL 150-30(84)
KIT ORAL
Qty: 91 TAB | Refills: 3 | OUTPATIENT
Start: 2020-07-30

## 2020-08-04 ENCOUNTER — TELEMEDICINE (OUTPATIENT)
Dept: MEDICAL GROUP | Facility: IMAGING CENTER | Age: 40
End: 2020-08-04
Payer: COMMERCIAL

## 2020-08-04 VITALS — HEIGHT: 65 IN | OXYGEN SATURATION: 98 % | WEIGHT: 125 LBS | BODY MASS INDEX: 20.83 KG/M2 | HEART RATE: 89 BPM

## 2020-08-04 DIAGNOSIS — E03.9 HYPOTHYROIDISM, UNSPECIFIED TYPE: ICD-10-CM

## 2020-08-04 DIAGNOSIS — E78.00 HYPERCHOLESTEROLEMIA: ICD-10-CM

## 2020-08-04 DIAGNOSIS — Z30.41 USES ORAL CONTRACEPTION: ICD-10-CM

## 2020-08-04 DIAGNOSIS — R79.89 ELEVATED CORTISOL LEVEL: ICD-10-CM

## 2020-08-04 DIAGNOSIS — G43.831 INTRACTABLE MENSTRUAL MIGRAINE WITH STATUS MIGRAINOSUS: ICD-10-CM

## 2020-08-04 DIAGNOSIS — F10.239 ALCOHOL DEPENDENCE WITH WITHDRAWAL WITH COMPLICATION (HCC): ICD-10-CM

## 2020-08-04 PROBLEM — R07.89 CHEST TIGHTNESS OR PRESSURE: Status: RESOLVED | Noted: 2018-03-27 | Resolved: 2020-08-04

## 2020-08-04 PROCEDURE — 99214 OFFICE O/P EST MOD 30 MIN: CPT | Mod: 95,CR | Performed by: FAMILY MEDICINE

## 2020-08-04 RX ORDER — LOVASTATIN 10 MG/1
10 TABLET ORAL
Qty: 90 TAB | Refills: 3 | Status: SHIPPED | OUTPATIENT
Start: 2020-08-04 | End: 2021-09-02

## 2020-08-04 RX ORDER — LEVOTHYROXINE SODIUM 0.03 MG/1
25 TABLET ORAL
Qty: 90 TAB | Refills: 0 | Status: SHIPPED | OUTPATIENT
Start: 2020-08-04 | End: 2020-10-22

## 2020-08-04 RX ORDER — LEVONORGESTREL / ETHINYL ESTRADIOL AND ETHINYL ESTRADIOL 150-30(84)
1 KIT ORAL DAILY
Qty: 84 TAB | Refills: 3 | Status: SHIPPED | OUTPATIENT
Start: 2020-08-04 | End: 2021-07-07

## 2020-08-04 ASSESSMENT — FIBROSIS 4 INDEX: FIB4 SCORE: 0.77

## 2020-08-04 ASSESSMENT — PATIENT HEALTH QUESTIONNAIRE - PHQ9: CLINICAL INTERPRETATION OF PHQ2 SCORE: 0

## 2020-08-04 NOTE — ASSESSMENT & PLAN NOTE
Discussed taking medication on empty stomach in the morning without other medications review package inserts start Synthroid 20 5 repeat labs in 6 to 8 weeks

## 2020-08-04 NOTE — PROGRESS NOTES
Telemedicine Visit: New Patient     This encounter was conducted via Zoom .   Verbal consent was obtained. Patient's identity was verified. Patient aware this will be   billed the same as an in person evaluation.    Subjective:     Chief Complaint   Patient presents with   • Establish Care   • Medication Refill     Dany Lambert is a 40 y.o. female with history of heartburn, hypothyroidism, hypercholesterolemia, irritable bowel disease, migraine with aura, depression, on virtual visit to discuss establish care and reviewing labs. Prior pcp was jeff hopper moved to [place]   Sees sports medicine doctor for a work-related injury.  She reports that she had a head trauma.  Due to this and severe continued fatigue her sports medicine doctor ordered a cortisol level and TSH.  TSH was elevated and cortisol was elevated as well. Has been taken out of work with some ptsd.     Patient has a relative with breast cancer at 35.  Patient has been having mammograms since her early 30s.  She has a cluster of calcium cells with precancerous characteristics in the right breast that is being monitored closely.  Per patient though no biopsy. No chemo. No radiation. No removal just monitoring.      Seeing neurology and pain specialist for her migraines does botox and aimovig. Cervical spine abrasion. She does get a left eye droop, photophobia, and phonophobia. She is on birth control. She gets a terrible migraine last about 4 days before and during her period. She reports oral birth control has tremendously her function given that she does not have these migraines anymore. She is aware of her risk of stroke and believe it is worth it given the clear benefits.     She did have elevated cortisol and elevated tsh with normal MRI brain.     ROS  See HPI  Constitutional: Negative for fever, chills and with fatigue  HENT: Negative for congestion, itchy watery eyes  Eyes: Negative for pain or sudden changes in vision  Respiratory:  Negative for cough and shortness of breath.    Cardiovascular: Negative for leg swelling or chest pain  Gastrointestinal: Negative for nausea, vomiting  Genitourinary: Negative for dysuria and hematuria.   Skin: Negative for rash or concerning moles   Neurological: Negative for dizziness, focal weakness   Psychiatric/Behavioral: With depression.  The patient is not nervous/anxious.    Musculoskeletal: no weakness or joint stiffness    Allergies   Allergen Reactions   • Benadryl Allergy Anxiety   • Demerol Hives   • Medrol [Methylprednisolone] Hives and Itching   • Previfem [Norgestimate-Ethinyl Estradiol]      Nausea/vomiting   • Reglan [Metoclopramide] Anxiety   • Seasonal        Current medicines (including changes today)  Current Outpatient Medications   Medication Sig Dispense Refill   • topiramate (TOPAMAX) 50 MG tablet TAKE 3 TABS BY MOUTH 2 TIMES A DAY. 540 Tab 3   • rizatriptan (MAXALT-MLT) 10 MG disintegrating tablet TAKE 1 TABLET BY MOUTH AT HEADACHE ONSET REPEAT EVERY HOUR AS NEEDED UP TO 4 TABLETS IN 24 HOURS     • lovastatin (MEVACOR) 10 MG tablet TAKE 1 TABLET BY MOUTH EVERY DAY IN THE EVENING 90 Tab 0   • venlafaxine (EFFEXOR-XR) 150 MG extended-release capsule TAKE 1 CAPSULE BY MOUTH EVERY DAY 90 Cap 3   • AIMOVIG 140 MG/ML Solution Auto-injector Inject 140 mg as instructed Q 4 Weeks. 1 PEN 11   • Levonorgest-Eth Estrad 91-Day (SEASONIQUE) 0.15-0.03 &0.01 MG Tab Take 1 Tab by mouth every day. 84 Tab 3   • naproxen (NAPROSYN) 500 MG Tab Take 1 Tab by mouth 2 times a day, with meals. 20 Tab 0   • rizatriptan (MAXALT) 10 MG tablet 1 tab at headache onset; repeat 1 tab every 1 hour prn up to #4 tab/24 hours 10 Tab 6   • cetirizine (ZYRTEC) 10 MG Tab Take 10 mg by mouth every day.     • cyclobenzaprine (FLEXERIL) 10 MG Tab Take 1 Tab by mouth 3 times a day as needed. (Patient not taking: Reported on 8/4/2020) 15 Tab 0     No current facility-administered medications for this visit.        She  has a past  medical history of Allergy, unspecified not elsewhere classified, Anxiety (2013), Arthritis, High cholesterol, Migraine with aura and without status migrainosus, not intractable (2013), Pain (2017), Psychiatric problem, and Seizure ([facility]) (2016).  She  has a past surgical history that includes tonsillectomy (); pr dstr nrolytc agnt parverteb fct sngl crvcl/thora (Left, 2017); pr dstr nrolytc agnt parverteb fct addl crvcl/thora (2017); cyst excision (Left, as child); pr dstr nrolytc agnt parverteb fct sngl crvcl/thora (Right, 2017); and pr dstr nrolytc agnt parverteb fct addl crvcl/thora (Right, 2017).      Family History   Problem Relation Age of Onset   • Diabetes Maternal Grandfather    • Migraines Maternal Grandfather    • Cancer Paternal Grandfather    • Alzheimer's Disease Paternal Grandfather    • Cancer Paternal Grandmother 30        breast   • Glasses Mother    • Migraines Mother    • Suicide Attempts Maternal Uncle         Killed himself by GSW   • Hypertension Father    • Glasses Father      Family Status   Relation Name Status   • MGFa     • PGFa     • PGMo     • Mo  Alive   • MUnc     • MGMo     • Fa  (Not Specified)       Patient Active Problem List    Diagnosis Date Noted   • Irritable bowel disease 2017     Priority: Low   • Dysmenorrhea 2015     Priority: Low   • Anxiety 2013     Priority: Low   • Contact lens overwear of both eyes 2020   • Ophthalmoplegia 2020   • Myopia of both eyes 2020   • Closed head injury 10/10/2019   • Skipped heart beats 2019   • Syncope, near 2019   • Lumbar back pain with radiculopathy affecting lower extremity 2019   • Uses oral contraception 2019   • Alcohol dependence with withdrawal with complication (HCC) 2018   • Moderate episode of recurrent major depressive disorder ([facility]) 2018   • Chest tightness or pressure  "03/27/2018   • Weight loss 02/07/2018   • Hospital discharge follow-up 11/18/2017   • Cervical spondylosis without myelopathy 09/29/2017   • Family history of breast cancer in paternal grandmother at age 35 09/26/2017   • Vitamin D insufficiency 09/26/2017   • Gastroesophageal reflux disease without esophagitis 09/13/2017   • Nipple discharge in female 09/13/2017   • Chronic fatigue 09/13/2017   • Cervical spondylosis 07/28/2017   • Fear of public speaking 10/06/2016   • Hypercholesterolemia 11/23/2015   • Migraine with aura and without status migrainosus, not intractable 08/26/2013          Objective:   Vitals obtained by patient:  Pulse 89   Ht 1.651 m (5' 5\")   Wt 56.7 kg (125 lb)   LMP 08/01/2019   SpO2 98%   BMI 20.80 kg/m²     Physical Exam:  Constitutional: Alert, no distress, well-groomed.  Skin: No rashes in visible areas.  Eye: Round. Conjunctiva clear, lids normal. No icterus.   ENMT: Lips pink without lesions, good dentition, moist mucous membranes. Phonation normal.  Neck: No masses, no thyromegaly. Moves freely without pain.  CV: Pulse as reported by patient  Respiratory: Unlabored respiratory effort, no cough or audible wheeze  Psych: Alert and oriented x3, normal affect and mood.   Neuro: symmetric face. Alert and oriented. Follows commands. No aphasia or dysarthria.    Labs:  No visits with results within 1 Month(s) from this visit.   Latest known visit with results is:   Hospital Outpatient Visit on 07/02/2020   Component Date Value Ref Range Status   • Prolactin 07/02/2020 23.60  2.80 - 26.00 ng/mL Final   • Estradiol-E2 07/02/2020 <5.0  pg/mL Final    Comment: REFERENCE RANGES FOR ESTRADIOL  Adult Males:                  7.6 - 47.0  pg/mL  Adult Female:  Follicular phase           12.5 - 166.0 pg/mL  Ovulation                  85.8 - 498.0 pg/mL  Luteal phase               43.8 - 211.0 pg/mL  Postmenopausal               <6 - 54.7  pg/mL  Pregnancy:  First Trimester           215.0 - 4300.0 " pg/mL     • Luteinizing Hormone 07/02/2020 0.5  IU/L Final    Comment: LUTEINIZING HORMONE REFERENCE RANGES:  Female               Male  >17 yrs                                      1.7 - 8.6 IU/L  Follicular             2.4 - 12.6 IU/L  Mid-Cycle             14.0 - 95.6 IU/L  Luteal                 1.0 - 11.4 IU/L  Postmenopausal         7.7 - 58.5 IU/L  ------------------------------------------------------  Ramy Stage I:         0.0 - 9.3  IU/L      0.0 - 1.0 IU/L  Ramy Stage II:        0.0 - 16.0 IU/L      0.0 - 3.6 IU/L  Ramy Stage III:       0.0 - 23.0 IU/L      0.2 - 6.4 IU/L  Ramy Stage IV:        0.0 - 19.1 IU/L      0.9 - 8.3 IU/L     • Follicle Stimulating Hormone 07/02/2020 1.0  mIU/mL Final    Comment: FOLLICULAR STIMULATING HORMONE REFERENCE RANGE  Female               Male  >17 yrs  Follicular             3.5 - 12.5  Mid-Cycle              4.7 - 21.5  Luteal                 1.7 - 7.7  Postmenopausal         25.8 - 134.8  ------------------------------------------------------  Ramy Stage I:         0.6 - 8.4           0.3 - 2.9  Ramy Stage II:        0.6 - 8.9           0.5 - 4.8  Ramy Stage III:       0.5 - 8.9           1.0 - 6.4  Ramy Stage IV:        0.7 - 9.3           1.0 - 8.1     • IGF1 07/02/2020 176  75 - 267 ng/mL Final   • IGF-1 Z Score Calculation 07/02/2020 0.5   Final    Comment: INTERPRETIVE INFORMATION: IGF 1 Z-SCORE CALCULATION  A Z score is the number of standard deviations a given result is  above (positive score) or below (negative score) the age- and  sex-adjusted population mean.  Results that are within the IGF-1  reference interval will have a Z score between -2.0 and +2.0.  Performed by [employer],  [address] [place] [address] [phone]  [email or web address], Terence Day MD, Lab. Director     • Cortisol 07/02/2020 27.9* 0.0 - 23.0 ug/dL Final    Comment: Male                   Female  Age                 ug/dL                   ug/dL  5th day               0.6 - 19.8             0.6 - 19.8  2 - 12 month         2.4 - 22.9             2.4 - 22.9  2 - 13 year          2.5 - 22.9             2.5 - 22.9  14 - 15 year         2.5 - 22.9             2.4 - 28.6  16 - 18 year         2.4 - 28.6             2.4 - 28.6  >18years:  AM Cortisol (0800 hours):  6.0-23.0 ug/dL  PM Cortisol (2000 hours):  0.0-9.0 ug/dL  ACTH Stimulation Test: 30-60 minutes post 250 mcg cosyntropin  I.V. A rise of >20 mcg/dL rules out primary adrenal insuffic-  iency but some patients with primary adrenal insufficiency  may have a rise in cortisol less than 20 mcg/dL.     • TSH 07/02/2020 6.490* 0.380 - 5.330 uIU/mL Final    Comment: Please note new reference ranges effective 12/14/2017 10:00 AM  Pregnant Females, 1st Trimester  0.050-3.700  Pregnant Females, 2nd Trimester  0.310-4.350  Pregnant Females, 3rd Trimester  0.410-5.180     • Free T-4 07/02/2020 1.02  0.93 - 1.70 ng/dL Final    Please note new FT4 reference range effective 4/29/2020.   • Collection Length 07/02/2020 Random  Hrs Final   • Total Volume 07/02/2020 Random  mL Final   • Cortisol, Urinary Free 24 Hour 07/02/2020 Not Applicable  <=45.0 ug/d Final   • Cortisol Ur Free ug/g CRT 07/02/2020 29.08  ug/g CRT Final    Comment: Reference Interval: Cortisol ug/g crt  Female  Prepubertal: Less than 25 ug/g crt  18 years and older: Less than 24 ug/g crt  Pregnancy: Less than 59 ug/g crt  Male  Prepubertal: Less than 25 ug/g crt  18 years and older: Less than 32 ug/g crt     • Cortisol Ur Free ug/L 07/02/2020 57.00  ug/L Final   • Creatinine Urine 07/02/2020 196  mg/dL Final   • Creatinine, Random Urine 07/02/2020 Not Applicable  700 - 1600 mg/d Final   • Cortisol, Ur Interp 07/02/2020 See Note   Final    Comment: INTERPRETIVE INFORMATION: Cortisol Urine Free by LC-MS/MS  The optimal specimen for this testing is a 24-hour urine  collection. Mass per day calculations are not reported for the  following specimen types: a random collection,  a collection with  duration of less than 20 hours, a collection with duration of  greater than 28 hours, or a collection with total volume less than  400 mL or greater than 5000 mL. Ratios to creatinine may be useful  for these evaluations.  Baseline urinary free cortisol excretion less than 5 ug/d may be  consistent with adrenal insufficiency.  Access complete set of age- and/or gender-specific reference  intervals for this test in the [employer]  ([employer]).  Test developed and characteristics determined by [employer]. See Compliance Statement B: [employer]/  Performed by [employer],  [address] [place] [address] [phone]  [email or web address], Terence Day MD - Lab. Director         Imaging:   Mr-brain-w/o    Result Date: 7/29/2020 7/28/2020 4:35 PM HISTORY/REASON FOR EXAM:  Ataxia. TECHNIQUE/EXAM DESCRIPTION: MRI of the brain without contrast. T1 sagittal, T2 fast spin-echo axial, T1 coronal, FLAIR coronal, diffusion-weighted and apparent diffusion coefficient (ADC map) axial images were obtained of the whole brain. The study was performed on a [employer] Signa 1.5 La MRI scanner. COMPARISON:  None. FINDINGS: There is no acute infarct. There is no acute or chronic parenchymal hemorrhage. There is no intra-axial space-occupying lesion. There is no abnormal signal change in the brain parenchyma. The pituitary, hypothalamic and pineal regions are unremarkable. The ventricles, cortical sulci and the basal cisterns are unremarkable. There is no extra-axial fluid collection, hemorrhage or mass. The visualized flow voids of the cerebral vasculature are unremarkable. There is no large lesion identified in the expected course of the intracranial portions of the cranial nerves. There is an approximately 2.6 x 1.3 cm sized focal lesion in the right frontal bone. The paranasal sinuses are clear. The extracranial soft tissue including orbits appear grossly normal.     1.  No acute  infarct. 2.  Unremarkable MR examination of the brain. 3.  There is an approximately 2.6 x 1.3 cm sized focal lesion in the right frontal bone. This is seen on the previous MRI dated 2/5/2016. There has been interval increase in the size of the lesion. This may represent a benign bone lesion such as a hemangioma or fibrous dysplasia.      Assessment and Plan:   The following treatment plan was discussed:   Reviewed labs reviewed imaging  I recommend follow-up with neurology for elevated cortisol levels I will treat her hypothyroidism she will obtain labs in 8 weeks and then return to office  Problem List Items Addressed This Visit     Hypothyroidism     Discussed taking medication on empty stomach in the morning without other medications review package inserts start Synthroid 20 5 repeat labs in 6 to 8 weeks         Relevant Medications    levothyroxine (SYNTHROID) 25 MCG Tab    Other Relevant Orders    TSH WITH REFLEX TO FT4    THYROID PEROX AB TPO (REFLEX)    Hypercholesterolemia    Relevant Medications    lovastatin (MEVACOR) 10 MG tablet    Alcohol dependence with withdrawal with complication (HCC)     Has not drank since 2006         Uses oral contraception    Relevant Medications    Levonorgest-Eth Estrad 91-Day (SEASONIQUE) 0.15-0.03 &0.01 MG Tab    Elevated cortisol level      Other Visit Diagnoses     Intractable menstrual migraine with status migrainosus        Relevant Medications    Levonorgest-Eth Estrad 91-Day (SEASONIQUE) 0.15-0.03 &0.01 MG Tab    lovastatin (MEVACOR) 10 MG tablet        Follow-up: Return in about 8 weeks (around 9/29/2020).        Portions of this note may be dictated using Dragon NaturallySpe[employer] voice recognition software.  Variances in spelling and vocabulary are possible and unintentional.  Not all areas may be caught/corrected.  Please notify me if any discrepancies are noted or if the meaning of any statement is not correct/clear.

## 2020-08-06 ENCOUNTER — OCCUPATIONAL MEDICINE (OUTPATIENT)
Dept: MEDICAL GROUP | Facility: CLINIC | Age: 40
End: 2020-08-06
Payer: COMMERCIAL

## 2020-08-06 VITALS
HEART RATE: 80 BPM | TEMPERATURE: 98.3 F | OXYGEN SATURATION: 98 % | SYSTOLIC BLOOD PRESSURE: 118 MMHG | BODY MASS INDEX: 20.83 KG/M2 | WEIGHT: 125 LBS | DIASTOLIC BLOOD PRESSURE: 80 MMHG | RESPIRATION RATE: 16 BRPM | HEIGHT: 65 IN

## 2020-08-06 DIAGNOSIS — H55.81 SACCADIC EYE MOVEMENTS: ICD-10-CM

## 2020-08-06 DIAGNOSIS — F43.10 PTSD (POST-TRAUMATIC STRESS DISORDER): ICD-10-CM

## 2020-08-06 DIAGNOSIS — M26.621 TMJ TENDERNESS, RIGHT: ICD-10-CM

## 2020-08-06 DIAGNOSIS — S09.90XD CLOSED HEAD INJURY, SUBSEQUENT ENCOUNTER: ICD-10-CM

## 2020-08-06 PROCEDURE — 99214 OFFICE O/P EST MOD 30 MIN: CPT | Performed by: FAMILY MEDICINE

## 2020-08-06 ASSESSMENT — FIBROSIS 4 INDEX: FIB4 SCORE: 0.77
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
## 2020-08-17 ENCOUNTER — OFFICE VISIT (OUTPATIENT)
Dept: NEUROLOGY | Facility: MEDICAL CENTER | Age: 40
End: 2020-08-17
Payer: COMMERCIAL

## 2020-08-17 VITALS
HEART RATE: 90 BPM | OXYGEN SATURATION: 100 % | SYSTOLIC BLOOD PRESSURE: 118 MMHG | RESPIRATION RATE: 18 BRPM | BODY MASS INDEX: 21.67 KG/M2 | WEIGHT: 130.07 LBS | DIASTOLIC BLOOD PRESSURE: 82 MMHG | HEIGHT: 65 IN

## 2020-08-17 DIAGNOSIS — G43.109 MIGRAINE WITH AURA AND WITHOUT STATUS MIGRAINOSUS, NOT INTRACTABLE: ICD-10-CM

## 2020-08-17 PROCEDURE — 99213 OFFICE O/P EST LOW 20 MIN: CPT | Performed by: PSYCHIATRY & NEUROLOGY

## 2020-08-17 RX ORDER — RIZATRIPTAN BENZOATE 10 MG/1
TABLET, ORALLY DISINTEGRATING ORAL
Qty: 10 TAB | Refills: 5 | Status: SHIPPED | OUTPATIENT
Start: 2020-08-17 | End: 2021-07-06 | Stop reason: SDUPTHER

## 2020-08-17 ASSESSMENT — ENCOUNTER SYMPTOMS
NECK PAIN: 1
FALLS: 0
BACK PAIN: 1
TINGLING: 1
MYALGIAS: 1
MEMORY LOSS: 1

## 2020-08-17 ASSESSMENT — FIBROSIS 4 INDEX: FIB4 SCORE: 0.77

## 2020-08-17 NOTE — PROGRESS NOTES
Subjective:      Dany Lambert is a 40 y.o. female who presents for follow-up, last seen 6 months ago, with a history of migraine with aura and persistent postconcussive encephalopathy, including headaches.    HPI    Dany states that in the last 6 months, although overall her migraines are doing fairly well, she is still having more of them, she thinks may be 2 headache attacks every week for which Maxalt-MLT is required.  The drug works consistently, the problem is the headaches recur.  She has been on Aimovig 140 mg every 4 weeks with good tolerability, she denies problems with constipation, hair loss, depression, or injection site reactions.  She has remained on Topamax 150 mg, twice daily throughout.    The issue has to do with a postconcussive encephalopathy and her more generalized pain symptoms.  She simply feels generally weak, feels more unsteady when she walks, cognition remains curtailed, she has had more more neck pain, upper extremity pain with radiating paresthesias into the hands, the headaches from the concussion are different, but daily, they have begun to assume more migrainous features.  She also underwent MRI scan with and without contrast of the brain from July 28, 2020, I reviewed the images, this was completely normal.    Under the care of Dr. Flaco Navarro, they have tried and failed what sounds like or ablation and then cervical epidural steroids, she is about to undergo EMG/NCV studies for the upper extremity paresthesias and radiating pain.  They are even considering neurosurgical consultation if it is deemed appropriate.    Medical, surgical and family histories are reviewed, there are no new drug allergies.  She is on Topamax 150 mg, twice daily, Aimovig 140 mg injections every 4 weeks, Maxalt-MLT 10 mg as needed, Effexor  mg daily, Naprosyn 500 mg twice daily (alternating with Aleve 660 mg, twice daily), Seasonique, Mevacor and Synthroid.    Review of Systems  "  Constitutional: Positive for malaise/fatigue.   Musculoskeletal: Positive for back pain, joint pain, myalgias and neck pain. Negative for falls.   Neurological: Positive for tingling.   Psychiatric/Behavioral: Positive for memory loss.   All other systems reviewed and are negative.       Objective:     /82 (BP Location: Left arm, Patient Position: Sitting, BP Cuff Size: Adult)   Pulse 90   Resp 18   Ht 1.651 m (5' 5\")   Wt 59 kg (130 lb 1.1 oz)   SpO2 100%   BMI 21.64 kg/m²      Physical Exam    She appears in no acute distress.  Vital signs are stable.  There is no malar rash or sialorrhea.  The neck is supple, range of motion appears full.  There is tenderness over the cervical paraspinal and trapezius muscle bodies.  Cardiac evaluation is unremarkable.  There is some mild tender points bilaterally in the upper extremities and distal lower extremities.    Cognition is grossly intact.  PERRLA/EOMI, visual fields are grossly full, facial movements are symmetric, there is no bulbar dysfunction.  Sensory exam is intact to temperature.  Shoulder shrug is symmetric.    There is a noticeable tremulousness of the head as well as the upper extremities.  She has difficulty maintaining both upper extremities against gravity, grasp seems to be diminished.  Tone is normal.  Even the distal lower extremities demonstrates weakness, though there is limited effort.  Reflexes are diminished but symmetric, both toes are downgoing.    She seems to use the upper extremities without major issue, movements in general are simply slow.  She stands easily, walks cautiously but can do so without the need for assistance.  There is no appendicular dystaxia.  Fine motor control seems to show a slowness with repetitive movements in the hands and feet.    Sensory exam is intact to vibration and temperature.     Assessment/Plan:     1. Migraine with aura and without status migrainosus, not intractable  Though I think the migraine " condition itself is actually fairly stable on her Topamax and Aimovig regimen, it is the postconcussive syndrome symptoms which includes headache, as well as diffuse pains, malaise, cognitive impairment, dizziness, ataxia, etc. that are playing a bigger role overall.  I will simply continue her present regimen.  She will follow-up with Dr. Navarro for most of the more active treatment for her generalized pain.  It sounds a lot like fibromyalgia has begun to enter into the picture, itself obviously not a direct effect of concussion but a condition commonly seen in people with migraine.  From my standpoint, she and I can simply follow-up in 6 months.    - rizatriptan (MAXALT-MLT) 10 MG disintegrating tablet; TAKE 1 TABLET BY MOUTH AT HEADACHE ONSET REPEAT EVERY HOUR AS NEEDED UP TO 4 TABLETS IN 24 HOURS  Dispense: 10 Tab; Refill: 5    Time: 20-minute spent face-to-face for exam, review, discussion, and education, of this over 50% of the time spent counseling and coordinating care.

## 2020-09-03 ENCOUNTER — PHYSICAL THERAPY (OUTPATIENT)
Dept: PHYSICAL THERAPY | Facility: REHABILITATION | Age: 40
End: 2020-09-03
Attending: FAMILY MEDICINE
Payer: COMMERCIAL

## 2020-09-03 DIAGNOSIS — M26.621 TMJ TENDERNESS, RIGHT: ICD-10-CM

## 2020-09-03 DIAGNOSIS — M54.12 CERVICAL RADICULOPATHY: ICD-10-CM

## 2020-09-03 DIAGNOSIS — M54.2 NECK ACHE: ICD-10-CM

## 2020-09-03 PROCEDURE — 97162 PT EVAL MOD COMPLEX 30 MIN: CPT

## 2020-09-03 PROCEDURE — 97014 ELECTRIC STIMULATION THERAPY: CPT

## 2020-09-03 PROCEDURE — 97110 THERAPEUTIC EXERCISES: CPT

## 2020-09-03 ASSESSMENT — ENCOUNTER SYMPTOMS: PAIN SCALE: 6
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
## 2020-09-09 ENCOUNTER — PHYSICAL THERAPY (OUTPATIENT)
Dept: PHYSICAL THERAPY | Facility: REHABILITATION | Age: 40
End: 2020-09-09
Attending: FAMILY MEDICINE
Payer: COMMERCIAL

## 2020-09-09 DIAGNOSIS — M54.2 NECK ACHE: ICD-10-CM

## 2020-09-09 DIAGNOSIS — M26.621 TMJ TENDERNESS, RIGHT: ICD-10-CM

## 2020-09-09 PROCEDURE — 97140 MANUAL THERAPY 1/> REGIONS: CPT

## 2020-09-09 PROCEDURE — 97014 ELECTRIC STIMULATION THERAPY: CPT

## 2020-09-09 PROCEDURE — 97110 THERAPEUTIC EXERCISES: CPT

## 2020-09-09 NOTE — OP THERAPY DAILY TREATMENT
"  Outpatient Physical Therapy  DAILY TREATMENT     [facility] [address].  [address]  [place] [address]  Phone:  [phone]  Fax:  [phone]    Date: 09/09/2020    Patient: Dany Lambert  YOB: 1980  MRN: [id number]     Time Calculation    Start time: 0216  Stop time: 0302 Time Calculation (min): 46 minutes         Chief Complaint: No chief complaint on file.    Visit #: 2    SUBJECTIVE:  Patient reports that she is doing ehr ex 6/day abd jow is always a little tired.  Her R side of her neck is tight and painful    OBJECTIVE:  Noted \"s\" MMO with sever shift and pain upon closing          Therapeutic Treatments and Modalities:     Therapeutic Treatment and Modalities Summary: Supine:( d breathing and posture ex)  Reviewed tree analogy and shoulder clock  Reviewed mouth ex and improtance not to do too many at once and not to push through pain  [place] bilateral masseter 5/5/ mhp x 15'    Time-based treatments/modalities:    Physical Therapy Timed Treatment Charges  Manual therapy minutes (CPT 67760): 3 minutes  Therapeutic exercise minutes (CPT 83654): 25 minutes      Pain rating (1-10) before treatment:  6  Pain rating (1-10) after treatment:  2  Discussed dry needling  ASSESSMENT:   Limited posture awareness and limtied tolerance to ex with axial loading due to dominant upper trap holding--tolerated jaw ex if limited to \"d\" psotion only and not past point of shift -/+ 15mm openeing      PLAN/RECOMMENDATIONS:   Discussed DN, scap stab, rocabado ex progression          "

## 2020-09-10 ENCOUNTER — OCCUPATIONAL MEDICINE (OUTPATIENT)
Dept: MEDICAL GROUP | Facility: CLINIC | Age: 40
End: 2020-09-10
Payer: COMMERCIAL

## 2020-09-10 VITALS
SYSTOLIC BLOOD PRESSURE: 116 MMHG | RESPIRATION RATE: 16 BRPM | BODY MASS INDEX: 21.67 KG/M2 | HEIGHT: 65 IN | HEART RATE: 90 BPM | OXYGEN SATURATION: 97 % | TEMPERATURE: 98.1 F | WEIGHT: 130.07 LBS | DIASTOLIC BLOOD PRESSURE: 76 MMHG

## 2020-09-10 DIAGNOSIS — M26.621 TMJ TENDERNESS, RIGHT: ICD-10-CM

## 2020-09-10 DIAGNOSIS — H55.81 SACCADIC EYE MOVEMENTS: ICD-10-CM

## 2020-09-10 DIAGNOSIS — M54.2 CERVICALGIA: ICD-10-CM

## 2020-09-10 DIAGNOSIS — S09.90XD CLOSED HEAD INJURY, SUBSEQUENT ENCOUNTER: ICD-10-CM

## 2020-09-10 DIAGNOSIS — F43.10 PTSD (POST-TRAUMATIC STRESS DISORDER): ICD-10-CM

## 2020-09-10 PROCEDURE — 99213 OFFICE O/P EST LOW 20 MIN: CPT | Performed by: FAMILY MEDICINE

## 2020-09-10 ASSESSMENT — FIBROSIS 4 INDEX: FIB4 SCORE: 0.77
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
## 2020-09-11 ENCOUNTER — PHYSICAL THERAPY (OUTPATIENT)
Dept: PHYSICAL THERAPY | Facility: REHABILITATION | Age: 40
End: 2020-09-11
Attending: FAMILY MEDICINE
Payer: COMMERCIAL

## 2020-09-11 DIAGNOSIS — M26.621 TMJ TENDERNESS, RIGHT: ICD-10-CM

## 2020-09-11 DIAGNOSIS — M54.2 NECK ACHE: ICD-10-CM

## 2020-09-11 PROCEDURE — 97140 MANUAL THERAPY 1/> REGIONS: CPT

## 2020-09-11 PROCEDURE — 97014 ELECTRIC STIMULATION THERAPY: CPT

## 2020-09-11 PROCEDURE — 97110 THERAPEUTIC EXERCISES: CPT

## 2020-09-11 NOTE — OP THERAPY DAILY TREATMENT
Outpatient Physical Therapy  DAILY TREATMENT     [facility] [address].  [address]  [place] [address]  Phone:  [phone]  Fax:  [phone]    Date: 09/11/2020    Patient: Dany Lambert  YOB: 1980  MRN: [id number]     Time Calculation    Start time: 0222  Stop time: 0313 Time Calculation (min): 51 minutes         Chief Complaint: No chief complaint on file.    Visit #: 3    SUBJECTIVE:  Jaw is a little better and less painful but really managing the amount of ex.--still L side more than r pop/crunches if she opens too far  Neck is really sore today    OBJECTIVE:  12mm prior to deviation--30mm ,inor deviation and slight deviation upon close    TTP R masseter>L, R medial pter          Therapeutic Treatments and Modalities:     Therapeutic Treatment and Modalities Summary: Reviewed HEP and progression of HEP  DN: Patient signed informed written release and verbally agreed with informed consent to procedure of dry needling   skin prep with isopropyl  Alcohol  -bL masseter and L lateral pterygoid  -TENS w/ FDN w/ varying frequencies  - R medial pterygoid TENS probe  -MHP x 10'  -No adverse reactions observed post treatment    Reviewed posture and flasher ex--patient struggled to perform ex w/o pain with band and/or loaded--patient to try ex w/o band over theweekend    Nigerian to bilateral upper trap  And bilateral masseter 5/5 mhp x 15'    Time-based treatments/modalities:    Physical Therapy Timed Treatment Charges  Manual therapy minutes (CPT 55070): 15 minutes  Therapeutic exercise minutes (CPT 90141): 10 minutes      Pain rating (1-10) before treatment:  8--upper trap  Pain rating (1-10) after treatment:  4--upper trap    ASSESSMENT:   slghtly less deviation upon mandibular opening for the first 12mm with increased opening to 35mm with continued deviation upon closing  PLAN/RECOMMENDATIONS:   C/s ratraction , bilateral upper trap inhibition tape,  DN--masseter and upper traps??

## 2020-09-16 ENCOUNTER — PHYSICAL THERAPY (OUTPATIENT)
Dept: PHYSICAL THERAPY | Facility: REHABILITATION | Age: 40
End: 2020-09-16
Attending: FAMILY MEDICINE
Payer: COMMERCIAL

## 2020-09-16 DIAGNOSIS — M26.621 TMJ TENDERNESS, RIGHT: ICD-10-CM

## 2020-09-16 PROCEDURE — 97140 MANUAL THERAPY 1/> REGIONS: CPT

## 2020-09-16 PROCEDURE — 97014 ELECTRIC STIMULATION THERAPY: CPT

## 2020-09-16 NOTE — OP THERAPY DAILY TREATMENT
Outpatient Physical Therapy  DAILY TREATMENT     [facility] [address].  [address]  [place] [address]  Phone:  [phone]  Fax:  [phone]    Date: 09/16/2020    Patient: Dany Lambert  YOB: 1980  MRN: [id number]     Time Calculation    Start time: 0215  Stop time: 0304 Time Calculation (min): 49 minutes         Chief Complaint: No chief complaint on file.    Visit #: 4    SUBJECTIVE:  L side of her jaw is 75 better but r side is sore at rest all of the toime with talking, chewing and ex increasing pain with activity    OBJECTIVE:  12mm prior to deviation--30mm ,inor deviation and slight deviation upon close    TTP R masseter>L, R medial pter          Therapeutic Treatments and Modalities:     Therapeutic Treatment and Modalities Summary: Reviewed HEP and progression of HEP//focus not to open to the point of deviation upon closing  Lateral excursion gentle mobs gd 2-3 bilateral  --review importance of dosing and to decrease duration and increase frequency of ex. As tolerted  Caudal and a/p unilateral mobs gd 2-3  Reviewed opening with MRE //no improvement with closing with resistance to either side  NMES bialteral masseter and temporalis 4 hx/ 60 pulse width x MHP x 15'          Zimbabwean to bilateral upper trap  And bilateral masseter 5/5 mhp x 15'    Time-based treatments/modalities:    Physical Therapy Timed Treatment Charges  Manual therapy minutes (CPT 73025): 25 minutes  Therapeutic exercise minutes (CPT 24197): 5 minutes      Pain rating (1-10) before treatment:  3- R jaw at rest  Pain rating (1-10) after treatment:  2-3 a little better    ASSESSMENT:   slghtly less deviation upon mandibular opening to 35mm but  continued deviation upon closing  PLAN/RECOMMENDATIONS:   C/s ratraction , bilateral upper trap inhibition tape, DN--masseter and upper traps??

## 2020-09-18 ENCOUNTER — PHYSICAL THERAPY (OUTPATIENT)
Dept: PHYSICAL THERAPY | Facility: REHABILITATION | Age: 40
End: 2020-09-18
Attending: FAMILY MEDICINE
Payer: COMMERCIAL

## 2020-09-18 DIAGNOSIS — M26.621 TMJ TENDERNESS, RIGHT: ICD-10-CM

## 2020-09-18 PROCEDURE — 97140 MANUAL THERAPY 1/> REGIONS: CPT

## 2020-09-18 PROCEDURE — 97110 THERAPEUTIC EXERCISES: CPT

## 2020-09-18 PROCEDURE — 97014 ELECTRIC STIMULATION THERAPY: CPT

## 2020-09-18 NOTE — OP THERAPY DAILY TREATMENT
Outpatient Physical Therapy  DAILY TREATMENT     [facility] Physical Therapy [address].  [address]  [place] [address]  Phone:  [phone]  Fax:  [phone]    Date: 09/18/2020    Patient: Dany Lambert  YOB: 1980  MRN: [id number]     Time Calculation    Start time: 0221  Stop time: 0308 Time Calculation (min): 47 minutes         Chief Complaint: No chief complaint on file.    Visit #: 5    SUBJECTIVE:  L side still feels great and r side is much better but  to touch... still reporting shifting with opening    OBJECTIVE:  31mm ,inor deviation and slight deviation upon close    TTP R masseter>L, R medial pter          Therapeutic Treatments and Modalities:     Therapeutic Treatment and Modalities Summary: Reviewed HEP and progression of HEP//focus not to open to the point of deviation upon closing  Trial of differeng pressure in all direction upon closing to see if deviation can be diminished--unchangeable with MC  --review importance of dosing and to decrease duration and increase frequency of ex. As tolerted  DN: Patient signed informed written release and verbally agreed with informed consent to procedure of dry needling   skin prep with isopropyl  Alcohol  r masseter and temporalis  -TENS w/ FDN    -MHP x 10'  -No adverse reactions observed post treatment    Sore, but better 36mm w/o deviation upon closing    Time-based treatments/modalities:    Physical Therapy Timed Treatment Charges  Manual therapy minutes (CPT 71328): 15 minutes  Therapeutic exercise minutes (CPT 47714): 10 minutes      Pain rating (1-10) before treatment:  2- R jaw at rest  Pain rating (1-10) after treatment:  3  Better, but a little sore from the needles    ASSESSMENT:   lessening resting pain but continued severe palpatory tenderness  PLAN/RECOMMENDATIONS: eval c/s with new referral

## 2020-09-23 ENCOUNTER — APPOINTMENT (OUTPATIENT)
Dept: PHYSICAL THERAPY | Facility: REHABILITATION | Age: 40
End: 2020-09-23
Attending: FAMILY MEDICINE
Payer: COMMERCIAL

## 2020-09-25 ENCOUNTER — PHYSICAL THERAPY (OUTPATIENT)
Dept: PHYSICAL THERAPY | Facility: REHABILITATION | Age: 40
End: 2020-09-25
Attending: FAMILY MEDICINE
Payer: COMMERCIAL

## 2020-09-25 DIAGNOSIS — M26.621 TMJ TENDERNESS, RIGHT: ICD-10-CM

## 2020-09-25 PROCEDURE — 97014 ELECTRIC STIMULATION THERAPY: CPT

## 2020-09-25 PROCEDURE — 97110 THERAPEUTIC EXERCISES: CPT

## 2020-09-25 PROCEDURE — 97140 MANUAL THERAPY 1/> REGIONS: CPT

## 2020-09-25 NOTE — OP THERAPY DAILY TREATMENT
"  Outpatient Physical Therapy  DAILY TREATMENT     [facility] Therapy [address].  [address]  [place] [address]  Phone:  [phone]  Fax:  [phone]    Date: 09/25/2020    Patient: Dany Lambert  YOB: 1980  MRN: [id number]     Time Calculation    Start time: 0221  Stop time: 0310 Time Calculation (min): 49 minutes         Chief Complaint: No chief complaint on file.    Visit #: 6    SUBJECTIVE:  Severe ha/ migraine past few leon with mot of time spent in bed--most pain has been behind R eye/side of face    OBJECTIVE:  31mm ,inor deviation and slight deviation upon close    TTP R masseter>L, R medial pter          Therapeutic Treatments and Modalities:     Therapeutic Treatment and Modalities Summary: Sub occip release  C/s raking  stm to levator insertion   Mfr R scap  stm with lengthening to levator and scap mobility//R eye h/a is almost gone  Reviewed mouth ex  Palestinian to R masseter, bilateral levator and upper trap 5/5/ x 5mhp    Sore, but better 35mm w/o deviation upon closing  No eye pain \"feels great\"    Time-based treatments/modalities:    Physical Therapy Timed Treatment Charges  Manual therapy minutes (CPT 01273): 20 minutes  Therapeutic exercise minutes (CPT 28213): 5 minutes      Pain rating (1-10) before treatment:  4 R orbital pain R jaw  Is tender     Pain rating (1-10) after treatment:   0 no pain  , but a little sore to touch    ASSESSMENT:   Abolished ha/ with treatment to levaotro--increasing mmo and control with decreasing jaw pain  PLAN/RECOMMENDATIONS: eval c/s next visit      "

## 2020-09-29 ENCOUNTER — HOSPITAL ENCOUNTER (OUTPATIENT)
Dept: LAB | Facility: MEDICAL CENTER | Age: 40
End: 2020-09-29
Attending: FAMILY MEDICINE
Payer: COMMERCIAL

## 2020-09-29 DIAGNOSIS — E03.9 HYPOTHYROIDISM, UNSPECIFIED TYPE: ICD-10-CM

## 2020-09-29 LAB
THYROPEROXIDASE AB SERPL-ACNC: <9 IU/ML (ref 0–9)
TSH SERPL DL<=0.005 MIU/L-ACNC: 3.37 UIU/ML (ref 0.38–5.33)

## 2020-09-29 PROCEDURE — 86376 MICROSOMAL ANTIBODY EACH: CPT

## 2020-09-29 PROCEDURE — 36415 COLL VENOUS BLD VENIPUNCTURE: CPT

## 2020-09-29 PROCEDURE — 84443 ASSAY THYROID STIM HORMONE: CPT

## 2020-09-30 ENCOUNTER — PHYSICAL THERAPY (OUTPATIENT)
Dept: PHYSICAL THERAPY | Facility: REHABILITATION | Age: 40
End: 2020-09-30
Attending: FAMILY MEDICINE
Payer: COMMERCIAL

## 2020-09-30 DIAGNOSIS — M54.2 NECK ACHE: ICD-10-CM

## 2020-09-30 PROCEDURE — 97014 ELECTRIC STIMULATION THERAPY: CPT

## 2020-09-30 PROCEDURE — 97161 PT EVAL LOW COMPLEX 20 MIN: CPT

## 2020-09-30 PROCEDURE — 97110 THERAPEUTIC EXERCISES: CPT

## 2020-09-30 ASSESSMENT — ENCOUNTER SYMPTOMS
PAIN SCALE AT LOWEST: 2
PAIN SCALE AT HIGHEST: 9
PAIN SCALE: 5
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
## 2020-10-05 ENCOUNTER — TELEMEDICINE (OUTPATIENT)
Dept: MEDICAL GROUP | Facility: IMAGING CENTER | Age: 40
End: 2020-10-05
Payer: COMMERCIAL

## 2020-10-05 VITALS
OXYGEN SATURATION: 99 % | HEIGHT: 65 IN | WEIGHT: 125 LBS | TEMPERATURE: 97.6 F | BODY MASS INDEX: 20.83 KG/M2 | HEART RATE: 102 BPM

## 2020-10-05 DIAGNOSIS — K29.00 ACUTE GASTRITIS WITHOUT HEMORRHAGE, UNSPECIFIED GASTRITIS TYPE: ICD-10-CM

## 2020-10-05 DIAGNOSIS — E03.9 HYPOTHYROIDISM, UNSPECIFIED TYPE: ICD-10-CM

## 2020-10-05 PROCEDURE — 99213 OFFICE O/P EST LOW 20 MIN: CPT | Mod: 95,CR | Performed by: FAMILY MEDICINE

## 2020-10-05 ASSESSMENT — PAIN SCALES - GENERAL: PAINLEVEL: 7=MODERATE-SEVERE PAIN

## 2020-10-05 ASSESSMENT — FIBROSIS 4 INDEX: FIB4 SCORE: 0.77
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
## 2020-10-08 ENCOUNTER — OCCUPATIONAL MEDICINE (OUTPATIENT)
Dept: MEDICAL GROUP | Facility: CLINIC | Age: 40
End: 2020-10-08
Payer: COMMERCIAL

## 2020-10-08 VITALS
OXYGEN SATURATION: 97 % | RESPIRATION RATE: 16 BRPM | DIASTOLIC BLOOD PRESSURE: 78 MMHG | HEIGHT: 65 IN | TEMPERATURE: 98.1 F | WEIGHT: 125 LBS | BODY MASS INDEX: 20.83 KG/M2 | SYSTOLIC BLOOD PRESSURE: 118 MMHG | HEART RATE: 80 BPM

## 2020-10-08 DIAGNOSIS — E03.9 HYPOTHYROIDISM, UNSPECIFIED TYPE: ICD-10-CM

## 2020-10-08 DIAGNOSIS — S09.90XD CLOSED HEAD INJURY, SUBSEQUENT ENCOUNTER: ICD-10-CM

## 2020-10-08 DIAGNOSIS — M26.621 TMJ TENDERNESS, RIGHT: ICD-10-CM

## 2020-10-08 DIAGNOSIS — F43.10 PTSD (POST-TRAUMATIC STRESS DISORDER): ICD-10-CM

## 2020-10-08 PROCEDURE — 99213 OFFICE O/P EST LOW 20 MIN: CPT | Performed by: FAMILY MEDICINE

## 2020-10-08 ASSESSMENT — FIBROSIS 4 INDEX: FIB4 SCORE: 0.77
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
## 2020-10-09 ENCOUNTER — APPOINTMENT (OUTPATIENT)
Dept: PHYSICAL THERAPY | Facility: REHABILITATION | Age: 40
End: 2020-10-09
Attending: FAMILY MEDICINE
Payer: COMMERCIAL

## 2020-10-15 ENCOUNTER — PHYSICAL THERAPY (OUTPATIENT)
Dept: PHYSICAL THERAPY | Facility: REHABILITATION | Age: 40
End: 2020-10-15
Attending: FAMILY MEDICINE
Payer: COMMERCIAL

## 2020-10-15 DIAGNOSIS — M54.2 NECK ACHE: ICD-10-CM

## 2020-10-15 PROCEDURE — 97110 THERAPEUTIC EXERCISES: CPT

## 2020-10-15 NOTE — OP THERAPY DAILY TREATMENT
"  Outpatient Physical Therapy  DAILY TREATMENT     [facility] Physical Therapy [address]  [address]  [place] [address]  Phone:  [phone]  Fax:  [phone]    Date: 10/15/2020    Patient: Dany Lambert  YOB: 1980  MRN: [id number]     Time Calculation    Start time: 0225  Stop time: 0247 Time Calculation (min): 22 minutes         Chief Complaint: No chief complaint on file.   10' late  Visit #: 7    SUBJECTIVE:  Pretty good for a few days but then had a full body ache and bad stomach virus for four days. Patient reports with increased neck pain and has been feeling better for the past week with the usual level of pain    OBJECTIVE:  Ext 5\" ER{  R: 45  L: 40 erp          Therapeutic Treatments and Modalities:     Therapeutic Treatment and Modalities Summary: Roller: (focus chin down and shoulder blades at seven O'clock)--progression of bringing feet closer with increased difficulty to control balance  -alternate arm  -flaquito    Reviewed HEP and frequent use of heat and to add roller to her HEP          Time-based treatments/modalities:    Physical Therapy Timed Treatment Charges  Therapeutic exercise minutes (CPT 59202): 20 minutes      Pain rating (1-10) before treatment: 6--R mid neck  Pain rating (1-10) after treatment:  6--still pretty sore--patient declined e-stim and heat  R: 55, L 60--ext no change  ASSESSMENT:   Increase c/o neck soreness since recent illness with noted forward head dominant posture--some increased arom after treatment with no change in pain    PLAN/RECOMMENDATIONS:   Progress albert DN??       "

## 2020-10-21 ENCOUNTER — PHYSICAL THERAPY (OUTPATIENT)
Dept: PHYSICAL THERAPY | Facility: REHABILITATION | Age: 40
End: 2020-10-21
Attending: FAMILY MEDICINE
Payer: COMMERCIAL

## 2020-10-21 DIAGNOSIS — M54.2 NECK ACHE: ICD-10-CM

## 2020-10-21 PROCEDURE — 97110 THERAPEUTIC EXERCISES: CPT

## 2020-10-21 PROCEDURE — 97014 ELECTRIC STIMULATION THERAPY: CPT

## 2020-10-21 PROCEDURE — 97140 MANUAL THERAPY 1/> REGIONS: CPT

## 2020-10-21 NOTE — OP THERAPY DAILY TREATMENT
"  Outpatient Physical Therapy  DAILY TREATMENT     [facility] Physical Therapy [address]  [address]  [place] [address]  Phone:  [phone]  Fax:  [phone]    Date: 10/21/2020    Patient: Dany Lambert  YOB: 1980  MRN: [id number]     Time Calculation    Start time: 0432  Stop time: 0513 Time Calculation (min): 41 minutes         Chief Complaint: No chief complaint on file.   17' late  Visit #: 8    SUBJECTIVE:  Still having some neck pain since latest abdominal illness--tried to do a bridge and tweaked her neck about 4 days ago    OBJECTIVE:  Ext 7\" ER{R: 45  L: 50 erp  Flex 4\"--severe pain post neck          Therapeutic Treatments and Modalities:     Therapeutic Treatment and Modalities Summary: DN: Patient signed informed written release and verbally agreed with informed consent to procedure of dry needling   skin prep with isopropyl  Alcohol in massage chair  -bilateral C/S multifidis C3-7,  -TENS w/ FDN  -russian 5/5 bialteral c/s upper trap MHP x 10'  -No adverse reactions observed post treatment  Reviewed HEP and frequent use of heat and to add roller to her HEP          Time-based treatments/modalities:    Physical Therapy Timed Treatment Charges  Manual therapy minutes (CPT 28532): 15 minutes  Therapeutic exercise minutes (CPT 10615): 10 minutes      Pain rating (1-10) before treatment: 6--R mid neck  Pain rating (1-10) after treatment:  R: 55, L 60, flex: 3\" \" better, more movement  Ext 8\"    4/10  ASSESSMENT:   Increase c/o neck soreness since recent illness and tried to do bridge a few day ago and \"tweaked\" her neck--increaed rom after treatmenttreatment    PLAN/RECOMMENDATIONS:   Progress roll and angels DN??       "

## 2020-10-22 DIAGNOSIS — M26.621 TMJ TENDERNESS, RIGHT: ICD-10-CM

## 2020-10-22 DIAGNOSIS — S09.90XD CLOSED HEAD INJURY, SUBSEQUENT ENCOUNTER: ICD-10-CM

## 2020-10-22 RX ORDER — BACLOFEN 10 MG/1
10 TABLET ORAL 3 TIMES DAILY PRN
Qty: 60 TAB | Refills: 1 | Status: SHIPPED | OUTPATIENT
Start: 2020-10-22 | End: 2021-02-09

## 2020-10-23 NOTE — PROGRESS NOTES
1. Closed head injury, subsequent encounter  baclofen (LIORESAL) 10 MG Tab    REFERRAL TO PHYSIATRY (PMR)   2. TMJ tenderness, right  baclofen (LIORESAL) 10 MG Tab    REFERRAL TO PHYSIATRY (PMR)

## 2020-10-27 ENCOUNTER — OFFICE VISIT (OUTPATIENT)
Dept: ENDOCRINOLOGY | Facility: MEDICAL CENTER | Age: 40
End: 2020-10-27
Attending: INTERNAL MEDICINE
Payer: COMMERCIAL

## 2020-10-27 VITALS
WEIGHT: 133.4 LBS | DIASTOLIC BLOOD PRESSURE: 78 MMHG | HEART RATE: 90 BPM | OXYGEN SATURATION: 99 % | SYSTOLIC BLOOD PRESSURE: 120 MMHG | BODY MASS INDEX: 22.23 KG/M2 | HEIGHT: 65 IN

## 2020-10-27 DIAGNOSIS — E55.9 VITAMIN D DEFICIENCY: ICD-10-CM

## 2020-10-27 DIAGNOSIS — R79.89 ABNORMAL CORTISOL LEVEL: ICD-10-CM

## 2020-10-27 DIAGNOSIS — E03.9 PRIMARY HYPOTHYROIDISM: ICD-10-CM

## 2020-10-27 DIAGNOSIS — Z87.820 HISTORY OF TRAUMATIC BRAIN INJURY: ICD-10-CM

## 2020-10-27 DIAGNOSIS — E53.8 B12 DEFICIENCY: ICD-10-CM

## 2020-10-27 PROCEDURE — 99211 OFF/OP EST MAY X REQ PHY/QHP: CPT | Performed by: INTERNAL MEDICINE

## 2020-10-27 PROCEDURE — 99204 OFFICE O/P NEW MOD 45 MIN: CPT | Performed by: INTERNAL MEDICINE

## 2020-10-27 RX ORDER — LEVOTHYROXINE SODIUM 50 MCG
50 TABLET ORAL
Qty: 30 TAB | Refills: 3 | Status: SHIPPED | OUTPATIENT
Start: 2020-10-27 | End: 2021-02-25

## 2020-10-27 RX ORDER — ERGOCALCIFEROL 1.25 MG/1
50000 CAPSULE ORAL DAILY
Qty: 12 CAP | Refills: 3 | Status: SHIPPED | OUTPATIENT
Start: 2020-10-27 | End: 2020-10-27 | Stop reason: SDUPTHER

## 2020-10-27 RX ORDER — ERGOCALCIFEROL 1.25 MG/1
50000 CAPSULE ORAL
Qty: 12 CAP | Refills: 3 | Status: SHIPPED | OUTPATIENT
Start: 2020-10-27 | End: 2021-02-05 | Stop reason: SDUPTHER

## 2020-10-27 ASSESSMENT — FIBROSIS 4 INDEX: FIB4 SCORE: 0.77
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
## 2020-10-29 ENCOUNTER — PHYSICAL THERAPY (OUTPATIENT)
Dept: PHYSICAL THERAPY | Facility: REHABILITATION | Age: 40
End: 2020-10-29
Attending: FAMILY MEDICINE
Payer: COMMERCIAL

## 2020-10-29 DIAGNOSIS — M54.2 CERVICALGIA: ICD-10-CM

## 2020-10-29 PROCEDURE — 97014 ELECTRIC STIMULATION THERAPY: CPT

## 2020-10-29 PROCEDURE — 97110 THERAPEUTIC EXERCISES: CPT

## 2020-10-29 PROCEDURE — 97140 MANUAL THERAPY 1/> REGIONS: CPT
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
## 2020-11-04 ENCOUNTER — PHYSICAL THERAPY (OUTPATIENT)
Dept: PHYSICAL THERAPY | Facility: REHABILITATION | Age: 40
End: 2020-11-04
Attending: FAMILY MEDICINE
Payer: COMMERCIAL

## 2020-11-04 DIAGNOSIS — M54.2 NECK ACHE: ICD-10-CM

## 2020-11-04 DIAGNOSIS — M54.2 CERVICALGIA: ICD-10-CM

## 2020-11-04 PROCEDURE — 97014 ELECTRIC STIMULATION THERAPY: CPT

## 2020-11-04 PROCEDURE — 97140 MANUAL THERAPY 1/> REGIONS: CPT

## 2020-11-04 PROCEDURE — 97110 THERAPEUTIC EXERCISES: CPT

## 2020-11-04 NOTE — OP THERAPY DAILY TREATMENT
Outpatient Physical Therapy  DAILY TREATMENT     [facility] Physical Therapy [address].  [address]  [place] [address]  Phone:  [phone]  Fax:  [phone]    Date: 11/04/2020    Patient: Dany Lambert  YOB: 1980  MRN: [id number]     Time Calculation    Start time: 0253  Stop time: 0340 Time Calculation (min): 47 minutes         Chief Complaint: No chief complaint on file.  9' late  Visit #: 10    SUBJECTIVE:   a little better and noticed that her shoulder pops out with roller ex exacerbating and old shoulder injury so she has backed off ex (patient was able to demonstrate ex on roller without shoulder complaint--not sure what patient was experiencing at home but nt pain and able to perform ex well in the clinic)    Patient feels as if she has more rom  And less shoulder pain since needling    OBJECTIVE:            Therapeutic Treatments and Modalities:     Therapeutic Treatment and Modalities Summary: Wall angels and alterning ars//increased movement with less compensatory movements   roller: alternating arm and angels  Bilateral MFR  -Botswanan 5/5 bilateral c/s upper trap MHP x 15'  -No adverse reactionsserved post treatment          Time-based treatments/modalities:    Physical Therapy Timed Treatment Charges  Manual therapy minutes (CPT 80397): 10 minutes  Therapeutic exercise minutes (CPT 19248): 15 minutes      Pain rating (1-10) before treatment: 5 mid c/s pain  Pain rating (1-10) after treatment:  ASSESSMENT:   Improved posture awareness but cont. Dominant upper trap/pec holding pattern with poor low trap and posture awareness--noted increased Ease of motion despio    PLAN/RECOMMENDATIONS:   Progress roll and angels DN??

## 2020-11-05 ENCOUNTER — OCCUPATIONAL MEDICINE (OUTPATIENT)
Dept: MEDICAL GROUP | Facility: CLINIC | Age: 40
End: 2020-11-05
Payer: COMMERCIAL

## 2020-11-05 VITALS
TEMPERATURE: 97.4 F | RESPIRATION RATE: 18 BRPM | WEIGHT: 133.4 LBS | BODY MASS INDEX: 22.23 KG/M2 | OXYGEN SATURATION: 98 % | HEIGHT: 65 IN | HEART RATE: 99 BPM | DIASTOLIC BLOOD PRESSURE: 80 MMHG | SYSTOLIC BLOOD PRESSURE: 122 MMHG

## 2020-11-05 DIAGNOSIS — M54.2 CERVICALGIA: ICD-10-CM

## 2020-11-05 DIAGNOSIS — S09.90XD CLOSED HEAD INJURY, SUBSEQUENT ENCOUNTER: ICD-10-CM

## 2020-11-05 DIAGNOSIS — F43.10 PTSD (POST-TRAUMATIC STRESS DISORDER): ICD-10-CM

## 2020-11-05 PROCEDURE — 99213 OFFICE O/P EST LOW 20 MIN: CPT | Performed by: FAMILY MEDICINE

## 2020-11-05 RX ORDER — ZOLPIDEM TARTRATE 5 MG/1
5 TABLET ORAL NIGHTLY PRN
Qty: 30 TAB | Refills: 0 | Status: SHIPPED | OUTPATIENT
Start: 2020-11-05 | End: 2020-11-30 | Stop reason: SDUPTHER

## 2020-11-05 ASSESSMENT — FIBROSIS 4 INDEX: FIB4 SCORE: 0.77
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
## 2020-11-12 ENCOUNTER — APPOINTMENT (OUTPATIENT)
Dept: PHYSICAL THERAPY | Facility: REHABILITATION | Age: 40
End: 2020-11-12
Attending: FAMILY MEDICINE
Payer: COMMERCIAL

## 2020-11-30 DIAGNOSIS — M54.2 CERVICALGIA: ICD-10-CM

## 2020-11-30 RX ORDER — ZOLPIDEM TARTRATE 5 MG/1
5 TABLET ORAL NIGHTLY PRN
Qty: 30 TAB | Refills: 0 | Status: SHIPPED | OUTPATIENT
Start: 2020-11-30 | End: 2020-12-30

## 2020-12-10 ENCOUNTER — PHYSICAL THERAPY (OUTPATIENT)
Dept: PHYSICAL THERAPY | Facility: REHABILITATION | Age: 40
End: 2020-12-10
Attending: FAMILY MEDICINE
Payer: COMMERCIAL

## 2020-12-10 DIAGNOSIS — M54.2 CERVICALGIA: ICD-10-CM

## 2020-12-10 PROCEDURE — 97140 MANUAL THERAPY 1/> REGIONS: CPT

## 2020-12-10 PROCEDURE — 97014 ELECTRIC STIMULATION THERAPY: CPT

## 2020-12-10 PROCEDURE — 97110 THERAPEUTIC EXERCISES: CPT

## 2020-12-10 NOTE — OP THERAPY DAILY TREATMENT
Outpatient Physical Therapy  DAILY TREATMENT     [facility] Physical Therapy [address].  [address]  [place] [address]  Phone:  [phone]  Fax:  [phone]    Date: 12/10/2020    Patient: Dany Lambert  YOB: 1980  MRN: [id number]     Time Calculation    Start time: 0345  Stop time: 0435 Time Calculation (min): 50 minutes         Chief Complaint: No chief complaint on file.  9' late  Visit #: 11    SUBJECTIVE:  Patient reported that she started to see a pain specialist and has received two sets of  trgr pts. With some relief    --patient stopped doing all exercises for the past month    OBJECTIVE:            Therapeutic Treatments and Modalities:     Therapeutic Treatment and Modalities Summary: Reviewed importance of moving and exicerising I=even if it is uncomfortable but does not progressively worsen  Posture on wall  Wall angels and alterning ars//increased movement with less compensatory movements   roller: alternating arm and angels  Supine/sit flasher #1--hep    R first rib mobs gd 1  insturcted D-breathing   Solomon Islander 5/5 upper traps and ctj multifidi x 15' mhp            Time-based treatments/modalities:    Physical Therapy Timed Treatment Charges  Manual therapy minutes (CPT 86403): 10 minutes  Therapeutic exercise minutes (CPT 64690): 20 minutes      Pain rating (1-10) before treatment: 5/10  Lower c/s and bilateral upper traps  Pain rating (1-10) after treatment: 3/10  ASSESSMENT:   guraded and nervous about exercise.  Noted upper trap dominant posturing with poor low trap recruitment  --reviewed understanding pain and not all pain means injury  PLAN/RECOMMENDATIONS:   Progress roll and angels , d-breathing

## 2020-12-16 ENCOUNTER — HOSPITAL ENCOUNTER (OUTPATIENT)
Dept: RADIOLOGY | Facility: MEDICAL CENTER | Age: 40
End: 2020-12-16
Attending: PHYSICAL MEDICINE & REHABILITATION
Payer: COMMERCIAL

## 2020-12-16 ENCOUNTER — HOSPITAL ENCOUNTER (OUTPATIENT)
Dept: RADIOLOGY | Facility: MEDICAL CENTER | Age: 40
End: 2020-12-16
Attending: INTERNAL MEDICINE
Payer: COMMERCIAL

## 2020-12-16 DIAGNOSIS — Z23 NEED FOR VACCINATION: ICD-10-CM

## 2020-12-16 DIAGNOSIS — M54.2 CERVICALGIA: ICD-10-CM

## 2020-12-16 DIAGNOSIS — E03.9 PRIMARY HYPOTHYROIDISM: ICD-10-CM

## 2020-12-16 PROCEDURE — 76536 US EXAM OF HEAD AND NECK: CPT

## 2020-12-16 PROCEDURE — 72141 MRI NECK SPINE W/O DYE: CPT

## 2020-12-18 ENCOUNTER — PHYSICAL THERAPY (OUTPATIENT)
Dept: PHYSICAL THERAPY | Facility: REHABILITATION | Age: 40
End: 2020-12-18
Attending: FAMILY MEDICINE
Payer: COMMERCIAL

## 2020-12-18 DIAGNOSIS — M54.2 CERVICALGIA: ICD-10-CM

## 2020-12-18 DIAGNOSIS — M54.2 NECK ACHE: ICD-10-CM

## 2020-12-18 PROCEDURE — 97110 THERAPEUTIC EXERCISES: CPT

## 2020-12-18 PROCEDURE — 97014 ELECTRIC STIMULATION THERAPY: CPT

## 2020-12-18 NOTE — OP THERAPY DAILY TREATMENT
"  Outpatient Physical Therapy  DAILY TREATMENT     [facility] Physical Therapy [address]  [address]  [place] [address]  Phone:  [phone]  Fax:  [phone]    Date: 12/18/2020    Patient: Dany Lambert  YOB: 1980  MRN: [id number]     Time Calculation    Start time: 0345  Stop time: 0430 Time Calculation (min): 45 minutes         Chief Complaint: No chief complaint on file.  9' late  Visit #: 12    SUBJECTIVE:  Still struggling with good/bad days and struggles with endurance--\"always feels tired\"--today is a bad day.  Patient reports that little activity wipes her out--patient reports that she only does 1 time per day because she does not want to overdo her activity  Second round of trigger pt injections 2 weeks ago--        --patient stopped doing all exercises for the past month    OBJECTIVE:          Therapeutic Treatments and Modalities:     Therapeutic Treatment and Modalities Summary: Patient ed on maintaining consistency with ex,  managing energy and pain with ex, as tolerated  Roller: (focus chin down and shoulder blades at seven O'clock)  -alternate arm  -angels  -ironing  -d-breathing   -flasher #1 on roller  // pain dropped to 4/10 on roller  Cupping ctj x 10'  Russian to ctj with 5/5 mhp x 15              Time-based treatments/modalities:    Physical Therapy Timed Treatment Charges  Therapeutic exercise minutes (CPT 92248): 25 minutes      Pain rating (1-10) before treatment: 7-8/10--ctj  Lower c/s and bilateral upper traps  Pain rating (1-10) after treatment: 2-3/10  ASSESSMENT:   Patient struggling with hep compliance with only doing ex 1/day--limited tolerance to ex at home.  Consistent releif with symptoms in clinic  PLAN/RECOMMENDATIONS:   Progress roll and angels , d-breathing       "

## 2020-12-21 ENCOUNTER — APPOINTMENT (OUTPATIENT)
Dept: FAMILY PLANNING/WOMEN'S HEALTH CLINIC | Facility: IMMUNIZATION CENTER | Age: 40
End: 2020-12-21
Attending: FAMILY MEDICINE
Payer: COMMERCIAL

## 2020-12-21 ENCOUNTER — IMMUNIZATION (OUTPATIENT)
Dept: FAMILY PLANNING/WOMEN'S HEALTH CLINIC | Facility: IMMUNIZATION CENTER | Age: 40
End: 2020-12-21
Payer: COMMERCIAL

## 2020-12-21 DIAGNOSIS — Z23 ENCOUNTER FOR VACCINATION: Primary | ICD-10-CM

## 2020-12-21 DIAGNOSIS — Z23 NEED FOR VACCINATION: ICD-10-CM

## 2020-12-21 PROCEDURE — 0001A PFIZER SARS-COV-2 VACCINE: CPT

## 2020-12-21 PROCEDURE — 91300 PFIZER SARS-COV-2 VACCINE: CPT

## 2020-12-23 ENCOUNTER — PHYSICAL THERAPY (OUTPATIENT)
Dept: PHYSICAL THERAPY | Facility: REHABILITATION | Age: 40
End: 2020-12-23
Attending: FAMILY MEDICINE
Payer: COMMERCIAL

## 2020-12-23 DIAGNOSIS — M54.2 NECK ACHE: ICD-10-CM

## 2020-12-23 PROCEDURE — 97014 ELECTRIC STIMULATION THERAPY: CPT

## 2020-12-23 PROCEDURE — 97140 MANUAL THERAPY 1/> REGIONS: CPT

## 2020-12-23 PROCEDURE — 97110 THERAPEUTIC EXERCISES: CPT

## 2020-12-23 NOTE — OP THERAPY DAILY TREATMENT
"  Outpatient Physical Therapy  DAILY TREATMENT     [facility] Physical [address].  [address]  [place] [address]  Phone:  [phone]  Fax:  [phone]    Date: 12/23/2020    Patient: Dany Lambert  YOB: 1980  MRN: [id number]     Time Calculation    Start time: 0152  Stop time: 0245 Time Calculation (min): 53 minutes         Chief Complaint: No chief complaint on file.  9' late  Visit #: 13    SUBJECTIVE:  Felt good for a few days but sore since last night  With R neck tightness        --patient stopped doing all exercises for the past month    OBJECTIVE:  Good scap position but slight post  forehead-chin tilt          Therapeutic Treatments and Modalities:     Therapeutic Treatment and Modalities Summary:   sahrman supine and wall with flasher #1(manula palpation for biofeedback to minimize levator and splenius capitus recruitment)// good control of low trap but limited sub occipital control and   Segmental stm and mobs gd 2-3 c1-4// \"better 5/10\"    DN: Patient signed informed written release and verbally agreed with informed consent to procedure of dry needling   skin prep with isopropyl  Alcohol/Chlora prep  -bilateral C/S multifidis and splenius capitus  C2 -4,  -TENS w/ FDN  Moldovan 5/5 to upper c/s MHP x 15'  -No adverse reactions observed post treatment          Time-based treatments/modalities:    Physical Therapy Timed Treatment Charges  Manual therapy minutes (CPT 30233): 10 minutes  Therapeutic exercise minutes (CPT 09225): 15 minutes      Pain rating (1-10) before treatment: 8/10-- R sub occip/levator   Pain rating (1-10) after treatment: 3-4/10 Better    Rot R:70   L:75     ASSESSMENT:   Better scapular posture awareness but still limited c/s orientation with continue post forehead-chin tilt anglePLAN/RECOMMENDATIONS:   Progress roll and angels , d-breathing       "

## 2020-12-30 ENCOUNTER — PHYSICAL THERAPY (OUTPATIENT)
Dept: PHYSICAL THERAPY | Facility: REHABILITATION | Age: 40
End: 2020-12-30
Attending: FAMILY MEDICINE
Payer: COMMERCIAL

## 2020-12-30 DIAGNOSIS — M54.2 CERVICALGIA: ICD-10-CM

## 2020-12-30 PROCEDURE — 97014 ELECTRIC STIMULATION THERAPY: CPT

## 2020-12-30 PROCEDURE — 97110 THERAPEUTIC EXERCISES: CPT

## 2020-12-30 NOTE — OP THERAPY DAILY TREATMENT
"  Outpatient Physical Therapy  DAILY TREATMENT     [facility] Physical Therapy [address].  [address]  [place] [address]  Phone:  [phone]  Fax:  [phone]    Date: 12/30/2020    Patient: Dany Lambert  YOB: 1980  MRN: [id number]     Time Calculation    Start time: 0421  Stop time: 0500 Time Calculation (min): 39 minutes         Chief Complaint: No chief complaint on file.    Visit #: 14  2' late  SUBJECTIVE:  Rough past week with the holidays and neck ihas been sore---saw md and got 3 trg pt injection R c/s             OBJECTIVE:  Good scap position but moderate post  forehead-chin tilt and forward with HOS 3\"          Therapeutic Treatments and Modalities:     Therapeutic Treatment and Modalities Summary: Tall wall with balloon smash with retraction and alt. Arm// difficult with patient struggle with maintain vertical chin orientation  Supine ball smash with alternating arm    Segmental stm and segmental rotational  mobs gd 2-3 c2-7// \"better 5/10\"  Roller with balloon smash and alt. arms  Russian 5/5 to  c/s MHP x 15'  Tall and posture review with two-mirror feedback  // better, but weird to stand tall 3/10          Time-based treatments/modalities:    Physical Therapy Timed Treatment Charges  Manual therapy minutes (CPT 43895): 5 minutes  Therapeutic exercise minutes (CPT 79079): 23 minutes      Pain rating (1-10) before treatment: 5/10-- R sub occip c/s  Pain rating (1-10) after treatment: 3/10 Better      ASSESSMENT:   Better scapular posture awareness but still limited c/s orientation with continue forehead-chin posterior tilt angle--patient questions and struggles with postural alignment but tolerated treatment with a decrease in pain    pLAN/RECOMMENDATIONS:   Progress roll and angels , d-breathing       "

## 2021-01-07 ENCOUNTER — PHYSICAL THERAPY (OUTPATIENT)
Dept: PHYSICAL THERAPY | Facility: REHABILITATION | Age: 41
End: 2021-01-07
Attending: FAMILY MEDICINE
Payer: COMMERCIAL

## 2021-01-07 DIAGNOSIS — M54.2 CERVICALGIA: ICD-10-CM

## 2021-01-07 PROCEDURE — 97140 MANUAL THERAPY 1/> REGIONS: CPT

## 2021-01-07 PROCEDURE — 97014 ELECTRIC STIMULATION THERAPY: CPT

## 2021-01-07 NOTE — OP THERAPY DAILY TREATMENT
"  Outpatient Physical Therapy  DAILY TREATMENT     [facility] Physical Therapy [address].  [address]  [place] [address]  Phone:  [phone]  Fax:  [phone]    Date: 01/07/2021    Patient: Dany Lambert  YOB: 1980  MRN: [id number]     Time Calculation    Start time: 0245  Stop time: 0335 Time Calculation (min): 50 minutes         Chief Complaint: No chief complaint on file.    Visit #: 15  2' late  SUBJECTIVE:  Not much lasting relief from injection but felt good for a few days.  Bent over a few days ago and flared up her low  back--it has been a 10/10 since and she has been in bed with tramadol most of the week            OBJECTIVE:  Noted significant turnkal ataxic like shaking --apparently due to severe lbp 10/10          Therapeutic Treatments and Modalities:     Therapeutic Treatment and Modalities Summary: SEIL with stm and p/s l3-5 gd 1-3// 4/10 better  SEIlk with russian 5/5 ,hp to l/s   DN: Patient signed informed written release and verbally agreed with informed consent to procedure of dry needling   skin prep with isopropyl  Alcohol/Chlora prep  -l C/S multifidis R C3-7,L c5-7  -TENS w/ FDN  -MHP x 10'  -No adverse reactions observed post treatment  Back is about a 4/10 better but still sore  Reviewed body mechanics and to maintain l/s neutral and REIL/s  Neck is 2-3 10 rot           Time-based treatments/modalities:    Physical Therapy Timed Treatment Charges  Manual therapy minutes (CPT 83956): 23 minutes  Pain rating (1-10) before treatment: 6/10-- R neck, lbp 10/10  Pain rating (1-10) after treatment: 3/10 neck is much better but back is about the same\"  10/10    Rot: R:70  L: 70    ASSESSMENT:   Severe lbp since bending wrong earlier in the week with her neck tightening up because of back pain and not being able to do any ex.  Good relief and increased ROM for c/s and te,porary relief of lbp --pain reduced to \"almost no pain \" in SEIL but returned upon " axial loading--instructed patient in imani progression and to avoid any kind of forward bending--suspect minimal hep for c/s  until low back pain resolves  pLAN/RECOMMENDATIONS:   Progress roll and flaquito , d-breathing, DN?

## 2021-01-12 ENCOUNTER — IMMUNIZATION (OUTPATIENT)
Dept: FAMILY PLANNING/WOMEN'S HEALTH CLINIC | Facility: IMMUNIZATION CENTER | Age: 41
End: 2021-01-12
Attending: FAMILY MEDICINE
Payer: COMMERCIAL

## 2021-01-12 DIAGNOSIS — Z23 ENCOUNTER FOR VACCINATION: Primary | ICD-10-CM

## 2021-01-12 PROCEDURE — 91300 PFIZER SARS-COV-2 VACCINE: CPT

## 2021-01-12 PROCEDURE — 0002A PFIZER SARS-COV-2 VACCINE: CPT

## 2021-01-14 ENCOUNTER — APPOINTMENT (OUTPATIENT)
Dept: PHYSICAL THERAPY | Facility: REHABILITATION | Age: 41
End: 2021-01-14
Attending: FAMILY MEDICINE
Payer: COMMERCIAL

## 2021-01-21 ENCOUNTER — PHYSICAL THERAPY (OUTPATIENT)
Dept: PHYSICAL THERAPY | Facility: REHABILITATION | Age: 41
End: 2021-01-21
Attending: FAMILY MEDICINE
Payer: COMMERCIAL

## 2021-01-21 DIAGNOSIS — M54.2 CERVICALGIA: ICD-10-CM

## 2021-01-21 PROCEDURE — 97140 MANUAL THERAPY 1/> REGIONS: CPT

## 2021-01-21 PROCEDURE — 97014 ELECTRIC STIMULATION THERAPY: CPT

## 2021-01-22 NOTE — OP THERAPY DAILY TREATMENT
"  Outpatient Physical Therapy  DAILY TREATMENT     [facility] Physical [address]  [address]  [place] [address]  Phone:  [phone]  Fax:  [phone]    Date: 01/21/2021    Patient: Dany Lambert  YOB: 1980  MRN: [id number]     Time Calculation    Start time: 0415  Stop time: 0505 Time Calculation (min): 50 minutes         Chief Complaint: No chief complaint on file.    Visit #: 16    SUBJECTIVE:  Felt good for a day but then the pain returned... patient reports occasional sharp pain with turning too far.              OBJECTIVE:  C/s bilat 65 deg--no pain \" just tight\"--when asked to perform prom for increased rom patient demonstrated 5-10deg. More rom  but then guarded and reported pain          Therapeutic Treatments and Modalities:     Therapeutic Treatment and Modalities Summary:    DN: Patient signed informed written release and verbally agreed with informed consent to procedure of dry needling   skin prep with isopropyl  Alcohol  -l C/S multifidis bilateral c3-6-TENS w/ FDN--bilateral sub ocipital release with cfm to infer/superior nuchal lines, bilateral upper trap stretch with hold-relax and active patient stretching( PROM after treatment was 85 deg bilaterally w/o pt. C/o pain  -MHP x 10'  -No adverse reactions observed post treatment   bilateral rot > 80 deg. W/o pain  \" feels great..., I haven't been able to move this move in a long time\"  Reviewed importance to maintain ex and continue roller ex to maintain rotation         Time-based treatments/modalities:    Physical Therapy Timed Treatment Charges  Manual therapy minutes (CPT 30537): 30 minutes  Therapeutic exercise minutes (CPT 59162): 5 minutes  Pain rating (1-10) before treatment: 6/10-- R neck,  Pain rating (1-10) after treatment: \"no real pain\"  Rot: R:70  L: 70    ASSESSMENT:   Patient presented with significant gurading and pain withdrawal behavior with ROM testing but demonstrated WNL for " bialteral c/s rotation after treatment w/o c/o pain  pLAN/RECOMMENDATIONS:   Review HEP DN followed with STM and active stretching

## 2021-01-28 ENCOUNTER — PHYSICAL THERAPY (OUTPATIENT)
Dept: PHYSICAL THERAPY | Facility: REHABILITATION | Age: 41
End: 2021-01-28
Attending: FAMILY MEDICINE
Payer: COMMERCIAL

## 2021-01-28 DIAGNOSIS — M54.2 CERVICALGIA: ICD-10-CM

## 2021-01-28 PROCEDURE — 97140 MANUAL THERAPY 1/> REGIONS: CPT

## 2021-01-28 PROCEDURE — 97110 THERAPEUTIC EXERCISES: CPT

## 2021-01-29 ENCOUNTER — HOSPITAL ENCOUNTER (OUTPATIENT)
Dept: LAB | Facility: MEDICAL CENTER | Age: 41
End: 2021-01-29
Attending: INTERNAL MEDICINE
Payer: COMMERCIAL

## 2021-01-29 DIAGNOSIS — E03.9 PRIMARY HYPOTHYROIDISM: ICD-10-CM

## 2021-01-29 DIAGNOSIS — E53.8 B12 DEFICIENCY: ICD-10-CM

## 2021-01-29 DIAGNOSIS — Z87.820 HISTORY OF TRAUMATIC BRAIN INJURY: ICD-10-CM

## 2021-01-29 DIAGNOSIS — R79.89 ABNORMAL CORTISOL LEVEL: ICD-10-CM

## 2021-01-29 DIAGNOSIS — E55.9 VITAMIN D DEFICIENCY: ICD-10-CM

## 2021-01-29 LAB
ALBUMIN SERPL BCP-MCNC: 4.1 G/DL (ref 3.2–4.9)
ALBUMIN/GLOB SERPL: 1.5 G/DL
ALP SERPL-CCNC: 54 U/L (ref 30–99)
ALT SERPL-CCNC: 17 U/L (ref 2–50)
ANION GAP SERPL CALC-SCNC: 11 MMOL/L (ref 7–16)
AST SERPL-CCNC: 18 U/L (ref 12–45)
BILIRUB SERPL-MCNC: 0.2 MG/DL (ref 0.1–1.5)
BUN SERPL-MCNC: 11 MG/DL (ref 8–22)
CALCIUM SERPL-MCNC: 9.2 MG/DL (ref 8.4–10.2)
CHLORIDE SERPL-SCNC: 109 MMOL/L (ref 96–112)
CO2 SERPL-SCNC: 21 MMOL/L (ref 20–33)
CREAT SERPL-MCNC: 0.93 MG/DL (ref 0.5–1.4)
GLOBULIN SER CALC-MCNC: 2.7 G/DL (ref 1.9–3.5)
GLUCOSE SERPL-MCNC: 97 MG/DL (ref 65–99)
POTASSIUM SERPL-SCNC: 4.1 MMOL/L (ref 3.6–5.5)
PROT SERPL-MCNC: 6.8 G/DL (ref 6–8.2)
SODIUM SERPL-SCNC: 141 MMOL/L (ref 135–145)
T4 FREE SERPL-MCNC: 1.22 NG/DL (ref 0.93–1.7)
TSH SERPL DL<=0.005 MIU/L-ACNC: 2.26 UIU/ML (ref 0.38–5.33)

## 2021-01-29 PROCEDURE — 84439 ASSAY OF FREE THYROXINE: CPT

## 2021-01-29 PROCEDURE — 84443 ASSAY THYROID STIM HORMONE: CPT

## 2021-01-29 PROCEDURE — 36415 COLL VENOUS BLD VENIPUNCTURE: CPT

## 2021-01-29 PROCEDURE — 82306 VITAMIN D 25 HYDROXY: CPT

## 2021-01-29 PROCEDURE — 80053 COMPREHEN METABOLIC PANEL: CPT

## 2021-01-29 NOTE — OP THERAPY DAILY TREATMENT
"  Outpatient Physical Therapy  DAILY TREATMENT     [facility] Physical [address].  [address]  [place] [address]  Phone:  [phone]  Fax:  [phone]    Date: 01/28/2021    Patient: Dany Lambert  YOB: 1980  MRN: [id number]     Time Calculation    Start time: 0417  Stop time: 0447 Time Calculation (min): 30 minutes         Chief Complaint: No chief complaint on file.    Visit #: 17    SUBJECTIVE:  Saw dr Elder and received trgr pt injection on Tuesday and feel ok today--just started on gabapentin.  Doing ex on roller and rotation feels better            OBJECTIVE:    C/s bilat 75 deg--no pain \" just tight\"-ated 5-10deg. More rom  but then guarded and reported pain          Therapeutic Treatments and Modalities:     Therapeutic Treatment and Modalities Summary:  C/s rakinjg  Segmental stm and mobilization  Stm/cfm levator and upper trap stretch with hold relax  Prom for c/s rotation with active chin tucks at end range --bilaterally  Roller eith rotation and chin tucks  t-balls-wall for seg mobs w/ alternating arms       bilateral rot > 80 deg. W/o pain  \" feels better\"  Reviewed importance to maintain ex and continue roller ex to maintain rotation --added end range rotation chin nods        Time-based treatments/modalities:    Physical Therapy Timed Treatment Charges  Manual therapy minutes (CPT 40077): 15 minutes  Therapeutic exercise minutes (CPT 10761): 10 minutes  Pain rating (1-10) before treatment: 6/10-- R neck,  Pain rating (1-10) after treatment: \"3/10  \"better\"  Rot: R:  L: 85    ASSESSMENT:   Patient presented with significant gurading and pain withdrawal behavior with ROM testing but demonstrated WNL for bialteral c/s rotation after treatment w/o c/o pain  pLAN/RECOMMENDATIONS:   Review HEP DN followed with STM and active stretching       "

## 2021-01-30 LAB — 25(OH)D3 SERPL-MCNC: 80 NG/ML (ref 30–100)

## 2021-02-05 ENCOUNTER — TELEMEDICINE (OUTPATIENT)
Dept: ENDOCRINOLOGY | Facility: MEDICAL CENTER | Age: 41
End: 2021-02-05
Attending: INTERNAL MEDICINE
Payer: COMMERCIAL

## 2021-02-05 ENCOUNTER — PHYSICAL THERAPY (OUTPATIENT)
Dept: PHYSICAL THERAPY | Facility: REHABILITATION | Age: 41
End: 2021-02-05
Attending: FAMILY MEDICINE
Payer: COMMERCIAL

## 2021-02-05 DIAGNOSIS — M54.2 CERVICALGIA: ICD-10-CM

## 2021-02-05 DIAGNOSIS — E55.9 VITAMIN D DEFICIENCY: ICD-10-CM

## 2021-02-05 DIAGNOSIS — S06.0X9A CONCUSSION WITH < 1 HR LOSS OF CONSCIOUSNESS: ICD-10-CM

## 2021-02-05 DIAGNOSIS — E53.8 B12 DEFICIENCY: ICD-10-CM

## 2021-02-05 DIAGNOSIS — E03.9 PRIMARY HYPOTHYROIDISM: ICD-10-CM

## 2021-02-05 PROCEDURE — 97014 ELECTRIC STIMULATION THERAPY: CPT

## 2021-02-05 PROCEDURE — 97110 THERAPEUTIC EXERCISES: CPT

## 2021-02-05 PROCEDURE — 97140 MANUAL THERAPY 1/> REGIONS: CPT

## 2021-02-05 PROCEDURE — 99214 OFFICE O/P EST MOD 30 MIN: CPT | Mod: 95,CR | Performed by: INTERNAL MEDICINE

## 2021-02-05 RX ORDER — ERGOCALCIFEROL 1.25 MG/1
50000 CAPSULE ORAL
Qty: 12 CAP | Refills: 1 | Status: SHIPPED | OUTPATIENT
Start: 2021-02-05 | End: 2021-07-19 | Stop reason: SDUPTHER

## 2021-02-05 RX ORDER — LEVOTHYROXINE SODIUM 75 MCG
75 TABLET ORAL
Qty: 90 TAB | Refills: 1 | Status: SHIPPED | OUTPATIENT
Start: 2021-02-05 | End: 2021-07-19 | Stop reason: SDUPTHER

## 2021-02-05 NOTE — OP THERAPY DAILY TREATMENT
"  Outpatient Physical Therapy  DAILY TREATMENT     [facility] Physical [address].  [address]  [place] [address]  Phone:  [phone]  Fax:  [phone]    Date: 02/05/2021    Patient: Dany Lambert  YOB: 1980  MRN: [id number]     Time Calculation    Start time: 0145  Stop time: 0235 Time Calculation (min): 50 minutes         Chief Complaint: No chief complaint on file.    Visit #: 18    SUBJECTIVE:    Ok, but feeling a bit \"spacey\" since she started with gabapentin    \" shoulders are acting up\"  Patient  Reports doing ex daily  Patient reports that she is forgetting stuff lately and that she vacuumed last week and flared neck up    OBJECTIVE:    C/s R 75  L: 80 deg--no pain \" just tight\"          Therapeutic Treatments and Modalities:     Therapeutic Treatment and Modalities Summary:    C/s rakinjg  R scap MFR  Segmental stm and mobilization to c/s gd 3-4  Stm/cfm levator and upper trap stretch with hold relax  Prom for c/s rotation with active chin tucks at end range --bilaterally  Roller ith rotation and chin tucks with added scap depression    Wall angels and alternating arms  Turkmen bilateral c/s w/ mhp x 15   bilateral rot > 80 deg. W/o pain  \" feels better\"  Reviewed importance to maintain ex and continue roller ex to maintain rotation --added end range rotation chin nods        Time-based treatments/modalities:    Physical Therapy Timed Treatment Charges  Manual therapy minutes (CPT 27383): 10 minutes  Therapeutic exercise minutes (CPT 05217): 15 minutes  Pain rating (1-10) before treatment: 6/10--neck traps,  Pain rating (1-10) after treatment: \"3/10  \"better\"  Rot: R:90  L: 80    ASSESSMENT:   Patient presented with cont. guarding and cont. pain driven self limiting behavior behavior with ex and activiities.  Patient reported that she is forgetting stuff and was unable to recall some of her ex.  Instructed patient to call her  Physician and let her know " about the memory issues  pLAN/RECOMMENDATIONS:   Review HEP DN followed with STM and active stretching
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
## 2021-02-09 ENCOUNTER — OFFICE VISIT (OUTPATIENT)
Dept: NEUROLOGY | Facility: MEDICAL CENTER | Age: 41
End: 2021-02-09
Attending: PSYCHIATRY & NEUROLOGY
Payer: COMMERCIAL

## 2021-02-09 VITALS
BODY MASS INDEX: 21.85 KG/M2 | RESPIRATION RATE: 16 BRPM | WEIGHT: 131.17 LBS | DIASTOLIC BLOOD PRESSURE: 66 MMHG | HEIGHT: 65 IN | TEMPERATURE: 98.6 F | HEART RATE: 98 BPM | SYSTOLIC BLOOD PRESSURE: 106 MMHG | OXYGEN SATURATION: 99 %

## 2021-02-09 DIAGNOSIS — G43.109 MIGRAINE WITH AURA AND WITHOUT STATUS MIGRAINOSUS, NOT INTRACTABLE: ICD-10-CM

## 2021-02-09 DIAGNOSIS — F07.81 POST CONCUSSIVE ENCEPHALOPATHY: ICD-10-CM

## 2021-02-09 PROCEDURE — 99213 OFFICE O/P EST LOW 20 MIN: CPT | Performed by: PSYCHIATRY & NEUROLOGY

## 2021-02-09 RX ORDER — GABAPENTIN 300 MG/1
300 CAPSULE ORAL 3 TIMES DAILY
COMMUNITY
End: 2021-04-14

## 2021-02-09 RX ORDER — TRAMADOL HYDROCHLORIDE 50 MG/1
50 TABLET ORAL EVERY 4 HOURS PRN
COMMUNITY
End: 2021-03-16

## 2021-02-09 RX ORDER — ERENUMAB-AOOE 140 MG/ML
140 INJECTION, SOLUTION SUBCUTANEOUS
Qty: 1 EACH | Refills: 11 | Status: SHIPPED | OUTPATIENT
Start: 2021-02-09 | End: 2022-03-03

## 2021-02-09 RX ORDER — CYCLOBENZAPRINE HCL 10 MG
10 TABLET ORAL 2 TIMES DAILY PRN
COMMUNITY
End: 2021-04-14

## 2021-02-09 ASSESSMENT — ENCOUNTER SYMPTOMS
MYALGIAS: 1
MEMORY LOSS: 1
NERVOUS/ANXIOUS: 1
HEADACHES: 1
LOSS OF CONSCIOUSNESS: 0
INSOMNIA: 1
NECK PAIN: 1

## 2021-02-09 ASSESSMENT — FIBROSIS 4 INDEX: FIB4 SCORE: 0.76

## 2021-02-10 DIAGNOSIS — G43.709 CHRONIC MIGRAINE W/O AURA W/O STATUS MIGRAINOSUS, NOT INTRACTABLE: ICD-10-CM
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
## 2021-02-18 ENCOUNTER — APPOINTMENT (OUTPATIENT)
Dept: PHYSICAL THERAPY | Facility: REHABILITATION | Age: 41
End: 2021-02-18
Attending: FAMILY MEDICINE
Payer: COMMERCIAL

## 2021-02-25 ENCOUNTER — TELEPHONE (OUTPATIENT)
Dept: NEUROLOGY | Facility: MEDICAL CENTER | Age: 41
End: 2021-02-25

## 2021-02-25 ENCOUNTER — APPOINTMENT (OUTPATIENT)
Dept: PHYSICAL THERAPY | Facility: REHABILITATION | Age: 41
End: 2021-02-25
Attending: FAMILY MEDICINE
Payer: COMMERCIAL

## 2021-02-25 DIAGNOSIS — E03.9 PRIMARY HYPOTHYROIDISM: ICD-10-CM

## 2021-02-25 RX ORDER — LEVOTHYROXINE SODIUM 50 MCG
50 TABLET ORAL
Qty: 90 TABLET | Refills: 1 | Status: SHIPPED | OUTPATIENT
Start: 2021-02-25 | End: 2021-03-16

## 2021-02-25 NOTE — TELEPHONE ENCOUNTER
Message was taken to change Botox Appt for MR [id number]. Dany Lambert. Please call to change. Thank you.

## 2021-03-04 ENCOUNTER — PHYSICAL THERAPY (OUTPATIENT)
Dept: PHYSICAL THERAPY | Facility: REHABILITATION | Age: 41
End: 2021-03-04
Attending: FAMILY MEDICINE
Payer: COMMERCIAL

## 2021-03-04 DIAGNOSIS — S06.0X9A CONCUSSION WITH < 1 HR LOSS OF CONSCIOUSNESS: ICD-10-CM

## 2021-03-04 PROCEDURE — 97014 ELECTRIC STIMULATION THERAPY: CPT

## 2021-03-04 PROCEDURE — 97110 THERAPEUTIC EXERCISES: CPT

## 2021-03-04 NOTE — OP THERAPY DAILY TREATMENT
"  Outpatient Physical Therapy  DAILY TREATMENT     [facility] [address].  [address]  [place] [address]  Phone:  [phone]  Fax:  [phone]    Date: 03/04/2021    Patient: Dany Lambert  YOB: 1980  MRN: [id number]     Time Calculation    Start time: 0316  Stop time: 0400 Time Calculation (min): 44 minutes         Chief Complaint: No chief complaint on file.    Visit #: 19    SUBJECTIVE:  Patient reports being really sick for a few weeks   Trying to do ex. Daily.  Patient stated that Dr Elder does not think trigger points are needed anymore, patient was frustrated that there was not more that could be done for the pain that she constantly has.  She stated that she is fearful to retrun to work with the pain that she still has    Patient reports recent lbp that seems constant  OBJECTIVE:    C/s R 65  L: 65 --tight on right          Therapeutic Treatments and Modalities:     Therapeutic Treatment and Modalities Summary: Reviewed HEP  Roller  Wall angels--not doing because it hurts too much  flasher scap retraction/ER   C/s rakinjg  R scap MFR   t-ball( patient does not have them at home) squat up/down and hold on spot and alternate arms// R: 65 L: 70 -just stops\"  Balloon smash and flasher #2 x 30\" x 2  Wall angels and alternating arms w/ balloon smash// R: 70 deg  L: 70\" just tight\"  Instructed in sefl c/s rotation with self gentle o/p and active chin tuck and hold 5\" ea. Side --3 bouts total(observed patient rotating > 85 to both sides in supine with self assist) seated R: 75 L: 70  Reviewed use and placement of her  Home TENS unit to help with pain( patient reports have a bad experience with her home unit so she is reluctant to try it again--instructed patient to bring in her unit and we will go over set-up and placement of pads at next visit  Panamanian bilateral c/s w/ mhp x 15   bilateral rot > 80 deg. W/o pain  \" feels better\"          Time-based " "treatments/modalities:    Physical Therapy Timed Treatment Charges  Therapeutic exercise minutes (CPT 12640): 25 minutes  Pain rating (1-10) before treatment: 5-6/10--bilateral upper traps,  Pain rating (1-10) after treatment: \"3/10   Better\"  Rot: R:80  L: 70\" tight \"      ASSESSMENT:   Patient presented with cont. guarding and a significant loss of ROM since prior visit.  Noted  Almost normal rom limits with supine -patient self rom but significant pain and loss of arom with patient in sitting  pLAN/RECOMMENDATIONS:   Review HEP, active stretching, reviewed home use of TENs unit d/c 2-3 vists if plateau remains       "

## 2021-03-11 ENCOUNTER — PHYSICAL THERAPY (OUTPATIENT)
Dept: PHYSICAL THERAPY | Facility: REHABILITATION | Age: 41
End: 2021-03-11
Attending: FAMILY MEDICINE
Payer: COMMERCIAL

## 2021-03-11 DIAGNOSIS — M54.2 NECK ACHE: ICD-10-CM

## 2021-03-11 PROCEDURE — 97110 THERAPEUTIC EXERCISES: CPT

## 2021-03-11 PROCEDURE — 97014 ELECTRIC STIMULATION THERAPY: CPT

## 2021-03-12 NOTE — OP THERAPY DAILY TREATMENT
"  Outpatient Physical Therapy  DAILY TREATMENT     [facility] [address].  [address]  [place] [address]  Phone:  [phone]  Fax:  [phone]    Date: 03/11/2021    Patient: Dany Lambert  YOB: 1980  MRN: [id number]     Time Calculation    Start time: 0425  Stop time: 0505 Time Calculation (min): 40 minutes         Chief Complaint: No chief complaint on file.    Visit #: 20  8' l;ate  SUBJECTIVE:  Patient reports that her neck and upper traps L>R have been flared up for the past 4 days.  Patient feels after if there are \" lumps \" in the neck muscles on both sides, L > R  Patient reports that she is doing her ex 6/day.  OBJECTIVE:  Prom:R: 90 L: 85--good joint mobility throughout lower c/s with c/o muscle  tightness    C/s R 80  L: 70 --\" just lots of pain....huge knots in my neck\"          Therapeutic Treatments and Modalities:     Therapeutic Treatment and Modalities Summary: Reviewed HEP and importance of consistency  Set up home tens unit with placement and set pulse rate to 2 and width to 120// patient reported 50% reduction in pain after 15 of TENs with p  ctj mobs in YOVANI gd 2-3  Joint mobility assessment: good mobility noted thorughout c/s and upper t/s    --instructed the importance of adding some kind of cardio routine to her program : TM, stationary bike or pool ex)  --discussed patient approaching a plateau with treatment  Options exhausted.  Explained that she flares up between visits and the treatment is not the limit with her progress.  It is between visits that she flares up and therapy  Is not a maintenance program.           Time-based treatments/modalities:    Physical Therapy Timed Treatment Charges  Therapeutic exercise minutes (CPT 47787): 23 minutes  Pain rating (1-10) before treatment: 8/10--L>R upper traps,  Pain rating (1-10) after treatment: \"4/10   Better\"  Rot: R:85  L: 80\" tight \"      ASSESSMENT:   Patient presented with cont. " guarding with limited carryover between visits.  Discussed patient reaching a plateau.  Discussed the importance of adding an cardio program to her routine.   Assessed joint mobility and soft tissue mobility with minimal muscle guarding despite patient c/o pain.  Good joint mobility throughout c/s and upper t/s.    pLAN/RECOMMENDATIONS:   Review HEP, active stretching, reviewed p[atient use of TENs unit to manage flare ups-- d/c 1-2vists if plateau remains

## 2021-03-16 ENCOUNTER — OFFICE VISIT (OUTPATIENT)
Dept: MEDICAL GROUP | Facility: IMAGING CENTER | Age: 41
End: 2021-03-16
Payer: COMMERCIAL

## 2021-03-16 VITALS
BODY MASS INDEX: 21.83 KG/M2 | HEIGHT: 65 IN | SYSTOLIC BLOOD PRESSURE: 108 MMHG | OXYGEN SATURATION: 99 % | TEMPERATURE: 98.2 F | HEART RATE: 100 BPM | DIASTOLIC BLOOD PRESSURE: 86 MMHG | WEIGHT: 131 LBS | RESPIRATION RATE: 12 BRPM

## 2021-03-16 DIAGNOSIS — M62.838 MUSCLE SPASM: ICD-10-CM

## 2021-03-16 DIAGNOSIS — F43.10 PTSD (POST-TRAUMATIC STRESS DISORDER): ICD-10-CM

## 2021-03-16 DIAGNOSIS — F33.1 MODERATE EPISODE OF RECURRENT MAJOR DEPRESSIVE DISORDER (HCC): ICD-10-CM

## 2021-03-16 PROCEDURE — 99213 OFFICE O/P EST LOW 20 MIN: CPT | Performed by: FAMILY MEDICINE

## 2021-03-16 RX ORDER — CYCLOBENZAPRINE HCL 10 MG
10 TABLET ORAL 2 TIMES DAILY PRN
Qty: 30 TABLET | Status: CANCELLED | OUTPATIENT
Start: 2021-03-16

## 2021-03-16 RX ORDER — VENLAFAXINE HYDROCHLORIDE 150 MG/1
150 CAPSULE, EXTENDED RELEASE ORAL
Qty: 90 CAPSULE | Refills: 3 | Status: SHIPPED | OUTPATIENT
Start: 2021-03-16 | End: 2022-02-02 | Stop reason: SDUPTHER

## 2021-03-16 ASSESSMENT — FIBROSIS 4 INDEX: FIB4 SCORE: 0.76

## 2021-03-16 ASSESSMENT — PAIN SCALES - GENERAL: PAINLEVEL: 6=MODERATE PAIN

## 2021-03-16 ASSESSMENT — ENCOUNTER SYMPTOMS
FOCAL WEAKNESS: 0
NAUSEA: 0
DIARRHEA: 0
WHEEZING: 0
VOMITING: 0
PALPITATIONS: 0
SPEECH CHANGE: 0
MYALGIAS: 0
DOUBLE VISION: 0
COUGH: 0
FEVER: 0
CHILLS: 0
EYE PAIN: 0
SHORTNESS OF BREATH: 0
ABDOMINAL PAIN: 0

## 2021-03-16 ASSESSMENT — PATIENT HEALTH QUESTIONNAIRE - PHQ9
8. MOVING OR SPEAKING SO SLOWLY THAT OTHER PEOPLE COULD HAVE NOTICED. OR THE OPPOSITE, BEING SO FIGETY OR RESTLESS THAT YOU HAVE BEEN MOVING AROUND A LOT MORE THAN USUAL: NOT AT ALL
5. POOR APPETITE OR OVEREATING: NOT AT ALL
9. THOUGHTS THAT YOU WOULD BE BETTER OFF DEAD, OR OF HURTING YOURSELF: NOT AT ALL
1. LITTLE INTEREST OR PLEASURE IN DOING THINGS: NOT AT ALL
3. TROUBLE FALLING OR STAYING ASLEEP OR SLEEPING TOO MUCH: MORE THAN HALF THE DAYS
SUM OF ALL RESPONSES TO PHQ QUESTIONS 1-9: 3
SUM OF ALL RESPONSES TO PHQ9 QUESTIONS 1 AND 2: 0
7. TROUBLE CONCENTRATING ON THINGS, SUCH AS READING THE NEWSPAPER OR WATCHING TELEVISION: NOT AT ALL
2. FEELING DOWN, DEPRESSED, IRRITABLE, OR HOPELESS: NOT AT ALL
6. FEELING BAD ABOUT YOURSELF - OR THAT YOU ARE A FAILURE OR HAVE LET YOURSELF OR YOUR FAMILY DOWN: NOT AL ALL
4. FEELING TIRED OR HAVING LITTLE ENERGY: SEVERAL DAYS
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
## 2021-03-18 ENCOUNTER — APPOINTMENT (OUTPATIENT)
Dept: PHYSICAL THERAPY | Facility: REHABILITATION | Age: 41
End: 2021-03-18
Attending: FAMILY MEDICINE
Payer: COMMERCIAL

## 2021-03-25 ENCOUNTER — PHYSICAL THERAPY (OUTPATIENT)
Dept: PHYSICAL THERAPY | Facility: REHABILITATION | Age: 41
End: 2021-03-25
Attending: FAMILY MEDICINE
Payer: COMMERCIAL

## 2021-03-25 DIAGNOSIS — M54.12 CERVICAL RADICULOPATHY: ICD-10-CM

## 2021-03-25 PROCEDURE — 97110 THERAPEUTIC EXERCISES: CPT

## 2021-03-25 NOTE — OP THERAPY DAILY TREATMENT
"  Outpatient Physical Therapy  DAILY TREATMENT     [facility] [place] [address]  [address]  [place] [address]  Phone:  [phone]  Fax:  [phone]    Date: 03/25/2021    Patient: Dany Lambert  YOB: 1980  MRN: [id number]     Time Calculation    Start time: 0421  Stop time: 0445 Time Calculation (min): 24 minutes         Chief Complaint: No chief complaint on file.    Visit #: 21  4' l;ate  SUBJECTIVE:  Patient reports severe flare up over the past few days.  Patient reported that her ataxia is back in her legs.  Patient also reported that her upper back is swollen but upon visual inspection there was not obvious swelling.  Patient pointed to area and stated that it felt swollen but there was no obvious edema or discoloration.  Patient reports that these episodes occur 3/month  Patient reports that since her flare up she has been doing the floor ex but that is it.  Patient reported that she has not used her TENS unit since the flare  Up and stated that she did not want to use it.    OBJECTIVE:      C/s R70 L: 75          Therapeutic Treatments and Modalities:     Therapeutic Treatment and Modalities Summary: Reviewed  Trying cardio ex to help with flare ups but patient stated that she does not have access to any type of cardio machines    Nu step  With bilateral le./u.e level 1 x 6' (asked about pain level) it's the same\" , --> level 14' HR: 126\" 8/10 maybe a little better\"--stopped at 15 due to patient c/o stabbing chest pain----Patient denied shortness of breath. Sy,ptoms resolved and patient left clinic w/o further pain complaint              Time-based treatments/modalities:    Physical Therapy Timed Treatment Charges  Therapeutic exercise minutes (CPT 19975): 23 minutes  Pain rating (1-10) before treatment: 9/10--R>L  upper trap and base of neck and throax  Pain rating (1-10) after treatment: \"8/10    A little better but still hurts\"Rot: R:80  L: 80\" " "tight  And hurts\"      ASSESSMENT:   Patient presented with recent flare up  between visits with severe pain and patient reports of upper thoracic swelling but I was unable to see any obvious signs of swelling upon visual inspection.   Exercise was stopped due to complain of sharp sternal pain while using legs and arms on nu step  Discussed with patient that she has reached a plateau with treatment and that she is independent with her HEP and that she needs to use the tools that she has and try to initiate a cardio program .  Patient agreed that she has reached a plateau but was concerned that she continues to hurt as much as she does at times.  pLAN/RECOMMENDATIONS:   D/c to an independent HEP       "

## 2021-03-26 NOTE — OP THERAPY DISCHARGE SUMMARY
"  Outpatient Physical Therapy  DISCHARGE SUMMARY NOTE      [facility] Physical Therapy [place] [address]  [address]  [place] [address]  Phone:  [phone]  Fax:  [phone]    Date of Visit: 03/25/2021    Patient: Dany Lambert  YOB: 1980  MRN: [id number]     Referring Provider: Carroll Elder M.D.  [address] [place] [address]   Referring Diagnosis Cervicalgia [M54.2]         Functional Assessment Used        Your patient is being discharged from Physical Therapy with the following comments:   · Partial goals met with patient reaching a plateau     RECENT SUBJECTIVE:  Patient reports severe flare up over the past few days.  Patient reported that her ataxia is back in her legs.  Patient also reported that her upper back is swollen but upon visual inspection there was not obvious swelling.  Patient pointed to area and stated that it felt swollen but there was no obvious edema or discoloration.  Patient reports that these episodes occur 3/month  Patient reports that since her flare up she has been doing the floor ex but that is it.  Patient reported that she has not used her TENS unit since the flare  Up and stated that she did not want to use it.        C/s R70 L: 75                    Time-based treatments/modalities:    Physical Therapy Timed Treatment Charges  Therapeutic exercise minutes (CPT 38308): 23 minutes  Pain rating (1-10) before treatment: 9/10--R>L  upper trap and base of neck and throax  Pain rating (1-10) after treatment: \"8/10    A little better but still hurts\"Rot: R:80  L: 80\" tight  And hurts\"      ASSESSMENT:   Patient presented with recent flare up  between visits with severe pain and patient reports of upper thoracic swelling but I was unable to see any obvious signs of swelling upon visual inspection.   Exercise was stopped due to complain of sharp sternal pain while using legs and arms on nu step  Discussed with patient that she has " reached a plateau with treatment and that she is independent with her HEP and that she needs to use the tools that she has and try to initiate a cardio program .  Patient agreed that she has reached a plateau but was concerned that she continues to hurt as much as she does at times.  pLAN/RECOMMENDATIONS:   D/c to an independent HEP    Nilay Sunshine, PT, DPT, OCS    Date: 3/26/2021

## 2021-04-01 ENCOUNTER — APPOINTMENT (OUTPATIENT)
Dept: PHYSICAL THERAPY | Facility: REHABILITATION | Age: 41
End: 2021-04-01
Attending: FAMILY MEDICINE
Payer: COMMERCIAL

## 2021-04-02 ENCOUNTER — HOSPITAL ENCOUNTER (OUTPATIENT)
Dept: RADIOLOGY | Facility: MEDICAL CENTER | Age: 41
End: 2021-04-02
Attending: FAMILY MEDICINE
Payer: COMMERCIAL

## 2021-04-02 DIAGNOSIS — Z12.31 VISIT FOR SCREENING MAMMOGRAM: ICD-10-CM

## 2021-04-02 PROCEDURE — 77063 BREAST TOMOSYNTHESIS BI: CPT

## 2021-04-13 ENCOUNTER — OFFICE VISIT (OUTPATIENT)
Dept: NEUROLOGY | Facility: MEDICAL CENTER | Age: 41
End: 2021-04-13
Attending: PSYCHIATRY & NEUROLOGY
Payer: COMMERCIAL

## 2021-04-13 VITALS
TEMPERATURE: 97.5 F | WEIGHT: 131.61 LBS | RESPIRATION RATE: 15 BRPM | OXYGEN SATURATION: 98 % | BODY MASS INDEX: 21.93 KG/M2 | SYSTOLIC BLOOD PRESSURE: 108 MMHG | HEIGHT: 65 IN | HEART RATE: 96 BPM | DIASTOLIC BLOOD PRESSURE: 68 MMHG

## 2021-04-13 DIAGNOSIS — G43.109 MIGRAINE WITH AURA AND WITHOUT STATUS MIGRAINOSUS, NOT INTRACTABLE: ICD-10-CM

## 2021-04-13 PROCEDURE — 99212 OFFICE O/P EST SF 10 MIN: CPT | Mod: 25 | Performed by: PSYCHIATRY & NEUROLOGY

## 2021-04-13 PROCEDURE — 64615 CHEMODENERV MUSC MIGRAINE: CPT | Performed by: PSYCHIATRY & NEUROLOGY

## 2021-04-13 ASSESSMENT — ENCOUNTER SYMPTOMS
MEMORY LOSS: 1
HEADACHES: 1

## 2021-04-13 ASSESSMENT — FIBROSIS 4 INDEX: FIB4 SCORE: 0.76
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
## 2021-04-14 ENCOUNTER — OFFICE VISIT (OUTPATIENT)
Dept: MEDICAL GROUP | Facility: IMAGING CENTER | Age: 41
End: 2021-04-14
Payer: COMMERCIAL

## 2021-04-14 VITALS
WEIGHT: 132 LBS | HEIGHT: 65 IN | SYSTOLIC BLOOD PRESSURE: 104 MMHG | OXYGEN SATURATION: 100 % | TEMPERATURE: 98.7 F | RESPIRATION RATE: 18 BRPM | BODY MASS INDEX: 21.99 KG/M2 | DIASTOLIC BLOOD PRESSURE: 68 MMHG | HEART RATE: 100 BPM

## 2021-04-14 DIAGNOSIS — M54.2 CERVICAL PAIN (NECK): ICD-10-CM

## 2021-04-14 DIAGNOSIS — M54.2 CERVICAL MUSCLE PAIN: ICD-10-CM

## 2021-04-14 DIAGNOSIS — M50.20 PROTRUSION OF CERVICAL INTERVERTEBRAL DISC: ICD-10-CM

## 2021-04-14 PROCEDURE — 99214 OFFICE O/P EST MOD 30 MIN: CPT | Performed by: FAMILY MEDICINE

## 2021-04-14 RX ORDER — GABAPENTIN 300 MG/1
300 CAPSULE ORAL 3 TIMES DAILY
Qty: 270 CAPSULE | Refills: 1 | Status: CANCELLED | OUTPATIENT
Start: 2021-04-14

## 2021-04-14 RX ORDER — TIZANIDINE 4 MG/1
4 TABLET ORAL EVERY 8 HOURS PRN
Qty: 90 TABLET | Refills: 0 | Status: SHIPPED | OUTPATIENT
Start: 2021-04-14 | End: 2021-05-25 | Stop reason: SDUPTHER

## 2021-04-14 RX ORDER — GABAPENTIN 100 MG/1
400 CAPSULE ORAL 3 TIMES DAILY
Qty: 360 CAPSULE | Refills: 2 | Status: SHIPPED | OUTPATIENT
Start: 2021-04-14 | End: 2021-05-25 | Stop reason: SDUPTHER

## 2021-04-14 ASSESSMENT — ANXIETY QUESTIONNAIRES
6. BECOMING EASILY ANNOYED OR IRRITABLE: NOT AT ALL
1. FEELING NERVOUS, ANXIOUS, OR ON EDGE: SEVERAL DAYS
4. TROUBLE RELAXING: SEVERAL DAYS
7. FEELING AFRAID AS IF SOMETHING AWFUL MIGHT HAPPEN: MORE THAN HALF THE DAYS
5. BEING SO RESTLESS THAT IT IS HARD TO SIT STILL: NOT AT ALL
2. NOT BEING ABLE TO STOP OR CONTROL WORRYING: NOT AT ALL
GAD7 TOTAL SCORE: 5
3. WORRYING TOO MUCH ABOUT DIFFERENT THINGS: SEVERAL DAYS

## 2021-04-14 ASSESSMENT — FIBROSIS 4 INDEX: FIB4 SCORE: 0.76

## 2021-04-14 ASSESSMENT — ENCOUNTER SYMPTOMS
COUGH: 0
VOMITING: 0
DIZZINESS: 0
WHEEZING: 0
ABDOMINAL PAIN: 0
DIARRHEA: 0
MYALGIAS: 0
CHILLS: 0
FEVER: 0
NAUSEA: 0
PALPITATIONS: 0
EYE PAIN: 0
DOUBLE VISION: 0
NECK PAIN: 1
HEADACHES: 0
SHORTNESS OF BREATH: 0

## 2021-04-14 ASSESSMENT — PAIN SCALES - GENERAL: PAINLEVEL: 8=MODERATE-SEVERE PAIN

## 2021-04-14 ASSESSMENT — PATIENT HEALTH QUESTIONNAIRE - PHQ9: CLINICAL INTERPRETATION OF PHQ2 SCORE: 0

## 2021-04-14 NOTE — PROGRESS NOTES
Chief Complaint   Patient presents with   • Follow-Up     4 week follow up; workers comp ended   • Referral Needed     podiatry, pain management     HPI:  40-year-old female with a history of migraines, hypothyroidism, hyperlipidemia, GERD, MDD, alcohol abuse, TBI here to discuss pain management.  Patient reports that her Workmen's Comp. case has been closed.  Patient reports she previously tried tramadol and gabapentin together and did really well on that.  That was recommended by her Workmen's Comp. doctor to stop the medications.  I spoke to the Workmen's Comp. physician who stated that she was under the impression that the medications were not working well for the patient.  And this is the reason why she recommended stopping them both.    Flexeril is helping because when she stopped her muscle spasms of the neck recurred. It helps her sleep better. Not on ambien anymore. 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  Though even when she is on the Flexeril she does still have severe pain from chronic muscle spasms.  Reports some mild headache after the gabapentin wore off. She also got skin flushing of her face.   Effexor: about 10 years on this. Initially started for anxiety. She denies any history of mdd.  She is happy with this medication.  April 8th was her closing visit for her Workmen's Comp. case.  Has 30 days to find a job now.   MRI cervical spine completed December 2020  She has had trigger point injections that helped very short period of time.   She has failed PT and at home exercises.   Was established with Dr. Stanley, neurosurgeon, who saw her MRI who said cord is ok though there is some spurring and disk bulging. He offered spinal blocks. He will be getting flexion xrays of the cervical spine.  She has follow-up with him in a couple weeks.      Allergies   Allergen Reactions   • Benadryl Allergy Anxiety   • Demerol Hives   • Medrol [Methylprednisolone] Hives and Itching   • Previfem [Norgestimate-Ethinyl Estradiol]       Nausea/vomiting   • Reglan [Metoclopramide] Anxiety   • Seasonal      Current Outpatient Medications   Medication Sig Dispense Refill   • gabapentin (NEURONTIN) 100 MG Cap Take 4 Capsules by mouth 3 times a day. 360 capsule 2   • tizanidine (ZANAFLEX) 4 MG Tab Take 1 tablet by mouth every 8 hours as needed. 90 tablet 0   • venlafaxine (EFFEXOR-XR) 150 MG extended-release capsule Take 1 capsule by mouth every day. 90 capsule 3   • AIMOVIG 140 MG/ML Solution Auto-injector Inject 140 mg under the skin every 4 weeks. 1 Each 11   • SYNTHROID 75 MCG Tab Take 1 Tab by mouth Every morning on an empty stomach. 90 Tab 1   • ergocalciferol (DRISDOL) 94467 UNIT capsule Take 1 Cap by mouth every 14 days. 12 Cap 1   • rizatriptan (MAXALT-MLT) 10 MG disintegrating tablet TAKE 1 TABLET BY MOUTH AT HEADACHE ONSET REPEAT EVERY HOUR AS NEEDED UP TO 4 TABLETS IN 24 HOURS 10 Tab 5   • Levonorgest-Eth Estrad 91-Day (SEASONIQUE) 0.15-0.03 &0.01 MG Tab Take 1 Tab by mouth every day. 84 Tab 3   • lovastatin (MEVACOR) 10 MG tablet Take 1 Tab by mouth every day. N THE EVENING 90 Tab 3   • topiramate (TOPAMAX) 50 MG tablet TAKE 3 TABS BY MOUTH 2 TIMES A DAY. 540 Tab 3   • cetirizine (ZYRTEC) 10 MG Tab Take 10 mg by mouth every day.       No current facility-administered medications for this visit.     Social History     Tobacco Use   • Smoking status: Former Smoker     Packs/day: 1.00     Years: 15.00     Pack years: 15.00     Types: Cigarettes     Quit date: 2010     Years since quittin.2   • Smokeless tobacco: Never Used   Substance Use Topics   • Alcohol use: No   • Drug use: No     Family History   Problem Relation Age of Onset   • Diabetes Maternal Grandfather    • Migraines Maternal Grandfather    • Cancer Paternal Grandfather    • Alzheimer's Disease Paternal Grandfather    • Cancer Paternal Grandmother 30        breast   • Glasses Mother    • Migraines Mother    • Suicide Attempts Maternal Uncle         Killed himself by  "GSW   • Hypertension Father    • Glasses Father        /68 (BP Location: Left arm, Patient Position: Sitting, BP Cuff Size: Adult)   Pulse 100   Temp 37.1 °C (98.7 °F) (Temporal)   Resp 18   Ht 1.651 m (5' 5\")   Wt 59.9 kg (132 lb)   SpO2 100%  Body mass index is 21.97 kg/m².  Review of Systems   Constitutional: Negative for chills, fever and malaise/fatigue.   HENT: Negative for hearing loss and tinnitus.    Eyes: Negative for double vision and pain.   Respiratory: Negative for cough, shortness of breath and wheezing.    Cardiovascular: Negative for chest pain, palpitations and leg swelling.   Gastrointestinal: Negative for abdominal pain, diarrhea, nausea and vomiting.   Genitourinary: Negative for dysuria and frequency.   Musculoskeletal: Positive for joint pain and neck pain. Negative for myalgias.   Skin: Negative for itching and rash.   Neurological: Negative for dizziness and headaches.     Physical Exam   Constitutional: She is well-developed, well-nourished, and in no distress. No distress.   Not distressed but does look very uncomfortable   HENT:   Head: Normocephalic and atraumatic.   Eyes: Pupils are equal, round, and reactive to light. Conjunctivae and EOM are normal. Right eye exhibits no discharge. Left eye exhibits no discharge. No scleral icterus.   Neck: No thyromegaly present.   Pulmonary/Chest: Effort normal. No respiratory distress.   Abdominal: She exhibits no distension.   Musculoskeletal:         General: No edema.      Comments: Very tense neck muscles and spinal tenderness along the cervical spine.  Patient has a tremor today due to the severity of the pain today.   Neurological: She is alert.   Skin: Skin is warm and dry. She is not diaphoretic.   Psychiatric: Affect and judgment normal.         All labs (last 1 month):   No visits with results within 1 Month(s) from this visit.   Latest known visit with results is:   Hospital Outpatient Visit on 01/29/2021   Component Date " Value Ref Range Status   • 25-Hydroxy   Vitamin D 25 01/29/2021 80  30 - 100 ng/mL Final    Comment: Adult Ranges:   <20 ng/mL - Deficiency  20-29 ng/mL - Insufficiency   ng/mL - Sufficiency  Effective 3/18/2020, this electrochemiluminescence binding assay is  performed using Roche philippe e immunoassay analyzer.  The Ele[employer]ys Vitamin D  total II assay is intended for the quantitative determination of total 25  hydroxyvitamin D in human serum and plasma. This assay is to be used as an  aid in the assessment of vitamin D sufficiency in adults.     • TSH 01/29/2021 2.260  0.380 - 5.330 uIU/mL Final    Comment: Please note new reference ranges effective 12/19/2017 12:30 PM  Pregnant Females, 1st Trimester  0.050-3.700  Pregnant Females, 2nd Trimester  0.310-4.350  Pregnant Females, 3rd Trimester  0.410-5.180     • Free T-4 01/29/2021 1.22  0.93 - 1.70 ng/dL Final    Please note new FT4 reference range effective 4/29/2020.   • Sodium 01/29/2021 141  135 - 145 mmol/L Final   • Potassium 01/29/2021 4.1  3.6 - 5.5 mmol/L Final   • Chloride 01/29/2021 109  96 - 112 mmol/L Final   • Co2 01/29/2021 21  20 - 33 mmol/L Final   • Anion Gap 01/29/2021 11.0  7.0 - 16.0 Final   • Glucose 01/29/2021 97  65 - 99 mg/dL Final   • Bun 01/29/2021 11  8 - 22 mg/dL Final   • Creatinine 01/29/2021 0.93  0.50 - 1.40 mg/dL Final   • Calcium 01/29/2021 9.2  8.4 - 10.2 mg/dL Final   • AST(SGOT) 01/29/2021 18  12 - 45 U/L Final   • ALT(SGPT) 01/29/2021 17  2 - 50 U/L Final   • Alkaline Phosphatase 01/29/2021 54  30 - 99 U/L Final   • Total Bilirubin 01/29/2021 0.2  0.1 - 1.5 mg/dL Final   • Albumin 01/29/2021 4.1  3.2 - 4.9 g/dL Final   • Total Protein 01/29/2021 6.8  6.0 - 8.2 g/dL Final   • Globulin 01/29/2021 2.7  1.9 - 3.5 g/dL Final   • A-G Ratio 01/29/2021 1.5  g/dL Final   • GFR If  01/29/2021 >60  >60 mL/min/1.73 m 2 Final   • GFR If Non  01/29/2021 >60  >60 mL/min/1.73 m 2 Final       Lipids:   Lab  Results   Component Value Date/Time    CHOLSTRLTOT 196 11/20/2019 11:56 AM    TRIGLYCERIDE 89 11/20/2019 11:56 AM    HDL 57 11/20/2019 11:56 AM     (H) 11/20/2019 11:56 AM       Imaging: MA-SCREENING MAMMO BILAT W/TOMOSYNTHESIS W/CAD    Result Date: 4/5/2021 4/2/2021 4:19 PM HISTORY/REASON FOR EXAM:  Routine Mammographic Screening. TECHNIQUE/EXAM DESCRIPTION: Bilateral tomosynthesis screening mammography was performed with standard mammographic images generated from the data set. Images were reviewed and interpreted with CAD. COMPARISON:   September 30, 2019, November 3, 2017, January 26, 2017 and March 27, 2014 FINDINGS: The breast parenchyma is extremely dense which limits the sensitivity of mammography. There is no dominant mass, suspicious calcification, or any secondary malignant sign. Bilateral calcifications are once again noted without significant change.     1.  Breasts are extremely dense, which lowers sensitivity of mammography. No gross evidence of malignancy. 2.  Screening mammogram in one year is recommended. R2- Category 2:  Benign Finding(s)      A/P  -Obtain urine today for urine drug screen.  Will refer to pain management.  Patient to continue to neurosurgery for injections.  I have discussed that this is why I was sending her to pain management.    -Patient does wish to continue to pain management to see if they have any other ideas for her.  She will start gabapentin 300 mg 3 times daily.  Then increase to 400 mg 3 times daily hopefully to mitigate headaches in between doses.  -We will switch to tizanidine to help with her severe muscle tension.  Return in about 3 weeks (around 5/5/2021).    Problem List Items Addressed This Visit     None      Visit Diagnoses     Protrusion of cervical intervertebral disc        Relevant Medications    gabapentin (NEURONTIN) 100 MG Cap    Other Relevant Orders    REFERRAL TO OUTPATIENT INTERVENTIONAL PAIN CLINIC    Pain Management Screen    Cervical pain  (neck)        Relevant Medications    gabapentin (NEURONTIN) 100 MG Cap    Other Relevant Orders    REFERRAL TO OUTPATIENT INTERVENTIONAL PAIN CLINIC    Pain Management Screen    Cervical muscle pain        Relevant Medications    tizanidine (ZANAFLEX) 4 MG Tab        MRI cervical spine copied from impression by radiologist:1.  C4-5. Midline disc protrusion or disc/osteophyte complex. No central or foraminal stenosis.  2.  C5-6 broad-based ventral extradural defect favoring the right consistent with a disc/osteophyte complex. Mild right lateral recess stenosis. No central stenosis. The neural foramina are well delineated and on today's exam, the left neural foramen   appears intact (prior study report suggested moderate left neural foraminal stenosis). The right neural foramen on today's exam shows only borderline-mild foraminal stenosis superiorly (prior study report suggested severe right-sided foraminal stenosis   which does not appear to be the case).  3.  C6-C7 broad-based midline and left paramedian disc protrusion or disc/osteophyte complex. No central or foraminal stenosis.  4.  C7-T1 negligible midline disc bulge.  5.  No myelopathic cord signal abnormality.  Portions of this note may be dictated using Dragon NaturallySpeaking voice recognition software.  Variances in spelling and vocabulary are possible and unintentional.  Not all areas may be caught/corrected.  Please notify me if any discrepancies are noted or if the meaning of any statement is not correct/clear.

## 2021-05-11 ENCOUNTER — OFFICE VISIT (OUTPATIENT)
Dept: MEDICAL GROUP | Facility: IMAGING CENTER | Age: 41
End: 2021-05-11
Payer: COMMERCIAL

## 2021-05-11 VITALS
BODY MASS INDEX: 21.83 KG/M2 | SYSTOLIC BLOOD PRESSURE: 104 MMHG | HEIGHT: 65 IN | TEMPERATURE: 98 F | HEART RATE: 90 BPM | RESPIRATION RATE: 12 BRPM | WEIGHT: 131 LBS | OXYGEN SATURATION: 96 % | DIASTOLIC BLOOD PRESSURE: 62 MMHG

## 2021-05-11 DIAGNOSIS — M47.812 CERVICAL SPONDYLOSIS WITHOUT MYELOPATHY: ICD-10-CM

## 2021-05-11 DIAGNOSIS — K64.9 HEMORRHOIDS, UNSPECIFIED HEMORRHOID TYPE: ICD-10-CM

## 2021-05-11 DIAGNOSIS — Z87.820 HISTORY OF TRAUMATIC BRAIN INJURY: ICD-10-CM

## 2021-05-11 DIAGNOSIS — M54.2 NECK PAIN: ICD-10-CM

## 2021-05-11 PROCEDURE — 99214 OFFICE O/P EST MOD 30 MIN: CPT | Performed by: FAMILY MEDICINE

## 2021-05-11 RX ORDER — HYDROCORTISONE ACETATE 25 MG/1
25 SUPPOSITORY RECTAL EVERY 12 HOURS
Qty: 12 SUPPOSITORY | Refills: 0 | Status: SHIPPED | OUTPATIENT
Start: 2021-05-11 | End: 2021-07-06

## 2021-05-11 RX ORDER — TRAMADOL HYDROCHLORIDE 50 MG/1
50 TABLET ORAL EVERY 12 HOURS PRN
Qty: 60 TABLET | Refills: 0 | Status: SHIPPED | OUTPATIENT
Start: 2021-05-11 | End: 2021-05-27 | Stop reason: SDUPTHER

## 2021-05-11 RX ORDER — TRAZODONE HYDROCHLORIDE 50 MG/1
50 TABLET ORAL
Qty: 30 TABLET | Refills: 0 | Status: CANCELLED | OUTPATIENT
Start: 2021-05-11

## 2021-05-11 RX ORDER — DOCUSATE SODIUM 100 MG/1
100 CAPSULE, LIQUID FILLED ORAL 2 TIMES DAILY
Qty: 180 CAPSULE | Refills: 0 | Status: SHIPPED | OUTPATIENT
Start: 2021-05-11 | End: 2022-01-25

## 2021-05-11 ASSESSMENT — ENCOUNTER SYMPTOMS
DIZZINESS: 0
NAUSEA: 0
PALPITATIONS: 0
CHILLS: 0
MYALGIAS: 0
COUGH: 0
WHEEZING: 0
EYE PAIN: 0
FEVER: 0
DOUBLE VISION: 0
HEADACHES: 1
NECK PAIN: 1
SHORTNESS OF BREATH: 0
DIARRHEA: 0
ABDOMINAL PAIN: 0
VOMITING: 0

## 2021-05-11 ASSESSMENT — FIBROSIS 4 INDEX: FIB4 SCORE: 0.76

## 2021-05-11 ASSESSMENT — PAIN SCALES - GENERAL: PAINLEVEL: 7=MODERATE-SEVERE PAIN

## 2021-05-11 NOTE — PROGRESS NOTES
Chief Complaint   Patient presents with   • Neck Pain     HPI:  40 year old with tbi and chronic neck pain here for follow up. Has psychologist. Pain management June 2nd. She is taking gabapentin 600mg tid. She is tired now with the gabapentin. Has noticed that her shocking pains down the arms has resolved though is still getting headaches with the gabapentin. She feels that she does not like the medication over all.   She still has memory issues perseveration issues. Has a hard time getting some words out that are not obvious during the visit.   She will be seeing Dr. Woo for her nerve block injections.   Sees neurology as well.   Taking tizanidine which does seem to work it is working better than flexeril. Takes a half tab two times per day and whole pill at night.   Also reports hemorrhoids at the end of the visit. Says that they are painful. She takes senna at night. And colace when needed for occasional constipation.   Allergies   Allergen Reactions   • Benadryl Allergy Anxiety   • Demerol Hives   • Medrol [Methylprednisolone] Hives and Itching   • Previfem [Norgestimate-Ethinyl Estradiol]      Nausea/vomiting   • Reglan [Metoclopramide] Anxiety   • Seasonal      Current Outpatient Medications   Medication Sig Dispense Refill   • traMADol (ULTRAM) 50 MG Tab Take 1 tablet by mouth every 12 hours as needed for up to 30 days. 60 tablet 0   • hydrocortisone (ANUSOL-HC) 25 MG Suppos Insert 1 Suppository into the rectum every 12 hours. 12 Suppository 0   • docusate sodium (COLACE) 100 MG Cap Take 1 capsule by mouth 2 times a day. 180 capsule 0   • tizanidine (ZANAFLEX) 4 MG Tab Take 1 tablet by mouth every 8 hours as needed. 90 tablet 0   • venlafaxine (EFFEXOR-XR) 150 MG extended-release capsule Take 1 capsule by mouth every day. 90 capsule 3   • AIMOVIG 140 MG/ML Solution Auto-injector Inject 140 mg under the skin every 4 weeks. 1 Each 11   • SYNTHROID 75 MCG Tab Take 1 Tab by mouth Every morning on an empty  "stomach. 90 Tab 1   • ergocalciferol (DRISDOL) 20609 UNIT capsule Take 1 Cap by mouth every 14 days. 12 Cap 1   • rizatriptan (MAXALT-MLT) 10 MG disintegrating tablet TAKE 1 TABLET BY MOUTH AT HEADACHE ONSET REPEAT EVERY HOUR AS NEEDED UP TO 4 TABLETS IN 24 HOURS 10 Tab 5   • Levonorgest-Eth Estrad 91-Day (SEASONIQUE) 0.15-0.03 &0.01 MG Tab Take 1 Tab by mouth every day. 84 Tab 3   • lovastatin (MEVACOR) 10 MG tablet Take 1 Tab by mouth every day. N THE EVENING 90 Tab 3   • topiramate (TOPAMAX) 50 MG tablet TAKE 3 TABS BY MOUTH 2 TIMES A DAY. 540 Tab 3   • cetirizine (ZYRTEC) 10 MG Tab Take 10 mg by mouth every day.     • gabapentin (NEURONTIN) 100 MG Cap Take 4 Capsules by mouth 3 times a day. (Patient taking differently: Take 600 mg by mouth 3 times a day.) 360 capsule 2     No current facility-administered medications for this visit.     Social History     Tobacco Use   • Smoking status: Former Smoker     Packs/day: 1.00     Years: 15.00     Pack years: 15.00     Types: Cigarettes     Quit date: 2010     Years since quittin.3   • Smokeless tobacco: Never Used   Substance Use Topics   • Alcohol use: No   • Drug use: No     Family History   Problem Relation Age of Onset   • Diabetes Maternal Grandfather    • Migraines Maternal Grandfather    • Cancer Paternal Grandfather    • Alzheimer's Disease Paternal Grandfather    • Cancer Paternal Grandmother 30        breast   • Glasses Mother    • Migraines Mother    • Suicide Attempts Maternal Uncle         Killed himself by GSW   • Hypertension Father    • Glasses Father        /62   Pulse 90   Temp 36.7 °C (98 °F)   Resp 12   Ht 1.651 m (5' 5\")   Wt 59.4 kg (131 lb)   SpO2 96%  Body mass index is 21.8 kg/m².  Review of Systems   Constitutional: Negative for chills, fever and malaise/fatigue.   HENT: Negative for hearing loss and tinnitus.    Eyes: Negative for double vision and pain.   Respiratory: Negative for cough, shortness of breath and " wheezing.    Cardiovascular: Negative for chest pain, palpitations and leg swelling.   Gastrointestinal: Negative for abdominal pain, diarrhea, nausea and vomiting.   Genitourinary: Negative for dysuria and frequency.   Musculoskeletal: Positive for neck pain. Negative for myalgias.   Skin: Negative for itching and rash.   Neurological: Positive for headaches. Negative for dizziness.     Physical Exam   Constitutional: She is well-developed, well-nourished, and in no distress. No distress.   HENT:   Head: Normocephalic and atraumatic.   Eyes: Pupils are equal, round, and reactive to light. Conjunctivae and EOM are normal. Right eye exhibits no discharge. Left eye exhibits no discharge. No scleral icterus.   Neck: No thyromegaly present.   Pulmonary/Chest: Effort normal. No respiratory distress.   Abdominal: She exhibits no distension.   Musculoskeletal:         General: No edema.   Neurological: She is alert.   Skin: Skin is warm and dry. She is not diaphoretic.   Psychiatric: Affect and judgment normal.         All labs (last 1 month):   No visits with results within 1 Month(s) from this visit.   Latest known visit with results is:   Hospital Outpatient Visit on 01/29/2021   Component Date Value Ref Range Status   • 25-Hydroxy   Vitamin D 25 01/29/2021 80  30 - 100 ng/mL Final    Comment: Adult Ranges:   <20 ng/mL - Deficiency  20-29 ng/mL - Insufficiency   ng/mL - Sufficiency  Effective 3/18/2020, this electrochemiluminescence binding assay is  performed using Roche philippe e immunoassay analyzer.  The [employer] Vitamin D  total II assay is intended for the quantitative determination of total 25  hydroxyvitamin D in human serum and plasma. This assay is to be used as an  aid in the assessment of vitamin D sufficiency in adults.     • TSH 01/29/2021 2.260  0.380 - 5.330 uIU/mL Final    Comment: Please note new reference ranges effective 12/19/2017 12:30 PM  Pregnant Females, 1st Trimester  0.050-3.700  Pregnant  Females, 2nd Trimester  0.310-4.350  Pregnant Females, 3rd Trimester  0.410-5.180     • Free T-4 01/29/2021 1.22  0.93 - 1.70 ng/dL Final    Please note new FT4 reference range effective 4/29/2020.   • Sodium 01/29/2021 141  135 - 145 mmol/L Final   • Potassium 01/29/2021 4.1  3.6 - 5.5 mmol/L Final   • Chloride 01/29/2021 109  96 - 112 mmol/L Final   • Co2 01/29/2021 21  20 - 33 mmol/L Final   • Anion Gap 01/29/2021 11.0  7.0 - 16.0 Final   • Glucose 01/29/2021 97  65 - 99 mg/dL Final   • Bun 01/29/2021 11  8 - 22 mg/dL Final   • Creatinine 01/29/2021 0.93  0.50 - 1.40 mg/dL Final   • Calcium 01/29/2021 9.2  8.4 - 10.2 mg/dL Final   • AST(SGOT) 01/29/2021 18  12 - 45 U/L Final   • ALT(SGPT) 01/29/2021 17  2 - 50 U/L Final   • Alkaline Phosphatase 01/29/2021 54  30 - 99 U/L Final   • Total Bilirubin 01/29/2021 0.2  0.1 - 1.5 mg/dL Final   • Albumin 01/29/2021 4.1  3.2 - 4.9 g/dL Final   • Total Protein 01/29/2021 6.8  6.0 - 8.2 g/dL Final   • Globulin 01/29/2021 2.7  1.9 - 3.5 g/dL Final   • A-G Ratio 01/29/2021 1.5  g/dL Final   • GFR If  01/29/2021 >60  >60 mL/min/1.73 m 2 Final   • GFR If Non  01/29/2021 >60  >60 mL/min/1.73 m 2 Final       Lipids:   Lab Results   Component Value Date/Time    CHOLSTRLTOT 196 11/20/2019 11:56 AM    TRIGLYCERIDE 89 11/20/2019 11:56 AM    HDL 57 11/20/2019 11:56 AM     (H) 11/20/2019 11:56 AM       Imaging: No results found.    A/P  -Opiate risk alyssa 4. uds 1-4 times per year. Will do second uds today.   -go down on gabapentin to 300mg tid. Then follow up with pain  -start tramadol to get her to pain management. Discussed this is not a good long term medication and will unlikely be continued though will get her to the nerve blocks.  -after nerve blocks she can start tissue manipulation therapies   -she reports she is due for a pap smear. She will schedule an annual  Return for annual.    Problem List Items Addressed This Visit     Cervical  spondylosis without myelopathy    Relevant Medications    traMADol (ULTRAM) 50 MG Tab    Other Relevant Orders    REFERRAL TO PHYSICAL THERAPY    PAIN MANAGEMENT SCRN, UR    History of traumatic brain injury    Relevant Medications    traMADol (ULTRAM) 50 MG Tab    Other Relevant Orders    REFERRAL TO PHYSICAL THERAPY    PAIN MANAGEMENT SCRN, UR      Other Visit Diagnoses     Neck pain        Relevant Medications    traMADol (ULTRAM) 50 MG Tab    Other Relevant Orders    REFERRAL TO PHYSICAL THERAPY    PAIN MANAGEMENT SCRN, UR    Hemorrhoids, unspecified hemorrhoid type        Relevant Medications    hydrocortisone (ANUSOL-HC) 25 MG Suppos    docusate sodium (COLACE) 100 MG Cap          Portions of this note may be dictated using Dragon NaturallySpeaking voice recognition software.  Variances in spelling and vocabulary are possible and unintentional.  Not all areas may be caught/corrected.  Please notify me if any discrepancies are noted or if the meaning of any statement is not correct/clear.

## 2021-05-25 ENCOUNTER — PATIENT MESSAGE (OUTPATIENT)
Dept: MEDICAL GROUP | Facility: IMAGING CENTER | Age: 41
End: 2021-05-25

## 2021-05-25 DIAGNOSIS — M54.2 CERVICAL MUSCLE PAIN: ICD-10-CM

## 2021-05-25 DIAGNOSIS — M50.20 PROTRUSION OF CERVICAL INTERVERTEBRAL DISC: ICD-10-CM

## 2021-05-25 DIAGNOSIS — M54.2 CERVICAL PAIN (NECK): ICD-10-CM

## 2021-05-25 RX ORDER — GABAPENTIN 100 MG/1
300 CAPSULE ORAL 3 TIMES DAILY
Qty: 270 CAPSULE | Refills: 0 | Status: SHIPPED | OUTPATIENT
Start: 2021-05-25 | End: 2021-05-27

## 2021-05-25 RX ORDER — TIZANIDINE 4 MG/1
4 TABLET ORAL EVERY 8 HOURS PRN
Qty: 90 TABLET | Refills: 0 | Status: SHIPPED | OUTPATIENT
Start: 2021-05-25 | End: 2021-06-22

## 2021-05-27 ENCOUNTER — OFFICE VISIT (OUTPATIENT)
Dept: PHYSICAL MEDICINE AND REHAB | Facility: MEDICAL CENTER | Age: 41
End: 2021-05-27
Payer: COMMERCIAL

## 2021-05-27 VITALS
HEART RATE: 95 BPM | SYSTOLIC BLOOD PRESSURE: 124 MMHG | OXYGEN SATURATION: 99 % | TEMPERATURE: 97.3 F | WEIGHT: 130.29 LBS | BODY MASS INDEX: 21.71 KG/M2 | DIASTOLIC BLOOD PRESSURE: 80 MMHG | HEIGHT: 65 IN

## 2021-05-27 DIAGNOSIS — M47.812 CERVICAL SPONDYLOSIS WITHOUT MYELOPATHY: ICD-10-CM

## 2021-05-27 DIAGNOSIS — M79.2 NEUROPATHIC PAIN: ICD-10-CM

## 2021-05-27 DIAGNOSIS — Z87.820 HISTORY OF TRAUMATIC BRAIN INJURY: ICD-10-CM

## 2021-05-27 DIAGNOSIS — M47.892 OTHER SPONDYLOSIS, CERVICAL REGION: ICD-10-CM

## 2021-05-27 DIAGNOSIS — M54.2 NECK PAIN: ICD-10-CM

## 2021-05-27 PROCEDURE — 99205 OFFICE O/P NEW HI 60 MIN: CPT | Performed by: PHYSICAL MEDICINE & REHABILITATION

## 2021-05-27 RX ORDER — PREGABALIN 75 MG/1
75 CAPSULE ORAL 3 TIMES DAILY
Qty: 90 CAPSULE | Refills: 0 | Status: SHIPPED | OUTPATIENT
Start: 2021-05-27 | End: 2021-06-22

## 2021-05-27 RX ORDER — TRAMADOL HYDROCHLORIDE 50 MG/1
50 TABLET ORAL EVERY 8 HOURS PRN
Qty: 90 TABLET | Refills: 0 | Status: SHIPPED | OUTPATIENT
Start: 2021-05-27 | End: 2021-06-22 | Stop reason: SDUPTHER

## 2021-05-27 ASSESSMENT — PAIN SCALES - GENERAL: PAINLEVEL: 8=MODERATE-SEVERE PAIN

## 2021-05-27 ASSESSMENT — FIBROSIS 4 INDEX: FIB4 SCORE: 0.76

## 2021-05-27 ASSESSMENT — PATIENT HEALTH QUESTIONNAIRE - PHQ9: CLINICAL INTERPRETATION OF PHQ2 SCORE: 0
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
## 2021-05-28 ENCOUNTER — TELEPHONE (OUTPATIENT)
Dept: PHYSICAL MEDICINE AND REHAB | Facility: MEDICAL CENTER | Age: 41
End: 2021-05-28

## 2021-05-28 NOTE — PATIENT INSTRUCTIONS
Your procedure will be at the [facility] special procedure suite.    [facility] [place] [address]       PRE-PROCEDURE INSTRUCTIONS  You may take your regular medications except:   · No Anti-inflammatories 5 days prior to your procedure. Anti-inflammatories include medicines such as  ibuprofen (Motrin, Advil), Excedrin, Naproxen (Aleve, Anaprox, Naprelan, Naprosyn), Celecoxib (Celebrex), Diclofenac (Voltaren-XR tab), and Meloxicam (Mobic).   · No Glucophage or Metformin 24 hours before your procedure. You may resume next day after your procedure.  · Call the physiatry office if you are taking or prescribed anti-biotics within five days of procedure.  · Please ask provider if you are taking any new diabetes medication.  · CONTINUE TAKING BLOOD PRESSURE MEDICATIONS AS PRESCRIBED.  · Pain medications will not be prescribed on the procedure day. Procedural pain medication may be used by your provider   · Call your doctor's office performing the procedure if you have a fever, chills, rash or new illness prior to your procedure    Anticoagulation/antiplatelet medications  No Blood thinning medications such as Coumadin or Plavix 5 days prior to procedure unless your doctor said to continue these medications. Call your doctor if a new medication is prescribed in this class.     Restrictions for eating before procedure:   · If you are getting procedural sedation, then do not eat to for 8 hours prior to procedure appointment time. Do not drink fluids for four hours prior to your procedure time.   · If you are not having procedural sedation, then Skip the meal prior to your procedure. If you have a morning procedure then skip breakfast. If you have an afternoon procedure then skip lunch.   · You may drink clear liquids up to 2 hours prior to your procedure  · You must have a  the day of procedure to accompany you home.      POST PROCEDURE INSTRUCTIONS   · No heavy lifting, strenuous bending or  strenuous exercise for 3 days after your procedure.  · No hot tubs, baths, swimming for 3 days after your procedure  · You can remove the bandage the day after the procedure.  · IF YOU RECEIVED A STEROID INJECTION. PLEASE NOTE THAT THERE MAY BE A DELAY FOR THE INJECTION TO START WORKING, THE DELAY MAY BE UP TO TWO WEEKS. IF YOU HAVE DIABETES, PLEASE NOTE THAT YOUR SUGAR LEVELS MAY BE ELEVATED FOR 1-2 DAYS AFTER A STEROID INJECTION.  THE STEROID MAY CAUSE TEMPORARY SYMPTOMS WHICH USUALLY RESOLVE ON THEIR OWN WITHIN 1 TO 2 DAYS INCLUDING FACIAL FLUSHING OR A FEELING OF WARMTH ON THE FACE, TEMPORARY INCREASES IN BLOOD SUGAR, INSOMNIA, INCREASED HUNGER  · IF YOU RECEIVED A DIAGNOSTIC PROCEDURE (SUCH AS A MEDIAL BRANCH BLOCK), PLEASE NOTE THAT WE DO EXPECT THIS INJECTION TO WEAR OFF.  IT IS IMPORTANT TO COMPLETE THE PAIN DIARY AND LIST THE PAIN SCORE ONLY FOR THE REGION WHERE THE PROCEDURE WAS AND BRING THIS TO YOUR FOLLOW UP VISIT.  · IF YOU RECEIVED A RADIOFREQUENCY PROCEDURE, THERE MAY BE SOME SORENESS AFTER THE PROCEDURE.  THIS IS NORMAL.  · IF YOU EXPERIENCE PROLONGED WEAKNESS LONGER THAN ONE DAY, BOWEL OR BLADDER INCONTINENCE THEN PLEASE CALL THE PHYSIATRY OFFICE.  · Your leg may feel heavy, weak and numb for up to 1-2 days. Be very careful walking.   ·  You may resume normal activities 3 days after procedure.

## 2021-05-28 NOTE — TELEPHONE ENCOUNTER
Returned a call from the patient about an issue with her RX at [facility] pharmacy as they need verification for the script written yesterday by . As there is a second script at the pharmacy for Tramdol but a different dose.     Pharmacy should have called earlier this morning.     Please advise.

## 2021-06-04 DIAGNOSIS — Z87.820 HISTORY OF TRAUMATIC BRAIN INJURY: ICD-10-CM

## 2021-06-04 DIAGNOSIS — M47.812 CERVICAL SPONDYLOSIS WITHOUT MYELOPATHY: ICD-10-CM

## 2021-06-04 DIAGNOSIS — M54.2 NECK PAIN: ICD-10-CM

## 2021-06-18 DIAGNOSIS — M54.2 CERVICAL MUSCLE PAIN: ICD-10-CM

## 2021-06-22 ENCOUNTER — OFFICE VISIT (OUTPATIENT)
Dept: PHYSICAL MEDICINE AND REHAB | Facility: MEDICAL CENTER | Age: 41
End: 2021-06-22
Payer: COMMERCIAL

## 2021-06-22 VITALS
HEART RATE: 88 BPM | TEMPERATURE: 98.1 F | HEIGHT: 65 IN | OXYGEN SATURATION: 98 % | WEIGHT: 128.75 LBS | DIASTOLIC BLOOD PRESSURE: 68 MMHG | SYSTOLIC BLOOD PRESSURE: 110 MMHG | BODY MASS INDEX: 21.45 KG/M2

## 2021-06-22 DIAGNOSIS — M47.892 OTHER SPONDYLOSIS, CERVICAL REGION: ICD-10-CM

## 2021-06-22 DIAGNOSIS — Z87.820 HISTORY OF TRAUMATIC BRAIN INJURY: ICD-10-CM

## 2021-06-22 DIAGNOSIS — M54.2 NECK PAIN: ICD-10-CM

## 2021-06-22 DIAGNOSIS — M47.812 CERVICAL SPONDYLOSIS WITHOUT MYELOPATHY: ICD-10-CM

## 2021-06-22 PROCEDURE — 99214 OFFICE O/P EST MOD 30 MIN: CPT | Performed by: PHYSICAL MEDICINE & REHABILITATION

## 2021-06-22 RX ORDER — TIZANIDINE 4 MG/1
4 TABLET ORAL EVERY 8 HOURS PRN
Qty: 90 TABLET | Refills: 0 | Status: SHIPPED | OUTPATIENT
Start: 2021-06-22 | End: 2021-07-12

## 2021-06-22 RX ORDER — TRAMADOL HYDROCHLORIDE 50 MG/1
50 TABLET ORAL EVERY 6 HOURS PRN
Qty: 120 TABLET | Refills: 0 | Status: SHIPPED | OUTPATIENT
Start: 2021-06-27 | End: 2021-07-27

## 2021-06-22 RX ORDER — GABAPENTIN 100 MG/1
400 CAPSULE ORAL 3 TIMES DAILY
COMMUNITY
Start: 2021-06-04 | End: 2021-06-22

## 2021-06-22 RX ORDER — TRAMADOL HYDROCHLORIDE 50 MG/1
50 TABLET ORAL EVERY 6 HOURS PRN
Qty: 120 TABLET | Refills: 0 | Status: SHIPPED | OUTPATIENT
Start: 2021-07-27 | End: 2021-08-24 | Stop reason: SDUPTHER

## 2021-06-22 RX ORDER — TRAMADOL HYDROCHLORIDE 50 MG/1
50 TABLET ORAL EVERY 6 HOURS PRN
Qty: 120 TABLET | Refills: 0 | Status: SHIPPED | OUTPATIENT
Start: 2021-07-27 | End: 2021-06-22

## 2021-06-22 ASSESSMENT — PATIENT HEALTH QUESTIONNAIRE - PHQ9: CLINICAL INTERPRETATION OF PHQ2 SCORE: 0

## 2021-06-22 ASSESSMENT — PAIN SCALES - GENERAL: PAINLEVEL: 7=MODERATE-SEVERE PAIN

## 2021-06-22 ASSESSMENT — FIBROSIS 4 INDEX: FIB4 SCORE: 0.76

## 2021-06-22 NOTE — PATIENT INSTRUCTIONS
Lyrica  Decrease to 75mg twice a day for 3 days and then decrease to 75mg once a day for 3 days and then discontinue.    Your procedure will be at the [facility] special procedure suite.    [facility] [place] [address]       PRE-PROCEDURE INSTRUCTIONS  You may take your regular medications except:   · No Anti-inflammatories 5 days prior to your procedure. Anti-inflammatories include medicines such as  ibuprofen (Motrin, Advil), Excedrin, Naproxen (Aleve, Anaprox, Naprelan, Naprosyn), Celecoxib (Celebrex), Diclofenac (Voltaren-XR tab), and Meloxicam (Mobic).   · No Glucophage or Metformin 24 hours before your procedure. You may resume next day after your procedure.  · Call the physiatry office if you are taking or prescribed anti-biotics within five days of procedure.  · Please ask provider if you are taking any new diabetes medication.  · CONTINUE TAKING BLOOD PRESSURE MEDICATIONS AS PRESCRIBED.  · Pain medications will not be prescribed on the procedure day. Procedural pain medication may be used by your provider   · Call your doctor's office performing the procedure if you have a fever, chills, rash or new illness prior to your procedure    Anticoagulation/antiplatelet medications  No Blood thinning medications such as Coumadin or Plavix 5 days prior to procedure unless your doctor said to continue these medications. Call your doctor if a new medication is prescribed in this class.     Restrictions for eating before procedure:   · If you are getting procedural sedation, then do not eat to for 8 hours prior to procedure appointment time. Do not drink fluids for four hours prior to your procedure time.   · If you are not having procedural sedation, then Skip the meal prior to your procedure. If you have a morning procedure then skip breakfast. If you have an afternoon procedure then skip lunch.   · You may drink clear liquids up to 2 hours prior to your procedure  · You must have a  the day  of procedure to accompany you home.      POST PROCEDURE INSTRUCTIONS   · No heavy lifting, strenuous bending or strenuous exercise for 3 days after your procedure.  · No hot tubs, baths, swimming for 3 days after your procedure  · You can remove the bandage the day after the procedure.  · IF YOU RECEIVED A STEROID INJECTION. PLEASE NOTE THAT THERE MAY BE A DELAY FOR THE INJECTION TO START WORKING, THE DELAY MAY BE UP TO TWO WEEKS. IF YOU HAVE DIABETES, PLEASE NOTE THAT YOUR SUGAR LEVELS MAY BE ELEVATED FOR 1-2 DAYS AFTER A STEROID INJECTION.  THE STEROID MAY CAUSE TEMPORARY SYMPTOMS WHICH USUALLY RESOLVE ON THEIR OWN WITHIN 1 TO 2 DAYS INCLUDING FACIAL FLUSHING OR A FEELING OF WARMTH ON THE FACE, TEMPORARY INCREASES IN BLOOD SUGAR, INSOMNIA, INCREASED HUNGER  · IF YOU RECEIVED A DIAGNOSTIC PROCEDURE (SUCH AS A MEDIAL BRANCH BLOCK), PLEASE NOTE THAT WE DO EXPECT THIS INJECTION TO WEAR OFF.  IT IS IMPORTANT TO COMPLETE THE PAIN DIARY AND LIST THE PAIN SCORE ONLY FOR THE REGION WHERE THE PROCEDURE WAS AND BRING THIS TO YOUR FOLLOW UP VISIT.  · IF YOU RECEIVED A RADIOFREQUENCY PROCEDURE, THERE MAY BE SOME SORENESS AFTER THE PROCEDURE.  THIS IS NORMAL.  · IF YOU EXPERIENCE PROLONGED WEAKNESS LONGER THAN ONE DAY, BOWEL OR BLADDER INCONTINENCE THEN PLEASE CALL THE PHYSIATRY OFFICE.  · Your leg may feel heavy, weak and numb for up to 1-2 days. Be very careful walking.   ·  You may resume normal activities 3 days after procedure.
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
## 2021-06-23 ENCOUNTER — HOSPITAL ENCOUNTER (OUTPATIENT)
Facility: REHABILITATION | Age: 41
End: 2021-06-23
Attending: PHYSICAL MEDICINE & REHABILITATION | Admitting: PHYSICAL MEDICINE & REHABILITATION
Payer: COMMERCIAL

## 2021-06-29 ENCOUNTER — APPOINTMENT (OUTPATIENT)
Dept: NEUROLOGY | Facility: MEDICAL CENTER | Age: 41
End: 2021-06-29
Attending: PSYCHIATRY & NEUROLOGY
Payer: COMMERCIAL

## 2021-07-06 ENCOUNTER — OFFICE VISIT (OUTPATIENT)
Dept: NEUROLOGY | Facility: MEDICAL CENTER | Age: 41
End: 2021-07-06
Attending: PSYCHIATRY & NEUROLOGY
Payer: COMMERCIAL

## 2021-07-06 ENCOUNTER — TELEPHONE (OUTPATIENT)
Dept: PHYSICAL MEDICINE AND REHAB | Facility: MEDICAL CENTER | Age: 41
End: 2021-07-06

## 2021-07-06 VITALS
WEIGHT: 129.63 LBS | HEIGHT: 65 IN | BODY MASS INDEX: 21.6 KG/M2 | TEMPERATURE: 97.3 F | DIASTOLIC BLOOD PRESSURE: 68 MMHG | HEART RATE: 103 BPM | SYSTOLIC BLOOD PRESSURE: 106 MMHG | OXYGEN SATURATION: 98 %

## 2021-07-06 DIAGNOSIS — Z30.41 USES ORAL CONTRACEPTION: ICD-10-CM

## 2021-07-06 DIAGNOSIS — G43.831 INTRACTABLE MENSTRUAL MIGRAINE WITH STATUS MIGRAINOSUS: ICD-10-CM

## 2021-07-06 DIAGNOSIS — G43.109 MIGRAINE WITH AURA AND WITHOUT STATUS MIGRAINOSUS, NOT INTRACTABLE: ICD-10-CM

## 2021-07-06 PROCEDURE — 64615 CHEMODENERV MUSC MIGRAINE: CPT | Performed by: PSYCHIATRY & NEUROLOGY

## 2021-07-06 PROCEDURE — 700111 HCHG RX REV CODE 636 W/ 250 OVERRIDE (IP): Performed by: PSYCHIATRY & NEUROLOGY

## 2021-07-06 RX ORDER — RIZATRIPTAN BENZOATE 10 MG/1
TABLET, ORALLY DISINTEGRATING ORAL
Qty: 10 TABLET | Refills: 5 | Status: SHIPPED | OUTPATIENT
Start: 2021-07-06 | End: 2022-07-18

## 2021-07-06 RX ORDER — TOPIRAMATE 50 MG/1
150 TABLET, FILM COATED ORAL 2 TIMES DAILY
Qty: 540 TABLET | Refills: 3 | Status: SHIPPED | OUTPATIENT
Start: 2021-07-06 | End: 2022-08-23 | Stop reason: SDUPTHER

## 2021-07-06 RX ADMIN — ONABOTULINUMTOXINA 155 UNITS: 200 INJECTION, POWDER, LYOPHILIZED, FOR SOLUTION INTRADERMAL; INTRAMUSCULAR at 14:43

## 2021-07-06 ASSESSMENT — ENCOUNTER SYMPTOMS
TREMORS: 1
HEADACHES: 1

## 2021-07-06 ASSESSMENT — FIBROSIS 4 INDEX: FIB4 SCORE: 0.76
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
## 2021-07-07 RX ORDER — LEVONORGESTREL AND ETHINYL ESTRADIOL 150-30(84)
KIT ORAL
Qty: 91 TABLET | Refills: 3 | Status: SHIPPED | OUTPATIENT
Start: 2021-07-07 | End: 2022-05-03

## 2021-07-08 DIAGNOSIS — M54.2 CERVICAL MUSCLE PAIN: ICD-10-CM

## 2021-07-08 NOTE — TELEPHONE ENCOUNTER
Received request via: Pharmacy    Was the patient seen in the last year in this department? Yes    Does the patient have an active prescription (recently filled or refills available) for medication(s) requested? No       Requesting 90 day supply.

## 2021-07-12 RX ORDER — TIZANIDINE 4 MG/1
4 TABLET ORAL EVERY 8 HOURS PRN
Qty: 270 TABLET | Refills: 1 | Status: SHIPPED | OUTPATIENT
Start: 2021-07-12 | End: 2023-03-06 | Stop reason: SDUPTHER

## 2021-07-13 ENCOUNTER — HOSPITAL ENCOUNTER (OUTPATIENT)
Dept: LAB | Facility: MEDICAL CENTER | Age: 41
End: 2021-07-13
Attending: INTERNAL MEDICINE
Payer: COMMERCIAL

## 2021-07-13 DIAGNOSIS — E55.9 VITAMIN D DEFICIENCY: ICD-10-CM

## 2021-07-13 DIAGNOSIS — E03.9 PRIMARY HYPOTHYROIDISM: ICD-10-CM

## 2021-07-13 DIAGNOSIS — E53.8 B12 DEFICIENCY: ICD-10-CM

## 2021-07-13 LAB
ALBUMIN SERPL BCP-MCNC: 4.5 G/DL (ref 3.2–4.9)
ALBUMIN/GLOB SERPL: 1.7 G/DL
ALP SERPL-CCNC: 59 U/L (ref 30–99)
ALT SERPL-CCNC: 10 U/L (ref 2–50)
ANION GAP SERPL CALC-SCNC: 13 MMOL/L (ref 7–16)
AST SERPL-CCNC: 16 U/L (ref 12–45)
BILIRUB SERPL-MCNC: 0.3 MG/DL (ref 0.1–1.5)
BUN SERPL-MCNC: 12 MG/DL (ref 8–22)
CALCIUM SERPL-MCNC: 9.3 MG/DL (ref 8.5–10.5)
CHLORIDE SERPL-SCNC: 108 MMOL/L (ref 96–112)
CO2 SERPL-SCNC: 19 MMOL/L (ref 20–33)
CREAT SERPL-MCNC: 0.99 MG/DL (ref 0.5–1.4)
GLOBULIN SER CALC-MCNC: 2.7 G/DL (ref 1.9–3.5)
GLUCOSE SERPL-MCNC: 112 MG/DL (ref 65–99)
POTASSIUM SERPL-SCNC: 4.1 MMOL/L (ref 3.6–5.5)
PROT SERPL-MCNC: 7.2 G/DL (ref 6–8.2)
SODIUM SERPL-SCNC: 140 MMOL/L (ref 135–145)
T3FREE SERPL-MCNC: 2.6 PG/ML (ref 2–4.4)
T4 FREE SERPL-MCNC: 1.55 NG/DL (ref 0.93–1.7)
TSH SERPL DL<=0.005 MIU/L-ACNC: 0.84 UIU/ML (ref 0.38–5.33)
VIT B12 SERPL-MCNC: 1321 PG/ML (ref 211–911)

## 2021-07-13 PROCEDURE — 36415 COLL VENOUS BLD VENIPUNCTURE: CPT

## 2021-07-13 PROCEDURE — 84439 ASSAY OF FREE THYROXINE: CPT

## 2021-07-13 PROCEDURE — 84443 ASSAY THYROID STIM HORMONE: CPT

## 2021-07-13 PROCEDURE — 84481 FREE ASSAY (FT-3): CPT

## 2021-07-13 PROCEDURE — 80053 COMPREHEN METABOLIC PANEL: CPT

## 2021-07-13 PROCEDURE — 82306 VITAMIN D 25 HYDROXY: CPT

## 2021-07-13 PROCEDURE — 82607 VITAMIN B-12: CPT

## 2021-07-14 LAB — 25(OH)D3 SERPL-MCNC: 63 NG/ML (ref 30–100)

## 2021-07-19 ENCOUNTER — OFFICE VISIT (OUTPATIENT)
Dept: ENDOCRINOLOGY | Facility: MEDICAL CENTER | Age: 41
End: 2021-07-19
Attending: INTERNAL MEDICINE
Payer: COMMERCIAL

## 2021-07-19 VITALS
DIASTOLIC BLOOD PRESSURE: 74 MMHG | SYSTOLIC BLOOD PRESSURE: 116 MMHG | WEIGHT: 127.9 LBS | HEART RATE: 97 BPM | BODY MASS INDEX: 21.31 KG/M2 | HEIGHT: 65 IN | OXYGEN SATURATION: 97 %

## 2021-07-19 DIAGNOSIS — E03.9 PRIMARY HYPOTHYROIDISM: ICD-10-CM

## 2021-07-19 DIAGNOSIS — E53.8 B12 DEFICIENCY: ICD-10-CM

## 2021-07-19 DIAGNOSIS — E55.9 VITAMIN D DEFICIENCY: ICD-10-CM

## 2021-07-19 PROCEDURE — 99211 OFF/OP EST MAY X REQ PHY/QHP: CPT | Performed by: INTERNAL MEDICINE

## 2021-07-19 PROCEDURE — 99214 OFFICE O/P EST MOD 30 MIN: CPT | Performed by: INTERNAL MEDICINE

## 2021-07-19 RX ORDER — ERGOCALCIFEROL 1.25 MG/1
50000 CAPSULE ORAL
Qty: 12 CAPSULE | Refills: 1 | Status: SHIPPED | OUTPATIENT
Start: 2021-07-19 | End: 2023-01-31 | Stop reason: SDUPTHER

## 2021-07-19 RX ORDER — LEVOTHYROXINE SODIUM 75 MCG
75 TABLET ORAL
Qty: 90 TABLET | Refills: 1 | Status: SHIPPED | OUTPATIENT
Start: 2021-07-19 | End: 2022-01-14

## 2021-07-19 ASSESSMENT — FIBROSIS 4 INDEX: FIB4 SCORE: 0.88
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
## 2021-08-24 ENCOUNTER — OFFICE VISIT (OUTPATIENT)
Dept: PHYSICAL MEDICINE AND REHAB | Facility: MEDICAL CENTER | Age: 41
End: 2021-08-24
Payer: COMMERCIAL

## 2021-08-24 VITALS
BODY MASS INDEX: 21.23 KG/M2 | HEIGHT: 65 IN | HEART RATE: 85 BPM | DIASTOLIC BLOOD PRESSURE: 86 MMHG | SYSTOLIC BLOOD PRESSURE: 122 MMHG | TEMPERATURE: 99.1 F | WEIGHT: 127.43 LBS | OXYGEN SATURATION: 100 %

## 2021-08-24 DIAGNOSIS — M47.812 CERVICAL SPONDYLOSIS WITHOUT MYELOPATHY: ICD-10-CM

## 2021-08-24 DIAGNOSIS — M47.892 OTHER SPONDYLOSIS, CERVICAL REGION: ICD-10-CM

## 2021-08-24 DIAGNOSIS — M54.2 NECK PAIN: ICD-10-CM

## 2021-08-24 DIAGNOSIS — Z87.820 HISTORY OF TRAUMATIC BRAIN INJURY: ICD-10-CM

## 2021-08-24 PROCEDURE — 99214 OFFICE O/P EST MOD 30 MIN: CPT | Performed by: PHYSICAL MEDICINE & REHABILITATION

## 2021-08-24 RX ORDER — TRAMADOL HYDROCHLORIDE 50 MG/1
50 TABLET ORAL EVERY 6 HOURS PRN
Qty: 120 TABLET | Refills: 0 | Status: SHIPPED | OUTPATIENT
Start: 2021-08-24 | End: 2021-09-23

## 2021-08-24 ASSESSMENT — PATIENT HEALTH QUESTIONNAIRE - PHQ9
CLINICAL INTERPRETATION OF PHQ2 SCORE: 0
5. POOR APPETITE OR OVEREATING: 0 - NOT AT ALL

## 2021-08-24 ASSESSMENT — PAIN SCALES - GENERAL: PAINLEVEL: 6=MODERATE PAIN

## 2021-08-24 ASSESSMENT — FIBROSIS 4 INDEX: FIB4 SCORE: 0.9
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
## 2021-12-16 DIAGNOSIS — E78.00 HYPERCHOLESTEROLEMIA: ICD-10-CM

## 2021-12-16 RX ORDER — LOVASTATIN 10 MG/1
TABLET ORAL
Qty: 90 TABLET | Refills: 0 | Status: SHIPPED | OUTPATIENT
Start: 2021-12-16 | End: 2022-02-02

## 2022-01-14 DIAGNOSIS — E03.9 PRIMARY HYPOTHYROIDISM: ICD-10-CM

## 2022-01-14 RX ORDER — LEVOTHYROXINE SODIUM 75 MCG
TABLET ORAL
Qty: 90 TABLET | Refills: 1 | Status: SHIPPED | OUTPATIENT
Start: 2022-01-14 | End: 2022-07-15

## 2022-01-14 NOTE — TELEPHONE ENCOUNTER
Received request via: Pharmacy    Was the patient seen in the last year in this department? Yes    Does the patient have an active prescription (recently filled or refills available) for medication(s) requested? No     REQUESTED MEDICATION:   Requested Prescriptions     Pending Prescriptions Disp Refills   • SYNTHROID 75 MCG Tab [Pharmacy Med Name: SYNTHROID 75 MCG TABLET] 90 Tablet 1     Sig: TAKE 1 TABLET BY MOUTH EVERY DAY IN THE MORNING ON AN EMPTY STOMACH

## 2022-01-18 ENCOUNTER — HOSPITAL ENCOUNTER (OUTPATIENT)
Dept: LAB | Facility: MEDICAL CENTER | Age: 42
End: 2022-01-18
Attending: INTERNAL MEDICINE
Payer: COMMERCIAL

## 2022-01-18 ENCOUNTER — HOSPITAL ENCOUNTER (OUTPATIENT)
Dept: LAB | Facility: MEDICAL CENTER | Age: 42
End: 2022-01-18
Attending: NURSE PRACTITIONER
Payer: COMMERCIAL

## 2022-01-18 DIAGNOSIS — E78.00 HYPERCHOLESTEROLEMIA: ICD-10-CM

## 2022-01-18 DIAGNOSIS — E55.9 VITAMIN D DEFICIENCY: ICD-10-CM

## 2022-01-18 DIAGNOSIS — E53.8 B12 DEFICIENCY: ICD-10-CM

## 2022-01-18 DIAGNOSIS — E03.9 PRIMARY HYPOTHYROIDISM: ICD-10-CM

## 2022-01-18 DIAGNOSIS — Z13.0 SCREENING FOR DEFICIENCY ANEMIA: ICD-10-CM

## 2022-01-18 LAB
25(OH)D3 SERPL-MCNC: 69 NG/ML (ref 30–100)
ALBUMIN SERPL BCP-MCNC: 4.5 G/DL (ref 3.2–4.9)
ALBUMIN/GLOB SERPL: 1.9 G/DL
ALP SERPL-CCNC: 49 U/L (ref 30–99)
ALT SERPL-CCNC: 23 U/L (ref 2–50)
ANION GAP SERPL CALC-SCNC: 14 MMOL/L (ref 7–16)
AST SERPL-CCNC: 17 U/L (ref 12–45)
BASOPHILS # BLD AUTO: 0.7 % (ref 0–1.8)
BASOPHILS # BLD: 0.05 K/UL (ref 0–0.12)
BILIRUB SERPL-MCNC: 0.2 MG/DL (ref 0.1–1.5)
BUN SERPL-MCNC: 14 MG/DL (ref 8–22)
CALCIUM SERPL-MCNC: 9.6 MG/DL (ref 8.5–10.5)
CHLORIDE SERPL-SCNC: 108 MMOL/L (ref 96–112)
CHOLEST SERPL-MCNC: 221 MG/DL (ref 100–199)
CO2 SERPL-SCNC: 18 MMOL/L (ref 20–33)
CREAT SERPL-MCNC: 0.9 MG/DL (ref 0.5–1.4)
EOSINOPHIL # BLD AUTO: 0.12 K/UL (ref 0–0.51)
EOSINOPHIL NFR BLD: 1.6 % (ref 0–6.9)
ERYTHROCYTE [DISTWIDTH] IN BLOOD BY AUTOMATED COUNT: 52 FL (ref 35.9–50)
FASTING STATUS PATIENT QL REPORTED: NORMAL
FASTING STATUS PATIENT QL REPORTED: NORMAL
GLOBULIN SER CALC-MCNC: 2.4 G/DL (ref 1.9–3.5)
GLUCOSE SERPL-MCNC: 91 MG/DL (ref 65–99)
HCT VFR BLD AUTO: 41.5 % (ref 37–47)
HDLC SERPL-MCNC: 57 MG/DL
HGB BLD-MCNC: 13.3 G/DL (ref 12–16)
IMM GRANULOCYTES # BLD AUTO: 0.04 K/UL (ref 0–0.11)
IMM GRANULOCYTES NFR BLD AUTO: 0.5 % (ref 0–0.9)
LDLC SERPL CALC-MCNC: 139 MG/DL
LYMPHOCYTES # BLD AUTO: 3.02 K/UL (ref 1–4.8)
LYMPHOCYTES NFR BLD: 39.8 % (ref 22–41)
MCH RBC QN AUTO: 32 PG (ref 27–33)
MCHC RBC AUTO-ENTMCNC: 32 G/DL (ref 33.6–35)
MCV RBC AUTO: 99.8 FL (ref 81.4–97.8)
MONOCYTES # BLD AUTO: 0.42 K/UL (ref 0–0.85)
MONOCYTES NFR BLD AUTO: 5.5 % (ref 0–13.4)
NEUTROPHILS # BLD AUTO: 3.93 K/UL (ref 2–7.15)
NEUTROPHILS NFR BLD: 51.9 % (ref 44–72)
NRBC # BLD AUTO: 0 K/UL
NRBC BLD-RTO: 0 /100 WBC
PLATELET # BLD AUTO: 263 K/UL (ref 164–446)
PMV BLD AUTO: 12.1 FL (ref 9–12.9)
POTASSIUM SERPL-SCNC: 3.9 MMOL/L (ref 3.6–5.5)
PROT SERPL-MCNC: 6.9 G/DL (ref 6–8.2)
RBC # BLD AUTO: 4.16 M/UL (ref 4.2–5.4)
SODIUM SERPL-SCNC: 140 MMOL/L (ref 135–145)
T3FREE SERPL-MCNC: 2.17 PG/ML (ref 2–4.4)
T4 FREE SERPL-MCNC: 1.39 NG/DL (ref 0.93–1.7)
TRIGL SERPL-MCNC: 124 MG/DL (ref 0–149)
TSH SERPL DL<=0.005 MIU/L-ACNC: 2.85 UIU/ML (ref 0.38–5.33)
VIT B12 SERPL-MCNC: 1464 PG/ML (ref 211–911)
WBC # BLD AUTO: 7.6 K/UL (ref 4.8–10.8)

## 2022-01-18 PROCEDURE — 85025 COMPLETE CBC W/AUTO DIFF WBC: CPT

## 2022-01-18 PROCEDURE — 36415 COLL VENOUS BLD VENIPUNCTURE: CPT

## 2022-01-18 PROCEDURE — 82306 VITAMIN D 25 HYDROXY: CPT

## 2022-01-18 PROCEDURE — 84439 ASSAY OF FREE THYROXINE: CPT

## 2022-01-18 PROCEDURE — 84443 ASSAY THYROID STIM HORMONE: CPT

## 2022-01-18 PROCEDURE — 82607 VITAMIN B-12: CPT

## 2022-01-18 PROCEDURE — 84481 FREE ASSAY (FT-3): CPT

## 2022-01-18 PROCEDURE — 80061 LIPID PANEL: CPT

## 2022-01-18 PROCEDURE — 80053 COMPREHEN METABOLIC PANEL: CPT

## 2022-01-25 ENCOUNTER — OFFICE VISIT (OUTPATIENT)
Dept: ENDOCRINOLOGY | Facility: MEDICAL CENTER | Age: 42
End: 2022-01-25
Attending: INTERNAL MEDICINE
Payer: COMMERCIAL

## 2022-01-25 VITALS
DIASTOLIC BLOOD PRESSURE: 80 MMHG | OXYGEN SATURATION: 96 % | SYSTOLIC BLOOD PRESSURE: 106 MMHG | HEART RATE: 82 BPM | HEIGHT: 65 IN | BODY MASS INDEX: 20.08 KG/M2 | WEIGHT: 120.5 LBS

## 2022-01-25 DIAGNOSIS — E55.9 VITAMIN D DEFICIENCY: ICD-10-CM

## 2022-01-25 DIAGNOSIS — E03.9 PRIMARY HYPOTHYROIDISM: ICD-10-CM

## 2022-01-25 DIAGNOSIS — E53.8 B12 DEFICIENCY: ICD-10-CM

## 2022-01-25 PROCEDURE — 99211 OFF/OP EST MAY X REQ PHY/QHP: CPT | Performed by: INTERNAL MEDICINE

## 2022-01-25 PROCEDURE — 99214 OFFICE O/P EST MOD 30 MIN: CPT | Performed by: INTERNAL MEDICINE

## 2022-01-25 RX ORDER — BUPRENORPHINE 10 UG/H
PATCH TRANSDERMAL
COMMUNITY
Start: 2021-12-01 | End: 2022-01-25

## 2022-01-25 RX ORDER — LIOTHYRONINE SODIUM 5 UG/1
5 TABLET ORAL DAILY
Qty: 30 TABLET | Refills: 6 | Status: SHIPPED | OUTPATIENT
Start: 2022-01-25 | End: 2022-02-18

## 2022-01-25 RX ORDER — ATORVASTATIN CALCIUM 10 MG/1
TABLET, FILM COATED ORAL
COMMUNITY
End: 2022-02-02

## 2022-01-25 RX ORDER — TRAMADOL HYDROCHLORIDE 50 MG/1
50 TABLET ORAL EVERY 4 HOURS
COMMUNITY
Start: 2022-01-11

## 2022-01-25 ASSESSMENT — FIBROSIS 4 INDEX: FIB4 SCORE: 0.58
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
## 2022-02-02 ENCOUNTER — OFFICE VISIT (OUTPATIENT)
Dept: MEDICAL GROUP | Facility: IMAGING CENTER | Age: 42
End: 2022-02-02
Payer: COMMERCIAL

## 2022-02-02 VITALS
HEART RATE: 95 BPM | HEIGHT: 65 IN | SYSTOLIC BLOOD PRESSURE: 114 MMHG | BODY MASS INDEX: 19.89 KG/M2 | OXYGEN SATURATION: 100 % | TEMPERATURE: 98.6 F | WEIGHT: 119.4 LBS | DIASTOLIC BLOOD PRESSURE: 64 MMHG | RESPIRATION RATE: 14 BRPM

## 2022-02-02 DIAGNOSIS — F43.10 PTSD (POST-TRAUMATIC STRESS DISORDER): ICD-10-CM

## 2022-02-02 DIAGNOSIS — Z91.89 AT RISK FOR BREAST CANCER: ICD-10-CM

## 2022-02-02 DIAGNOSIS — R29.898 SHOULDER WEAKNESS: ICD-10-CM

## 2022-02-02 DIAGNOSIS — Z12.83 SKIN CANCER SCREENING: ICD-10-CM

## 2022-02-02 DIAGNOSIS — F33.1 MODERATE EPISODE OF RECURRENT MAJOR DEPRESSIVE DISORDER (HCC): ICD-10-CM

## 2022-02-02 DIAGNOSIS — M25.512 ACUTE PAIN OF LEFT SHOULDER: ICD-10-CM

## 2022-02-02 DIAGNOSIS — E78.00 HYPERCHOLESTEROLEMIA: ICD-10-CM

## 2022-02-02 PROCEDURE — 99215 OFFICE O/P EST HI 40 MIN: CPT | Performed by: FAMILY MEDICINE

## 2022-02-02 RX ORDER — LOVASTATIN 20 MG/1
20 TABLET ORAL NIGHTLY
Qty: 90 TABLET | Refills: 3 | Status: SHIPPED | OUTPATIENT
Start: 2022-02-02 | End: 2023-01-09 | Stop reason: SDUPTHER

## 2022-02-02 RX ORDER — VENLAFAXINE HYDROCHLORIDE 150 MG/1
150 CAPSULE, EXTENDED RELEASE ORAL
Qty: 90 CAPSULE | Refills: 3 | Status: SHIPPED | OUTPATIENT
Start: 2022-02-02 | End: 2023-01-09 | Stop reason: SDUPTHER

## 2022-02-02 ASSESSMENT — FIBROSIS 4 INDEX: FIB4 SCORE: 0.58

## 2022-02-02 ASSESSMENT — PATIENT HEALTH QUESTIONNAIRE - PHQ9: CLINICAL INTERPRETATION OF PHQ2 SCORE: 0

## 2022-02-02 ASSESSMENT — PAIN SCALES - GENERAL: PAINLEVEL: 10=SEVERE PAIN

## 2022-02-02 NOTE — PROGRESS NOTES
Chief Complaint   Patient presents with   • Shoulder Pain     left, x 1 week   • Medication Management   • Referral Needed     derm, concerning moles     HPI:   With a history of   Patient Active Problem List   Diagnosis   • Migraine with aura and without status migrainosus, not intractable   • Anxiety   • Primary hypothyroidism   • Dysmenorrhea   • Hypercholesterolemia   • Fear of public speaking   • Irritable bowel disease   • Cervical spondylosis   • Gastroesophageal reflux disease without esophagitis   • Nipple discharge in female   • Chronic fatigue   • Family history of breast cancer in paternal grandmother at age 35   • Vitamin D deficiency   • Cervical spondylosis without myelopathy   • Hospital discharge follow-up   • Weight loss   • Alcohol dependence with withdrawal with complication (HCC)   • Moderate episode of recurrent major depressive disorder (HCC)   • Skipped heart beats   • Syncope, near   • Lumbar back pain with radiculopathy affecting lower extremity   • Uses oral contraception   • Closed head injury   • Contact lens overwear of both eyes   • Ophthalmoplegia   • Myopia of both eyes   • Abnormal cortisol level   • B12 deficiency   • History of traumatic brain injury   • Post concussive encephalopathy     One week of left shoulder pain, physical therapy heped reduced pain from 10 to 8. Has seen PT three times. Using Tens therapy. She has been stretching and used heat. Feels like it is coming from her scapula. No redness, increased heat, or swelling of the joints.     Would also like to review labs.   Concerning mole on her face.   Pain from her neck trauma has greatly improved.       Allergies   Allergen Reactions   • Benadryl Allergy Anxiety   • Demerol Hives   • Medrol [Methylprednisolone] Hives and Itching   • Previfem [Norgestimate-Ethinyl Estradiol]      Nausea/vomiting   • Reglan [Metoclopramide] Anxiety   • Seasonal      Current Outpatient Medications   Medication Sig Dispense Refill   •  lovastatin (MEVACOR) 20 MG Tab Take 1 Tablet by mouth every evening. 90 Tablet 3   • venlafaxine (EFFEXOR-XR) 150 MG extended-release capsule Take 1 Capsule by mouth every day. 90 Capsule 3   • traMADol (ULTRAM) 50 MG Tab      • liothyronine (CYTOMEL) 5 MCG Tab Take 1 Tablet by mouth every day. 30 Tablet 6   • SYNTHROID 75 MCG Tab TAKE 1 TABLET BY MOUTH EVERY DAY IN THE MORNING ON AN EMPTY STOMACH 90 Tablet 1   • Cyanocobalamin (VITAMIN B 12 PO) Take  by mouth.     • ergocalciferol (DRISDOL) 45815 UNIT capsule Take 1 capsule by mouth every 14 days. 12 capsule 1   • tizanidine (ZANAFLEX) 4 MG Tab TAKE 1 TABLET BY MOUTH EVERY 8 HOURS AS NEEDED 270 tablet 1   • DAYSEE 0.15-0.03 &0.01 MG Tab TAKE 1 TABLET BY MOUTH EVERY DAY 91 tablet 3   • rizatriptan (MAXALT-MLT) 10 MG disintegrating tablet TAKE 1 TABLET BY MOUTH AT HEADACHE ONSET REPEAT EVERY HOUR AS NEEDED UP TO 4 TABLETS IN 24 HOURS 10 tablet 5   • topiramate (TOPAMAX) 50 MG tablet Take 3 Tablets by mouth 2 times a day. 540 tablet 3   • AIMOVIG 140 MG/ML Solution Auto-injector Inject 140 mg under the skin every 4 weeks. 1 Each 11   • cetirizine (ZYRTEC) 10 MG Tab Take 10 mg by mouth every day.       No current facility-administered medications for this visit.     Social History     Tobacco Use   • Smoking status: Former Smoker     Packs/day: 1.00     Years: 15.00     Pack years: 15.00     Types: Cigarettes     Quit date: 2010     Years since quittin.0   • Smokeless tobacco: Never Used   Vaping Use   • Vaping Use: Never used   Substance Use Topics   • Alcohol use: No   • Drug use: No     Family History   Problem Relation Age of Onset   • Diabetes Maternal Grandfather    • Migraines Maternal Grandfather    • Cancer Paternal Grandfather    • Alzheimer's Disease Paternal Grandfather    • Cancer Paternal Grandmother 30        breast   • Glasses Mother    • Migraines Mother    • Suicide Attempts Maternal Uncle         Killed himself by GSW   • Hypertension Father  "   • Glasses Father        /62 (BP Location: Left arm, Patient Position: Sitting, BP Cuff Size: Adult)   Pulse 95   Temp 37 °C (98.6 °F) (Temporal)   Resp 14   Ht 1.651 m (5' 5\")   Wt 54.2 kg (119 lb 6.4 oz)   SpO2 100%  Body mass index is 19.87 kg/m².  Review of Systems   Constitutional: Negative for chills, fever and malaise/fatigue.   HENT: Negative for hearing loss and tinnitus.    Eyes: Negative for double vision and pain.   Respiratory: Negative for cough, shortness of breath and wheezing.    Cardiovascular: Negative for chest pain, palpitations and leg swelling.   Gastrointestinal: Negative for abdominal pain, diarrhea, nausea and vomiting.   Genitourinary: Negative for dysuria and frequency.   Musculoskeletal: Negative for joint pain and myalgias.   Skin: Negative for itching and rash.   Neurological: Negative for dizziness and headaches.     Physical Exam  Constitutional:       General: He is not in acute distress.     Appearance: He is not diaphoretic.   HENT:      Head: Normocephalic and atraumatic.   Eyes:      General: No scleral icterus.        Right eye: No discharge.         Left eye: No discharge.      Conjunctiva/sclera: Conjunctivae normal.      Pupils: Pupils are equal, round, and reactive to light.   Neck:      Thyroid: No thyromegaly.   Pulmonary:      Effort: Pulmonary effort is normal. No respiratory distress.   Abdominal:      General: There is no distension.   Skin:     General: Skin is warm and dry.   Neurological:      Mental Status: He is alert.   Psychiatric:         Mood and Affect: Affect normal.         Judgment: Judgment normal.   musculo skeletal: Atrophy of upper body muscle noted from the last time I saw her. Point tenderness over the scapula. Reduced range of motion and paraesthesias occur at 0 degrees on the left for lateral arm raises. No swelling, erythema, deformity or surgical scar b/l. Palpation normal of the acromioclavicular joint and long head of the bicep " tendon b/l. Flexion and internal rotation reduced on the left.  Internal rotation not to T8 left normal right.  Supraspinatus test normal (open can)with pain on left and weakness normal on right.    Internal rotation (lift off) test weakness left normal on right.       All labs (last 1 month):   Hospital Outpatient Visit on 01/18/2022   Component Date Value Ref Range Status   • Sodium 01/18/2022 140  135 - 145 mmol/L Final   • Potassium 01/18/2022 3.9  3.6 - 5.5 mmol/L Final   • Chloride 01/18/2022 108  96 - 112 mmol/L Final   • Co2 01/18/2022 18* 20 - 33 mmol/L Final   • Anion Gap 01/18/2022 14.0  7.0 - 16.0 Final   • Glucose 01/18/2022 91  65 - 99 mg/dL Final   • Bun 01/18/2022 14  8 - 22 mg/dL Final   • Creatinine 01/18/2022 0.90  0.50 - 1.40 mg/dL Final   • Calcium 01/18/2022 9.6  8.5 - 10.5 mg/dL Final   • AST(SGOT) 01/18/2022 17  12 - 45 U/L Final   • ALT(SGPT) 01/18/2022 23  2 - 50 U/L Final   • Alkaline Phosphatase 01/18/2022 49  30 - 99 U/L Final   • Total Bilirubin 01/18/2022 0.2  0.1 - 1.5 mg/dL Final   • Albumin 01/18/2022 4.5  3.2 - 4.9 g/dL Final   • Total Protein 01/18/2022 6.9  6.0 - 8.2 g/dL Final   • Globulin 01/18/2022 2.4  1.9 - 3.5 g/dL Final   • A-G Ratio 01/18/2022 1.9  g/dL Final   • T3,Free 01/18/2022 2.17  2.00 - 4.40 pg/mL Final   • Free T-4 01/18/2022 1.39  0.93 - 1.70 ng/dL Final   • Vitamin B12 -True Cobalamin 01/18/2022 1464* 211 - 911 pg/mL Final   • TSH 01/18/2022 2.850  0.380 - 5.330 uIU/mL Final    Comment: Reference Range:    Pregnant Females, 1st Trimester  0.050-3.700  Pregnant Females, 2nd Trimester  0.310-4.350  Pregnant Females, 3rd Trimester  0.410-5.180     • 25-Hydroxy   Vitamin D 25 01/18/2022 69  30 - 100 ng/mL Final    Comment: Adult Ranges:   <20 ng/mL - Deficiency  20-29 ng/mL - Insufficiency   ng/mL - Sufficiency  Effective 3/18/2020, this electrochemiluminescence binding assay is  performed using Roche philippe e immunoassay analyzer.  The [employer] Vitamin  D  total II assay is intended for the quantitative determination of total 25  hydroxyvitamin D in human serum and plasma. This assay is to be used as an  aid in the assessment of vitamin D sufficiency in adults.     • Fasting Status 01/18/2022 Fasting   Final   • GFR If  01/18/2022 >60  >60 mL/min/1.73 m 2 Final   • GFR If Non  01/18/2022 >60  >60 mL/min/1.73 m 2 Final   Hospital Outpatient Visit on 01/18/2022   Component Date Value Ref Range Status   • Cholesterol,Tot 01/18/2022 221* 100 - 199 mg/dL Final   • Triglycerides 01/18/2022 124  0 - 149 mg/dL Final   • HDL 01/18/2022 57  >=40 mg/dL Final   • LDL 01/18/2022 139* <100 mg/dL Final   • WBC 01/18/2022 7.6  4.8 - 10.8 K/uL Final   • RBC 01/18/2022 4.16* 4.20 - 5.40 M/uL Final   • Hemoglobin 01/18/2022 13.3  12.0 - 16.0 g/dL Final   • Hematocrit 01/18/2022 41.5  37.0 - 47.0 % Final   • MCV 01/18/2022 99.8* 81.4 - 97.8 fL Final   • MCH 01/18/2022 32.0  27.0 - 33.0 pg Final   • MCHC 01/18/2022 32.0* 33.6 - 35.0 g/dL Final   • RDW 01/18/2022 52.0* 35.9 - 50.0 fL Final   • Platelet Count 01/18/2022 263  164 - 446 K/uL Final   • MPV 01/18/2022 12.1  9.0 - 12.9 fL Final   • Neutrophils-Polys 01/18/2022 51.90  44.00 - 72.00 % Final   • Lymphocytes 01/18/2022 39.80  22.00 - 41.00 % Final   • Monocytes 01/18/2022 5.50  0.00 - 13.40 % Final   • Eosinophils 01/18/2022 1.60  0.00 - 6.90 % Final   • Basophils 01/18/2022 0.70  0.00 - 1.80 % Final   • Immature Granulocytes 01/18/2022 0.50  0.00 - 0.90 % Final   • Nucleated RBC 01/18/2022 0.00  /100 WBC Final   • Neutrophils (Absolute) 01/18/2022 3.93  2.00 - 7.15 K/uL Final    Includes immature neutrophils, if present.   • Lymphs (Absolute) 01/18/2022 3.02  1.00 - 4.80 K/uL Final   • Monos (Absolute) 01/18/2022 0.42  0.00 - 0.85 K/uL Final   • Eos (Absolute) 01/18/2022 0.12  0.00 - 0.51 K/uL Final   • Baso (Absolute) 01/18/2022 0.05  0.00 - 0.12 K/uL Final   • Immature Granulocytes (abs)  01/18/2022 0.04  0.00 - 0.11 K/uL Final   • NRBC (Absolute) 01/18/2022 0.00  K/uL Final   • Fasting Status 01/18/2022 Fasting   Final       Lipids:   Lab Results   Component Value Date/Time    CHOLSTRLTOT 221 (H) 01/18/2022 10:11 AM    TRIGLYCERIDE 124 01/18/2022 10:11 AM    HDL 57 01/18/2022 10:11 AM     (H) 01/18/2022 10:11 AM       Imaging: No results found.    A/P  Patient counseled on the risks and benefits of genetic testing and chooses to go through [facility].  Return after mri shoulder.    Problem List Items Addressed This Visit     Hypercholesterolemia    Relevant Medications    lovastatin (MEVACOR) 20 MG Tab    Moderate episode of recurrent major depressive disorder (HCC)    Relevant Medications    venlafaxine (EFFEXOR-XR) 150 MG extended-release capsule      Other Visit Diagnoses     PTSD (post-traumatic stress disorder)        Relevant Medications    venlafaxine (EFFEXOR-XR) 150 MG extended-release capsule    At risk for breast cancer        Relevant Orders    Referral to Genetic Research Studies    Skin cancer screening        Relevant Orders    Referral to Dermatology    Acute pain of left shoulder        Relevant Orders    MR-SHOULDER-W/O LEFT    Shoulder weakness        Relevant Orders    MR-SHOULDER-W/O LEFT        Patient was seen for 40 minutes face to face of which > 50% of appointment time was spent on counseling and coordination of care regarding the above.    Portions of this note may be dictated using Dragon NaturallySpeaking voice recognition software.  Variances in spelling and vocabulary are possible and unintentional.  Not all areas may be caught/corrected.  Please notify me if any discrepancies are noted or if the meaning of any statement is not correct/clear.

## 2022-02-14 ENCOUNTER — RESEARCH ENCOUNTER (OUTPATIENT)
Dept: RESEARCH | Facility: WORKSITE | Age: 42
End: 2022-02-14
Attending: FAMILY MEDICINE

## 2022-02-14 DIAGNOSIS — Z00.6 RESEARCH STUDY PATIENT: ICD-10-CM

## 2022-02-18 DIAGNOSIS — E03.9 PRIMARY HYPOTHYROIDISM: ICD-10-CM

## 2022-02-18 RX ORDER — LIOTHYRONINE SODIUM 5 UG/1
5 TABLET ORAL DAILY
Qty: 30 TABLET | Refills: 6 | Status: SHIPPED | OUTPATIENT
Start: 2022-02-18 | End: 2022-08-22

## 2022-03-08 ENCOUNTER — OFFICE VISIT (OUTPATIENT)
Dept: DERMATOLOGY | Facility: IMAGING CENTER | Age: 42
End: 2022-03-08
Payer: COMMERCIAL

## 2022-03-08 DIAGNOSIS — L30.9 ECZEMA, UNSPECIFIED TYPE: ICD-10-CM

## 2022-03-08 DIAGNOSIS — Z12.83 SKIN CANCER SCREENING: ICD-10-CM

## 2022-03-08 DIAGNOSIS — D22.9 MULTIPLE NEVI: ICD-10-CM

## 2022-03-08 DIAGNOSIS — L81.4 LENTIGINES: ICD-10-CM

## 2022-03-08 PROCEDURE — 99214 OFFICE O/P EST MOD 30 MIN: CPT | Performed by: NURSE PRACTITIONER

## 2022-03-08 RX ORDER — TRIAMCINOLONE ACETONIDE 1 MG/G
OINTMENT TOPICAL
Qty: 80 G | Refills: 1 | Status: SHIPPED | OUTPATIENT
Start: 2022-03-08 | End: 2022-11-29

## 2022-03-08 RX ORDER — TACROLIMUS 1 MG/G
OINTMENT TOPICAL
Qty: 60 G | Refills: 1 | Status: SHIPPED | OUTPATIENT
Start: 2022-03-08 | End: 2022-11-29

## 2022-03-08 NOTE — PROGRESS NOTES
DERMATOLOGY NOTE  NEW VISIT       Chief complaint:  Establish care , ANNA, discuss eczema           History of skin cancer: No  History of precancers/actinic keratoses: No  History of biopsies:Yes, Details: mole removed 2000 results benign   History of blistering/severe sunburns:Yes, Details: teenager   Family history of skin cancer:No  Family history of atypical moles:No      Allergies   Allergen Reactions   • Benadryl Allergy Anxiety   • Demerol Hives   • Medrol [Methylprednisolone] Hives and Itching   • Previfem [Norgestimate-Ethinyl Estradiol]      Nausea/vomiting   • Reglan [Metoclopramide] Anxiety   • Seasonal         MEDICATIONS:  Medications relevant to specialty reviewed.     REVIEW OF SYSTEMS:   Positive for skin (see HPI)  Negative for fevers and chills       EXAM:  There were no vitals taken for this visit.  Constitutional: Well-developed, well-nourished, and in no distress.     A total body skin exam was performed excluding the genitals per patient preference and including the following areas: head (including face), neck, chest, abdomen, groin/buttocks, back, bilateral upper extremities, and bilateral lower extremities with the following pertinent findings listed below and/or in assessment/plan.     -sun exposed skin of face trunk and b/l upper and lower extremities and face with scattered clinically benign light brown reticulated macules all of which were morphologically similar and none of which were suspicious for skin cancer today on exam    Multiple tan medium brown skin-colored macules papules scattered over the trunk BUE, BLE, All with benign-appearing pigment network patterns on dermoscopy    Spilus nevus R scapula    Scattered small coin shaped, approx dime size, papular lesions to BLE consistent with nummular eczema     IMPRESSION / PLAN:    1. Lentigines  - Benign-appearing nature of lesions discussed. Advised to return to clinic for any new or concerning changes.      2. Multiple nevi  -  Benign-appearing nature of lesions discussed. Advised to return to clinic for any new or concerning changes.  - ABCDE's of melanoma discussed      3. Eczema, unspecified type  Patient  given information on skin care for eczema. Recommended short, tepid baths/showers daily and then to immediately apply emollient-type cream such as Eucerin cream, Ceravue, Nutraderm, Cetaphil or ointments such as aquaphor, petroleum jelly to skin while still slightly damp. Avoid products with known irritants such as fragrance or dyes.     Topicals as  below were prescribed at this appointment today. Patient was counseled to use a small amount of steroid cream/ointment on the affected area of skin as prescribed alternating between 2 every 2 weeks as needed.  Low potency steroids or tacrolimus can be used on the face.  If the patient finds they are using steroid cream on a frequent basis, they should return to clinic for a follow up appointment. Patient was counseled to never apply anything but a low potency steroid to skin folds and face unless otherwise directed by provider/specialist    - tacrolimus (PROTOPIC) 0.1 % Ointment; AAA twice a day for 2 weeks, alternating with triamcinolone ointment for 2 weeks  Dispense: 60 g; Refill: 1  - triamcinolone acetonide (KENALOG) 0.1 % Ointment; AAA twice a day for 2 week, alternating with tacrolimus twice a day for 2 weeks  Dispense: 80 g; Refill: 1    4. Skin cancer screening  Skin cancer education  - discussed importance of sun protective clothing, eyewear  - discussed importance of daily use of broad spectrum sunscreen with SPF 30 or greater, as well as need for reapplication ~every 2 hours when exposed to UVR  - discussed importance of regular self-exams, ideally once per month, every 12 months exams in clinic  - ABCDE's of melanoma discussed  - patient to bring any new or concerning lesions to my attention  - Patient educational handout provided and reviewed with patient        Discussed  risks, benefits, alternative treatments as well as common side effects associated with prescribed treatment, Patient verbalized understanding and agrees with plan regarding the above          Please note that this dictation was created using voice recognition software. I have made every reasonable attempt to correct obvious errors, but I expect that there are errors of grammar and possibly content that I did not discover before finalizing the note.      Return to clinic in: Return in about 1 year (around 3/8/2023) for ANNA. and as needed for any new or changing skin lesions.

## 2022-03-21 LAB
APOB+LDLR+PCSK9 GENE MUT ANL BLD/T: NOT DETECTED
BRCA1+BRCA2 DEL+DUP + FULL MUT ANL BLD/T: NOT DETECTED
MLH1+MSH2+MSH6+PMS2 GN DEL+DUP+FUL M: NOT DETECTED

## 2022-03-29 ENCOUNTER — TELEPHONE (OUTPATIENT)
Dept: NEUROLOGY | Facility: MEDICAL CENTER | Age: 42
End: 2022-03-29

## 2022-03-29 NOTE — TELEPHONE ENCOUNTER
Aimovig 140mg/ml SOAJ    Request rec'd via jose alej[facility] via [facility] paid copay $35.00 #1ml/30 DS or copay $105.00 #3ml/90 DS no $$ benefit for 90 DS as still $35.00 per 30 DS notifying liaison Ashanti Ledezma - 03/29/2022 3:57pm

## 2022-05-02 DIAGNOSIS — G43.831 INTRACTABLE MENSTRUAL MIGRAINE WITH STATUS MIGRAINOSUS: ICD-10-CM

## 2022-05-02 DIAGNOSIS — Z30.41 USES ORAL CONTRACEPTION: ICD-10-CM

## 2022-05-03 RX ORDER — LEVONORGESTREL AND ETHINYL ESTRADIOL 150-30(84)
KIT ORAL
Qty: 91 TABLET | Refills: 3 | Status: SHIPPED | OUTPATIENT
Start: 2022-05-03 | End: 2023-01-03 | Stop reason: SDUPTHER

## 2022-05-12 ENCOUNTER — HOSPITAL ENCOUNTER (OUTPATIENT)
Dept: LAB | Facility: MEDICAL CENTER | Age: 42
End: 2022-05-12
Attending: INTERNAL MEDICINE
Payer: COMMERCIAL

## 2022-05-12 DIAGNOSIS — E53.8 B12 DEFICIENCY: ICD-10-CM

## 2022-05-12 DIAGNOSIS — E03.9 PRIMARY HYPOTHYROIDISM: ICD-10-CM

## 2022-05-12 DIAGNOSIS — E55.9 VITAMIN D DEFICIENCY: ICD-10-CM

## 2022-05-12 PROCEDURE — 84443 ASSAY THYROID STIM HORMONE: CPT

## 2022-05-12 PROCEDURE — 84439 ASSAY OF FREE THYROXINE: CPT

## 2022-05-12 PROCEDURE — 36415 COLL VENOUS BLD VENIPUNCTURE: CPT

## 2022-05-12 PROCEDURE — 84481 FREE ASSAY (FT-3): CPT

## 2022-05-13 LAB
T3FREE SERPL-MCNC: 3.77 PG/ML (ref 2–4.4)
T4 FREE SERPL-MCNC: 1.4 NG/DL (ref 0.93–1.7)
TSH SERPL DL<=0.005 MIU/L-ACNC: 4.11 UIU/ML (ref 0.38–5.33)

## 2022-06-28 ENCOUNTER — OFFICE VISIT (OUTPATIENT)
Dept: MEDICAL GROUP | Facility: IMAGING CENTER | Age: 42
End: 2022-06-28
Payer: COMMERCIAL

## 2022-06-28 VITALS
HEART RATE: 84 BPM | SYSTOLIC BLOOD PRESSURE: 110 MMHG | WEIGHT: 122 LBS | DIASTOLIC BLOOD PRESSURE: 78 MMHG | HEIGHT: 65 IN | OXYGEN SATURATION: 99 % | BODY MASS INDEX: 20.33 KG/M2 | TEMPERATURE: 98.2 F | RESPIRATION RATE: 16 BRPM

## 2022-06-28 DIAGNOSIS — M51.36 BULGING LUMBAR DISC: ICD-10-CM

## 2022-06-28 DIAGNOSIS — M48.061 FORAMINAL STENOSIS OF LUMBAR REGION: ICD-10-CM

## 2022-06-28 DIAGNOSIS — M47.819 FACET ARTHROPATHY OF SPINE: ICD-10-CM

## 2022-06-28 DIAGNOSIS — M54.9 MID BACK PAIN: ICD-10-CM

## 2022-06-28 PROBLEM — R63.4 WEIGHT LOSS: Status: RESOLVED | Noted: 2018-02-07 | Resolved: 2022-06-28

## 2022-06-28 PROBLEM — M51.369 BULGING LUMBAR DISC: Status: ACTIVE | Noted: 2022-06-28

## 2022-06-28 PROCEDURE — 99214 OFFICE O/P EST MOD 30 MIN: CPT | Performed by: FAMILY MEDICINE

## 2022-06-28 RX ORDER — HYDROCODONE BITARTRATE AND ACETAMINOPHEN 5; 325 MG/1; MG/1
TABLET ORAL
COMMUNITY
Start: 2022-04-28 | End: 2022-06-28

## 2022-06-28 RX ORDER — ALPRAZOLAM 0.5 MG/1
TABLET ORAL
COMMUNITY
Start: 2022-04-14 | End: 2022-06-28

## 2022-06-28 RX ORDER — OXYCODONE HYDROCHLORIDE AND ACETAMINOPHEN 5; 325 MG/1; MG/1
TABLET ORAL
COMMUNITY
Start: 2022-04-14 | End: 2022-06-28

## 2022-06-28 ASSESSMENT — FIBROSIS 4 INDEX: FIB4 SCORE: 0.58

## 2022-06-28 NOTE — PROGRESS NOTES
Chief Complaint   Patient presents with   • Back Pain     HPI:   With a history of   Patient Active Problem List   Diagnosis   • Migraine with aura and without status migrainosus, not intractable   • Anxiety   • Primary hypothyroidism   • Dysmenorrhea   • Hypercholesterolemia   • Fear of public speaking   • Irritable bowel disease   • Cervical spondylosis   • Gastroesophageal reflux disease without esophagitis   • Nipple discharge in female   • Chronic fatigue   • Family history of breast cancer in paternal grandmother at age 35   • Vitamin D deficiency   • Cervical spondylosis without myelopathy   • Alcohol dependence with withdrawal with complication (HCC)   • Moderate episode of recurrent major depressive disorder (HCC)   • Skipped heart beats   • Syncope, near   • Lumbar back pain with radiculopathy affecting lower extremity   • Closed head injury   • Contact lens overwear of both eyes   • Ophthalmoplegia   • Myopia of both eyes   • Abnormal cortisol level   • B12 deficiency   • History of traumatic brain injury   • Post concussive encephalopathy   • Bulging lumbar disc   • Facet arthropathy of spine   • Foraminal stenosis of lumbar region     Follow up on shoulder, she thought was part of her workmans comp so she did not get imaging. She is going to address w them.     She is having middle thorasic back pain now. Started 6 months ago and is increasing and becoming constant. Left arm still with tingling though thinks its her shoulder.   She continues with neck pain. She has not had pt for this pain. It is tender to the touch. Pain management who is addressing the neck or ablated the right nerves of the neck.   Dr. Stanley neurosurgery  Allergies   Allergen Reactions   • Benadryl Allergy Anxiety   • Demerol Hives   • Medrol [Methylprednisolone] Hives and Itching   • Previfem [Norgestimate-Ethinyl Estradiol]      Nausea/vomiting   • Reglan [Metoclopramide] Anxiety   • Seasonal      Current Outpatient Medications    Medication Sig Dispense Refill   • DAYSEE 0.15-0.03 &0.01 MG Tab TAKE 1 TABLET BY MOUTH EVERY DAY 91 Tablet 3   • AIMOVIG 140 MG/ML Solution Auto-injector INJECT 140 MG UNDER THE SKIN EVERY 4 WEEKS. 1 mL 11   • tacrolimus (PROTOPIC) 0.1 % Ointment AAA twice a day for 2 weeks, alternating with triamcinolone ointment for 2 weeks 60 g 1   • triamcinolone acetonide (KENALOG) 0.1 % Ointment AAA twice a day for 2 week, alternating with tacrolimus twice a day for 2 weeks 80 g 1   • liothyronine (CYTOMEL) 5 MCG Tab TAKE 1 TABLET BY MOUTH EVERY DAY 30 Tablet 6   • lovastatin (MEVACOR) 20 MG Tab Take 1 Tablet by mouth every evening. 90 Tablet 3   • venlafaxine (EFFEXOR-XR) 150 MG extended-release capsule Take 1 Capsule by mouth every day. 90 Capsule 3   • traMADol (ULTRAM) 50 MG Tab      • SYNTHROID 75 MCG Tab TAKE 1 TABLET BY MOUTH EVERY DAY IN THE MORNING ON AN EMPTY STOMACH 90 Tablet 1   • Cyanocobalamin (VITAMIN B 12 PO) Take  by mouth.     • ergocalciferol (DRISDOL) 84239 UNIT capsule Take 1 capsule by mouth every 14 days. 12 capsule 1   • tizanidine (ZANAFLEX) 4 MG Tab TAKE 1 TABLET BY MOUTH EVERY 8 HOURS AS NEEDED 270 tablet 1   • rizatriptan (MAXALT-MLT) 10 MG disintegrating tablet TAKE 1 TABLET BY MOUTH AT HEADACHE ONSET REPEAT EVERY HOUR AS NEEDED UP TO 4 TABLETS IN 24 HOURS 10 tablet 5   • topiramate (TOPAMAX) 50 MG tablet Take 3 Tablets by mouth 2 times a day. 540 tablet 3   • cetirizine (ZYRTEC) 10 MG Tab Take 10 mg by mouth every day.       No current facility-administered medications for this visit.     Social History     Tobacco Use   • Smoking status: Former Smoker     Packs/day: 1.00     Years: 15.00     Pack years: 15.00     Types: Cigarettes     Quit date: 2010     Years since quittin.4   • Smokeless tobacco: Never Used   Vaping Use   • Vaping Use: Never used   Substance Use Topics   • Alcohol use: No   • Drug use: No     Family History   Problem Relation Age of Onset   • Diabetes Maternal  "Grandfather    • Migraines Maternal Grandfather    • Cancer Paternal Grandfather    • Alzheimer's Disease Paternal Grandfather    • Cancer Paternal Grandmother 30        breast   • Glasses Mother    • Migraines Mother    • Suicide Attempts Maternal Uncle         Killed himself by GSW   • Hypertension Father    • Glasses Father        /78 (BP Location: Left arm, Patient Position: Sitting, BP Cuff Size: Adult)   Pulse 84   Temp 36.8 °C (98.2 °F) (Temporal)   Resp 16   Ht 1.651 m (5' 5\")   Wt 55.3 kg (122 lb)   SpO2 99%  Body mass index is 20.3 kg/m².  Review of Systems   Constitutional: Negative for chills, fever and malaise/fatigue.   HENT: Negative for hearing loss and tinnitus.    Eyes: Negative for double vision and pain.   Respiratory: Negative for cough, shortness of breath and wheezing.    Cardiovascular: Negative for chest pain, palpitations and leg swelling.   Gastrointestinal: Negative for abdominal pain, diarrhea, nausea and vomiting.   Genitourinary: Negative for dysuria and frequency.   Musculoskeletal: with joint pain   Skin: Negative for itching and rash.   Neurological: Negative for dizziness and headaches.     Physical Exam  Constitutional:       General: He is not in acute distress.     Appearance: He is not diaphoretic.   HENT:      Head: Normocephalic and atraumatic.   Eyes:      General: No scleral icterus.        Right eye: No discharge.         Left eye: No discharge.      Conjunctiva/sclera: Conjunctivae normal.      Pupils: Pupils are equal, round, and reactive to light.   Neck:      Thyroid: No thyromegaly.   Pulmonary:      Effort: Pulmonary effort is normal. No respiratory distress.   Abdominal:      General: There is no distension.   Skin:     General: Skin is warm and dry.   Neurological:      Mental Status: He is alert.   Psychiatric:         Mood and Affect: Affect normal.         Judgment: Judgment normal.   Musculoskeletal: facet tenderness thoracic and lumbar spine. Some " erector tenderness at thoracic level.       All labs (last 1 month):   No visits with results within 1 Month(s) from this visit.   Latest known visit with results is:   Hospital Outpatient Visit on 05/12/2022   Component Date Value Ref Range Status   • T3,Free 05/12/2022 3.77  2.00 - 4.40 pg/mL Final   • Free T-4 05/12/2022 1.40  0.93 - 1.70 ng/dL Final   • TSH 05/12/2022 4.110  0.380 - 5.330 uIU/mL Final    Comment: Reference Range:    Pregnant Females, 1st Trimester  0.050-3.700  Pregnant Females, 2nd Trimester  0.310-4.350  Pregnant Females, 3rd Trimester  0.410-5.180         Lipids:   Lab Results   Component Value Date/Time    CHOLSTRLTOT 221 (H) 01/18/2022 10:11 AM    TRIGLYCERIDE 124 01/18/2022 10:11 AM    HDL 57 01/18/2022 10:11 AM     (H) 01/18/2022 10:11 AM       Imaging: No results found.    A/P  Mri 10/2019  IMPRESSION:     1.  L1-2 left paramedian disc protrusion/cephalad extrusion with mild left lateral recess stenosis.  2.  L3-4 mild disc bulging, annular fissure.  3.  L4-5 small central disc protrusion with underlying annular fissure. No central or foraminal stenosis.  4.  L5-S1 minimal bilateral lateral disc bulging. Minor hypertrophic facet arthropathy. Mild right foraminal stenosis. Borderline left foraminal stenosis.  Mri 12/2020  IMPRESSION:     1.  C4-5. Midline disc protrusion or disc/osteophyte complex. No central or foraminal stenosis.  2.  C5-6 broad-based ventral extradural defect favoring the right consistent with a disc/osteophyte complex. Mild right lateral recess stenosis. No central stenosis. The neural foramina are well delineated and on today's exam, the left neural foramen   appears intact (prior study report suggested moderate left neural foraminal stenosis). The right neural foramen on today's exam shows only borderline-mild foraminal stenosis superiorly (prior study report suggested severe right-sided foraminal stenosis   which does not appear to be the case).  3.  C6-C7  broad-based midline and left paramedian disc protrusion or disc/osteophyte complex. No central or foraminal stenosis.  4.  C7-T1 negligible midline disc bulge.  5.  No myelopathic cord signal abnormality.  Return if symptoms worsen or fail to improve.    Problem List Items Addressed This Visit     Bulging lumbar disc    Relevant Orders    Referral to Physical Therapy    Referral to Pain Management    Facet arthropathy of spine    Relevant Orders    Referral to Physical Therapy    Referral to Pain Management    Foraminal stenosis of lumbar region    Relevant Orders    Referral to Physical Therapy    Referral to Pain Management      Other Visit Diagnoses     Mid back pain        Relevant Orders    DX-THORACIC SPINE-2 VIEWS    Referral to Physical Therapy    MR-THORACIC SPINE-W/O    Referral to Pain Management          Portions of this note may be dictated using Dragon NaturallySpeaking voice recognition software.  Variances in spelling and vocabulary are possible and unintentional.  Not all areas may be caught/corrected.  Please notify me if any discrepancies are noted or if the meaning of any statement is not correct/clear.

## 2022-07-05 ENCOUNTER — HOSPITAL ENCOUNTER (OUTPATIENT)
Dept: RADIOLOGY | Facility: MEDICAL CENTER | Age: 42
End: 2022-07-05
Attending: PAIN MEDICINE
Payer: COMMERCIAL

## 2022-07-05 ENCOUNTER — HOSPITAL ENCOUNTER (OUTPATIENT)
Dept: RADIOLOGY | Facility: MEDICAL CENTER | Age: 42
End: 2022-07-05
Attending: FAMILY MEDICINE
Payer: COMMERCIAL

## 2022-07-05 DIAGNOSIS — M54.9 MID BACK PAIN: ICD-10-CM

## 2022-07-05 DIAGNOSIS — M25.512 LEFT SHOULDER PAIN, UNSPECIFIED CHRONICITY: ICD-10-CM

## 2022-07-05 DIAGNOSIS — R29.898 SHOULDER WEAKNESS: ICD-10-CM

## 2022-07-05 PROCEDURE — 72146 MRI CHEST SPINE W/O DYE: CPT

## 2022-07-05 PROCEDURE — 73221 MRI JOINT UPR EXTREM W/O DYE: CPT | Mod: LT

## 2022-07-07 ENCOUNTER — HOSPITAL ENCOUNTER (OUTPATIENT)
Dept: RADIOLOGY | Facility: MEDICAL CENTER | Age: 42
End: 2022-07-07
Attending: FAMILY MEDICINE
Payer: COMMERCIAL

## 2022-07-07 ENCOUNTER — HOSPITAL ENCOUNTER (OUTPATIENT)
Dept: RADIOLOGY | Facility: MEDICAL CENTER | Age: 42
End: 2022-07-07
Attending: PAIN MEDICINE
Payer: COMMERCIAL

## 2022-07-07 DIAGNOSIS — Z12.31 VISIT FOR SCREENING MAMMOGRAM: ICD-10-CM

## 2022-07-07 DIAGNOSIS — M25.512 ACUTE PAIN OF LEFT SHOULDER: ICD-10-CM

## 2022-07-07 DIAGNOSIS — M54.9 MID BACK PAIN: ICD-10-CM

## 2022-07-07 PROCEDURE — 73030 X-RAY EXAM OF SHOULDER: CPT | Mod: LT

## 2022-07-07 PROCEDURE — 72070 X-RAY EXAM THORAC SPINE 2VWS: CPT

## 2022-07-07 PROCEDURE — 77063 BREAST TOMOSYNTHESIS BI: CPT

## 2022-07-15 DIAGNOSIS — E03.9 PRIMARY HYPOTHYROIDISM: ICD-10-CM

## 2022-07-15 RX ORDER — LEVOTHYROXINE SODIUM 75 MCG
TABLET ORAL
Qty: 90 TABLET | Refills: 1 | Status: SHIPPED | OUTPATIENT
Start: 2022-07-15 | End: 2023-01-09 | Stop reason: SDUPTHER

## 2022-07-18 DIAGNOSIS — G43.109 MIGRAINE WITH AURA AND WITHOUT STATUS MIGRAINOSUS, NOT INTRACTABLE: ICD-10-CM

## 2022-07-18 NOTE — TELEPHONE ENCOUNTER
Received request via: Pharmacy    Was the patient seen in the last year in this department? No    Does the patient have an active prescription (recently filled or refills available) for medication(s) requested? Yes.

## 2022-07-19 RX ORDER — RIZATRIPTAN BENZOATE 10 MG/1
TABLET, ORALLY DISINTEGRATING ORAL
Qty: 10 TABLET | Refills: 5 | Status: SHIPPED | OUTPATIENT
Start: 2022-07-19 | End: 2022-08-23 | Stop reason: SDUPTHER

## 2022-07-26 ENCOUNTER — APPOINTMENT (OUTPATIENT)
Dept: ENDOCRINOLOGY | Facility: MEDICAL CENTER | Age: 42
End: 2022-07-26
Attending: INTERNAL MEDICINE
Payer: COMMERCIAL

## 2022-08-18 ENCOUNTER — OFFICE VISIT (OUTPATIENT)
Dept: DERMATOLOGY | Facility: IMAGING CENTER | Age: 42
End: 2022-08-18
Payer: COMMERCIAL

## 2022-08-18 DIAGNOSIS — M25.50 ARTHRALGIA, UNSPECIFIED JOINT: ICD-10-CM

## 2022-08-18 DIAGNOSIS — R21 RASH: ICD-10-CM

## 2022-08-18 DIAGNOSIS — L30.9 ECZEMA, UNSPECIFIED TYPE: ICD-10-CM

## 2022-08-18 PROCEDURE — 99214 OFFICE O/P EST MOD 30 MIN: CPT | Performed by: NURSE PRACTITIONER

## 2022-08-18 NOTE — PROGRESS NOTES
DERMATOLOGY NOTE  FOLLOW UP VISIT       Chief complaint: eczema     Pt here to  on exzema has been alternating between - tacrolimus and triamcinolone acetonide, has not seen much improvement, still very itchy on arms torso, legs and face--also reporting multiple symmetric joint pain, has family history of rheumatoid arthritis, thinks there is as hx of PSA as well     History of skin cancer: No  History of precancers/actinic keratoses: No  History of biopsies:Yes, Details: mole removed 2000 results benign   History of blistering/severe sunburns:Yes, Details: teenager   Family history of skin cancer:No  Family history of atypical moles:No      Allergies   Allergen Reactions    Benadryl Allergy Anxiety    Demerol Hives    Medrol [Methylprednisolone] Hives and Itching    Previfem [Norgestimate-Ethinyl Estradiol]      Nausea/vomiting    Reglan [Metoclopramide] Anxiety    Seasonal         MEDICATIONS:  Medications relevant to specialty reviewed.     REVIEW OF SYSTEMS:   Positive for skin (see HPI)  Negative for fevers and chills       EXAM:  There were no vitals taken for this visit.  Constitutional: Well-developed, well-nourished, and in no distress.     A focused skin exam was performed including the affected areas of the abdomen, back, bilateral upper extremities, and bilateral lower extremities. Notable findings on exam today listed below and/or in assessment/plan.     Scattered eczematous patches to BUEs and BLEs, xerosis cutis present  IMPRESSION / PLAN:    1. Eczema, unspecified type  Pt concerned for auto-immune disease, especially in setting of family history as well as associated joint pain with eczema, not responding to first line treatment of eczema  Will get additional work up, as ordered  below  Advised to continue with previously prescribed topicals, advised to continue with emollient use and advised to increase Zyrtec to 2-3 pills per day  Follow up 6 weeks    2. Arthralgia, unspecified joint    - Sed  Rate; Future  - CRP QUANTITIVE (NON-CARDIAC); Future  - CCP ANTIBODY; Future  - RHEUMATOID ARTHRITIS FACTOR; Future  - HLA-B27; Future  - DX-JOINT SURVEY-HANDS SINGLE VIEW; Future  - DX-JOINT SURVEY-FEET SINGLE VIEW; Future  - CBC WITH DIFFERENTIAL; Future    3. Rash    - Sed Rate; Future  - CRP QUANTITIVE (NON-CARDIAC); Future  - CCP ANTIBODY; Future  - RHEUMATOID ARTHRITIS FACTOR; Future  - HLA-B27; Future  - DX-JOINT SURVEY-HANDS SINGLE VIEW; Future  - DX-JOINT SURVEY-FEET SINGLE VIEW; Future  - CBC WITH DIFFERENTIAL; Future            Discussed risks, benefits, alternative treatments as well as common side effects associated with prescribed treatment, Patient verbalized understanding and agrees with plan regarding the above          Please note that this dictation was created using voice recognition software. I have made every reasonable attempt to correct obvious errors, but I expect that there are errors of grammar and possibly content that I did not discover before finalizing the note.      Return to clinic in: No follow-ups on file. and as needed for any new or changing skin lesions.

## 2022-08-23 ENCOUNTER — OFFICE VISIT (OUTPATIENT)
Dept: NEUROLOGY | Facility: MEDICAL CENTER | Age: 42
End: 2022-08-23
Attending: PSYCHIATRY & NEUROLOGY
Payer: COMMERCIAL

## 2022-08-23 VITALS — DIASTOLIC BLOOD PRESSURE: 76 MMHG | SYSTOLIC BLOOD PRESSURE: 118 MMHG

## 2022-08-23 DIAGNOSIS — G43.119 INTRACTABLE MIGRAINE WITH AURA WITHOUT STATUS MIGRAINOSUS: Primary | ICD-10-CM

## 2022-08-23 PROCEDURE — 99212 OFFICE O/P EST SF 10 MIN: CPT | Performed by: PSYCHIATRY & NEUROLOGY

## 2022-08-23 PROCEDURE — 99213 OFFICE O/P EST LOW 20 MIN: CPT | Performed by: PSYCHIATRY & NEUROLOGY

## 2022-08-23 RX ORDER — 0.9 % SODIUM CHLORIDE 0.9 %
3 VIAL (ML) INJECTION PRN
Status: CANCELLED | OUTPATIENT
Start: 2022-08-23

## 2022-08-23 RX ORDER — 0.9 % SODIUM CHLORIDE 0.9 %
10 VIAL (ML) INJECTION PRN
Status: CANCELLED | OUTPATIENT
Start: 2022-08-23

## 2022-08-23 RX ORDER — DIPHENHYDRAMINE HYDROCHLORIDE 50 MG/ML
50 INJECTION INTRAMUSCULAR; INTRAVENOUS PRN
Status: CANCELLED | OUTPATIENT
Start: 2022-08-23

## 2022-08-23 RX ORDER — SODIUM CHLORIDE 9 MG/ML
INJECTION, SOLUTION INTRAVENOUS CONTINUOUS
Status: CANCELLED | OUTPATIENT
Start: 2022-08-23

## 2022-08-23 RX ORDER — EPINEPHRINE 1 MG/ML(1)
0.5 AMPUL (ML) INJECTION PRN
Status: CANCELLED | OUTPATIENT
Start: 2022-08-23

## 2022-08-23 RX ORDER — METHYLPREDNISOLONE SODIUM SUCCINATE 125 MG/2ML
125 INJECTION, POWDER, LYOPHILIZED, FOR SOLUTION INTRAMUSCULAR; INTRAVENOUS PRN
Status: CANCELLED | OUTPATIENT
Start: 2022-08-23

## 2022-08-23 RX ORDER — TOPIRAMATE 50 MG/1
150 TABLET, FILM COATED ORAL 2 TIMES DAILY
Qty: 540 TABLET | Refills: 3 | Status: SHIPPED | OUTPATIENT
Start: 2022-08-23 | End: 2023-05-24

## 2022-08-23 RX ORDER — HEPARIN SODIUM (PORCINE) LOCK FLUSH IV SOLN 100 UNIT/ML 100 UNIT/ML
500 SOLUTION INTRAVENOUS PRN
Status: CANCELLED | OUTPATIENT
Start: 2022-08-23

## 2022-08-23 RX ORDER — RIZATRIPTAN BENZOATE 10 MG/1
TABLET, ORALLY DISINTEGRATING ORAL
Qty: 10 TABLET | Refills: 11 | Status: SHIPPED | OUTPATIENT
Start: 2022-08-23

## 2022-08-23 RX ORDER — 0.9 % SODIUM CHLORIDE 0.9 %
VIAL (ML) INJECTION PRN
Status: CANCELLED | OUTPATIENT
Start: 2022-08-23

## 2022-08-23 RX ORDER — ERENUMAB-AOOE 140 MG/ML
140 INJECTION, SOLUTION SUBCUTANEOUS
Qty: 1 ML | Refills: 11 | Status: SHIPPED | OUTPATIENT
Start: 2022-08-23 | End: 2023-01-09 | Stop reason: SDUPTHER

## 2022-08-23 ASSESSMENT — FIBROSIS 4 INDEX: FIB4 SCORE: 0.59

## 2022-08-23 ASSESSMENT — ENCOUNTER SYMPTOMS
NECK PAIN: 1
MEMORY LOSS: 0
HEADACHES: 1

## 2022-08-24 NOTE — PROGRESS NOTES
Subjective     Dany Lambert is a 42 y.o. female who presents for 1 year follow-up, with a history of intractable migraine with aura.     HPI    When last seen, Yahir had still been having frequent, though still improved, headache occurrences, she still is having upwards of 4-6 headache days in a month, rarely with a headache recurrent consecutive days.  Maxalt-MLT 10 mg wafers usually are enough is a single dose, though she may repeated on second day if the headache recurs.  She still can remain headache free through the most part of a month.  Over the last week she has had a rare 7-day recurrence of headache, the stabbing pains near continuous over the day, she was forced to use Maxalt on a daily basis.    She had received Botox infusions when she was last seen in July, 2021.  She then discontinued Botox because of practical issues of making appointments, also finding that coming to the hospital simply activated her PTSD symptoms, increasing muscle spasm and stiffness, etc.  As a result, simply getting the infusions was unpleasant, though for other reasons.  She also had a lot on her plate including resolution of her Worker's Compensation case.  Over the following 6 months until now, she has not really noted any sudden worsening of the headaches fortunately.    Medical, surgical and family histories are reviewed, there are no new drug allergies.  She is finally getting back into the workforce, she continues to deal with the physical and psychiatric sequelae of her PTSD.  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    She is on Topamax 150 mg, twice daily, Aimovig 140 mg every 4 weeks, Maxalt-MLT 10 mg wafers as needed, Mevacor, Effexor  mg daily, Cytomel, Synthroid, vitamin D, Ultram, vitamin B12 and Zanaflex as needed.    Review of Systems   Musculoskeletal:  Positive for neck  "pain.   Neurological:  Positive for headaches.   Psychiatric/Behavioral:  Negative for memory loss.    All other systems reviewed and are negative.    Objective     /76 (BP Location: Right arm, Patient Position: Standing, BP Cuff Size: Adult)   Pulse (P) 77   Temp (P) 36.3 °C (97.3 °F) (Temporal)   Resp (P) 16   Ht (P) 1.651 m (5' 5\")   Wt (P) 56.3 kg (124 lb 1.9 oz)   SpO2 (P) 99%   BMI (P) 20.65 kg/m²      Physical Exam    She appears in no acute distress.  Vital signs are stable.  There is mild bitemporal tenderness, jaw angle tenderness, but no jaw claudication.  The neck is supple, range of motion is full.  Cardiac evaluation is unremarkable.     Neurological Exam    Including mental status, cranial nerves, musculoskeletal, reflex, coordination, and since evaluations, her neurologic examination remains intact.    Assessment & Plan     1. Intractable migraine with aura without status migrainosus  We will change from AimovigTo Vyepti 100 mg infusions every 90 days.  The rationale was reviewed.  Though I suspect the Aimovig is continuing to provide consistent benefit, even though she had a very severe 7-day long headache that recently, we can always fall back on the drug.  Tachyphylaxis is known to occur with the CGRP drugs, but Vyepti is chemically different than the others, so there is a reasonable expectation of improvement.  We will continue the Maxalt-MLT.  We will continue Topamax unchanged, I do not think escalating the dose further is likely to provide additional benefit, there is certainly an even greater risk of cognitive side effects.    Hopefully, with her returning to the workforce, resolution of the Worker's Compensation case she has, she will feel better and headaches improve as a result.  We will maintain connection via [employer] in regards to the Vyepti infusions, they are scheduling, and efficacy.  She was told to continue the Aimovig until Vyepti has been scheduled, there can be some " overlap between the home injections and initiation of the latter.    - rizatriptan (MAXALT-MLT) 10 MG disintegrating tablet; TAKE 1 TABLET BY MOUTH AT HEADACHE ONSET REPEAT EVERY HOUR AS NEEDED UP TO 4 TABLETS IN 24 HOURS  Dispense: 10 Tablet; Refill: 11  - topiramate (TOPAMAX) 50 MG tablet; Take 3 Tablets by mouth 2 times a day.  Dispense: 540 Tablet; Refill: 3  - AIMOVIG 140 MG/ML Solution Auto-injector; Inject 140 mg under the skin every 4 weeks.  Dispense: 1 mL; Refill: 11    Time: 20 minutes in total spent on patient care including pre-charting, record review, discussion with healthcare staff and documentation.  This included face-to-face time with the patient for exam, review, discussion, as well as counseling and coordinating care.

## 2022-09-07 ENCOUNTER — HOSPITAL ENCOUNTER (OUTPATIENT)
Dept: RADIOLOGY | Facility: MEDICAL CENTER | Age: 42
End: 2022-09-07
Attending: NURSE PRACTITIONER
Payer: COMMERCIAL

## 2022-09-07 ENCOUNTER — HOSPITAL ENCOUNTER (OUTPATIENT)
Dept: LAB | Facility: MEDICAL CENTER | Age: 42
End: 2022-09-07
Attending: INTERNAL MEDICINE
Payer: COMMERCIAL

## 2022-09-07 ENCOUNTER — HOSPITAL ENCOUNTER (OUTPATIENT)
Dept: LAB | Facility: MEDICAL CENTER | Age: 42
End: 2022-09-07
Attending: NURSE PRACTITIONER
Payer: COMMERCIAL

## 2022-09-07 DIAGNOSIS — M25.50 ARTHRALGIA, UNSPECIFIED JOINT: ICD-10-CM

## 2022-09-07 DIAGNOSIS — R21 RASH: ICD-10-CM

## 2022-09-07 DIAGNOSIS — E55.9 VITAMIN D DEFICIENCY: ICD-10-CM

## 2022-09-07 DIAGNOSIS — E03.9 PRIMARY HYPOTHYROIDISM: ICD-10-CM

## 2022-09-07 DIAGNOSIS — E53.8 B12 DEFICIENCY: ICD-10-CM

## 2022-09-07 LAB
25(OH)D3 SERPL-MCNC: 57 NG/ML (ref 30–100)
ALBUMIN SERPL BCP-MCNC: 4.2 G/DL (ref 3.2–4.9)
ALBUMIN/GLOB SERPL: 1.8 G/DL
ALP SERPL-CCNC: 59 U/L (ref 30–99)
ALT SERPL-CCNC: 9 U/L (ref 2–50)
ANION GAP SERPL CALC-SCNC: 11 MMOL/L (ref 7–16)
AST SERPL-CCNC: 13 U/L (ref 12–45)
BASOPHILS # BLD AUTO: 1 % (ref 0–1.8)
BASOPHILS # BLD: 0.07 K/UL (ref 0–0.12)
BILIRUB SERPL-MCNC: <0.2 MG/DL (ref 0.1–1.5)
BUN SERPL-MCNC: 12 MG/DL (ref 8–22)
CALCIUM SERPL-MCNC: 8.9 MG/DL (ref 8.5–10.5)
CHLORIDE SERPL-SCNC: 105 MMOL/L (ref 96–112)
CO2 SERPL-SCNC: 21 MMOL/L (ref 20–33)
CREAT SERPL-MCNC: 0.89 MG/DL (ref 0.5–1.4)
CRP SERPL HS-MCNC: 0.37 MG/DL (ref 0–0.75)
EOSINOPHIL # BLD AUTO: 0.24 K/UL (ref 0–0.51)
EOSINOPHIL NFR BLD: 3.5 % (ref 0–6.9)
ERYTHROCYTE [DISTWIDTH] IN BLOOD BY AUTOMATED COUNT: 51 FL (ref 35.9–50)
ERYTHROCYTE [SEDIMENTATION RATE] IN BLOOD BY WESTERGREN METHOD: 7 MM/HOUR (ref 0–25)
FASTING STATUS PATIENT QL REPORTED: NORMAL
GFR SERPLBLD CREATININE-BSD FMLA CKD-EPI: 82 ML/MIN/1.73 M 2
GLOBULIN SER CALC-MCNC: 2.4 G/DL (ref 1.9–3.5)
GLUCOSE SERPL-MCNC: 89 MG/DL (ref 65–99)
HCT VFR BLD AUTO: 40.4 % (ref 37–47)
HGB BLD-MCNC: 13.1 G/DL (ref 12–16)
IMM GRANULOCYTES # BLD AUTO: 0.04 K/UL (ref 0–0.11)
IMM GRANULOCYTES NFR BLD AUTO: 0.6 % (ref 0–0.9)
LYMPHOCYTES # BLD AUTO: 2.03 K/UL (ref 1–4.8)
LYMPHOCYTES NFR BLD: 29.5 % (ref 22–41)
MCH RBC QN AUTO: 31.8 PG (ref 27–33)
MCHC RBC AUTO-ENTMCNC: 32.4 G/DL (ref 33.6–35)
MCV RBC AUTO: 98.1 FL (ref 81.4–97.8)
MONOCYTES # BLD AUTO: 0.4 K/UL (ref 0–0.85)
MONOCYTES NFR BLD AUTO: 5.8 % (ref 0–13.4)
NEUTROPHILS # BLD AUTO: 4.09 K/UL (ref 2–7.15)
NEUTROPHILS NFR BLD: 59.6 % (ref 44–72)
NRBC # BLD AUTO: 0 K/UL
NRBC BLD-RTO: 0 /100 WBC
PLATELET # BLD AUTO: 299 K/UL (ref 164–446)
PMV BLD AUTO: 11.8 FL (ref 9–12.9)
POTASSIUM SERPL-SCNC: 4.2 MMOL/L (ref 3.6–5.5)
PROT SERPL-MCNC: 6.6 G/DL (ref 6–8.2)
RBC # BLD AUTO: 4.12 M/UL (ref 4.2–5.4)
RHEUMATOID FACT SER IA-ACNC: <10 IU/ML (ref 0–14)
SODIUM SERPL-SCNC: 137 MMOL/L (ref 135–145)
T3FREE SERPL-MCNC: 3.23 PG/ML (ref 2–4.4)
T4 FREE SERPL-MCNC: 1.01 NG/DL (ref 0.93–1.7)
TSH SERPL DL<=0.005 MIU/L-ACNC: 3.22 UIU/ML (ref 0.38–5.33)
VIT B12 SERPL-MCNC: 375 PG/ML (ref 211–911)
WBC # BLD AUTO: 6.9 K/UL (ref 4.8–10.8)

## 2022-09-07 PROCEDURE — 36415 COLL VENOUS BLD VENIPUNCTURE: CPT

## 2022-09-07 PROCEDURE — 84481 FREE ASSAY (FT-3): CPT

## 2022-09-07 PROCEDURE — 77077 JOINT SURVEY SINGLE VIEW: CPT

## 2022-09-07 PROCEDURE — 84439 ASSAY OF FREE THYROXINE: CPT

## 2022-09-07 PROCEDURE — 85025 COMPLETE CBC W/AUTO DIFF WBC: CPT

## 2022-09-07 PROCEDURE — 86812 HLA TYPING A B OR C: CPT

## 2022-09-07 PROCEDURE — 82607 VITAMIN B-12: CPT

## 2022-09-07 PROCEDURE — 85652 RBC SED RATE AUTOMATED: CPT

## 2022-09-07 PROCEDURE — 86140 C-REACTIVE PROTEIN: CPT

## 2022-09-07 PROCEDURE — 86431 RHEUMATOID FACTOR QUANT: CPT

## 2022-09-07 PROCEDURE — 80053 COMPREHEN METABOLIC PANEL: CPT

## 2022-09-07 PROCEDURE — 82306 VITAMIN D 25 HYDROXY: CPT

## 2022-09-07 PROCEDURE — 86200 CCP ANTIBODY: CPT

## 2022-09-07 PROCEDURE — 84443 ASSAY THYROID STIM HORMONE: CPT

## 2022-09-08 ENCOUNTER — APPOINTMENT (OUTPATIENT)
Dept: PHYSICAL THERAPY | Facility: MEDICAL CENTER | Age: 42
End: 2022-09-08
Attending: FAMILY MEDICINE
Payer: COMMERCIAL

## 2022-09-09 LAB
CCP IGG SERPL-ACNC: 3 UNITS (ref 0–19)
HLA-B27 QL FC: NEGATIVE

## 2022-09-13 ENCOUNTER — APPOINTMENT (OUTPATIENT)
Dept: PHYSICAL THERAPY | Facility: MEDICAL CENTER | Age: 42
End: 2022-09-13
Attending: FAMILY MEDICINE
Payer: COMMERCIAL

## 2022-09-15 ENCOUNTER — APPOINTMENT (OUTPATIENT)
Dept: PHYSICAL THERAPY | Facility: MEDICAL CENTER | Age: 42
End: 2022-09-15
Attending: FAMILY MEDICINE
Payer: COMMERCIAL

## 2022-09-20 ENCOUNTER — APPOINTMENT (OUTPATIENT)
Dept: PHYSICAL THERAPY | Facility: MEDICAL CENTER | Age: 42
End: 2022-09-20
Attending: FAMILY MEDICINE
Payer: COMMERCIAL

## 2022-09-22 ENCOUNTER — APPOINTMENT (OUTPATIENT)
Dept: PHYSICAL THERAPY | Facility: MEDICAL CENTER | Age: 42
End: 2022-09-22
Attending: FAMILY MEDICINE
Payer: COMMERCIAL

## 2022-09-27 ENCOUNTER — APPOINTMENT (OUTPATIENT)
Dept: PHYSICAL THERAPY | Facility: MEDICAL CENTER | Age: 42
End: 2022-09-27
Attending: FAMILY MEDICINE
Payer: COMMERCIAL

## 2022-09-29 ENCOUNTER — APPOINTMENT (OUTPATIENT)
Dept: DERMATOLOGY | Facility: IMAGING CENTER | Age: 42
End: 2022-09-29
Payer: COMMERCIAL

## 2022-10-04 ENCOUNTER — OUTPATIENT INFUSION SERVICES (OUTPATIENT)
Dept: ONCOLOGY | Facility: MEDICAL CENTER | Age: 42
End: 2022-10-04
Attending: PSYCHIATRY & NEUROLOGY
Payer: COMMERCIAL

## 2022-10-04 VITALS
HEART RATE: 74 BPM | RESPIRATION RATE: 18 BRPM | OXYGEN SATURATION: 100 % | TEMPERATURE: 97.9 F | BODY MASS INDEX: 21.23 KG/M2 | HEIGHT: 65 IN | WEIGHT: 127.43 LBS | SYSTOLIC BLOOD PRESSURE: 110 MMHG | DIASTOLIC BLOOD PRESSURE: 75 MMHG

## 2022-10-04 DIAGNOSIS — G43.119 INTRACTABLE MIGRAINE WITH AURA WITHOUT STATUS MIGRAINOSUS: ICD-10-CM

## 2022-10-04 PROCEDURE — 96365 THER/PROPH/DIAG IV INF INIT: CPT

## 2022-10-04 PROCEDURE — 700105 HCHG RX REV CODE 258: Performed by: PSYCHIATRY & NEUROLOGY

## 2022-10-04 PROCEDURE — 700111 HCHG RX REV CODE 636 W/ 250 OVERRIDE (IP): Performed by: PSYCHIATRY & NEUROLOGY

## 2022-10-04 RX ORDER — 0.9 % SODIUM CHLORIDE 0.9 %
VIAL (ML) INJECTION PRN
OUTPATIENT
Start: 2022-12-27

## 2022-10-04 RX ORDER — EPINEPHRINE 1 MG/ML(1)
0.5 AMPUL (ML) INJECTION PRN
OUTPATIENT
Start: 2022-12-27

## 2022-10-04 RX ORDER — SODIUM CHLORIDE 9 MG/ML
INJECTION, SOLUTION INTRAVENOUS CONTINUOUS
Status: DISCONTINUED | OUTPATIENT
Start: 2022-10-04 | End: 2022-10-04 | Stop reason: HOSPADM

## 2022-10-04 RX ORDER — SODIUM CHLORIDE 9 MG/ML
INJECTION, SOLUTION INTRAVENOUS CONTINUOUS
OUTPATIENT
Start: 2022-12-27

## 2022-10-04 RX ORDER — 0.9 % SODIUM CHLORIDE 0.9 %
10 VIAL (ML) INJECTION PRN
OUTPATIENT
Start: 2022-12-27

## 2022-10-04 RX ORDER — METHYLPREDNISOLONE SODIUM SUCCINATE 125 MG/2ML
125 INJECTION, POWDER, LYOPHILIZED, FOR SOLUTION INTRAMUSCULAR; INTRAVENOUS PRN
OUTPATIENT
Start: 2022-12-27

## 2022-10-04 RX ORDER — HEPARIN SODIUM (PORCINE) LOCK FLUSH IV SOLN 100 UNIT/ML 100 UNIT/ML
500 SOLUTION INTRAVENOUS PRN
OUTPATIENT
Start: 2022-12-27

## 2022-10-04 RX ORDER — DIPHENHYDRAMINE HYDROCHLORIDE 50 MG/ML
50 INJECTION INTRAMUSCULAR; INTRAVENOUS PRN
OUTPATIENT
Start: 2022-12-27

## 2022-10-04 RX ORDER — 0.9 % SODIUM CHLORIDE 0.9 %
3 VIAL (ML) INJECTION PRN
OUTPATIENT
Start: 2022-12-27

## 2022-10-04 RX ADMIN — EPTINEZUMAB-JJMR 100 MG: 100 INJECTION INTRAVENOUS at 14:38

## 2022-10-04 RX ADMIN — SODIUM CHLORIDE: 9 INJECTION, SOLUTION INTRAVENOUS at 14:38

## 2022-10-04 ASSESSMENT — FIBROSIS 4 INDEX: FIB4 SCORE: .6376811594202898551

## 2022-10-04 ASSESSMENT — PAIN DESCRIPTION - PAIN TYPE: TYPE: CHRONIC PAIN

## 2022-10-04 NOTE — PROGRESS NOTES
Pt arrived ambulatory to [facility] for Vyepti infusion for migraine headaches. POC discussed with pt and she agrees with plan. Pt with call light in reach for safety. Pt verbalized understanding to call for needs/assist.     PIV established, brisk blood return observed. Pt medicated per MAR. Pt tolerated Vyepti infusion without s/s adverse reaction. PIV dc'd catheter tip intact, gauze and coban dressing applied. Pt discharged to self care, NAD. Pt will contact scheduling for next 12 week appt.

## 2022-10-07 ENCOUNTER — IMMUNIZATION (OUTPATIENT)
Dept: OCCUPATIONAL MEDICINE | Facility: CLINIC | Age: 42
End: 2022-10-07

## 2022-10-07 DIAGNOSIS — Z23 NEED FOR VACCINATION: Primary | ICD-10-CM

## 2022-10-07 PROCEDURE — 90686 IIV4 VACC NO PRSV 0.5 ML IM: CPT | Performed by: PREVENTIVE MEDICINE

## 2022-10-12 ENCOUNTER — PHARMACY VISIT (OUTPATIENT)
Dept: PHARMACY | Facility: MEDICAL CENTER | Age: 42
End: 2022-10-12
Payer: COMMERCIAL

## 2022-10-12 ENCOUNTER — APPOINTMENT (OUTPATIENT)
Dept: PHARMACY | Facility: MEDICAL CENTER | Age: 42
End: 2022-10-12
Payer: COMMERCIAL

## 2022-10-12 PROCEDURE — RXMED WILLOW AMBULATORY MEDICATION CHARGE: Performed by: INTERNAL MEDICINE

## 2022-11-22 DIAGNOSIS — E03.9 PRIMARY HYPOTHYROIDISM: ICD-10-CM

## 2022-11-22 RX ORDER — LIOTHYRONINE SODIUM 5 UG/1
5 TABLET ORAL DAILY
Qty: 90 TABLET | Refills: 0 | Status: SHIPPED | OUTPATIENT
Start: 2022-11-22 | End: 2022-12-09 | Stop reason: SDUPTHER

## 2022-11-29 ENCOUNTER — OFFICE VISIT (OUTPATIENT)
Dept: PHYSICAL MEDICINE AND REHAB | Facility: MEDICAL CENTER | Age: 42
End: 2022-11-29
Payer: COMMERCIAL

## 2022-11-29 VITALS
OXYGEN SATURATION: 98 % | DIASTOLIC BLOOD PRESSURE: 80 MMHG | TEMPERATURE: 97.2 F | WEIGHT: 128.6 LBS | HEART RATE: 93 BPM | HEIGHT: 65 IN | SYSTOLIC BLOOD PRESSURE: 122 MMHG | BODY MASS INDEX: 21.43 KG/M2

## 2022-11-29 DIAGNOSIS — Z87.820 HISTORY OF TRAUMATIC BRAIN INJURY: ICD-10-CM

## 2022-11-29 DIAGNOSIS — M54.2 CERVICALGIA: ICD-10-CM

## 2022-11-29 DIAGNOSIS — M47.812 CERVICAL SPONDYLOSIS WITHOUT MYELOPATHY: ICD-10-CM

## 2022-11-29 DIAGNOSIS — G89.4 CHRONIC PAIN SYNDROME: ICD-10-CM

## 2022-11-29 DIAGNOSIS — M47.892 OTHER SPONDYLOSIS, CERVICAL REGION: ICD-10-CM

## 2022-11-29 DIAGNOSIS — M79.18 MYOFASCIAL PAIN: ICD-10-CM

## 2022-11-29 PROCEDURE — 99215 OFFICE O/P EST HI 40 MIN: CPT | Performed by: PHYSICAL MEDICINE & REHABILITATION

## 2022-11-29 RX ORDER — LEVOTHYROXINE SODIUM 0.03 MG/1
1 TABLET ORAL
COMMUNITY
End: 2023-01-31

## 2022-11-29 RX ORDER — ATORVASTATIN CALCIUM 10 MG/1
1 TABLET, FILM COATED ORAL
COMMUNITY
End: 2022-11-29

## 2022-11-29 ASSESSMENT — FIBROSIS 4 INDEX: FIB4 SCORE: .6376811594202898551

## 2022-11-29 ASSESSMENT — PATIENT HEALTH QUESTIONNAIRE - PHQ9: CLINICAL INTERPRETATION OF PHQ2 SCORE: 0

## 2022-11-29 ASSESSMENT — PAIN SCALES - GENERAL: PAINLEVEL: 6=MODERATE PAIN

## 2022-11-29 NOTE — PROGRESS NOTES
Follow-up patient note    Physiatry (physical medicine and  Rehabilitation), interventional spine and sports medicine    Date of Service: 11/29/2022    Chief complaint:   Chief Complaint   Patient presents with    New Patient     Back and neck pain       HISTORY    HPI: Dany Lambert 42 y.o. female who presents today for evaluation of chronic pain    Dany returns for follow-up.   She continues to have ongoing neck pain.  This has been present since injury. Since the last visit, she has had ablations and trigger point injections.  These did help somewhat, but she continues to have pain.  She reports that there is ongoing pain on the right side.  Pain is 6/10 on the NRS.  This was with Dr. York.    Her most significant pain is neck pain that is primarily right-sided.  She reports that she takes tramadol 50 mg 4 times a day and that this is somewhat helpful.  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  She does feel that her neck pain is worse with this.  She does not have a home exercise program from physical therapy and reports that most of the therapy that she had was hands-on/manual therapy.    Pain today is 6/10 on the NRS.    Back pain is primarily axial and has been present prior to Worker's Compensation claim.     Medical records review:  I reviewed the note from the referring provider Kerry Turk M.D.  No recent records from Dr. York's care available.  The patient reports that she had trigger points and medial branch blocks and ablations.    Previous treatments:    Physical Therapy: Yes    ROS:   Red Flags ROS:   Fever, Chills, Sweats: Denies  Involuntary Weight Loss: Denies  Bladder Incontinence: Denies  Bowel Incontinence: Denies  Saddle Anesthesia: Denies    All other systems reviewed and negative.       PMHx:   Past Medical History:   Diagnosis Date    Allergy, unspecified not elsewhere classified     Anxiety 8/26/2013    Arthritis      Cervical joints, and hands    Chest tightness or pressure 3/27/2018    High cholesterol     Migraine with aura and without status migrainosus, not intractable 8/26/2013    Pain 7/27/2017    Chronic pain- Migraine; neck pain, spasm    Psychiatric problem     hx of anxiety    Seizure (HCC) 1/2016    ? possibly r/t migraine        PSHx:   Past Surgical History:   Procedure Laterality Date    MD DSTR NROLYTC AGNT PARVERTEB FCT SNGL CRVCL/THORA Right 9/29/2017    Procedure: NEURO DEST FACET C/T W/IG SNGL C2-4;  Surgeon: Scotty Navarro D.O.;  Location: SURGERY [facility];  Service: Pain Management    MD DSTR NROLYTC AGNT PARVERTEB FCT ADDL CRVCL/THORA Right 9/29/2017    Procedure: NEURO DEST FACET C/T W/IG ADDL;  Surgeon: Scotty Navarro D.O.;  Location: SURGERY [facility];  Service: Pain Management    MD DSTR NROLYTC AGNT PARVERTEB FCT SNGL CRVCL/THORA Left 7/28/2017    Procedure: NEURO DEST FACET C/T W/IG SNGL - C2-4;  Surgeon: Scotty Navarro D.O.;  Location: SURGERY [facility];  Service: Pain Management    MD DSTR NROLYTC AGNT PARVERTEB FCT ADDL CRVCL/THORA  7/28/2017    Procedure: NEURO DEST FACET C/T W/IG ADDL;  Surgeon: Scotty Navarro D.O.;  Location: SURGERY [facility];  Service: Pain Management    TONSILLECTOMY  1997    CYST EXCISION Left as child    cyst removal on L wrist       Family history   Family History   Problem Relation Age of Onset    Diabetes Maternal Grandfather     Migraines Maternal Grandfather     Cancer Paternal Grandfather     Alzheimer's Disease Paternal Grandfather     Cancer Paternal Grandmother 30        breast    Glasses Mother     Migraines Mother     Suicide Attempts Maternal Uncle         Killed himself by GSW    Hypertension Father     Glasses Father          Medications:   Current Outpatient Medications   Medication    liothyronine (CYTOMEL) 5 MCG Tab    rizatriptan (MAXALT-MLT) 10 MG disintegrating tablet    topiramate (TOPAMAX) 50 MG  tablet    AIMOVIG 140 MG/ML Solution Auto-injector    SYNTHROID 75 MCG Tab    DAYSEE 0.15-0.03 &0.01 MG Tab    lovastatin (MEVACOR) 20 MG Tab    venlafaxine (EFFEXOR-XR) 150 MG extended-release capsule    traMADol (ULTRAM) 50 MG Tab    Cyanocobalamin (VITAMIN B 12 PO)    ergocalciferol (DRISDOL) 81040 UNIT capsule    tizanidine (ZANAFLEX) 4 MG Tab    cetirizine (ZYRTEC) 10 MG Tab    levothyroxine (SYNTHROID) 25 MCG Tab    COVID-19mRNA Bival Vacc Pfizer (PFIZER COVID-19 BIVALENT) 30 MCG/0.3ML Suspension injection     No current facility-administered medications for this visit.       Allergies:   Allergies   Allergen Reactions    Benadryl Allergy Anxiety    Demerol Hives    Medrol [Methylprednisolone] Hives and Itching    Previfem [Norgestimate-Ethinyl Estradiol]      Nausea/vomiting    Reglan [Metoclopramide] Anxiety    Seasonal        Social Hx:   Social History     Socioeconomic History    Marital status: Single     Spouse name: Not on file    Number of children: Not on file    Years of education: Not on file    Highest education level: Not on file   Occupational History    Not on file   Tobacco Use    Smoking status: Former     Packs/day: 1.00     Years: 15.00     Pack years: 15.00     Types: Cigarettes     Quit date: 2010     Years since quittin.9    Smokeless tobacco: Never   Vaping Use    Vaping Use: Never used   Substance and Sexual Activity    Alcohol use: No    Drug use: No    Sexual activity: Not Currently   Other Topics Concern     Service No    Blood Transfusions No    Caffeine Concern No    Occupational Exposure No    Hobby Hazards No    Sleep Concern Yes    Stress Concern No    Weight Concern No    Special Diet No    Back Care No    Exercise Yes    Bike Helmet No    Seat Belt Yes    Self-Exams Yes   Social History Narrative    40 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      Social Determinants of Health     Financial Resource Strain: Not on file   Food Insecurity: Not on file   Transportation  "Needs: Not on file   Physical Activity: Not on file   Stress: Not on file   Social Connections: Not on file   Intimate Partner Violence: Not on file   Housing Stability: Not on file         EXAMINATION     Physical Exam:   Vitals: /80 (BP Location: Right arm, Patient Position: Sitting, BP Cuff Size: Adult long)   Pulse 93   Temp 36.2 °C (97.2 °F) (Temporal)   Ht 1.651 m (5' 5\")   Wt 58.3 kg (128 lb 9.6 oz)   SpO2 98%     Constitutional:   Body Habitus: Body mass index is 21.4 kg/m².  Cooperation: Fully cooperates with exam  Appearance: Well-groomed, well-nourished, not disheveled, in no acute distress    Eyes: No scleral icterus, no proptosis     ENT -no obvious auditory deficits, wearing a face mask     Skin -no rashes or lesions noted     Respiratory-  breathing comfortable on room air, no audible wheezing    Cardiovascular-  No lower extremity edema is noted.     Psychiatric- alert and oriented ×3. Normal affect.     Gait - normal gait, no use of ambulatory device, nonantalgic.    Musculoskeletal -   Cervical spine   Inspection: No deformities of the skin over the cervical spine. No rashes or lesions.    Functional A/P ROM in all directions, with pain      Spurling’s sign: Deferred today    No signs of muscular atrophy in bilateral upper extremities     No tenderness to palpation of the cervical facets    Thoracic/Lumbar Spine/Sacral Spine/Hips   Inspection: No evidence of atrophy in bilateral lower extremities throughout     Neuro       Key points for the international standards for neurological classification of spinal cord injury (ISNCSCI) to light touch.     Dermatome R L   C4 2  2   C5 2 2   C6 2 2   C7 2 2   C8 2 2   T1 2 2   T2 2 2           Motor Exam Upper Extremities   ? Myotome R L   Shoulder flexion C5  5 5   Elbow flexion C5 5 5   Wrist extension C6 5 5   Elbow extension C7 5 5   Finger flexion C8 5 5   Finger abduction T1 5 5       Reflexes  ?  R L   Biceps  3+ 3+   Brachioradialis  2+ 2+ "   Patella  2+ 2+   Achilles   2+ 2+       MEDICAL DECISION MAKING    Medical records review: see under HPI section.     DATA    Labs:   Lab Results   Component Value Date/Time    SODIUM 137 09/07/2022 09:27 AM    POTASSIUM 4.2 09/07/2022 09:27 AM    CHLORIDE 105 09/07/2022 09:27 AM    CO2 21 09/07/2022 09:27 AM    ANION 11.0 09/07/2022 09:27 AM    GLUCOSE 89 09/07/2022 09:27 AM    BUN 12 09/07/2022 09:27 AM    CREATININE 0.89 09/07/2022 09:27 AM    CALCIUM 8.9 09/07/2022 09:27 AM    ASTSGOT 13 09/07/2022 09:27 AM    ALTSGPT 9 09/07/2022 09:27 AM    TBILIRUBIN <0.2 09/07/2022 09:27 AM    ALBUMIN 4.2 09/07/2022 09:27 AM    TOTPROTEIN 6.6 09/07/2022 09:27 AM    GLOBULIN 2.4 09/07/2022 09:27 AM    AGRATIO 1.8 09/07/2022 09:27 AM       Lab Results   Component Value Date/Time    PROTHROMBTM 12.5 10/09/2017 10:11 PM    INR 0.91 10/09/2017 10:11 PM        Lab Results   Component Value Date/Time    WBC 6.9 09/07/2022 09:28 AM    RBC 4.12 (L) 09/07/2022 09:28 AM    HEMOGLOBIN 13.1 09/07/2022 09:28 AM    HEMATOCRIT 40.4 09/07/2022 09:28 AM    MCV 98.1 (H) 09/07/2022 09:28 AM    MCH 31.8 09/07/2022 09:28 AM    MCHC 32.4 (L) 09/07/2022 09:28 AM    MPV 11.8 09/07/2022 09:28 AM    NEUTSPOLYS 59.60 09/07/2022 09:28 AM    LYMPHOCYTES 29.50 09/07/2022 09:28 AM    MONOCYTES 5.80 09/07/2022 09:28 AM    EOSINOPHILS 3.50 09/07/2022 09:28 AM    BASOPHILS 1.00 09/07/2022 09:28 AM        No results found for: HBA1C     Imaging: I personally reviewed following images, these are my reads  MRI cervical spine 12/16/2020  At C2-3, no disc bulge,no central canal stenosis, no left and no right foraminal stenosis  At C3-4 no disc bulge,no central canal stenosis, no left and no right foraminal stenosis  At C4-5, disc protrusion,no central canal stenosis, no left and no right foraminal stenosis  At C5-6, disc bulge,no central canal stenosis, no left and mild right foraminal stenosis  At C6-7, mild disc bulge,no central canal stenosis, no left and no  right foraminal stenosis  At C7-T1,minimal disc bulge,no central canal stenosis, no left and no right foraminal stenosis    IMAGING radiology reads. I reviewed the following radiology reads                 Results for orders placed during the hospital encounter of 12/16/20    MR-CERVICAL SPINE-W/O    Impression  1.  C4-5. Midline disc protrusion or disc/osteophyte complex. No central or foraminal stenosis.  2.  C5-6 broad-based ventral extradural defect favoring the right consistent with a disc/osteophyte complex. Mild right lateral recess stenosis. No central stenosis. The neural foramina are well delineated and on today's exam, the left neural foramen  appears intact (prior study report suggested moderate left neural foraminal stenosis). The right neural foramen on today's exam shows only borderline-mild foraminal stenosis superiorly (prior study report suggested severe right-sided foraminal stenosis  which does not appear to be the case).  3.  C6-C7 broad-based midline and left paramedian disc protrusion or disc/osteophyte complex. No central or foraminal stenosis.  4.  C7-T1 negligible midline disc bulge.  5.  No myelopathic cord signal abnormality.      Results for orders placed during the hospital encounter of 10/31/19    MR-LUMBAR SPINE-W/O    Impression  1.  L1-2 left paramedian disc protrusion/cephalad extrusion with mild left lateral recess stenosis.  2.  L3-4 mild disc bulging, annular fissure.  3.  L4-5 small central disc protrusion with underlying annular fissure. No central or foraminal stenosis.  4.  L5-S1 minimal bilateral lateral disc bulging. Minor hypertrophic facet arthropathy. Mild right foraminal stenosis. Borderline left foraminal stenosis.         Diagnosis   Visit Diagnoses     ICD-10-CM   1. Cervicalgia  M54.2   2. Cervical spondylosis without myelopathy  M47.812   3. Other spondylosis, cervical region  M47.892   4. History of traumatic brain injury  Z87.820   5. Chronic pain syndrome   G89.4   6. Myofascial pain  M79.18           ASSESSMENT:  Dany Lambert 42 y.o. female seen for above     Dany was seen today for new patient.    Diagnoses and all orders for this visit:    Cervicalgia  -     Referral to Physical Therapy    Cervical spondylosis without myelopathy  -     Referral to Physical Therapy    Other spondylosis, cervical region    History of traumatic brain injury    Chronic pain syndrome    Myofascial pain  -     Referral to Physical Therapy      Plan to get records from her care with Dr. York.  From what I understand her Worker's Comp. claim is not closed.  I am sending her to physical therapy to work on a home exercise program as she reports that she did have some improvement with physical therapy but does not have a home exercise program.  She continues to take tramadol 50 mg every 6 hours.  Anticipate treating her lumbar spine in the future but she prioritizes neck pain today.  Encouraged her to continue to work with pain psychology on nonpharmacologic strategies and management of chronic pain.  Reviewed her current medications and discussed possible adjustments.  She has been taking Topamax and venlafaxine for many years and I am hesitant to make any changes to these at this time.  We did discuss that she is communicating with Dr. Griffin about possible treatment options.  Discussed that she has been back to full-time work for about 2 weeks.  She will let me know if this becomes more difficult.         Outside records requested:  The patient signed outside records request form for her outside records including outside images.      Follow-up: Return in about 2 months (around 1/29/2023).    My total time spent caring for the patient on the day of the encounter was 40 minutes.  This does not include time spent on separately billable procedures/tests.    Thank you very much for asking me to participate in Dany Lambert's care.  Please contact me with any questions  or concerns.    Please note that this dictation was created using voice recognition software. I have made every reasonable attempt to correct obvious errors but there may be errors of grammar and content that I may have overlooked prior to finalization of this note.      Phil Woo MD  Physical Medicine and Rehabilitation  Interventional Spine and Sports Physiatry  [facility]          Kerry Turk M.D.

## 2022-12-09 DIAGNOSIS — E03.9 PRIMARY HYPOTHYROIDISM: ICD-10-CM

## 2022-12-09 RX ORDER — LIOTHYRONINE SODIUM 5 UG/1
5 TABLET ORAL DAILY
Qty: 90 TABLET | Refills: 0 | Status: SHIPPED | OUTPATIENT
Start: 2022-12-09 | End: 2023-01-31 | Stop reason: SDUPTHER

## 2022-12-21 ENCOUNTER — OFFICE VISIT (OUTPATIENT)
Dept: MEDICAL GROUP | Facility: MEDICAL CENTER | Age: 42
End: 2022-12-21
Payer: COMMERCIAL

## 2022-12-21 VITALS
TEMPERATURE: 97.9 F | OXYGEN SATURATION: 99 % | SYSTOLIC BLOOD PRESSURE: 102 MMHG | HEART RATE: 97 BPM | WEIGHT: 129.41 LBS | DIASTOLIC BLOOD PRESSURE: 84 MMHG | BODY MASS INDEX: 20.8 KG/M2 | HEIGHT: 66 IN

## 2022-12-21 DIAGNOSIS — G43.119 INTRACTABLE MIGRAINE WITH AURA WITHOUT STATUS MIGRAINOSUS: ICD-10-CM

## 2022-12-21 DIAGNOSIS — E03.9 PRIMARY HYPOTHYROIDISM: ICD-10-CM

## 2022-12-21 DIAGNOSIS — N93.0 PCB (POST COITAL BLEEDING): ICD-10-CM

## 2022-12-21 PROCEDURE — 99214 OFFICE O/P EST MOD 30 MIN: CPT | Performed by: INTERNAL MEDICINE

## 2022-12-21 ASSESSMENT — ENCOUNTER SYMPTOMS
VOMITING: 0
ABDOMINAL PAIN: 0
NAUSEA: 0
COUGH: 0
DIZZINESS: 0
FEVER: 0
CHILLS: 0
DIARRHEA: 0
HEADACHES: 1

## 2022-12-21 ASSESSMENT — FIBROSIS 4 INDEX: FIB4 SCORE: .6376811594202898551

## 2022-12-22 NOTE — ASSESSMENT & PLAN NOTE
This is a chronic problem, follows with neurology, on Aimovig, Topamax and Maxalt for exacerbations.

## 2022-12-22 NOTE — ASSESSMENT & PLAN NOTE
This is a new problem, mild vaginal bleeding during and after sexual intercourse.   -Differentials include cervical polyp, dysplasia, dysmenorrhea.   -Refer to gynecology for further evaluation and treatment

## 2022-12-22 NOTE — PROGRESS NOTES
Subjective:     Diagnoses of PCB (post coital bleeding), Intractable migraine with aura without status migrainosus, and Primary hypothyroidism were pertinent to this visit.    HPI: Dany is a pleasant 42 y.o. female who presents today with for acute visit, she has new patient appointment with me On January 9, 2023.    Problem   Pcb (Post Coital Bleeding)    This is a new problem, patient has not had a sexual intercourse for over 2 years and has recently resumed her relationships.  First time she has experienced intracordal bleeding approximately 1 week ago.  Blood was bright red, the bleeding has resolved after 2-3 days.   To 1 week patient has had a subsequent intercourse, which has also triggered vaginal bleeding.  The bleeding is minimal, however it is concerning.  She does not have menstrual periods, as she is taking OCP for migraine preventions.  She has not had a Pap smear within the last 2 to 3 years.         Primary Hypothyroidism    This is a chronic condition, managed by Dr. Lee.  On thyroid replacement therapy.       Intractable Migraine With Aura Without Status Migrainosus    Follows with neurology, receiving Aimovig injections every 4 weeks, on Topamax twice daily for prevention and Maxalt for breakthrough symptoms.         Past Medical History:   Diagnosis Date    Allergy, unspecified not elsewhere classified     Anxiety 8/26/2013    Arthritis     Cervical joints, and hands    Chest tightness or pressure 3/27/2018    High cholesterol     Migraine with aura and without status migrainosus, not intractable 8/26/2013    Pain 7/27/2017    Chronic pain- Migraine; neck pain, spasm    Psychiatric problem     hx of anxiety    Seizure (HCC) 1/2016    ? possibly r/t migraine      Current Outpatient Medications Ordered in Epic   Medication Sig Dispense Refill    liothyronine (CYTOMEL) 5 MCG Tab Take 1 Tablet by mouth every day. Please make an appointment for any future refills 90 Tablet 0    levothyroxine  "(SYNTHROID) 25 MCG Tab Take 1 Tablet by mouth every morning on an empty stomach.      rizatriptan (MAXALT-MLT) 10 MG disintegrating tablet TAKE 1 TABLET BY MOUTH AT HEADACHE ONSET REPEAT EVERY HOUR AS NEEDED UP TO 4 TABLETS IN 24 HOURS 10 Tablet 11    topiramate (TOPAMAX) 50 MG tablet Take 3 Tablets by mouth 2 times a day. 540 Tablet 3    AIMOVIG 140 MG/ML Solution Auto-injector Inject 140 mg under the skin every 4 weeks. 1 mL 11    SYNTHROID 75 MCG Tab TAKE 1 TABLET BY MOUTH EVERY DAY IN THE MORNING ON AN EMPTY STOMACH 90 Tablet 1    DAYSEE 0.15-0.03 &0.01 MG Tab TAKE 1 TABLET BY MOUTH EVERY DAY 91 Tablet 3    lovastatin (MEVACOR) 20 MG Tab Take 1 Tablet by mouth every evening. 90 Tablet 3    venlafaxine (EFFEXOR-XR) 150 MG extended-release capsule Take 1 Capsule by mouth every day. 90 Capsule 3    traMADol (ULTRAM) 50 MG Tab       Cyanocobalamin (VITAMIN B 12 PO) Take  by mouth.      ergocalciferol (DRISDOL) 99315 UNIT capsule Take 1 capsule by mouth every 14 days. 12 capsule 1    tizanidine (ZANAFLEX) 4 MG Tab TAKE 1 TABLET BY MOUTH EVERY 8 HOURS AS NEEDED 270 tablet 1    cetirizine (ZYRTEC) 10 MG Tab Take 10 mg by mouth every day.      COVID-19mRNA Bival Vacc Pfizer (PFIZER COVID-19 BIVALENT) 30 MCG/0.3ML Suspension injection Inject  into the shoulder, thigh, or buttocks. 0.3 mL 0     No current Epic-ordered facility-administered medications on file.     Health Maintenance: deferred     Review of Systems   Constitutional:  Negative for chills, fever and malaise/fatigue.   Respiratory:  Negative for cough.    Gastrointestinal:  Negative for abdominal pain, diarrhea, nausea and vomiting.   Genitourinary:  Negative for dysuria and hematuria.   Neurological:  Positive for headaches. Negative for dizziness.     Objective:     Exam:  /84 (BP Location: Left arm, Patient Position: Sitting, BP Cuff Size: Adult)   Pulse 97   Temp 36.6 °C (97.9 °F) (Temporal)   Ht 1.676 m (5' 6\")   Wt 58.7 kg (129 lb 6.6 oz)   " SpO2 99%   BMI 20.89 kg/m²  Body mass index is 20.89 kg/m².    Physical Exam  Constitutional:       Appearance: Normal appearance.   HENT:      Head: Normocephalic and atraumatic.   Cardiovascular:      Rate and Rhythm: Normal rate and regular rhythm.      Pulses: Normal pulses.      Heart sounds: Normal heart sounds. No murmur heard.  Abdominal:      Tenderness: There is abdominal tenderness in the right lower quadrant. There is no guarding or rebound. Negative signs include Alaniz's sign.      Hernia: No hernia is present.   Neurological:      Mental Status: She is alert.     Assessment & Plan:   Dany  is a pleasant 42 y.o. female with the following -     Problem List Items Addressed This Visit       Intractable migraine with aura without status migrainosus     This is a chronic problem, follows with neurology, on Aimovig, Topamax and Maxalt for exacerbations.           PCB (post coital bleeding)     This is a new problem, mild vaginal bleeding during and after sexual intercourse.   -Differentials include cervical polyp, dysplasia, dysmenorrhea.   -Refer to gynecology for further evaluation and treatment         Relevant Orders    Referral to Gynecology    Primary hypothyroidism     Chronic, controlled, clinically euthyroid.            Return on 01/09/2023 for New patient appointment    Please note that this dictation was created using voice recognition software. I have made every reasonable attempt to correct obvious errors, but I expect that there are errors of grammar and possibly content that I did not discover before finalizing the note.

## 2023-01-03 ENCOUNTER — GYNECOLOGY VISIT (OUTPATIENT)
Dept: OBGYN | Facility: CLINIC | Age: 43
End: 2023-01-03
Payer: COMMERCIAL

## 2023-01-03 ENCOUNTER — HOSPITAL ENCOUNTER (OUTPATIENT)
Facility: MEDICAL CENTER | Age: 43
End: 2023-01-03
Attending: OBSTETRICS & GYNECOLOGY
Payer: COMMERCIAL

## 2023-01-03 DIAGNOSIS — Z30.41 USES ORAL CONTRACEPTION: ICD-10-CM

## 2023-01-03 DIAGNOSIS — G43.831 INTRACTABLE MENSTRUAL MIGRAINE WITH STATUS MIGRAINOSUS: ICD-10-CM

## 2023-01-03 DIAGNOSIS — Z01.419 ENCOUNTER FOR ANNUAL ROUTINE GYNECOLOGICAL EXAMINATION: ICD-10-CM

## 2023-01-03 DIAGNOSIS — N93.0 POSTCOITAL BLEEDING: ICD-10-CM

## 2023-01-03 PROCEDURE — 87480 CANDIDA DNA DIR PROBE: CPT

## 2023-01-03 PROCEDURE — 88175 CYTOPATH C/V AUTO FLUID REDO: CPT

## 2023-01-03 PROCEDURE — 87624 HPV HI-RISK TYP POOLED RSLT: CPT

## 2023-01-03 PROCEDURE — 87591 N.GONORRHOEAE DNA AMP PROB: CPT

## 2023-01-03 PROCEDURE — 87510 GARDNER VAG DNA DIR PROBE: CPT

## 2023-01-03 PROCEDURE — 87660 TRICHOMONAS VAGIN DIR PROBE: CPT

## 2023-01-03 PROCEDURE — 87491 CHLMYD TRACH DNA AMP PROBE: CPT

## 2023-01-03 PROCEDURE — 99386 PREV VISIT NEW AGE 40-64: CPT | Performed by: OBSTETRICS & GYNECOLOGY

## 2023-01-03 RX ORDER — LEVONORGESTREL / ETHINYL ESTRADIOL AND ETHINYL ESTRADIOL 150-30(84)
1 KIT ORAL
Qty: 91 TABLET | Refills: 3 | Status: SHIPPED
Start: 2023-01-03 | End: 2023-01-09 | Stop reason: SDUPTHER

## 2023-01-03 ASSESSMENT — FIBROSIS 4 INDEX: FIB4 SCORE: .6376811594202898551

## 2023-01-03 NOTE — PROGRESS NOTES
ANNUAL GYNECOLOGY VISIT    Chief Complaint  Annual GYN exam  Postcoital bleeding    Subjective  Dany Lambert is a 42 y.o. female who presents today for Annual Exam. She reports that she has been in good health for the last year. She reports that she just recently became sexually active for the first time in three years. She states that she had bright red blood with intercourse, then had some brown spotting for several days afterwards. The next week when she had intercourse again the same thing happened. She is on continuous OCPs and has been on them for 3+ years to control her headaches. She reports that she notices this helps a lot and wishes to continue. She states that her mother is a neurologist and she is well aware of the possibility of stroke in women who take OCPs and have migraines with aura. She wishes to continue the OCPs. A refill prescription was given to her today. Discussed breast health. She has a strong family history of breast cancer on her paternal side and is up to date on mammograms. She is due for a pap smear today.     Preventive Care   Immunization History   Administered Date(s) Administered    DTP - Historical vaccine 1978, 1978, 01/24/1980, 05/08/1981    Hepatitis B Vaccine (Adol/Adult) 04/20/2012, 05/04/2016, 08/14/2019    INFLUENZA TIV (IM) 11/05/2013, 12/11/2014    Influenza Seasonal Injectable - Historical Data 11/05/2013    Influenza Vaccine Quad Inj (Pf) 12/11/2014, 11/17/2016, 09/13/2017, 09/19/2018, 10/01/2019, 11/10/2021, 10/07/2022    Influenza Vaccine Quad Inj (Preserved) 11/23/2015    Influenza Vaccine Quad Recombinant 11/30/2020, 01/17/2021    Influenza, Unspecified - HISTORICAL DATA 11/30/2020    MMR Vaccine 01/24/1980    OPV TRIVALENT - HISTORICAL DATA (GIVEN PRIOR TO MAY 2016) 1978, 1978, 01/24/1980, 05/05/1983    PFIZER BIVALENT BOOSTER SARS-COV-2 VACCINE (12+) 10/12/2022    PFIZER PURPLE CAP SARS-COV-2 VACCINATION (12+) 12/21/2020,  2021, 11/10/2021    Tdap Vaccine 2012    Tuberculin Skin Test 2012, 2013, 11/15/2013, 2016, 2016     Last Mammogram: 2022    Gynecology History and ROS  Current Sexual Activity: Yes  History of sexually transmitted diseases?  no  Abnormal discharge? No  Current Contraception:  continuous OCP    Menstrual History  No LMP recorded (lmp unknown). (Menstrual status: Other).  Periods are absent due to continuous OCP use  Bothersome PMS symptoms:  Yes, she is on continuous OCPs due to worsening headaches with menstruation  Significant Pelvic Pain:  Occasional left lower pelvic pain      Pap History  Last pap smear: unknown  History of moderate or severe dysplasia: No    Cancer Risk Assessement:  Family history of:   - Breast cancer: yes, on paternal side    - Ovarian cancer: no   - Uterine cancer: no   - Colon cancer: no    Obstetric History  OB History    Para Term  AB Living   1 0     1     SAB IAB Ectopic Molar Multiple Live Births                    # Outcome Date GA Lbr Nakul/2nd Weight Sex Delivery Anes PTL Lv   1 AB                Past Medical History  Past Medical History:   Diagnosis Date    Allergy, unspecified not elsewhere classified     Anxiety 2013    Arthritis     Cervical joints, and hands    Chest tightness or pressure 3/27/2018    High cholesterol     Migraine with aura and without status migrainosus, not intractable 2013    Pain 2017    Chronic pain- Migraine; neck pain, spasm    Psychiatric problem     hx of anxiety    Seizure (HCC) 2016    ? possibly r/t migraine        Past Surgical History  Past Surgical History:   Procedure Laterality Date    NE DSTR NROLYTC AGNT PARVERTEB FCT SNGL CRVCL/THORA Right 2017    Procedure: NEURO DEST FACET C/T W/IG SNGL C2-4;  Surgeon: Scotty Navarro D.O.;  Location: SURGERY [facility];  Service: Pain Management    NE DSTR NROLYTC AGNT PARVERTEB FCT ADDL CRVCL/THORA Right 2017     Procedure: NEURO DEST FACET C/T W/IG ADDL;  Surgeon: Scotty Navarro D.O.;  Location: SURGERY [facility];  Service: Pain Management    TN DSTR NROLYTC AGNT PARVERTEB FCT SNGL CRVCL/THORA Left 2017    Procedure: NEURO DEST FACET C/T W/IG SNGL - C2-4;  Surgeon: Scotty Navarro D.O.;  Location: SURGERY [facility];  Service: Pain Management    TN DSTR NROLYTC AGNT PARVERTEB FCT ADDL CRVCL/THORA  2017    Procedure: NEURO DEST FACET C/T W/IG ADDL;  Surgeon: Scotty Navarro D.O.;  Location: SURGERY [facility];  Service: Pain Management    TONSILLECTOMY      CYST EXCISION Left as child    cyst removal on L wrist       Social History  Social History     Socioeconomic History    Marital status: Single     Spouse name: Not on file    Number of children: Not on file    Years of education: Not on file    Highest education level: Not on file   Occupational History    Not on file   Tobacco Use    Smoking status: Former     Packs/day: 1.00     Years: 15.00     Pack years: 15.00     Types: Cigarettes     Quit date: 2010     Years since quittin.0    Smokeless tobacco: Never   Vaping Use    Vaping Use: Never used   Substance and Sexual Activity    Alcohol use: No    Drug use: No    Sexual activity: Yes     Partners: Male   Other Topics Concern     Service No    Blood Transfusions No    Caffeine Concern No    Occupational Exposure No    Hobby Hazards No    Sleep Concern Yes    Stress Concern No    Weight Concern No    Special Diet No    Back Care No    Exercise Yes    Bike Helmet No    Seat Belt Yes    Self-Exams Yes   Social History Narrative    38 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      Social Determinants of Health     Financial Resource Strain: Not on file   Food Insecurity: Not on file   Transportation Needs: Not on file   Physical Activity: Not on file   Stress: Not on file   Social Connections: Not on file   Intimate Partner Violence: Not on file   Housing Stability: Not on  file       Family History  Family History   Problem Relation Age of Onset    Diabetes Maternal Grandfather     Migraines Maternal Grandfather     Cancer Paternal Grandfather     Alzheimer's Disease Paternal Grandfather     Cancer Paternal Grandmother 30        breast    Glasses Mother     Migraines Mother     Suicide Attempts Maternal Uncle         Killed himself by [facility]    Hypertension Father     Glasses Father        Home Medications  Current Outpatient Medications on File Prior to Visit   Medication Sig Dispense Refill    liothyronine (CYTOMEL) 5 MCG Tab Take 1 Tablet by mouth every day. Please make an appointment for any future refills 90 Tablet 0    levothyroxine (SYNTHROID) 25 MCG Tab Take 1 Tablet by mouth every morning on an empty stomach.      COVID-19mRNA Bival Vacc Pfizer (PFIZER COVID-19 BIVALENT) 30 MCG/0.3ML Suspension injection Inject  into the shoulder, thigh, or buttocks. 0.3 mL 0    rizatriptan (MAXALT-MLT) 10 MG disintegrating tablet TAKE 1 TABLET BY MOUTH AT HEADACHE ONSET REPEAT EVERY HOUR AS NEEDED UP TO 4 TABLETS IN 24 HOURS 10 Tablet 11    topiramate (TOPAMAX) 50 MG tablet Take 3 Tablets by mouth 2 times a day. 540 Tablet 3    AIMOVIG 140 MG/ML Solution Auto-injector Inject 140 mg under the skin every 4 weeks. 1 mL 11    SYNTHROID 75 MCG Tab TAKE 1 TABLET BY MOUTH EVERY DAY IN THE MORNING ON AN EMPTY STOMACH 90 Tablet 1    lovastatin (MEVACOR) 20 MG Tab Take 1 Tablet by mouth every evening. 90 Tablet 3    venlafaxine (EFFEXOR-XR) 150 MG extended-release capsule Take 1 Capsule by mouth every day. 90 Capsule 3    traMADol (ULTRAM) 50 MG Tab       Cyanocobalamin (VITAMIN B 12 PO) Take  by mouth.      ergocalciferol (DRISDOL) 98234 UNIT capsule Take 1 capsule by mouth every 14 days. 12 capsule 1    tizanidine (ZANAFLEX) 4 MG Tab TAKE 1 TABLET BY MOUTH EVERY 8 HOURS AS NEEDED 270 tablet 1    cetirizine (ZYRTEC) 10 MG Tab Take 10 mg by mouth every day.       No current facility-administered  medications on file prior to visit.       Allergies/Reactions  Allergies   Allergen Reactions    Benadryl Allergy Anxiety    Demerol Hives    Medrol [Methylprednisolone] Hives and Itching    Previfem [Norgestimate-Ethinyl Estradiol]      Nausea/vomiting    Reglan [Metoclopramide] Anxiety    Seasonal        ROS  Positive ROS: postcoital bleeding  Gen: no fevers or chills, no significant weight loss or gain, excessive fatigue  Respiratory:  no cough or dyspnea  Cardiac:  no chest pain, no palpitations, no syncope  Breast: no breast discharge, pain, lump or skin changes  GI:  no heartburn, no abdominal pain, no nausea or vomiting  Urinary: no dysuria, urgency, frequency, incontinence   Psych: no depression or anxiety  Neuro: no migraines with aura, fainting spells, numbness or tingling  Extremities: no joint pain, persistently swollen ankles, recurrent leg cramps      Physical Examination:  Vital Signs:   Vitals:    01/03/23 1308   BP: (P) 119/81   BP Location: (P) Left arm   Patient Position: (P) Sitting   Weight: (P) 132 lb     Body mass index is 21.31 kg/m² (pended).    Constitutional: The patient is well developed and well nourished.  Psychiatric: Patient is oriented to time place and person.   Skin: No rash observed.  Neck: Appears symmetric. Thyroid normal size  Respiratory: normal effort  Breast: Inspection reveals no asymetry or nipple discharge, no skin thickening, dimpling or erythema.  Palpation demonstrates no masses.  Abdomen: Soft, non-tender.  Pelvic Exam:     Vulva: external female genitalia are normal in appearance. No lesions    Urethra - no lesions, no erythema    Vagina: moist, pink, normal ruggae    Cervix: pink, smooth, no lesions, no CMT, friable and bleeding noted with pap smear    Uterus - non-tender, normal size, shape, contour, mobile, anteverted    Ovaries: non-tender, no appreciable masses  Pap Smear performed: Yes  Extremeties: Legs are symmetric and without tenderness. There is no edema  present.    Labs/Imaging:  HDL (mg/dL)   Date Value   01/18/2022 57     LDL (mg/dL)   Date Value   01/18/2022 139 (H)     No components found for: A1C1  WBC (K/uL)   Date Value   09/07/2022 6.9     Lab Results   Component Value Date/Time    CO2 21 09/07/2022 0927    BUN 12 09/07/2022 0927       Assessment & Plan  Dany Lambert is a 42 y.o. female who presents today for Annual Gyn Exam.     1. Postcoital bleeding  - THINPREP PAP W/HPV AND CTNG; Future  - VAGINAL PATHOGENS DNA PANEL  - US-PELVIC TRANSVAGINAL ONLY; Future    2. Encounter for annual routine gynecological examination  - She is up to date on mammogram  - Pap smear performed today  - CBC WITH DIFFERENTIAL; Future  - RPR (SYPHILIS); Future  - HIV AG/AB COMBO ASSAY SCREENING; Future  - HEP C VIRUS ANTIBODY; Future    3. Uses oral contraception  - Use with migraine history discussed. She is aware of risk and feels like the benefits outweigh this. A refill was sent to the pharmacy  - Discussed that sometimes continuous OCPs can cause AUB. She was encouraged to take a week off and have a withdrawal bleed before starting her new pack  - Levonorgest-Eth Estrad 91-Day (DAYSEE) 0.15-0.03 &0.01 MG Tab; Take 1 Tablet by mouth every day.  Dispense: 91 Tablet; Refill: 3    4. Intractable menstrual migraine with status migrainosus  - Levonorgest-Eth Estrad 91-Day (DAYSEE) 0.15-0.03 &0.01 MG Tab; Take 1 Tablet by mouth every day.  Dispense: 91 Tablet; Refill: 3    Return: Annually or PRN    Philip Colon M.D.

## 2023-01-04 LAB
AMBIGUOUS DTTM AMBI4: NORMAL
C TRACH DNA GENITAL QL NAA+PROBE: NEGATIVE
CANDIDA DNA VAG QL PROBE+SIG AMP: NEGATIVE
CYTOLOGY REG CYTOL: NORMAL
G VAGINALIS DNA VAG QL PROBE+SIG AMP: NEGATIVE
HPV HR 12 DNA CVX QL NAA+PROBE: NEGATIVE
HPV16 DNA SPEC QL NAA+PROBE: NEGATIVE
HPV18 DNA SPEC QL NAA+PROBE: NEGATIVE
N GONORRHOEA DNA GENITAL QL NAA+PROBE: NEGATIVE
SIGNIFICANT IND 70042: NORMAL
SITE SITE: NORMAL
SOURCE SOURCE: NORMAL
SPECIMEN SOURCE: NORMAL
SPECIMEN SOURCE: NORMAL
T VAGINALIS DNA VAG QL PROBE+SIG AMP: NEGATIVE

## 2023-01-09 ENCOUNTER — OFFICE VISIT (OUTPATIENT)
Dept: MEDICAL GROUP | Facility: MEDICAL CENTER | Age: 43
End: 2023-01-09
Payer: COMMERCIAL

## 2023-01-09 VITALS
HEIGHT: 66 IN | HEART RATE: 93 BPM | SYSTOLIC BLOOD PRESSURE: 112 MMHG | WEIGHT: 131.61 LBS | TEMPERATURE: 96.8 F | OXYGEN SATURATION: 99 % | BODY MASS INDEX: 21.15 KG/M2 | DIASTOLIC BLOOD PRESSURE: 84 MMHG

## 2023-01-09 DIAGNOSIS — G43.809 OTHER MIGRAINE WITHOUT STATUS MIGRAINOSUS, NOT INTRACTABLE: ICD-10-CM

## 2023-01-09 DIAGNOSIS — F33.1 MODERATE EPISODE OF RECURRENT MAJOR DEPRESSIVE DISORDER (HCC): ICD-10-CM

## 2023-01-09 DIAGNOSIS — F43.10 PTSD (POST-TRAUMATIC STRESS DISORDER): ICD-10-CM

## 2023-01-09 DIAGNOSIS — E03.9 PRIMARY HYPOTHYROIDISM: ICD-10-CM

## 2023-01-09 DIAGNOSIS — F41.9 ANXIETY: ICD-10-CM

## 2023-01-09 DIAGNOSIS — G43.831 INTRACTABLE MENSTRUAL MIGRAINE WITH STATUS MIGRAINOSUS: ICD-10-CM

## 2023-01-09 DIAGNOSIS — G47.09 OTHER INSOMNIA: ICD-10-CM

## 2023-01-09 DIAGNOSIS — G47.00 INSOMNIA, UNSPECIFIED TYPE: ICD-10-CM

## 2023-01-09 DIAGNOSIS — G43.119 INTRACTABLE MIGRAINE WITH AURA WITHOUT STATUS MIGRAINOSUS: ICD-10-CM

## 2023-01-09 DIAGNOSIS — Z30.41 USES ORAL CONTRACEPTION: ICD-10-CM

## 2023-01-09 DIAGNOSIS — E78.00 HYPERCHOLESTEROLEMIA: ICD-10-CM

## 2023-01-09 PROBLEM — G43.909 MIGRAINE: Status: ACTIVE | Noted: 2023-01-09

## 2023-01-09 PROCEDURE — 99214 OFFICE O/P EST MOD 30 MIN: CPT | Performed by: INTERNAL MEDICINE

## 2023-01-09 RX ORDER — LOVASTATIN 20 MG/1
20 TABLET ORAL NIGHTLY
Qty: 90 TABLET | Refills: 3 | Status: SHIPPED | OUTPATIENT
Start: 2023-01-09 | End: 2023-12-01

## 2023-01-09 RX ORDER — LEVONORGESTREL / ETHINYL ESTRADIOL AND ETHINYL ESTRADIOL 150-30(84)
1 KIT ORAL
Qty: 91 TABLET | Refills: 0 | Status: SHIPPED | OUTPATIENT
Start: 2023-01-09 | End: 2023-08-02

## 2023-01-09 RX ORDER — TRAZODONE HYDROCHLORIDE 100 MG/1
50-150 TABLET ORAL NIGHTLY
Qty: 90 TABLET | Refills: 3 | Status: SHIPPED | OUTPATIENT
Start: 2023-01-09 | End: 2023-03-06

## 2023-01-09 RX ORDER — VENLAFAXINE HYDROCHLORIDE 150 MG/1
150 CAPSULE, EXTENDED RELEASE ORAL
Qty: 90 CAPSULE | Refills: 3 | Status: SHIPPED | OUTPATIENT
Start: 2023-01-09 | End: 2023-06-12

## 2023-01-09 SDOH — ECONOMIC STABILITY: INCOME INSECURITY: IN THE LAST 12 MONTHS, WAS THERE A TIME WHEN YOU WERE NOT ABLE TO PAY THE MORTGAGE OR RENT ON TIME?: NO

## 2023-01-09 SDOH — ECONOMIC STABILITY: HOUSING INSECURITY: IN THE LAST 12 MONTHS, HOW MANY PLACES HAVE YOU LIVED?: 1

## 2023-01-09 SDOH — ECONOMIC STABILITY: FOOD INSECURITY: WITHIN THE PAST 12 MONTHS, THE FOOD YOU BOUGHT JUST DIDN'T LAST AND YOU DIDN'T HAVE MONEY TO GET MORE.: NEVER TRUE

## 2023-01-09 SDOH — ECONOMIC STABILITY: TRANSPORTATION INSECURITY
IN THE PAST 12 MONTHS, HAS LACK OF RELIABLE TRANSPORTATION KEPT YOU FROM MEDICAL APPOINTMENTS, MEETINGS, WORK OR FROM GETTING THINGS NEEDED FOR DAILY LIVING?: NO

## 2023-01-09 SDOH — ECONOMIC STABILITY: FOOD INSECURITY: WITHIN THE PAST 12 MONTHS, YOU WORRIED THAT YOUR FOOD WOULD RUN OUT BEFORE YOU GOT MONEY TO BUY MORE.: NEVER TRUE

## 2023-01-09 SDOH — HEALTH STABILITY: PHYSICAL HEALTH: ON AVERAGE, HOW MANY DAYS PER WEEK DO YOU ENGAGE IN MODERATE TO STRENUOUS EXERCISE (LIKE A BRISK WALK)?: 0 DAYS

## 2023-01-09 SDOH — ECONOMIC STABILITY: TRANSPORTATION INSECURITY
IN THE PAST 12 MONTHS, HAS THE LACK OF TRANSPORTATION KEPT YOU FROM MEDICAL APPOINTMENTS OR FROM GETTING MEDICATIONS?: NO

## 2023-01-09 SDOH — ECONOMIC STABILITY: HOUSING INSECURITY
IN THE LAST 12 MONTHS, WAS THERE A TIME WHEN YOU DID NOT HAVE A STEADY PLACE TO SLEEP OR SLEPT IN A SHELTER (INCLUDING NOW)?: NO

## 2023-01-09 SDOH — HEALTH STABILITY: MENTAL HEALTH
STRESS IS WHEN SOMEONE FEELS TENSE, NERVOUS, ANXIOUS, OR CAN'T SLEEP AT NIGHT BECAUSE THEIR MIND IS TROUBLED. HOW STRESSED ARE YOU?: NOT AT ALL

## 2023-01-09 SDOH — HEALTH STABILITY: PHYSICAL HEALTH: ON AVERAGE, HOW MANY MINUTES DO YOU ENGAGE IN EXERCISE AT THIS LEVEL?: 0 MIN

## 2023-01-09 SDOH — ECONOMIC STABILITY: INCOME INSECURITY: HOW HARD IS IT FOR YOU TO PAY FOR THE VERY BASICS LIKE FOOD, HOUSING, MEDICAL CARE, AND HEATING?: NOT HARD AT ALL

## 2023-01-09 SDOH — ECONOMIC STABILITY: TRANSPORTATION INSECURITY
IN THE PAST 12 MONTHS, HAS LACK OF TRANSPORTATION KEPT YOU FROM MEETINGS, WORK, OR FROM GETTING THINGS NEEDED FOR DAILY LIVING?: NO

## 2023-01-09 ASSESSMENT — SOCIAL DETERMINANTS OF HEALTH (SDOH)
IN A TYPICAL WEEK, HOW MANY TIMES DO YOU TALK ON THE PHONE WITH FAMILY, FRIENDS, OR NEIGHBORS?: MORE THAN THREE TIMES A WEEK
HOW OFTEN DO YOU ATTEND CHURCH OR RELIGIOUS SERVICES?: NEVER
HOW OFTEN DO YOU HAVE A DRINK CONTAINING ALCOHOL: NEVER
HOW HARD IS IT FOR YOU TO PAY FOR THE VERY BASICS LIKE FOOD, HOUSING, MEDICAL CARE, AND HEATING?: NOT HARD AT ALL
HOW MANY DRINKS CONTAINING ALCOHOL DO YOU HAVE ON A TYPICAL DAY WHEN YOU ARE DRINKING: PATIENT DOES NOT DRINK
WITHIN THE PAST 12 MONTHS, YOU WORRIED THAT YOUR FOOD WOULD RUN OUT BEFORE YOU GOT THE MONEY TO BUY MORE: NEVER TRUE
HOW OFTEN DO YOU HAVE SIX OR MORE DRINKS ON ONE OCCASION: NEVER
HOW OFTEN DO YOU GET TOGETHER WITH FRIENDS OR RELATIVES?: ONCE A WEEK
IN A TYPICAL WEEK, HOW MANY TIMES DO YOU TALK ON THE PHONE WITH FAMILY, FRIENDS, OR NEIGHBORS?: MORE THAN THREE TIMES A WEEK
HOW OFTEN DO YOU ATTENT MEETINGS OF THE CLUB OR ORGANIZATION YOU BELONG TO?: NEVER
ARE YOU MARRIED, WIDOWED, DIVORCED, SEPARATED, NEVER MARRIED, OR LIVING WITH A PARTNER?: NEVER MARRIED
ARE YOU MARRIED, WIDOWED, DIVORCED, SEPARATED, NEVER MARRIED, OR LIVING WITH A PARTNER?: NEVER MARRIED
DO YOU BELONG TO ANY CLUBS OR ORGANIZATIONS SUCH AS CHURCH GROUPS UNIONS, FRATERNAL OR ATHLETIC GROUPS, OR SCHOOL GROUPS?: NO
HOW OFTEN DO YOU ATTENT MEETINGS OF THE CLUB OR ORGANIZATION YOU BELONG TO?: NEVER
DO YOU BELONG TO ANY CLUBS OR ORGANIZATIONS SUCH AS CHURCH GROUPS UNIONS, FRATERNAL OR ATHLETIC GROUPS, OR SCHOOL GROUPS?: NO
HOW OFTEN DO YOU GET TOGETHER WITH FRIENDS OR RELATIVES?: ONCE A WEEK
HOW OFTEN DO YOU ATTEND CHURCH OR RELIGIOUS SERVICES?: NEVER

## 2023-01-09 ASSESSMENT — PATIENT HEALTH QUESTIONNAIRE - PHQ9
7. TROUBLE CONCENTRATING ON THINGS, SUCH AS READING THE NEWSPAPER OR WATCHING TELEVISION: NOT AT ALL
6. FEELING BAD ABOUT YOURSELF - OR THAT YOU ARE A FAILURE OR HAVE LET YOURSELF OR YOUR FAMILY DOWN: NOT AL ALL
4. FEELING TIRED OR HAVING LITTLE ENERGY: SEVERAL DAYS
1. LITTLE INTEREST OR PLEASURE IN DOING THINGS: NOT AT ALL
9. THOUGHTS THAT YOU WOULD BE BETTER OFF DEAD, OR OF HURTING YOURSELF: NOT AT ALL
2. FEELING DOWN, DEPRESSED, IRRITABLE, OR HOPELESS: NOT AT ALL
5. POOR APPETITE OR OVEREATING: NOT AT ALL
3. TROUBLE FALLING OR STAYING ASLEEP OR SLEEPING TOO MUCH: SEVERAL DAYS
SUM OF ALL RESPONSES TO PHQ9 QUESTIONS 1 AND 2: 0
8. MOVING OR SPEAKING SO SLOWLY THAT OTHER PEOPLE COULD HAVE NOTICED. OR THE OPPOSITE, BEING SO FIGETY OR RESTLESS THAT YOU HAVE BEEN MOVING AROUND A LOT MORE THAN USUAL: NOT AT ALL
SUM OF ALL RESPONSES TO PHQ QUESTIONS 1-9: 2

## 2023-01-09 ASSESSMENT — LIFESTYLE VARIABLES
HOW OFTEN DO YOU HAVE SIX OR MORE DRINKS ON ONE OCCASION: NEVER
SKIP TO QUESTIONS 9-10: 1
HOW MANY STANDARD DRINKS CONTAINING ALCOHOL DO YOU HAVE ON A TYPICAL DAY: PATIENT DOES NOT DRINK
AUDIT-C TOTAL SCORE: 0
HOW OFTEN DO YOU HAVE A DRINK CONTAINING ALCOHOL: NEVER

## 2023-01-09 ASSESSMENT — ENCOUNTER SYMPTOMS
DIZZINESS: 0
HEADACHES: 1
FEVER: 0
DIARRHEA: 0
ABDOMINAL PAIN: 0
COUGH: 0
CHILLS: 0
NAUSEA: 0
VOMITING: 0

## 2023-01-09 ASSESSMENT — FIBROSIS 4 INDEX: FIB4 SCORE: .6376811594202898551

## 2023-01-10 DIAGNOSIS — E03.9 PRIMARY HYPOTHYROIDISM: ICD-10-CM

## 2023-01-10 RX ORDER — ERENUMAB-AOOE 140 MG/ML
140 INJECTION, SOLUTION SUBCUTANEOUS
Qty: 1 ML | Refills: 11 | Status: SHIPPED | OUTPATIENT
Start: 2023-01-10

## 2023-01-10 RX ORDER — LEVOTHYROXINE SODIUM 75 MCG
75 TABLET ORAL
Qty: 90 TABLET | Refills: 1 | Status: SHIPPED | OUTPATIENT
Start: 2023-01-10 | End: 2023-07-10

## 2023-01-10 RX ORDER — LEVOTHYROXINE SODIUM 75 MCG
75 TABLET ORAL
Qty: 90 TABLET | Refills: 1 | Status: SHIPPED | OUTPATIENT
Start: 2023-01-10 | End: 2023-01-10 | Stop reason: SDUPTHER

## 2023-01-10 NOTE — ASSESSMENT & PLAN NOTE
This is a chronic problem, controlled on liothyronine and levothyroxine, continue current regimen with no changes, follow-up with Dr. Lee.

## 2023-01-10 NOTE — TELEPHONE ENCOUNTER
Received request via: Patient    Was the patient seen in the last year in this department? Yes    Does the patient have an active prescription (recently filled or refills available) for medication(s) requested? No    Does the patient have detention Plus and need 100 day supply (blood pressure, diabetes and cholesterol meds only)? Medication is not for cholesterol, blood pressure or diabetes    Per patient as of Jan 1st, [facility] does not take . Please send to [facility] on [address]

## 2023-01-10 NOTE — PROGRESS NOTES
Subjective:     Chief Complaint   Patient presents with    Medication Refill    Establish Care     Diagnoses of Intractable migraine with aura without status migrainosus, Other migraine without status migrainosus, not intractable, Hypercholesterolemia, PTSD (post-traumatic stress disorder), Moderate episode of recurrent major depressive disorder (HCC), Anxiety, Uses oral contraception, Intractable menstrual migraine with status migrainosus, Insomnia, unspecified type, Primary hypothyroidism, and Other insomnia were pertinent to this visit.    HPI: Dany is a pleasant 42 y.o. female who presents today to establish care and for evaluation of problems listed below  Problem   Insomnia    This is a chronic problem, previously patient was prescribed zolpidem which she found to be effective.  Agrees to trial of trazodone.         Hypercholesterolemia    Established diagnosis. Currently taking lovastatin 20 mg nightly, reports compliance and no side effects.     The 10-year ASCVD risk score (Nannette DK, et al., 2019) is: 0.7%    Lab Results   Component Value Date/Time    CHOLSTRLTOT 221 (H) 01/18/2022 10:11 AM    TRIGLYCERIDE 124 01/18/2022 10:11 AM    HDL 57 01/18/2022 10:11 AM     (H) 01/18/2022 10:11 AM        Primary Hypothyroidism    This is a chronic condition, managed by Dr. Lee.  On thyroid replacement therapy with liothyronine and levothyroxine.       Intractable Migraine With Aura Without Status Migrainosus    Follows with neurology, receiving Aimovig injections every 4 weeks, on Topamax twice daily for prevention and Maxalt for breakthrough symptoms.         Anxiety    This is a chronic problem, controlled on venlafaxine.  PHQ-9 tool, no suicidal thoughts or ideations.  Rating venlafaxine well, would like a refill today.        Past Medical History:   Diagnosis Date    Allergy, unspecified not elsewhere classified     Anxiety 8/26/2013    Arthritis     Cervical joints, and hands    Chest tightness or  pressure 3/27/2018    High cholesterol     Migraine with aura and without status migrainosus, not intractable 8/26/2013    Pain 7/27/2017    Chronic pain- Migraine; neck pain, spasm    Psychiatric problem     hx of anxiety    Seizure (HCC) 1/2016    ? possibly r/t migraine      Past Surgical History:   Procedure Laterality Date    MI DSTR NROLYTC AGNT PARVERTEB FCT SNGL CRVCL/THORA Right 9/29/2017    Procedure: NEURO DEST FACET C/T W/IG SNGL C2-4;  Surgeon: Scotty Navarro D.O.;  Location: [facility];  Service: Pain Management    MI DSTR NROLYTC AGNT PARVERTEB FCT ADDL CRVCL/THORA Right 9/29/2017    Procedure: NEURO DEST FACET C/T W/IG ADDL;  Surgeon: Scotty Navarro D.O.;  [facility]ERY [facility];  Service: Pain Management    MI DSTR NROLYTC AGNT PARVERTEB FCT SNGL CRVCL/THORA Left 7/28/2017    Procedure: NEURO DEST FACET C/T W/IG SNGL - C2-4;  Surgeon: Scotty Navarro D.O.;  Location: SURGERY [facility];  Service: Pain Management    MI DSTR NROLYTC AGNT PARVERTEB FCT ADDL CRVCL/THORA  7/28/2017    Procedure: NEURO DEST FACET C/T W/IG ADDL;  Surgeon: Scotty Navarro D.O.;  Location: SURGERY [facility];  Service: Pain Management    TONSILLECTOMY  1997    CYST EXCISION Left as child    cyst removal on L wrist     Family History   Problem Relation Age of Onset    Diabetes Maternal Grandfather     Migraines Maternal Grandfather     Cancer Paternal Grandfather     Alzheimer's Disease Paternal Grandfather     Cancer Paternal Grandmother 30        breast    Glasses Mother     Migraines Mother     Stroke Mother     Suicide Attempts Maternal Uncle         Killed himself by [facility]    Breast Cancer Maternal Grandmother     Hypertension Father     Glasses Father     Hyperlipidemia Father      Social History     Tobacco Use    Smoking status: Former     Packs/day: 1.00     Years: 15.00     Pack years: 15.00     Types: Cigarettes     Quit date: 1/1/2010     Years since quitting:  13.0    Smokeless tobacco: Never   Vaping Use    Vaping Use: Never used   Substance Use Topics    Alcohol use: No    Drug use: No       Current Outpatient Medications Ordered in Epic   Medication Sig Dispense Refill    lovastatin (MEVACOR) 20 MG Tab Take 1 Tablet by mouth every evening. 90 Tablet 3    venlafaxine (EFFEXOR-XR) 150 MG extended-release capsule Take 1 Capsule by mouth every day. 90 Capsule 3    Levonorgest-Eth Estrad 91-Day (DAYSEE) 0.15-0.03 &0.01 MG Tab Take 1 Tablet by mouth every day. 91 Tablet 0    traZODone (DESYREL) 100 MG Tab Take 0.5-1.5 Tablets by mouth every evening. 90 Tablet 3    liothyronine (CYTOMEL) 5 MCG Tab Take 1 Tablet by mouth every day. Please make an appointment for any future refills 90 Tablet 0    levothyroxine (SYNTHROID) 25 MCG Tab Take 1 Tablet by mouth every morning on an empty stomach.      rizatriptan (MAXALT-MLT) 10 MG disintegrating tablet TAKE 1 TABLET BY MOUTH AT HEADACHE ONSET REPEAT EVERY HOUR AS NEEDED UP TO 4 TABLETS IN 24 HOURS 10 Tablet 11    topiramate (TOPAMAX) 50 MG tablet Take 3 Tablets by mouth 2 times a day. 540 Tablet 3    AIMOVIG 140 MG/ML Solution Auto-injector Inject 140 mg under the skin every 4 weeks. 1 mL 11    SYNTHROID 75 MCG Tab TAKE 1 TABLET BY MOUTH EVERY DAY IN THE MORNING ON AN EMPTY STOMACH 90 Tablet 1    traMADol (ULTRAM) 50 MG Tab       Cyanocobalamin (VITAMIN B 12 PO) Take  by mouth.      ergocalciferol (DRISDOL) 86096 UNIT capsule Take 1 capsule by mouth every 14 days. 12 capsule 1    tizanidine (ZANAFLEX) 4 MG Tab TAKE 1 TABLET BY MOUTH EVERY 8 HOURS AS NEEDED 270 tablet 1    cetirizine (ZYRTEC) 10 MG Tab Take 10 mg by mouth every day.      COVID-19mRNA Bival Vacc Pfizer (PFIZER COVID-19 BIVALENT) 30 MCG/0.3ML Suspension injection Inject  into the shoulder, thigh, or buttocks. 0.3 mL 0     No current Epic-ordered facility-administered medications on file.     Review of Systems   Constitutional:  Negative for chills, fever and  "malaise/fatigue.   Respiratory:  Negative for cough.    Gastrointestinal:  Negative for abdominal pain, diarrhea, nausea and vomiting.   Genitourinary:  Negative for dysuria and hematuria.   Neurological:  Positive for headaches. Negative for dizziness.     Objective:     Exam:  /84 (BP Location: Right arm, Patient Position: Sitting, BP Cuff Size: Adult)   Pulse 93   Temp 36 °C (96.8 °F) (Temporal)   Ht 1.676 m (5' 6\")   Wt 59.7 kg (131 lb 9.8 oz)   LMP  (LMP Unknown)   SpO2 99%   BMI 21.24 kg/m²  Body mass index is 21.24 kg/m².    Physical Exam  Constitutional:       Appearance: Normal appearance.   HENT:      Head: Normocephalic and atraumatic.   Cardiovascular:      Rate and Rhythm: Normal rate and regular rhythm.   Abdominal:      Tenderness: Negative signs include Alaniz's sign.   Neurological:      Mental Status: She is alert and oriented to person, place, and time.   Psychiatric:         Mood and Affect: Mood normal.     Labs: per orders    Assessment & Plan:   Dany  is a pleasant 42 y.o. female with the following -     Problem List Items Addressed This Visit       Anxiety (Chronic)     This is a chronic problem, venlafaxine 150 mg daily is helpful to control her anxiety.   - No suicidal/homicidal thoughts or ideations.   -Continue current regimen without changes.         Relevant Medications    venlafaxine (EFFEXOR-XR) 150 MG extended-release capsule    traZODone (DESYREL) 100 MG Tab    Hypercholesterolemia (Chronic)     Chronic, stable, continue lovastatin, obtain lipid panel.           Relevant Medications    lovastatin (MEVACOR) 20 MG Tab    Other Relevant Orders    Lipid Profile    Primary hypothyroidism (Chronic)     This is a chronic problem, controlled on liothyronine and levothyroxine, continue current regimen with no changes, follow-up with Dr. Lee.         Insomnia    Relevant Medications    traZODone (DESYREL) 100 MG Tab    Intractable migraine with aura without status " migrainosus     Chronic, was well controlled on Aimovig, Topamax and Maxalt for breakthrough symptoms.  Was not able to fill her Aimovig for over 6 weeks and has developed severe intractable migraine.         Relevant Medications    lovastatin (MEVACOR) 20 MG Tab    venlafaxine (EFFEXOR-XR) 150 MG extended-release capsule    traZODone (DESYREL) 100 MG Tab    Other Relevant Orders    Referral to Neurology    Moderate episode of recurrent major depressive disorder (HCC)    Relevant Medications    venlafaxine (EFFEXOR-XR) 150 MG extended-release capsule    traZODone (DESYREL) 100 MG Tab     Other Visit Diagnoses       Other migraine without status migrainosus, not intractable        Relevant Medications    lovastatin (MEVACOR) 20 MG Tab    venlafaxine (EFFEXOR-XR) 150 MG extended-release capsule    traZODone (DESYREL) 100 MG Tab    PTSD (post-traumatic stress disorder)        Relevant Medications    venlafaxine (EFFEXOR-XR) 150 MG extended-release capsule    traZODone (DESYREL) 100 MG Tab    Uses oral contraception        Relevant Medications    Levonorgest-Eth Estrad 91-Day (DAYSEE) 0.15-0.03 &0.01 MG Tab    Intractable menstrual migraine with status migrainosus        Relevant Medications    lovastatin (MEVACOR) 20 MG Tab    venlafaxine (EFFEXOR-XR) 150 MG extended-release capsule    Levonorgest-Eth Estrad 91-Day (DAYSEE) 0.15-0.03 &0.01 MG Tab    traZODone (DESYREL) 100 MG Tab            Return in about 3 months (around 4/9/2023), or if symptoms worsen or fail to improve.    Please note that this dictation was created using voice recognition software. I have made every reasonable attempt to correct obvious errors, but I expect that there are errors of grammar and possibly content that I did not discover before finalizing the note.

## 2023-01-10 NOTE — ASSESSMENT & PLAN NOTE
Chronic, was well controlled on Aimovig, Topamax and Maxalt for breakthrough symptoms.  Was not able to fill her Aimovig for over 6 weeks and has developed severe intractable migraine.

## 2023-01-10 NOTE — ASSESSMENT & PLAN NOTE
This is a chronic problem, venlafaxine 150 mg daily is helpful to control her anxiety.   - No suicidal/homicidal thoughts or ideations.   -Continue current regimen without changes.

## 2023-01-11 ENCOUNTER — TELEPHONE (OUTPATIENT)
Dept: NEUROLOGY | Facility: MEDICAL CENTER | Age: 43
End: 2023-01-11

## 2023-01-11 ENCOUNTER — APPOINTMENT (OUTPATIENT)
Dept: PHYSICAL MEDICINE AND REHAB | Facility: MEDICAL CENTER | Age: 43
End: 2023-01-11
Payer: COMMERCIAL

## 2023-01-11 NOTE — TELEPHONE ENCOUNTER
Was given the approval for her P.A , for patients   Amiovig  sent a refill in to her local pharmacy. Called patient and left voicemail on patient phone.

## 2023-01-16 DIAGNOSIS — E55.9 VITAMIN D DEFICIENCY: ICD-10-CM

## 2023-01-16 DIAGNOSIS — E53.8 B12 DEFICIENCY: ICD-10-CM

## 2023-01-16 DIAGNOSIS — E03.9 PRIMARY HYPOTHYROIDISM: ICD-10-CM

## 2023-01-23 ENCOUNTER — HOSPITAL ENCOUNTER (OUTPATIENT)
Dept: LAB | Facility: MEDICAL CENTER | Age: 43
End: 2023-01-23
Attending: INTERNAL MEDICINE
Payer: COMMERCIAL

## 2023-01-23 ENCOUNTER — HOSPITAL ENCOUNTER (OUTPATIENT)
Facility: MEDICAL CENTER | Age: 43
End: 2023-01-23
Attending: INTERNAL MEDICINE
Payer: COMMERCIAL

## 2023-01-23 ENCOUNTER — HOSPITAL ENCOUNTER (OUTPATIENT)
Dept: LAB | Facility: MEDICAL CENTER | Age: 43
End: 2023-01-23
Attending: OBSTETRICS & GYNECOLOGY
Payer: COMMERCIAL

## 2023-01-23 DIAGNOSIS — E03.9 PRIMARY HYPOTHYROIDISM: ICD-10-CM

## 2023-01-23 DIAGNOSIS — E55.9 VITAMIN D DEFICIENCY: ICD-10-CM

## 2023-01-23 DIAGNOSIS — E53.8 B12 DEFICIENCY: ICD-10-CM

## 2023-01-23 DIAGNOSIS — Z01.419 ENCOUNTER FOR ANNUAL ROUTINE GYNECOLOGICAL EXAMINATION: ICD-10-CM

## 2023-01-23 DIAGNOSIS — E78.00 HYPERCHOLESTEROLEMIA: ICD-10-CM

## 2023-01-23 LAB
25(OH)D3 SERPL-MCNC: 43 NG/ML (ref 30–100)
ALBUMIN SERPL BCP-MCNC: 4.2 G/DL (ref 3.2–4.9)
ALBUMIN/GLOB SERPL: 1.8 G/DL
ALP SERPL-CCNC: 69 U/L (ref 30–99)
ALT SERPL-CCNC: 46 U/L (ref 2–50)
ANION GAP SERPL CALC-SCNC: 10 MMOL/L (ref 7–16)
AST SERPL-CCNC: 47 U/L (ref 12–45)
BASOPHILS # BLD AUTO: 0.9 % (ref 0–1.8)
BASOPHILS # BLD: 0.05 K/UL (ref 0–0.12)
BILIRUB SERPL-MCNC: 0.2 MG/DL (ref 0.1–1.5)
BUN SERPL-MCNC: 11 MG/DL (ref 8–22)
CALCIUM ALBUM COR SERPL-MCNC: 8.5 MG/DL (ref 8.5–10.5)
CALCIUM SERPL-MCNC: 8.7 MG/DL (ref 8.4–10.2)
CHLORIDE SERPL-SCNC: 106 MMOL/L (ref 96–112)
CHOLEST SERPL-MCNC: 197 MG/DL (ref 100–199)
CO2 SERPL-SCNC: 21 MMOL/L (ref 20–33)
CREAT SERPL-MCNC: 0.79 MG/DL (ref 0.5–1.4)
EOSINOPHIL # BLD AUTO: 0.12 K/UL (ref 0–0.51)
EOSINOPHIL NFR BLD: 2.1 % (ref 0–6.9)
ERYTHROCYTE [DISTWIDTH] IN BLOOD BY AUTOMATED COUNT: 47.6 FL (ref 35.9–50)
FASTING STATUS PATIENT QL REPORTED: NORMAL
GFR SERPLBLD CREATININE-BSD FMLA CKD-EPI: 94 ML/MIN/1.73 M 2
GLOBULIN SER CALC-MCNC: 2.4 G/DL (ref 1.9–3.5)
GLUCOSE SERPL-MCNC: 91 MG/DL (ref 65–99)
HCT VFR BLD AUTO: 42.2 % (ref 37–47)
HCV AB SER QL: NORMAL
HDLC SERPL-MCNC: 79 MG/DL
HGB BLD-MCNC: 13.9 G/DL (ref 12–16)
HIV 1+2 AB+HIV1 P24 AG SERPL QL IA: NORMAL
IMM GRANULOCYTES # BLD AUTO: 0.01 K/UL (ref 0–0.11)
IMM GRANULOCYTES NFR BLD AUTO: 0.2 % (ref 0–0.9)
LDLC SERPL CALC-MCNC: 106 MG/DL
LYMPHOCYTES # BLD AUTO: 1.66 K/UL (ref 1–4.8)
LYMPHOCYTES NFR BLD: 29.3 % (ref 22–41)
MCH RBC QN AUTO: 31.4 PG (ref 27–33)
MCHC RBC AUTO-ENTMCNC: 32.9 G/DL (ref 33.6–35)
MCV RBC AUTO: 95.3 FL (ref 81.4–97.8)
MONOCYTES # BLD AUTO: 0.45 K/UL (ref 0–0.85)
MONOCYTES NFR BLD AUTO: 7.9 % (ref 0–13.4)
NEUTROPHILS # BLD AUTO: 3.38 K/UL (ref 2–7.15)
NEUTROPHILS NFR BLD: 59.6 % (ref 44–72)
NRBC # BLD AUTO: 0 K/UL
NRBC BLD-RTO: 0 /100 WBC
PLATELET # BLD AUTO: 271 K/UL (ref 164–446)
PMV BLD AUTO: 10.1 FL (ref 9–12.9)
POTASSIUM SERPL-SCNC: 3.8 MMOL/L (ref 3.6–5.5)
PROT SERPL-MCNC: 6.6 G/DL (ref 6–8.2)
RBC # BLD AUTO: 4.43 M/UL (ref 4.2–5.4)
SODIUM SERPL-SCNC: 137 MMOL/L (ref 135–145)
T3FREE SERPL-MCNC: 3.21 PG/ML (ref 2–4.4)
T4 FREE SERPL-MCNC: 1.1 NG/DL (ref 0.93–1.7)
TRIGL SERPL-MCNC: 62 MG/DL (ref 0–149)
TSH SERPL DL<=0.005 MIU/L-ACNC: 1.02 UIU/ML (ref 0.38–5.33)
VIT B12 SERPL-MCNC: 510 PG/ML (ref 211–911)
WBC # BLD AUTO: 5.7 K/UL (ref 4.8–10.8)

## 2023-01-23 PROCEDURE — 36415 COLL VENOUS BLD VENIPUNCTURE: CPT

## 2023-01-23 PROCEDURE — 84443 ASSAY THYROID STIM HORMONE: CPT

## 2023-01-23 PROCEDURE — 84439 ASSAY OF FREE THYROXINE: CPT

## 2023-01-23 PROCEDURE — 87389 HIV-1 AG W/HIV-1&-2 AB AG IA: CPT

## 2023-01-23 PROCEDURE — 85025 COMPLETE CBC W/AUTO DIFF WBC: CPT

## 2023-01-23 PROCEDURE — 82607 VITAMIN B-12: CPT

## 2023-01-23 PROCEDURE — 80053 COMPREHEN METABOLIC PANEL: CPT

## 2023-01-23 PROCEDURE — 82306 VITAMIN D 25 HYDROXY: CPT

## 2023-01-23 PROCEDURE — 84481 FREE ASSAY (FT-3): CPT

## 2023-01-23 PROCEDURE — 80061 LIPID PANEL: CPT

## 2023-01-23 PROCEDURE — 86803 HEPATITIS C AB TEST: CPT

## 2023-01-23 PROCEDURE — 86780 TREPONEMA PALLIDUM: CPT

## 2023-01-24 ENCOUNTER — APPOINTMENT (OUTPATIENT)
Dept: OBGYN | Facility: CLINIC | Age: 43
End: 2023-01-24
Payer: COMMERCIAL

## 2023-01-24 DIAGNOSIS — N76.1 SUBACUTE VAGINITIS: ICD-10-CM

## 2023-01-24 LAB — T PALLIDUM AB SER QL IA: NORMAL

## 2023-01-24 RX ORDER — FLUCONAZOLE 150 MG/1
150 TABLET ORAL
Qty: 2 TABLET | Refills: 0 | Status: SHIPPED | OUTPATIENT
Start: 2023-01-24 | End: 2023-01-31

## 2023-01-31 ENCOUNTER — OFFICE VISIT (OUTPATIENT)
Dept: ENDOCRINOLOGY | Facility: MEDICAL CENTER | Age: 43
End: 2023-01-31
Attending: INTERNAL MEDICINE
Payer: COMMERCIAL

## 2023-01-31 ENCOUNTER — HOSPITAL ENCOUNTER (OUTPATIENT)
Dept: RADIOLOGY | Facility: MEDICAL CENTER | Age: 43
End: 2023-01-31
Attending: OBSTETRICS & GYNECOLOGY
Payer: COMMERCIAL

## 2023-01-31 VITALS
BODY MASS INDEX: 21.21 KG/M2 | DIASTOLIC BLOOD PRESSURE: 58 MMHG | HEART RATE: 104 BPM | OXYGEN SATURATION: 100 % | HEIGHT: 66 IN | WEIGHT: 132 LBS | SYSTOLIC BLOOD PRESSURE: 98 MMHG

## 2023-01-31 DIAGNOSIS — N93.0 POSTCOITAL BLEEDING: ICD-10-CM

## 2023-01-31 DIAGNOSIS — E55.9 VITAMIN D DEFICIENCY: ICD-10-CM

## 2023-01-31 DIAGNOSIS — E53.8 B12 DEFICIENCY: ICD-10-CM

## 2023-01-31 DIAGNOSIS — E03.9 PRIMARY HYPOTHYROIDISM: ICD-10-CM

## 2023-01-31 PROCEDURE — 99214 OFFICE O/P EST MOD 30 MIN: CPT | Performed by: INTERNAL MEDICINE

## 2023-01-31 PROCEDURE — 76830 TRANSVAGINAL US NON-OB: CPT

## 2023-01-31 PROCEDURE — 99211 OFF/OP EST MAY X REQ PHY/QHP: CPT | Performed by: INTERNAL MEDICINE

## 2023-01-31 RX ORDER — LIOTHYRONINE SODIUM 5 UG/1
5 TABLET ORAL DAILY
Qty: 90 TABLET | Refills: 1 | Status: SHIPPED | OUTPATIENT
Start: 2023-01-31 | End: 2023-03-15

## 2023-01-31 RX ORDER — ERGOCALCIFEROL 1.25 MG/1
50000 CAPSULE ORAL
Qty: 12 CAPSULE | Refills: 1 | Status: SHIPPED | OUTPATIENT
Start: 2023-01-31

## 2023-01-31 ASSESSMENT — FIBROSIS 4 INDEX: FIB4 SCORE: 1.13
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
## 2023-03-06 ENCOUNTER — OFFICE VISIT (OUTPATIENT)
Dept: MEDICAL GROUP | Facility: MEDICAL CENTER | Age: 43
End: 2023-03-06
Payer: COMMERCIAL

## 2023-03-06 VITALS
SYSTOLIC BLOOD PRESSURE: 112 MMHG | DIASTOLIC BLOOD PRESSURE: 68 MMHG | BODY MASS INDEX: 21.19 KG/M2 | OXYGEN SATURATION: 98 % | TEMPERATURE: 97.8 F | HEART RATE: 77 BPM | WEIGHT: 131.84 LBS | HEIGHT: 66 IN

## 2023-03-06 DIAGNOSIS — M54.2 CERVICAL MUSCLE PAIN: ICD-10-CM

## 2023-03-06 DIAGNOSIS — F51.04 PSYCHOPHYSIOLOGICAL INSOMNIA: ICD-10-CM

## 2023-03-06 DIAGNOSIS — G47.09 OTHER INSOMNIA: ICD-10-CM

## 2023-03-06 PROBLEM — G47.00 INSOMNIA: Chronic | Status: ACTIVE | Noted: 2023-01-09

## 2023-03-06 PROCEDURE — 99214 OFFICE O/P EST MOD 30 MIN: CPT | Performed by: INTERNAL MEDICINE

## 2023-03-06 RX ORDER — TIZANIDINE 4 MG/1
4 TABLET ORAL EVERY 8 HOURS PRN
Qty: 270 TABLET | Refills: 1 | Status: SHIPPED | OUTPATIENT
Start: 2023-03-06 | End: 2023-12-01

## 2023-03-06 RX ORDER — QUETIAPINE FUMARATE 25 MG/1
12.5-25 TABLET, FILM COATED ORAL NIGHTLY PRN
Qty: 90 TABLET | Refills: 1 | Status: SHIPPED | OUTPATIENT
Start: 2023-03-06 | End: 2023-06-12

## 2023-03-06 ASSESSMENT — FIBROSIS 4 INDEX: FIB4 SCORE: 1.13

## 2023-03-07 ASSESSMENT — ENCOUNTER SYMPTOMS
NAUSEA: 0
COUGH: 0
DIARRHEA: 0
FEVER: 0
CHILLS: 0
INSOMNIA: 1
HEADACHES: 1
ABDOMINAL PAIN: 0
DIZZINESS: 0
VOMITING: 0

## 2023-03-07 NOTE — ASSESSMENT & PLAN NOTE
This is a chronic problem, uncontrolled and is experiencing side effects from trazodone.  Patient started weaning herself off.   -Sleep hygiene, caffeine avoidance.   - Trial of quetiapine, side effects discussed.   - Patient also follows with psychiatry for her PTSD and depressive disorder.  Advised to discuss with them as well if the quetiapine trial fails.

## 2023-03-07 NOTE — PROGRESS NOTES
Subjective:     Diagnoses of Other insomnia, Cervical muscle pain, and Psychophysiological insomnia were pertinent to this visit.    HPI: VIRAJ is a pleasant 42 y.o. female who presents today To discuss the following:    Problem   Psychophysiological Insomnia    This is a chronic problem, currently uncontrolled and patient is experiencing side effects from trazodone (Change in mood, mental stability, irritability, anxiety, paranoid.)  Prior regimens: Zolpidem, was effective.   Sleep hygiene: Discussed blue screen, exercise, diet.              Current Outpatient Medications Ordered in Epic   Medication Sig Dispense Refill    QUEtiapine (SEROQUEL) 25 MG Tab Take 0.5-1 Tablets by mouth at bedtime as needed (insomnia). 90 Tablet 1    tizanidine (ZANAFLEX) 4 MG Tab Take 1 Tablet by mouth every 8 hours as needed (muscle spasm). 270 Tablet 1    ergocalciferol (DRISDOL) 91317 UNIT capsule Take 1 Capsule by mouth every 14 days. 12 Capsule 1    liothyronine (CYTOMEL) 5 MCG Tab Take 1 Tablet by mouth every day. 90 Tablet 1    AIMOVIG 140 MG/ML Solution Auto-injector Inject 140 mg under the skin every 4 weeks. 1 mL 11    SYNTHROID 75 MCG Tab Take 1 Tablet by mouth every morning on an empty stomach. 90 Tablet 1    lovastatin (MEVACOR) 20 MG Tab Take 1 Tablet by mouth every evening. 90 Tablet 3    venlafaxine (EFFEXOR-XR) 150 MG extended-release capsule Take 1 Capsule by mouth every day. 90 Capsule 3    Levonorgest-Eth Estrad 91-Day (DAYSEE) 0.15-0.03 &0.01 MG Tab Take 1 Tablet by mouth every day. 91 Tablet 0    rizatriptan (MAXALT-MLT) 10 MG disintegrating tablet TAKE 1 TABLET BY MOUTH AT HEADACHE ONSET REPEAT EVERY HOUR AS NEEDED UP TO 4 TABLETS IN 24 HOURS 10 Tablet 11    topiramate (TOPAMAX) 50 MG tablet Take 3 Tablets by mouth 2 times a day. 540 Tablet 3    traMADol (ULTRAM) 50 MG Tab       Cyanocobalamin (VITAMIN B 12 PO) Take  by mouth.      cetirizine (ZYRTEC) 10 MG Tab Take 10 mg by mouth every day.       No current  "Epic-ordered facility-administered medications on file.     Review of Systems   Constitutional:  Negative for chills, fever and malaise/fatigue.   Respiratory:  Negative for cough.    Gastrointestinal:  Negative for abdominal pain, diarrhea, nausea and vomiting.   Genitourinary:  Negative for dysuria and hematuria.   Neurological:  Positive for headaches. Negative for dizziness.   Psychiatric/Behavioral:  The patient has insomnia.      Objective:     Exam:  /68 (BP Location: Left arm, Patient Position: Sitting, BP Cuff Size: Adult)   Pulse 77   Temp 36.6 °C (97.8 °F) (Temporal)   Ht 1.676 m (5' 6\")   Wt 59.8 kg (131 lb 13.4 oz)   SpO2 98%   BMI 21.28 kg/m²  Body mass index is 21.28 kg/m².    Physical Exam  Constitutional:       Appearance: Normal appearance.   HENT:      Head: Normocephalic and atraumatic.   Abdominal:      Tenderness: Negative signs include Alaniz's sign.   Neurological:      Mental Status: She is alert and oriented to person, place, and time. Mental status is at baseline.   Psychiatric:         Mood and Affect: Mood normal.     Assessment & Plan:   VIRAJ  is a pleasant 42 y.o. female with the following -     Problem List Items Addressed This Visit       Psychophysiological insomnia     This is a chronic problem, uncontrolled and is experiencing side effects from trazodone.  Patient started weaning herself off.   -Sleep hygiene, caffeine avoidance.   - Trial of quetiapine, side effects discussed.   - Patient also follows with psychiatry for her PTSD and depressive disorder.  Advised to discuss with them as well if the quetiapine trial fails.         Relevant Medications    QUEtiapine (SEROQUEL) 25 MG Tab     Other Visit Diagnoses       Cervical muscle pain        Relevant Medications    tizanidine (ZANAFLEX) 4 MG Tab          Return if symptoms worsen or fail to improve.    Please note that this dictation was created using voice recognition software. I have made every reasonable " attempt to correct obvious errors, but I expect that there are errors of grammar and possibly content that I did not discover before finalizing the note.

## 2023-03-15 DIAGNOSIS — E03.9 PRIMARY HYPOTHYROIDISM: ICD-10-CM

## 2023-03-15 RX ORDER — LIOTHYRONINE SODIUM 5 UG/1
TABLET ORAL
Qty: 90 TABLET | Refills: 1 | Status: SHIPPED | OUTPATIENT
Start: 2023-03-15 | End: 2023-11-27

## 2023-05-05 ENCOUNTER — PATIENT MESSAGE (OUTPATIENT)
Dept: MEDICAL GROUP | Facility: MEDICAL CENTER | Age: 43
End: 2023-05-05
Payer: COMMERCIAL

## 2023-05-05 DIAGNOSIS — R79.89 ABNORMAL CORTISOL LEVEL: ICD-10-CM

## 2023-05-12 ENCOUNTER — HOSPITAL ENCOUNTER (OUTPATIENT)
Dept: LAB | Facility: MEDICAL CENTER | Age: 43
End: 2023-05-12
Attending: INTERNAL MEDICINE
Payer: COMMERCIAL

## 2023-05-12 LAB — CORTIS SERPL-MCNC: 24.3 UG/DL (ref 0–23)

## 2023-05-12 PROCEDURE — 36415 COLL VENOUS BLD VENIPUNCTURE: CPT

## 2023-05-12 PROCEDURE — 82533 TOTAL CORTISOL: CPT

## 2023-05-23 DIAGNOSIS — G43.119 INTRACTABLE MIGRAINE WITH AURA WITHOUT STATUS MIGRAINOSUS: ICD-10-CM

## 2023-05-24 RX ORDER — TOPIRAMATE 50 MG/1
TABLET, FILM COATED ORAL
Qty: 540 TABLET | Refills: 1 | Status: SHIPPED | OUTPATIENT
Start: 2023-05-24

## 2023-05-24 NOTE — TELEPHONE ENCOUNTER
Received request via: Pharmacy    Was the patient seen in the last year in this department? Yes    Does the patient have an active prescription (recently filled or refills available) for medication(s) requested? Yes.     Does the patient have intermediate Plus and need 100 day supply (blood pressure, diabetes and cholesterol meds only)? Medication is not for cholesterol, blood pressure or diabetes   no

## 2023-06-09 ENCOUNTER — HOSPITAL ENCOUNTER (OUTPATIENT)
Dept: RADIOLOGY | Facility: MEDICAL CENTER | Age: 43
End: 2023-06-09
Attending: REGISTERED NURSE
Payer: COMMERCIAL

## 2023-06-09 ENCOUNTER — OFFICE VISIT (OUTPATIENT)
Dept: URGENT CARE | Facility: CLINIC | Age: 43
End: 2023-06-09
Payer: COMMERCIAL

## 2023-06-09 VITALS
BODY MASS INDEX: 21.66 KG/M2 | SYSTOLIC BLOOD PRESSURE: 128 MMHG | HEART RATE: 67 BPM | HEIGHT: 65 IN | OXYGEN SATURATION: 98 % | DIASTOLIC BLOOD PRESSURE: 82 MMHG | TEMPERATURE: 98.3 F | WEIGHT: 130 LBS | RESPIRATION RATE: 16 BRPM

## 2023-06-09 DIAGNOSIS — M79.89 LEFT ARM SWELLING: ICD-10-CM

## 2023-06-09 DIAGNOSIS — L03.114 CELLULITIS OF LEFT UPPER EXTREMITY: ICD-10-CM

## 2023-06-09 DIAGNOSIS — I82.619 BASILIC VEIN THROMBOSIS: ICD-10-CM

## 2023-06-09 PROCEDURE — 93971 EXTREMITY STUDY: CPT | Mod: LT

## 2023-06-09 PROCEDURE — 3074F SYST BP LT 130 MM HG: CPT | Performed by: REGISTERED NURSE

## 2023-06-09 PROCEDURE — 2023F DILAT RTA XM W/O RTNOPTHY: CPT | Performed by: REGISTERED NURSE

## 2023-06-09 PROCEDURE — 3079F DIAST BP 80-89 MM HG: CPT | Performed by: REGISTERED NURSE

## 2023-06-09 PROCEDURE — 99214 OFFICE O/P EST MOD 30 MIN: CPT | Performed by: REGISTERED NURSE

## 2023-06-09 RX ORDER — OXYCODONE HYDROCHLORIDE AND ACETAMINOPHEN 5; 325 MG/1; MG/1
TABLET ORAL
COMMUNITY
Start: 2023-03-23 | End: 2023-06-12

## 2023-06-09 RX ORDER — DULOXETIN HYDROCHLORIDE 20 MG/1
CAPSULE, DELAYED RELEASE ORAL
COMMUNITY
Start: 2023-04-25

## 2023-06-09 RX ORDER — HYDROXYZINE HYDROCHLORIDE 10 MG/1
TABLET, FILM COATED ORAL
COMMUNITY
Start: 2023-04-25

## 2023-06-09 RX ORDER — CEPHALEXIN 500 MG/1
500 CAPSULE ORAL 4 TIMES DAILY
Qty: 20 CAPSULE | Refills: 0 | Status: SHIPPED | OUTPATIENT
Start: 2023-06-09 | End: 2023-06-09

## 2023-06-09 RX ORDER — ALPRAZOLAM 0.5 MG/1
TABLET ORAL
COMMUNITY
Start: 2023-03-23 | End: 2023-06-12

## 2023-06-09 ASSESSMENT — ENCOUNTER SYMPTOMS
SHORTNESS OF BREATH: 0
NECK PAIN: 0
HEMOPTYSIS: 0
ORTHOPNEA: 0
EYE PAIN: 0
MYALGIAS: 0
WHEEZING: 0
FEVER: 0
WEAKNESS: 0
WEIGHT LOSS: 0
HEADACHES: 0
DIZZINESS: 0
PALPITATIONS: 0
SENSORY CHANGE: 0
FOCAL WEAKNESS: 0
ABDOMINAL PAIN: 0

## 2023-06-09 ASSESSMENT — FIBROSIS 4 INDEX: FIB4 SCORE: 1.13

## 2023-06-09 NOTE — PROGRESS NOTES
"Subjective:   VIRAJ MCMILLAN is a 42 y.o. female who presents for Arm Pain (Left arm pain, and swollen )    HPI:  Developed some left inner arm pain and redness after receiving a ketamine infusion on 06/03/23, then a few days later the symptoms developed. IV was in left AC. No fever or body aches or chills. The redness has grown, warm and tender, also notes some swelling that waxes and wanes.  Denies history of blood clots.    Review of Systems   Constitutional:  Negative for fever and weight loss.   Eyes:  Negative for pain.   Respiratory:  Negative for hemoptysis, shortness of breath and wheezing.    Cardiovascular:  Negative for chest pain, palpitations, orthopnea and leg swelling.   Gastrointestinal:  Negative for abdominal pain.   Musculoskeletal:  Negative for myalgias and neck pain.   Skin:         Positive Redness, warmth, swelling left upper extremity   Neurological:  Negative for dizziness, sensory change, focal weakness, weakness and headaches.       Medications, Allergies, and current problem list reviewed today in Epic.     Objective:     /82   Pulse 67   Temp 36.8 °C (98.3 °F) (Temporal)   Resp 16   Ht 1.651 m (5' 5\")   Wt 59 kg (130 lb)   SpO2 98%     Physical Exam  Vitals and nursing note reviewed.   Constitutional:       General: She is not in acute distress.     Appearance: Normal appearance. She is well-developed. She is not ill-appearing, toxic-appearing or diaphoretic.   HENT:      Head: Normocephalic and atraumatic.      Right Ear: Hearing normal.      Left Ear: Hearing normal.      Mouth/Throat:      Mouth: Mucous membranes are moist.   Eyes:      General:         Right eye: No discharge.         Left eye: No discharge.      Conjunctiva/sclera: Conjunctivae normal.   Cardiovascular:      Rate and Rhythm: Normal rate and regular rhythm.      Heart sounds: Normal heart sounds.   Pulmonary:      Effort: Pulmonary effort is normal. No respiratory distress.      Breath sounds: " Normal breath sounds. No wheezing.   Abdominal:      General: Abdomen is flat.      Palpations: Abdomen is soft.   Musculoskeletal:         General: Normal range of motion.      Left elbow: Swelling present. Normal range of motion. Tenderness present.        Arms:       Cervical back: Normal range of motion and neck supple.      Right lower leg: No edema.      Left lower leg: No edema.      Comments: Erythema well demarcated, warmth and TTP, slight swelling.  Erythema outlined in surgical pen   Skin:     General: Skin is warm and dry.      Capillary Refill: Capillary refill takes less than 2 seconds.   Neurological:      General: No focal deficit present.      Mental Status: She is alert and oriented to person, place, and time.   Psychiatric:         Mood and Affect: Mood normal.         RADIOLOGY RESULTS   US-EXTREMITY VENOUS UPPER UNILAT LEFT    Result Date: 2023   Upper Extremity  Venous Duplex Report  Vascular Laboratory  CONCLUSIONS  VIRAJ MCMILLAN  Exam Date:     2023 16:21  Room #:     Out Patient  Priority:     Stat  Ht (in):             Wt (lb):  Ordering Physician:        OFELIA THORPE  Referring Physician:       [id number] Najera  Sonographer:               Kiran Pederson RVGEOFFREY  Study Type:                Complete Unilateral  Technical Quality:         Adequate  Age:    42    Gender:     F  MRN:    [id number]  :    1980      BSA:  Indications:     Pain in left arm  CPT Codes:       54725  ICD Codes:       M79.602  History:  Limitations:  PROCEDURES:  Left upper extremity venous duplex imaging.  The following venous structures were evaluated: internal jugular,  subclavian, axillary, brachial, cephalic, and basilic veins.  Serial compression, color, and spectral Doppler flow evaluations were  performed.  FINDINGS:  Left upper extremity.  Acute to subacute, occlusive thrombus in the left basilic vein.  All other veins demonstrate complete color filling and compressibility with  normal venous flow  dynamics including spontaneous flow and respiratory  phasicity.  No deep venous thrombosis.            Assessment/Plan:     Diagnosis and associated orders:     1. Cellulitis of left upper extremity  cephALEXin (KEFLEX) 500 MG Cap      2. Basilic vein thrombosis  US-EXTREMITY VENOUS UPPER UNILAT LEFT    rivaroxaban (XARELTO) 15 MG Tab tablet    Referral to Anticoagulation Monitoring           Comments/MDM:     Vital signs within normal limits, nontoxic appearance  No red flag signs or symptoms  Did have recent IV infusion left AC site  Outlined erythema with surgical pen, there is warmth and swelling at the site  Ultrasound left upper extremity positive for acute/subacute thrombosis basilic vein  Will start on Xarelto and transfer care to anticoagulation clinic  Discussed bleeding precautions  Strict ER precautions in place         Differential diagnosis, natural history, supportive care, and indications for immediate follow-up discussed.    Return to clinic or go to ED if symptoms worsen or persist. Indications for ED discussed at length. Patient/Parent/Guardian voices understanding. Follow-up with your primary care provider in 3-5 days. Red flag symptoms discussed. All side effects of medication discussed including allergic response, GI upset, tendon injury, rash, sedation etc.    I personally reviewed prior external notes and test results pertinent to today's visit as well as additional imaging and testing completed in clinic today.     Please note that this dictation was created using voice recognition software. I have made every reasonable attempt to correct obvious errors, but I expect that there are errors of grammar and possibly content that I did not discover before finalizing the note.    This note was electronically signed by DARRYL Valentine

## 2023-06-12 ENCOUNTER — TELEPHONE (OUTPATIENT)
Dept: VASCULAR LAB | Facility: MEDICAL CENTER | Age: 43
End: 2023-06-12
Payer: COMMERCIAL

## 2023-06-12 ENCOUNTER — ANTICOAGULATION VISIT (OUTPATIENT)
Dept: VASCULAR LAB | Facility: MEDICAL CENTER | Age: 43
End: 2023-06-12
Attending: INTERNAL MEDICINE
Payer: COMMERCIAL

## 2023-06-12 DIAGNOSIS — I82.619 BASILIC VEIN THROMBOSIS: ICD-10-CM

## 2023-06-12 PROCEDURE — 99213 OFFICE O/P EST LOW 20 MIN: CPT

## 2023-06-12 NOTE — TELEPHONE ENCOUNTER
[facility]    Received anticoagulation referral from Dhruv MENDOZA on 6/9    Scheduled NP 6/12    Insurance: [facility]  PCP: [facility]  Locations to be seen: Any    [facility] at [phone], fax [phone]    Bessie Patel, PharmD

## 2023-06-12 NOTE — PROGRESS NOTES
NEW DOAC   .  Anticoagulation Patient Findings      PCP: Ara Adair M.D.  Cardiologist: none  Dx: Basilic vein svt (lft arm)    CHADSVASC = n/a  HAS-BLED = 0  Target End Date = 7/25/2023    Pt Hx:  - left inner arm pain and redness 6/3 after ketamine infusion for PTSD (at urgent care). Symptoms developed over the following days. Prescribed keflex, only took one dose then stopped at direction of urgent care provider.   - No immobility noted   - Patient denies hypercoagulable state   - Left elbow notably more swollen than right elbow  - Pt unclear if she had a previous clot. Believes she may have had a clot before on the right arm dorsal to wrist after IV medication, pt endorses having collapsed vein (years ago, unable to specify date, unable to confirm via chart review)    Labs:  Lab Results   Component Value Date/Time    WBC 5.7 01/23/2023 11:20 AM    RBC 4.43 01/23/2023 11:20 AM    HEMOGLOBIN 13.9 01/23/2023 11:20 AM    HEMATOCRIT 42.2 01/23/2023 11:20 AM    MCV 95.3 01/23/2023 11:20 AM    MCH 31.4 01/23/2023 11:20 AM    MCHC 32.9 (L) 01/23/2023 11:20 AM    MPV 10.1 01/23/2023 11:20 AM    NEUTSPOLYS 59.60 01/23/2023 11:20 AM    LYMPHOCYTES 29.30 01/23/2023 11:20 AM    MONOCYTES 7.90 01/23/2023 11:20 AM    EOSINOPHILS 2.10 01/23/2023 11:20 AM    BASOPHILS 0.90 01/23/2023 11:20 AM      Lab Results   Component Value Date/Time    SODIUM 137 01/23/2023 11:15 AM    POTASSIUM 3.8 01/23/2023 11:15 AM    CHLORIDE 106 01/23/2023 11:15 AM    CO2 21 01/23/2023 11:15 AM    GLUCOSE 91 01/23/2023 11:15 AM    BUN 11 01/23/2023 11:15 AM    CREATININE 0.79 01/23/2023 11:15 AM          Pt is new to [facility]. Discussed:   Indication for DOAC therapy.  Importance of monitoring and compliance.   Monitoring parameters, signs and symptoms of bleeding or clotting.  DOAC therapy, side effects, potential DDIs, OTC medications  Hormonal therapy: Daysee birth control for ~5 years, also for migraines  Pregnancy: no  DDI: aleve for  migraines  2 tablets per day up to 1 month depending on migraine duration  Discussed bleed risk and switching to tylenol, pt verbalizes understanding and plans to switch.  Pt is not on antiplatelet/NSAID therapy  Lifestyle safety, ie smoking, ETOH, hobby safety, fall safety/prevention  Denies smoking and ETOH  Procedures for missed doses or suspected missed doses, surgeries/procedures, travel, dental work, any medication changes    Xarelto 15mg taken 2 times a day for 21 days and then change to 20mg taken once daily. Pt will transition to 20mg dose on 7/1    DOAC affordable = Yes    Samples provided: No    Labs to be completed prior to next f/u - CBC, CMP    F/U - 6 weeks    Sami Ashton, PharmD    Added [facility] to care team      CC:  Ara Adair M.D.  Ryan Bristol, A.P.R.N. Michael Bloch, M.D.  Prosper Arauz, PharmD      ADDENDUM:  End date updated to 7/25/2023, 6 week therapy.    [facility] Pharmacotherapy Program Consent                                             Name    VIRAJ MCMILLAN    MRN number: [id number]    the following are guidelines for participation in the [facility] Pharmac[facility].     I, ____VIRAJ Elianajanet MCMILLAN_____, understand and voluntarily agree to participate in the [facility] and to have services provided to me by pharmacists working in collaboration with my provider.    I understand the pharmacist within the [facility] may initiate, modify or discontinue my medications, order appropriate testing and appointments, perform exams, monitor treatment, and make clinical evaluations and decisions pursuant to a collaborative practice agreement with my provider.  I understand the pharmacist within the [facility] is not a physician, osteopathic physician, advanced practice registered nurse or physician assistant and may not diagnose.  I will take all my  medications as instructed and not change the way I take it without first talking to my provider or a pharmacist within the [facility] Program.  I understand that if I am late to my appointment I may not be able to be seen by a pharmacist at that time and will have to reschedule my appointment.  During appointment with pharmacist I understand that pharmacist has the right not to answer questions or perform services outside the pharmacist’s scope of practice.  By signing below, I provide informed consent for the pharmacist to provide these services and for my participation in the [facility] Pharmacotherapy Program.      VIRAJ MCMILLAN           [id number]          06/12/23  Patient Name                   MRN number  Date     __x__Obtained verbal consent from VIRAJ MCMILLAN

## 2023-06-16 ENCOUNTER — DOCUMENTATION (OUTPATIENT)
Dept: VASCULAR LAB | Facility: MEDICAL CENTER | Age: 43
End: 2023-06-16
Payer: COMMERCIAL

## 2023-06-17 NOTE — PROGRESS NOTES
Initial anticoag note and most recent UC note reviewed.     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     Will continue with 6 week of therapy and then d/c if symptoms has resolved and pcp in agreement.    Michael Bloch, MD  Anticoagulation Clinic    Cc:  AMBROSE Adair

## 2023-07-08 DIAGNOSIS — E03.9 PRIMARY HYPOTHYROIDISM: ICD-10-CM

## 2023-07-10 ENCOUNTER — HOSPITAL ENCOUNTER (OUTPATIENT)
Dept: LAB | Facility: MEDICAL CENTER | Age: 43
End: 2023-07-10
Attending: INTERNAL MEDICINE
Payer: COMMERCIAL

## 2023-07-10 DIAGNOSIS — E03.9 PRIMARY HYPOTHYROIDISM: ICD-10-CM

## 2023-07-10 DIAGNOSIS — E53.8 B12 DEFICIENCY: ICD-10-CM

## 2023-07-10 DIAGNOSIS — I82.619 BASILIC VEIN THROMBOSIS: ICD-10-CM

## 2023-07-10 DIAGNOSIS — E55.9 VITAMIN D DEFICIENCY: ICD-10-CM

## 2023-07-10 LAB
25(OH)D3 SERPL-MCNC: 67 NG/ML (ref 30–100)
ALBUMIN SERPL BCP-MCNC: 4.4 G/DL (ref 3.2–4.9)
ALBUMIN/GLOB SERPL: 1.7 G/DL
ALP SERPL-CCNC: 60 U/L (ref 30–99)
ALT SERPL-CCNC: 12 U/L (ref 2–50)
ANION GAP SERPL CALC-SCNC: 12 MMOL/L (ref 7–16)
ANION GAP SERPL CALC-SCNC: 12 MMOL/L (ref 7–16)
AST SERPL-CCNC: 14 U/L (ref 12–45)
BILIRUB SERPL-MCNC: 0.2 MG/DL (ref 0.1–1.5)
BUN SERPL-MCNC: 14 MG/DL (ref 8–22)
BUN SERPL-MCNC: 14 MG/DL (ref 8–22)
CALCIUM ALBUM COR SERPL-MCNC: 8.8 MG/DL (ref 8.5–10.5)
CALCIUM SERPL-MCNC: 9.1 MG/DL (ref 8.5–10.5)
CALCIUM SERPL-MCNC: 9.2 MG/DL (ref 8.5–10.5)
CHLORIDE SERPL-SCNC: 106 MMOL/L (ref 96–112)
CHLORIDE SERPL-SCNC: 106 MMOL/L (ref 96–112)
CO2 SERPL-SCNC: 21 MMOL/L (ref 20–33)
CO2 SERPL-SCNC: 21 MMOL/L (ref 20–33)
CREAT SERPL-MCNC: 1.05 MG/DL (ref 0.5–1.4)
CREAT SERPL-MCNC: 1.09 MG/DL (ref 0.5–1.4)
ERYTHROCYTE [DISTWIDTH] IN BLOOD BY AUTOMATED COUNT: 50.9 FL (ref 35.9–50)
GFR SERPLBLD CREATININE-BSD FMLA CKD-EPI: 64 ML/MIN/1.73 M 2
GFR SERPLBLD CREATININE-BSD FMLA CKD-EPI: 67 ML/MIN/1.73 M 2
GLOBULIN SER CALC-MCNC: 2.6 G/DL (ref 1.9–3.5)
GLUCOSE SERPL-MCNC: 79 MG/DL (ref 65–99)
GLUCOSE SERPL-MCNC: 80 MG/DL (ref 65–99)
HCT VFR BLD AUTO: 42.4 % (ref 37–47)
HGB BLD-MCNC: 13.8 G/DL (ref 12–16)
MCH RBC QN AUTO: 31.4 PG (ref 27–33)
MCHC RBC AUTO-ENTMCNC: 32.5 G/DL (ref 32.2–35.5)
MCV RBC AUTO: 96.4 FL (ref 81.4–97.8)
PLATELET # BLD AUTO: 268 K/UL (ref 164–446)
PMV BLD AUTO: 11.9 FL (ref 9–12.9)
POTASSIUM SERPL-SCNC: 4.1 MMOL/L (ref 3.6–5.5)
POTASSIUM SERPL-SCNC: 4.2 MMOL/L (ref 3.6–5.5)
PROT SERPL-MCNC: 7 G/DL (ref 6–8.2)
RBC # BLD AUTO: 4.4 M/UL (ref 4.2–5.4)
SODIUM SERPL-SCNC: 139 MMOL/L (ref 135–145)
SODIUM SERPL-SCNC: 139 MMOL/L (ref 135–145)
T3FREE SERPL-MCNC: 2.78 PG/ML (ref 2–4.4)
T4 FREE SERPL-MCNC: 1.1 NG/DL (ref 0.93–1.7)
TSH SERPL DL<=0.005 MIU/L-ACNC: 3.78 UIU/ML (ref 0.38–5.33)
VIT B12 SERPL-MCNC: 495 PG/ML (ref 211–911)
WBC # BLD AUTO: 6.6 K/UL (ref 4.8–10.8)

## 2023-07-10 PROCEDURE — 82306 VITAMIN D 25 HYDROXY: CPT

## 2023-07-10 PROCEDURE — 84443 ASSAY THYROID STIM HORMONE: CPT

## 2023-07-10 PROCEDURE — 36415 COLL VENOUS BLD VENIPUNCTURE: CPT

## 2023-07-10 PROCEDURE — 82607 VITAMIN B-12: CPT

## 2023-07-10 PROCEDURE — 84439 ASSAY OF FREE THYROXINE: CPT

## 2023-07-10 PROCEDURE — 80048 BASIC METABOLIC PNL TOTAL CA: CPT

## 2023-07-10 PROCEDURE — 80053 COMPREHEN METABOLIC PANEL: CPT

## 2023-07-10 PROCEDURE — 85027 COMPLETE CBC AUTOMATED: CPT

## 2023-07-10 PROCEDURE — 84481 FREE ASSAY (FT-3): CPT

## 2023-07-10 RX ORDER — LEVOTHYROXINE SODIUM 75 MCG
TABLET ORAL
Qty: 90 TABLET | Refills: 1 | Status: SHIPPED | OUTPATIENT
Start: 2023-07-10 | End: 2023-11-27

## 2023-07-17 ENCOUNTER — APPOINTMENT (OUTPATIENT)
Dept: NEUROLOGY | Facility: MEDICAL CENTER | Age: 43
End: 2023-07-17
Attending: PSYCHIATRY & NEUROLOGY
Payer: COMMERCIAL

## 2023-07-26 ENCOUNTER — DOCUMENTATION (OUTPATIENT)
Dept: VASCULAR LAB | Facility: MEDICAL CENTER | Age: 43
End: 2023-07-26
Payer: COMMERCIAL

## 2023-07-26 DIAGNOSIS — I82.619 BASILIC VEIN THROMBOSIS: ICD-10-CM

## 2023-07-26 NOTE — PROGRESS NOTES
Spoke with pt by phone. She requests a f/u u/s of her left arm to check the status of her previous SVT. She continues to get regular IVs and would like to know if it is safe to use her left arm. No current redness or pain. She is taking Xarelto as instructed. Order for LUE venous duplex placed. Vasc f/u scheduled for 7/31/23.    Jessi MATA

## 2023-07-27 NOTE — PROGRESS NOTES
Addendum - reviewed u/s result:   1.  No Left upper extremity DVT.   2.  No residual superficial thrombus seen in the Left basilic vein.    Lancope message sent to pt. F/u in August.    VASCULAR MEDICINE CLINIC - INITIAL VISIT (ANTICOAGULATION)  07/31/23     Dany Lambert is a 42 y.o. female who presents today for LOT.     Subjective    HPI:  Patient referred for evaluation and management of anticoagulation therapy.  She presented to  on 6/9/23 w. LUE pain, warmth and redness.  She was stared on cephalexin and an u/s was ordered which showed a left basilic SVT.  She receives ketamine infusions currently weekly for another month for PTSD then will be receiving monthly infusions.  Her last infusion was 6/3/23 in which she had a PIV placed to her left arm..  No prior hx of VTEs.  No known FH.  She had hypercoag studies done in 2016 - she is neg for LA, PTGM, FVL, protein c/s def, ATIII, ACLA.  Denies any prior surgeries, hospitalizations, medical illnesses, traumas, extended travel or other prolonged periods of immobility.  No tobacco .  Takes OCP for migraines.   Denies any personal hx of malignancy.  Last mammo July 2022.  Last pap smear Jan 2023.  Started on Xarelto 15 mg BID x 21 days then 20 mg daily.  Has been adherent to taking.  Cost is affordable.  No problems with bleeding.  Avoids NSAID/antiplatelets.  No ETOH use.  No SOB or CP.  No further arm swelling.  Still feels occasional nerve shooting type pain to her left arm.  Has been avoiding PIVs to her LUE.  Has resumed usual activities.  Wishes to pursue follow up imaging which she will have done tomorrow.  Has completed 6 weeks of therapy.    Past Medical History:   Diagnosis Date    Allergy, unspecified not elsewhere classified     Anxiety 8/26/2013    Arthritis     Cervical joints, and hands    Chest tightness or pressure 3/27/2018    High cholesterol     Migraine with aura and without status migrainosus, not intractable 8/26/2013    Pain  2017    Chronic pain- Migraine; neck pain, spasm    Psychiatric problem     hx of anxiety    Seizure (HCC) 2016    ? possibly r/t migraine         Past Surgical History:   Procedure Laterality Date    VT DSTR NROLYTC AGNT PARVERTEB FCT SNGL CRVCL/THORA Right 2017    Procedure: NEURO DEST FACET C/T W/IG SNGL C2-4;  Surgeon: Scotty Navarro D.O.;  Location: SURGERY [facility];  Service: Pain Management    VT DSTR NROLYTC AGNT PARVERTEB FCT ADDL CRVCL/THORA Right 2017    Procedure: NEURO DEST FACET C/T W/IG ADDL;  Surgeon: Scotty Navarro D.O.;  Location: SURGERY [facility];  Service: Pain Management    VT DSTR NROLYTC AGNT PARVERTEB FCT SNGL CRVCL/THORA Left 2017    Procedure: NEURO DEST FACET C/T W/IG SNGL - C2-4;  Surgeon: Scotty Navarro D.O.;  Location: SURGERY [facility];  Service: Pain Management    VT DSTR NROLYTC AGNT PARVERTEB FCT ADDL CRVCL/THORA  2017    Procedure: NEURO DEST FACET C/T W/IG ADDL;  Surgeon: Scotty Navarro D.O.;  Location: SURGERY [facility];  Service: Pain Management    TONSILLECTOMY      CYST EXCISION Left as child    cyst removal on L wrist        Family History   Problem Relation Age of Onset    Diabetes Maternal Grandfather     Migraines Maternal Grandfather     Cancer Paternal Grandfather     Alzheimer's Disease Paternal Grandfather     Cancer Paternal Grandmother 30        breast    Glasses Mother     Migraines Mother     Stroke Mother     Suicide Attempts Maternal Uncle         Killed himself by [facility]    Breast Cancer Maternal Grandmother     Hypertension Father     Glasses Father     Hyperlipidemia Father         Social History     Tobacco Use    Smoking status: Former     Packs/day: 1.00     Years: 15.00     Pack years: 15.00     Types: Cigarettes     Quit date: 2010     Years since quittin.5    Smokeless tobacco: Never   Vaping Use    Vaping Use: Never used   Substance Use Topics    Alcohol use: No     Drug use: No        Current Outpatient Medications on File Prior to Visit   Medication Sig Dispense Refill    SYNTHROID 75 MCG Tab TAKE 1 TABLET BY MOUTH EVERY DAY IN THE MORNING ON AN EMPTY STOMACH 90 Tablet 1    hydrOXYzine HCl (ATARAX) 10 MG Tab TAKE 1-3 TABLET BY MOUTH EVERY DAY AS NEEDED INSOMNIA      DULoxetine (CYMBALTA) 20 MG Cap DR Particles TAKE 1 CAPSULE BY MOUTH EVERY DAY AT BEDTIME      topiramate (TOPAMAX) 50 MG tablet TAKE 3 TABLETS BY MOUTH TWICE A  Tablet 1    liothyronine (CYTOMEL) 5 MCG Tab TAKE 1 TABLET BY MOUTH EVERY DAY. PLEASE MAKE AN APPOINTMENT FOR ANY FUTURE REFILLS 90 Tablet 1    tizanidine (ZANAFLEX) 4 MG Tab Take 1 Tablet by mouth every 8 hours as needed (muscle spasm). 270 Tablet 1    ergocalciferol (DRISDOL) 66336 UNIT capsule Take 1 Capsule by mouth every 14 days. 12 Capsule 1    AIMOVIG 140 MG/ML Solution Auto-injector Inject 140 mg under the skin every 4 weeks. 1 mL 11    lovastatin (MEVACOR) 20 MG Tab Take 1 Tablet by mouth every evening. 90 Tablet 3    Levonorgest-Eth Estrad 91-Day (DAYSEE) 0.15-0.03 &0.01 MG Tab Take 1 Tablet by mouth every day. 91 Tablet 0    rizatriptan (MAXALT-MLT) 10 MG disintegrating tablet TAKE 1 TABLET BY MOUTH AT HEADACHE ONSET REPEAT EVERY HOUR AS NEEDED UP TO 4 TABLETS IN 24 HOURS 10 Tablet 11    traMADol (ULTRAM) 50 MG Tab Take 50 mg by mouth every 4 hours. While awake      Cyanocobalamin (VITAMIN B 12 PO) Take  by mouth.      cetirizine (ZYRTEC) 10 MG Tab Take 10 mg by mouth every day.       No current facility-administered medications on file prior to visit.      Allergies:  Benadryl allergy, Demerol, Medrol [methylprednisolone], Previfem [norgestimate-ethinyl estradiol], Reglan [metoclopramide], and Seasonal     DIET AND EXERCISE:  Weight Change: stable  Diet: common adult    ROS         Objective       Objective:     Vitals:    07/31/23 1630   BP: 133/87   Pulse: 67        Physical Exam     DATA REVIEW    Lab Results   Component Value  Date/Time    CHOLSTRLTOT 197 01/23/2023 11:26 AM     (H) 01/23/2023 11:26 AM    HDL 79 01/23/2023 11:26 AM    TRIGLYCERIDE 62 01/23/2023 11:26 AM       Lab Results   Component Value Date/Time    SODIUM 139 07/10/2023 09:46 AM    SODIUM 139 07/10/2023 09:46 AM    POTASSIUM 4.2 07/10/2023 09:46 AM    POTASSIUM 4.1 07/10/2023 09:46 AM    CHLORIDE 106 07/10/2023 09:46 AM    CHLORIDE 106 07/10/2023 09:46 AM    CO2 21 07/10/2023 09:46 AM    CO2 21 07/10/2023 09:46 AM    GLUCOSE 80 07/10/2023 09:46 AM    GLUCOSE 79 07/10/2023 09:46 AM    BUN 14 07/10/2023 09:46 AM    BUN 14 07/10/2023 09:46 AM    CREATININE 1.09 07/10/2023 09:46 AM    CREATININE 1.05 07/10/2023 09:46 AM     Lab Results   Component Value Date/Time    ALKPHOSPHAT 60 07/10/2023 09:46 AM    ASTSGOT 14 07/10/2023 09:46 AM    ALTSGPT 12 07/10/2023 09:46 AM    TBILIRUBIN 0.2 07/10/2023 09:46 AM       INR   Date Value Ref Range Status   10/09/2017 0.91 0.87 - 1.13 Final     Comment:     INR - Non-therapeutic Reference Range: 0.87-1.13  INR - Therapeutic Reference Range: 2.0-4.0       No results found for: POCINR     6/9/23 LUE venous duplex:   No left upper extremity DVT.   Clot in basilic vein consistent with superficial thrombophlebitis.    Medical Decision Making:  Today's Assessment / Status / Plan:     No diagnosis found.     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     Anti-Platelet/Anticoagulant Discussion:  Long discussion regarding the risks and benefits of stopping OAC in this setting. Her LUE basilic SVT was likely provoked by PIV for ketamine infusion she had 6 leon prior. She has completed 6 weeks of anticoagulation therapy. She has never had a DVT or PE. She does continue to receive IV ketamine infusions weekly which can be an ongoing provoking factor. She had a hypercoag w/u in 2016 which was fortunately negative. Her OCP is an ongoing risk factor VTE warranting consideration of  discontinuation. Pt has considered the risks and benefits of OCP, and plans to continue taking due to intractable menstrual migraine with status migrainosus which are debilitating. If we continue OAC, her risk of bleeding is 1% annually (HAS BLED score = 0) which is low risk. After much discussion, my recommendation is she finish her last tablet of Xarelto 20 mg then began taking Xarelto 10 mg for SVT/DVT prophylaxis while she is still receiving weekly infusions requiring PIVs and on OCP. She is agreeable to this plan. Could consider changing to antiplatelet therapy when infusions are less frequent, however, OCP will remain an ongoing risk factor whish she is aware of . Stressed the importance of close surveillance for s/sx of recurrent VTEs and to monitor for prolonged/abnormal bleeding. She is to let all of providers know she has a hx of a SVT, sabrina if having any surgeries or hospitalizations. Continue to avoid tobacco and keep up with cancer screenings as a small % of VTE can be caused by an underlying malignancy. Pt verbalizes understanding and is in agreement with this plan    Anti-Coagulation Plan:  - finish your last tablet of Xarelto 20 mg tonight then began taking Xarelto 10 mg by mouth daily  - go to the ER for shortness of breath, chest pain, pain with deep inhalation, new onset swelling and/or pain to any of your extremities  - avoid sedentary periods  - let all your providers know you take this medication  - don't stop this medication without permission from your doctor or our clinic  -  refills before you run out  - continue complete avoidance of tobacco products  - recommendation is to avoid hormonal therapies including estrogen or testosterone-containing meds, or raloxifene or tamoxifene (commonly used for osteoporosis)  - to avoid Aspirin and anti-inflammatories (eg. Advil, ibuprofen, Aleve, naproxen, etc) while anticoagulated   - Avoid skiing or other dangerous activities to reduce risk of  head injury and brain bleeds  - if any bleeding lasting 30min without stopping, please seek care with your PCP, urgent care, or ED  - if having any invasive procedure, please make sure the doctor knows of your history of blood clots and current anticoagulation status  - keep up with age-appropriate cancer screenings as a small % of blood clots may be caused by an underlying malignancy  - reversal agents for most blood thinners are now available and used if you have major bleeding    Smoking: continue complete avoidance of all tobacco products    Physical Activity: goal is 30 min of moderate activity 5 times a week    Weight Management and Nutrition: high protein, high vegetable diet like Mediterranean    Instructed to follow-up with PCP for remainder of adult medical needs: yes  We will partner with other provider in the management of established vascular disease and cardiometabolic risk factors    Studies to Be Obtained: LUE venous duplex  Labs to Be Obtained: cbc/bmp or cmp every 6 mo while taking DOAC.    Follow up in: 4 weeks    Jessi MATA

## 2023-07-31 ENCOUNTER — ANTICOAGULATION VISIT (OUTPATIENT)
Dept: VASCULAR LAB | Facility: MEDICAL CENTER | Age: 43
End: 2023-07-31
Attending: INTERNAL MEDICINE
Payer: COMMERCIAL

## 2023-07-31 VITALS — HEART RATE: 67 BPM | SYSTOLIC BLOOD PRESSURE: 133 MMHG | DIASTOLIC BLOOD PRESSURE: 87 MMHG

## 2023-07-31 DIAGNOSIS — I80.8 SUPERFICIAL THROMBOPHLEBITIS OF LEFT UPPER EXTREMITY: ICD-10-CM

## 2023-07-31 PROCEDURE — 2023F DILAT RTA XM W/O RTNOPTHY: CPT | Performed by: NURSE PRACTITIONER

## 2023-07-31 PROCEDURE — 99212 OFFICE O/P EST SF 10 MIN: CPT | Performed by: NURSE PRACTITIONER

## 2023-07-31 PROCEDURE — 3075F SYST BP GE 130 - 139MM HG: CPT | Performed by: NURSE PRACTITIONER

## 2023-07-31 PROCEDURE — 99214 OFFICE O/P EST MOD 30 MIN: CPT | Performed by: NURSE PRACTITIONER

## 2023-07-31 PROCEDURE — 3079F DIAST BP 80-89 MM HG: CPT | Performed by: NURSE PRACTITIONER

## 2023-08-01 ENCOUNTER — HOSPITAL ENCOUNTER (OUTPATIENT)
Dept: RADIOLOGY | Facility: MEDICAL CENTER | Age: 43
End: 2023-08-01
Attending: NURSE PRACTITIONER
Payer: COMMERCIAL

## 2023-08-01 ENCOUNTER — TELEPHONE (OUTPATIENT)
Dept: VASCULAR LAB | Facility: MEDICAL CENTER | Age: 43
End: 2023-08-01
Payer: COMMERCIAL

## 2023-08-01 DIAGNOSIS — I82.619 BASILIC VEIN THROMBOSIS: ICD-10-CM

## 2023-08-01 DIAGNOSIS — Z30.41 USES ORAL CONTRACEPTION: ICD-10-CM

## 2023-08-01 DIAGNOSIS — G43.831 INTRACTABLE MENSTRUAL MIGRAINE WITH STATUS MIGRAINOSUS: ICD-10-CM

## 2023-08-01 PROCEDURE — 93971 EXTREMITY STUDY: CPT | Mod: LT

## 2023-08-01 NOTE — TELEPHONE ENCOUNTER
Attempted to contact patient at [phone] to discuss [facility] and services/benefits offered. No answer, left voicemail.    Sarah Beard

## 2023-08-02 RX ORDER — LEVONORGESTREL AND ETHINYL ESTRADIOL 150-30(84)
1 KIT ORAL
Qty: 91 TABLET | Refills: 0 | Status: SHIPPED | OUTPATIENT
Start: 2023-08-02 | End: 2023-10-30 | Stop reason: SDUPTHER

## 2023-08-07 ENCOUNTER — APPOINTMENT (OUTPATIENT)
Dept: ENDOCRINOLOGY | Facility: MEDICAL CENTER | Age: 43
End: 2023-08-07
Attending: INTERNAL MEDICINE
Payer: COMMERCIAL

## 2023-08-27 NOTE — PROGRESS NOTES
VASCULAR MEDICINE CLINIC - F/u VISIT (ANTICOAGULATION)  07/31/23     Dany Lambert is a 42 y.o. female who presents today for f/u.     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  She receives ketamine every 2 weeks and will eventually transition to monthly infusions.  She had hypercoag studies done in 2016 - she is neg for LA, PTGM, FVL, protein c/s def, ATIII, ACLA.  Takes OCP for migraines.   Skips the sugar pills so does not have menses.  Currently on Xarelto 10 mg for dvt prophylaxis.  Has been adherent to taking.  Cost is affordable.  No problems with bleeding.  Avoids NSAID/antiplatelets.  No ETOH use.  No SOB or CP.  No further arm swelling.  Still feels occasional nerve shooting type pain to her left arm.  Has resumed usual activities.  No new concerns today.    Past Medical History:   Diagnosis Date    Allergy, unspecified not elsewhere classified     Anxiety 8/26/2013    Arthritis     Cervical joints, and hands    Chest tightness or pressure 3/27/2018    High cholesterol     Migraine with aura and without status migrainosus, not intractable 8/26/2013    Pain 7/27/2017    Chronic pain- Migraine; neck pain, spasm    Psychiatric problem     hx of anxiety    Seizure (HCC) 1/2016    ? possibly r/t migraine         Past Surgical History:   Procedure Laterality Date    GA DSTR NROLYTC AGNT PARVERTEB FCT SNGL CRVCL/THORA Right 9/29/2017    Procedure: NEURO DEST FACET C/T W/IG SNGL C2-4;  Surgeon: Scotty Navarro D.O.;  Location: SURGERY [facility];  Service: Pain Management    GA DSTR NROLYTC AGNT PARVERTEB FCT ADDL CRVCL/THORA Right 9/29/2017    Procedure: NEURO DEST FACET C/T W/IG ADDL;  Surgeon: Scotty Navarro D.O.;  Location: SURGERY [facility];  Service: Pain Management    GA DSTR NROLYTC AGNT PARVERTEB FCT SNGL CRVCL/THORA Left 7/28/2017    Procedure: NEURO DEST FACET C/T W/IG SNGL - C2-4;  Surgeon: Scotty WHITLOCK  MATT Navarro;  Location: SURGERY [facility];  Service: Pain Management    IA DSTR NROLYTC AGNT PARVERTEB FCT ADDL CRVCL/THORA  2017    Procedure: NEURO DEST FACET C/T W/IG ADDL;  Surgeon: Scotty Navarro D.O.;  Location: [facility];  Service: Pain Management    TONSILLECTOMY      CYST EXCISION Left as child    cyst removal on L wrist        Family History   Problem Relation Age of Onset    Diabetes Maternal Grandfather     Migraines Maternal Grandfather     Cancer Paternal Grandfather     Alzheimer's Disease Paternal Grandfather     Cancer Paternal Grandmother 30        breast    Glasses Mother     Migraines Mother     Stroke Mother     Suicide Attempts Maternal Uncle         Killed himself by GSW    Breast Cancer Maternal Grandmother     Hypertension Father     Glasses Father     Hyperlipidemia Father         Social History     Tobacco Use    Smoking status: Former     Current packs/day: 0.00     Average packs/day: 1 pack/day for 15.0 years (15.0 ttl pk-yrs)     Types: Cigarettes     Start date: 1995     Quit date: 2010     Years since quittin.6    Smokeless tobacco: Never   Vaping Use    Vaping Use: Never used   Substance Use Topics    Alcohol use: No    Drug use: No        Current Outpatient Medications on File Prior to Visit   Medication Sig Dispense Refill    DAYSEE 0.15-0.03 &0.01 MG Tab TAKE 1 TABLET BY MOUTH EVERY DAY 91 Tablet 0    SYNTHROID 75 MCG Tab TAKE 1 TABLET BY MOUTH EVERY DAY IN THE MORNING ON AN EMPTY STOMACH 90 Tablet 1    hydrOXYzine HCl (ATARAX) 10 MG Tab TAKE 1-3 TABLET BY MOUTH EVERY DAY AS NEEDED INSOMNIA      DULoxetine (CYMBALTA) 20 MG Cap DR Particles TAKE 1 CAPSULE BY MOUTH EVERY DAY AT BEDTIME      topiramate (TOPAMAX) 50 MG tablet TAKE 3 TABLETS BY MOUTH TWICE A  Tablet 1    liothyronine (CYTOMEL) 5 MCG Tab TAKE 1 TABLET BY MOUTH EVERY DAY. PLEASE MAKE AN APPOINTMENT FOR ANY FUTURE REFILLS 90 Tablet 1    tizanidine (ZANAFLEX) 4  MG Tab Take 1 Tablet by mouth every 8 hours as needed (muscle spasm). 270 Tablet 1    ergocalciferol (DRISDOL) 95560 UNIT capsule Take 1 Capsule by mouth every 14 days. 12 Capsule 1    AIMOVIG 140 MG/ML Solution Auto-injector Inject 140 mg under the skin every 4 weeks. 1 mL 11    lovastatin (MEVACOR) 20 MG Tab Take 1 Tablet by mouth every evening. 90 Tablet 3    rizatriptan (MAXALT-MLT) 10 MG disintegrating tablet TAKE 1 TABLET BY MOUTH AT HEADACHE ONSET REPEAT EVERY HOUR AS NEEDED UP TO 4 TABLETS IN 24 HOURS 10 Tablet 11    traMADol (ULTRAM) 50 MG Tab Take 50 mg by mouth every 4 hours. While awake      Cyanocobalamin (VITAMIN B 12 PO) Take  by mouth.      cetirizine (ZYRTEC) 10 MG Tab Take 10 mg by mouth every day.       No current facility-administered medications on file prior to visit.      Allergies:  Benadryl allergy, Demerol, Medrol [methylprednisolone], Previfem [norgestimate-ethinyl estradiol], Reglan [metoclopramide], and Seasonal     DIET AND EXERCISE:  Weight Change: stable  Diet: common adult    Review of Systems   HENT:  Negative for nosebleeds.    Respiratory:  Negative for hemoptysis and shortness of breath.    Gastrointestinal:  Negative for blood in stool and melena.   Genitourinary:  Negative for hematuria.   Endo/Heme/Allergies:  Does not bruise/bleed easily.            Objective       Objective:     There were no vitals filed for this visit.  Pt declined    Physical Exam  Constitutional:       Appearance: Normal appearance. She is normal weight.   Pulmonary:      Effort: Pulmonary effort is normal.   Musculoskeletal:         General: Normal range of motion.   Neurological:      General: No focal deficit present.      Mental Status: She is alert.   Psychiatric:         Mood and Affect: Mood normal.         Behavior: Behavior normal.         Thought Content: Thought content normal.         Judgment: Judgment normal.          DATA REVIEW    Lab Results   Component Value Date/Time    CHOLSTRLTOT 197  "01/23/2023 11:26 AM     (H) 01/23/2023 11:26 AM    HDL 79 01/23/2023 11:26 AM    TRIGLYCERIDE 62 01/23/2023 11:26 AM       Lab Results   Component Value Date/Time    SODIUM 139 07/10/2023 09:46 AM    SODIUM 139 07/10/2023 09:46 AM    POTASSIUM 4.2 07/10/2023 09:46 AM    POTASSIUM 4.1 07/10/2023 09:46 AM    CHLORIDE 106 07/10/2023 09:46 AM    CHLORIDE 106 07/10/2023 09:46 AM    CO2 21 07/10/2023 09:46 AM    CO2 21 07/10/2023 09:46 AM    GLUCOSE 80 07/10/2023 09:46 AM    GLUCOSE 79 07/10/2023 09:46 AM    BUN 14 07/10/2023 09:46 AM    BUN 14 07/10/2023 09:46 AM    CREATININE 1.09 07/10/2023 09:46 AM    CREATININE 1.05 07/10/2023 09:46 AM     Lab Results   Component Value Date/Time    ALKPHOSPHAT 60 07/10/2023 09:46 AM    ASTSGOT 14 07/10/2023 09:46 AM    ALTSGPT 12 07/10/2023 09:46 AM    TBILIRUBIN 0.2 07/10/2023 09:46 AM       INR   Date Value Ref Range Status   10/09/2017 0.91 0.87 - 1.13 Final     Comment:     INR - Non-therapeutic Reference Range: 0.87-1.13  INR - Therapeutic Reference Range: 2.0-4.0       No results found for: \"POCINR\"     6/9/23 LUE venous duplex:   No left upper extremity DVT.   Clot in basilic vein consistent with superficial thrombophlebitis.    8/1/23 LUE venous duplex:   1.  No Left upper extremity DVT.   2.  No residual superficial thrombus seen in the Left basilic vein.    Medical Decision Making:  Today's Assessment / Status / Plan:     1. 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     Anti-Platelet/Anticoagulant Discussion:  She completed 6 weeks of full dose Xarelto and is currently on 10 mg prophylactically as she continues to receive IV ketamine infusions which can be an ongoing provoking factor. Her OCP is an ongoing risk factor for VTE, as well, warranting consideration of discontinuation but pt has considered the risks and " benefits of OCP, and plans to continue taking due to intractable menstrual migraine with status migrainosus which are debilitating. Fortunately she has tolerated Xarelto and her risk of bleeding is 1% annually (HAS BLED score = 0) which is low risk. After much discussion, she will continue taking Xarelto 10 mg for SVT/DVT prophylaxis while she is still receiving weekly infusions requiring PIVs and on OCP. She is agreeable to this plan. Could consider changing to antiplatelet therapy when infusions are less frequent, however, OCP will remain an ongoing risk factor whish she is aware of . Stressed the importance of close surveillance for s/sx of recurrent VTEs and to monitor for prolonged/abnormal bleeding. She is to let all of providers know she has a hx of a SVT, sabrina if having any surgeries or hospitalizations. Continue to avoid tobacco and keep up with cancer screenings as a small % of VTE can be caused by an underlying malignancy. Pt verbalizes understanding and is in agreement with this plan    Anti-Coagulation Plan:  - continue taking Xarelto 10 mg by mouth daily   - go to the ER for shortness of breath, chest pain, pain with deep inhalation, new onset swelling and/or pain to any of your extremities  - avoid sedentary periods  - let all your providers know you take this medication  - don't stop this medication without permission from your doctor or our clinic  -  refills before you run out  - continue complete avoidance of tobacco products  - recommendation is to avoid hormonal therapies including estrogen or testosterone-containing meds, or raloxifene or tamoxifene (commonly used for osteoporosis)  - to avoid Aspirin and anti-inflammatories (eg. Advil, ibuprofen, Aleve, naproxen, etc) while anticoagulated   - Avoid skiing or other dangerous activities to reduce risk of head injury and brain bleeds  - if any bleeding lasting 30min without stopping, please seek care with your PCP, urgent care, or ED  - if  having any invasive procedure, please make sure the doctor knows of your history of blood clots and current anticoagulation status  - keep up with age-appropriate cancer screenings as a small % of blood clots may be caused by an underlying malignancy  - reversal agents for most blood thinners are now available and used if you have major bleeding    Smoking: continue complete avoidance of all tobacco products    Physical Activity: goal is 30 min of moderate activity 5 times a week    Weight Management and Nutrition: high protein, high vegetable diet like Mediterranean    Instructed to follow-up with PCP for remainder of adult medical needs: yes  We will partner with other provider in the management of established vascular disease and cardiometabolic risk factors    Studies to Be Obtained:   Labs to Be Obtained: cbc/bmp or cmp every 6 mo while taking DOAC.    Follow up in: 3 months    Jessi MATA

## 2023-08-28 ENCOUNTER — ANTICOAGULATION VISIT (OUTPATIENT)
Dept: VASCULAR LAB | Facility: MEDICAL CENTER | Age: 43
End: 2023-08-28
Attending: INTERNAL MEDICINE
Payer: COMMERCIAL

## 2023-08-28 DIAGNOSIS — I80.8 SUPERFICIAL THROMBOPHLEBITIS OF LEFT UPPER EXTREMITY: ICD-10-CM

## 2023-08-28 PROCEDURE — 2023F DILAT RTA XM W/O RTNOPTHY: CPT | Performed by: NURSE PRACTITIONER

## 2023-08-28 PROCEDURE — 99213 OFFICE O/P EST LOW 20 MIN: CPT | Performed by: NURSE PRACTITIONER

## 2023-08-28 PROCEDURE — 99212 OFFICE O/P EST SF 10 MIN: CPT | Performed by: NURSE PRACTITIONER

## 2023-08-28 ASSESSMENT — ENCOUNTER SYMPTOMS
HEMOPTYSIS: 0
BRUISES/BLEEDS EASILY: 0
SHORTNESS OF BREATH: 0
BLOOD IN STOOL: 0

## 2023-09-14 ENCOUNTER — OFFICE VISIT (OUTPATIENT)
Dept: MEDICAL GROUP | Facility: MEDICAL CENTER | Age: 43
End: 2023-09-14
Payer: COMMERCIAL

## 2023-09-14 VITALS
TEMPERATURE: 97.9 F | DIASTOLIC BLOOD PRESSURE: 72 MMHG | SYSTOLIC BLOOD PRESSURE: 118 MMHG | BODY MASS INDEX: 23.84 KG/M2 | WEIGHT: 143.08 LBS | HEART RATE: 63 BPM | HEIGHT: 65 IN | OXYGEN SATURATION: 97 %

## 2023-09-14 DIAGNOSIS — M25.512 CHRONIC LEFT SHOULDER PAIN: ICD-10-CM

## 2023-09-14 DIAGNOSIS — Z23 NEED FOR VACCINATION: ICD-10-CM

## 2023-09-14 DIAGNOSIS — M25.532 LEFT WRIST PAIN: ICD-10-CM

## 2023-09-14 DIAGNOSIS — G89.29 CHRONIC LEFT SHOULDER PAIN: ICD-10-CM

## 2023-09-14 DIAGNOSIS — Z91.89 AT RISK FOR CENTRAL SLEEP APNEA: ICD-10-CM

## 2023-09-14 DIAGNOSIS — Z91.89 AT RISK FOR SLEEP APNEA: ICD-10-CM

## 2023-09-14 DIAGNOSIS — F51.04 PSYCHOPHYSIOLOGICAL INSOMNIA: ICD-10-CM

## 2023-09-14 PROBLEM — I45.9 SKIPPED HEART BEATS: Status: RESOLVED | Noted: 2019-08-14 | Resolved: 2023-09-14

## 2023-09-14 PROBLEM — R55 SYNCOPE, NEAR: Status: RESOLVED | Noted: 2019-08-14 | Resolved: 2023-09-14

## 2023-09-14 PROCEDURE — 3074F SYST BP LT 130 MM HG: CPT | Performed by: INTERNAL MEDICINE

## 2023-09-14 PROCEDURE — 3078F DIAST BP <80 MM HG: CPT | Performed by: INTERNAL MEDICINE

## 2023-09-14 PROCEDURE — 99214 OFFICE O/P EST MOD 30 MIN: CPT | Mod: 25 | Performed by: INTERNAL MEDICINE

## 2023-09-14 PROCEDURE — 90471 IMMUNIZATION ADMIN: CPT | Performed by: INTERNAL MEDICINE

## 2023-09-14 PROCEDURE — 90686 IIV4 VACC NO PRSV 0.5 ML IM: CPT | Performed by: INTERNAL MEDICINE

## 2023-09-14 ASSESSMENT — ENCOUNTER SYMPTOMS
DIZZINESS: 0
INSOMNIA: 1
ABDOMINAL PAIN: 0
VOMITING: 0
FEVER: 0
NAUSEA: 0
HEADACHES: 1
CHILLS: 0
COUGH: 0
DIARRHEA: 0

## 2023-09-14 ASSESSMENT — FIBROSIS 4 INDEX: FIB4 SCORE: 0.68
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
## 2023-09-15 NOTE — ASSESSMENT & PLAN NOTE
Patient with longstanding daytime fatigue, waking up at night gasping for air, at risk of sleep apnea.  STOPBANG - Sleep Apnea Screening    Flowsheet Row Most Recent Value   S - Have you been told that you SNORE? Yes   T - Are you often TIRED during the day? Yes   O - Do you know if you stop breathing or has anyone witnessed you stop breathing while you were asleep? (OBSTRUCTION) No   P - Do you have high blood PRESSURE or on medication to control high blood pressure? No   B - Is your Body Mass Index greater than 35? (BMI) No   A - Are you 50 years old or older? (AGE) No   N - Are you a male with a NECK circumference greater than 17 inches, or a female with a neck circumference greater than 16 inches? No   G - Are you male? (GENDER) No   STOPBANG Total Score 2   XIAO Risk Low Risk

## 2023-09-15 NOTE — ASSESSMENT & PLAN NOTE
Chronic problem, controlled, did not tolerate multiple medications in the past.  Refer to sleep physician for further evaluation

## 2023-09-15 NOTE — ASSESSMENT & PLAN NOTE
Chronic problem, unstable, patient still in substantial amount of pain, she had to postpone for injection due to financial constraints.  She would like to be referred to a physiatrist.

## 2023-09-21 ENCOUNTER — HOME STUDY (OUTPATIENT)
Dept: SLEEP MEDICINE | Facility: MEDICAL CENTER | Age: 43
End: 2023-09-21
Attending: INTERNAL MEDICINE
Payer: COMMERCIAL

## 2023-09-21 DIAGNOSIS — Z91.89 AT RISK FOR CENTRAL SLEEP APNEA: ICD-10-CM

## 2023-09-21 PROCEDURE — 95800 SLP STDY UNATTENDED: CPT | Performed by: STUDENT IN AN ORGANIZED HEALTH CARE EDUCATION/TRAINING PROGRAM

## 2023-09-29 NOTE — PROCEDURES
DIAGNOSTIC HOME SLEEP TEST (HST) REPORT WatchPAT      PATIENT ID:  NAME:  Dany Lambert  MRN:               [id number]  YOB: 1980  DATE OF STUDY: 9/21/2023      Impression:     This study shows evidence of:      1.  Showed potential for mild obstructive sleep apnea with PAT apnea hypopnea index(pAHI) of 2.3 per hour and PAT respiratory disturbance index (pRDI) was 5.5 per hour. These findings are based on 7 channels recording of PAT signal with sleep staging, heart rate, pulse oximetry, actigraphy, body position, snoring and respiratory movement.     2. Oxygenation O2 Sat. mean O2 sat was 96%,  caitie was 93%,  and maximum O2 at 99 %. O2 sat was at or  below 88% for 0 min of evaluation time. Oxygen Desaturation (>=4%) Index was 0.3/hr. AVG HR was 65 BPM.      TECHNICAL DESCRIPTION: Patient underwent home sleep apnea testing with peripheral arterial tone signal (WatchPAT™). This is a Type IV portable monitor and device per Medicare. Monitoring was done with 7 channels recording of PAT signal with sleep staging, heart rate, pulse oximetry, actigraphy, body position, snoring and respiratory movement. Prior to using the device, the patient received verbal and written instructions for its application and was provided with the help desk phone number for additional telephonic instruction with 24-hour availability of qualified personnel to answer questions.    Respiratory events:    pRDI: Total 43 (REM index 14.6/hr. NREM index 4.1/hr, All Night 5.5/hr)    3% pAHI: Total 18 (REM index 9.1/hr. NREM index 1.2/hr, All Night 2.3/hr)    4% pAHI: Total 1 (All Night 0.1/hr)    General sleep summary: . Total recording time is 8 hours and 33 minutes and total Sleep time is 8 hours and 0 minutes.      Recommendations:    1.  Does not meet criteria for obstructive sleep apnea based off of AHI of 2.3 (less than 5 normal).  However RDI is elevated at 5.5 which suggest potential mild obstructive sleep apnea as  greater than 5 is abnormal.  If significant suspicion persists regarding potential sleep apnea would recommend undergoing a in lab sleep study.  2.   In general patients with sleep apnea are advised to avoid alcohol and sedatives and to not operate a motor vehicle while drowsy. In some cases alternative treatment options may prove effective in resolving sleep apnea in these options include upper airway surgery, the use of a dental orthotic or weight loss and positional therapy. Clinical correlation is required.         Rodney Banerjee MD

## 2023-11-23 DIAGNOSIS — E03.9 PRIMARY HYPOTHYROIDISM: ICD-10-CM

## 2023-11-27 RX ORDER — LIOTHYRONINE SODIUM 5 UG/1
5 TABLET ORAL
Qty: 90 TABLET | Refills: 0 | Status: SHIPPED | OUTPATIENT
Start: 2023-11-27

## 2023-11-27 RX ORDER — LEVOTHYROXINE SODIUM 75 MCG
75 TABLET ORAL
Qty: 90 TABLET | Refills: 0 | Status: SHIPPED | OUTPATIENT
Start: 2023-11-27 | End: 2024-01-18

## 2023-11-30 DIAGNOSIS — M54.2 CERVICAL MUSCLE PAIN: ICD-10-CM

## 2023-11-30 DIAGNOSIS — E78.00 HYPERCHOLESTEROLEMIA: ICD-10-CM

## 2023-12-01 RX ORDER — LOVASTATIN 20 MG/1
20 TABLET ORAL EVERY EVENING
Qty: 90 TABLET | Refills: 3 | Status: SHIPPED | OUTPATIENT
Start: 2023-12-01

## 2023-12-01 RX ORDER — TIZANIDINE 4 MG/1
4 TABLET ORAL EVERY 8 HOURS PRN
Qty: 270 TABLET | Refills: 1 | Status: SHIPPED | OUTPATIENT
Start: 2023-12-01

## 2023-12-04 ENCOUNTER — APPOINTMENT (OUTPATIENT)
Dept: VASCULAR LAB | Facility: MEDICAL CENTER | Age: 43
End: 2023-12-04
Payer: COMMERCIAL

## 2023-12-07 ASSESSMENT — ENCOUNTER SYMPTOMS
HEMOPTYSIS: 0
BLOOD IN STOOL: 0
BRUISES/BLEEDS EASILY: 0
SHORTNESS OF BREATH: 0

## 2023-12-07 NOTE — PROGRESS NOTES
VASCULAR MEDICINE CLINIC - F/u VISIT (ANTICOAGULATION)  12/11/23     Dany Lambert is a 42 y.o. female who presents today for f/u.     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  She receives ketamine every 2 weeks and will eventually transition to monthly infusions.  She had hypercoag studies done in 2016 - she is neg for LA, PTGM, FVL, protein c/s def, ATIII, ACLA.  Takes OCP for migraines.   Skips the sugar pills so does not have menses.  Currently on Xarelto 10 mg for dvt prophylaxis.  Has been adherent to taking.  Cost is affordable.  No problems with bleeding.  Avoids NSAID/antiplatelets.  No ETOH use.  No SOB or CP.  No further arm swelling.  Still feels occasional nerve shooting type pain to her left arm.  Has resumed usual activities.  No new concerns today.    Past Medical History:   Diagnosis Date    Allergy, unspecified not elsewhere classified     Anxiety 8/26/2013    Arthritis     Cervical joints, and hands    Chest tightness or pressure 3/27/2018    High cholesterol     Migraine with aura and without status migrainosus, not intractable 8/26/2013    Pain 7/27/2017    Chronic pain- Migraine; neck pain, spasm    Psychiatric problem     hx of anxiety    Seizure (HCC) 1/2016    ? possibly r/t migraine     Skipped heart beats 8/14/2019    Syncope, near 8/14/2019        Past Surgical History:   Procedure Laterality Date    IL DSTR NROLYTC AGNT PARVERTEB FCT SNGL CRVCL/THORA Right 9/29/2017    Procedure: NEURO DEST FACET C/T W/IG SNGL C2-4;  Surgeon: Scotty Navarro D.O.;  Location: [facility];  Service: Pain Management    IL DSTR NROLYTC AGNT PARVERTEB FCT ADDL CRVCL/THORA Right 9/29/2017    Procedure: NEURO DEST FACET C/T W/IG ADDL;  Surgeon: Scotty Navarro D.O.;  Location: [facility];  Service: Pain Management    IL DSTR NROLYTC AGNT PARVERTEB FCT SNGL CRVCL/THORA Left 7/28/2017    Procedure:  NEURO DEST FACET C/T W/IG SNGL - C2-4;  Surgeon: Scotty Navarro D.O.;  Location: [facility];  Service: Pain Management    MA DSTR NROLYTC AGNT PARVERTEB FCT ADDL CRVCL/THORA  2017    Procedure: NEURO DEST FACET C/T W/IG ADDL;  Surgeon: Scotty Navarro D.O.;  Location: [facility];  Service: Pain Management    TONSILLECTOMY      CYST EXCISION Left as child    cyst removal on L wrist        Family History   Problem Relation Age of Onset    Diabetes Maternal Grandfather     Migraines Maternal Grandfather     Cancer Paternal Grandfather     Alzheimer's Disease Paternal Grandfather     Cancer Paternal Grandmother 30        breast    Glasses Mother     Migraines Mother     Stroke Mother     Suicide Attempts Maternal Uncle         Killed himself by W    Breast Cancer Maternal Grandmother     Hypertension Father     Glasses Father     Hyperlipidemia Father         Social History     Tobacco Use    Smoking status: Former     Current packs/day: 0.00     Average packs/day: 1 pack/day for 15.0 years (15.0 ttl pk-yrs)     Types: Cigarettes     Start date: 1995     Quit date: 2010     Years since quittin.9    Smokeless tobacco: Never   Vaping Use    Vaping Use: Never used   Substance Use Topics    Alcohol use: No    Drug use: No        Current Outpatient Medications on File Prior to Visit   Medication Sig Dispense Refill    Levonorgest-Eth Estrad 91-Day (DAYSEE) 0.15-0.03 &0.01 MG Tab Take 1 Tablet by mouth every day. 91 Tablet 1    tizanidine (ZANAFLEX) 4 MG Tab TAKE 1 TABLET BY MOUTH EVERY 8 HOURS AS NEEDED (MUSCLE SPASM). 270 Tablet 1    lovastatin (MEVACOR) 20 MG Tab TAKE 1 TABLET BY MOUTH EVERY DAY IN THE EVENING 90 Tablet 3    SYNTHROID 75 MCG Tab Take 1 Tablet by mouth every morning on an empty stomach. Please make an appointment for any future refills 90 Tablet 0    liothyronine (CYTOMEL) 5 MCG Tab Take 1 Tablet by mouth every day. Please make an appointment for  any future refills 90 Tablet 0    rivaroxaban (XARELTO) 10 MG Tab tablet Take 1 Tablet by mouth every day at 6 PM. 90 Tablet 1    hydrOXYzine HCl (ATARAX) 10 MG Tab TAKE 1-3 TABLET BY MOUTH EVERY DAY AS NEEDED INSOMNIA      DULoxetine (CYMBALTA) 20 MG Cap DR Particles TAKE 1 CAPSULE BY MOUTH EVERY DAY AT BEDTIME      topiramate (TOPAMAX) 50 MG tablet TAKE 3 TABLETS BY MOUTH TWICE A  Tablet 1    ergocalciferol (DRISDOL) 49595 UNIT capsule Take 1 Capsule by mouth every 14 days. 12 Capsule 1    AIMOVIG 140 MG/ML Solution Auto-injector Inject 140 mg under the skin every 4 weeks. 1 mL 11    rizatriptan (MAXALT-MLT) 10 MG disintegrating tablet TAKE 1 TABLET BY MOUTH AT HEADACHE ONSET REPEAT EVERY HOUR AS NEEDED UP TO 4 TABLETS IN 24 HOURS 10 Tablet 11    traMADol (ULTRAM) 50 MG Tab Take 50 mg by mouth every 4 hours. While awake      Cyanocobalamin (VITAMIN B 12 PO) Take  by mouth.      cetirizine (ZYRTEC) 10 MG Tab Take 10 mg by mouth every day.       No current facility-administered medications on file prior to visit.      Allergies:  Benadryl allergy, Demerol, Medrol [methylprednisolone], Previfem [norgestimate-ethinyl estradiol], Reglan [metoclopramide], and Seasonal     DIET AND EXERCISE:  Weight Change: stable  Diet: common adult    Review of Systems   HENT:  Negative for nosebleeds.    Respiratory:  Negative for hemoptysis and shortness of breath.    Gastrointestinal:  Negative for blood in stool and melena.   Genitourinary:  Negative for hematuria.   Endo/Heme/Allergies:  Does not bruise/bleed easily.            Objective       Objective:     There were no vitals filed for this visit.  Pt declined    Physical Exam  Constitutional:       Appearance: Normal appearance. She is normal weight.   Pulmonary:      Effort: Pulmonary effort is normal.   Musculoskeletal:         General: Normal range of motion.   Neurological:      General: No focal deficit present.      Mental Status: She is alert.   Psychiatric:     "     Mood and Affect: Mood normal.         Behavior: Behavior normal.         Thought Content: Thought content normal.         Judgment: Judgment normal.          DATA REVIEW    Lab Results   Component Value Date/Time    CHOLSTRLTOT 197 01/23/2023 11:26 AM     (H) 01/23/2023 11:26 AM    HDL 79 01/23/2023 11:26 AM    TRIGLYCERIDE 62 01/23/2023 11:26 AM       Lab Results   Component Value Date/Time    SODIUM 139 07/10/2023 09:46 AM    SODIUM 139 07/10/2023 09:46 AM    POTASSIUM 4.2 07/10/2023 09:46 AM    POTASSIUM 4.1 07/10/2023 09:46 AM    CHLORIDE 106 07/10/2023 09:46 AM    CHLORIDE 106 07/10/2023 09:46 AM    CO2 21 07/10/2023 09:46 AM    CO2 21 07/10/2023 09:46 AM    GLUCOSE 80 07/10/2023 09:46 AM    GLUCOSE 79 07/10/2023 09:46 AM    BUN 14 07/10/2023 09:46 AM    BUN 14 07/10/2023 09:46 AM    CREATININE 1.09 07/10/2023 09:46 AM    CREATININE 1.05 07/10/2023 09:46 AM     Lab Results   Component Value Date/Time    ALKPHOSPHAT 60 07/10/2023 09:46 AM    ASTSGOT 14 07/10/2023 09:46 AM    ALTSGPT 12 07/10/2023 09:46 AM    TBILIRUBIN 0.2 07/10/2023 09:46 AM       INR   Date Value Ref Range Status   10/09/2017 0.91 0.87 - 1.13 Final     Comment:     INR - Non-therapeutic Reference Range: 0.87-1.13  INR - Therapeutic Reference Range: 2.0-4.0       No results found for: \"POCINR\"     6/9/23 LUE venous duplex:   No left upper extremity DVT.   Clot in basilic vein consistent with superficial thrombophlebitis.    8/1/23 LUE venous duplex:   1.  No Left upper extremity DVT.   2.  No residual superficial thrombus seen in the Left basilic vein.    Medical Decision Making:  Today's Assessment / Status / Plan:     No diagnosis found.       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     Anti-Platelet/Anticoagulant Discussion:  She completed 6 weeks of full dose Xarelto and is currently on 10 mg prophylactically as she continues to receive IV ketamine infusions which can be an " ongoing provoking factor. Her OCP is an ongoing risk factor for VTE, as well, warranting consideration of discontinuation but pt has considered the risks and benefits of OCP, and plans to continue taking due to intractable menstrual migraine with status migrainosus which are debilitating. Fortunately she has tolerated Xarelto and her risk of bleeding is 1% annually (HAS BLED score = 0) which is low risk. After much discussion, she will continue taking Xarelto 10 mg for SVT/DVT prophylaxis while she is still receiving weekly infusions requiring PIVs and on OCP. She is agreeable to this plan. Could consider changing to antiplatelet therapy when infusions are less frequent, however, OCP will remain an ongoing risk factor whish she is aware of . Stressed the importance of close surveillance for s/sx of recurrent VTEs and to monitor for prolonged/abnormal bleeding. She is to let all of providers know she has a hx of a SVT, sabrina if having any surgeries or hospitalizations. Continue to avoid tobacco and keep up with cancer screenings as a small % of VTE can be caused by an underlying malignancy. Pt verbalizes understanding and is in agreement with this plan    Anti-Coagulation Plan:  - continue taking Xarelto 10 mg by mouth daily   - go to the ER for shortness of breath, chest pain, pain with deep inhalation, new onset swelling and/or pain to any of your extremities  - avoid sedentary periods  - let all your providers know you take this medication  - don't stop this medication without permission from your doctor or our clinic  -  refills before you run out  - continue complete avoidance of tobacco products  - recommendation is to avoid hormonal therapies including estrogen or testosterone-containing meds, or raloxifene or tamoxifene (commonly used for osteoporosis)  - to avoid Aspirin and anti-inflammatories (eg. Advil, ibuprofen, Aleve, naproxen, etc) while anticoagulated   - Avoid skiing or other dangerous  activities to reduce risk of head injury and brain bleeds  - if any bleeding lasting 30min without stopping, please seek care with your PCP, urgent care, or ED  - if having any invasive procedure, please make sure the doctor knows of your history of blood clots and current anticoagulation status  - keep up with age-appropriate cancer screenings as a small % of blood clots may be caused by an underlying malignancy  - reversal agents for most blood thinners are now available and used if you have major bleeding    Smoking: continue complete avoidance of all tobacco products    Physical Activity: goal is 30 min of moderate activity 5 times a week    Weight Management and Nutrition: high protein, high vegetable diet like Mediterranean    Instructed to follow-up with PCP for remainder of adult medical needs: yes  We will partner with other provider in the management of established vascular disease and cardiometabolic risk factors    Studies to Be Obtained:   Labs to Be Obtained: cbc/bmp or cmp every 6 mo while taking DOAC.    Follow up in: 3 months    Jessi MATA

## 2023-12-11 ENCOUNTER — ANTICOAGULATION MONITORING (OUTPATIENT)
Dept: VASCULAR LAB | Facility: MEDICAL CENTER | Age: 43
End: 2023-12-11
Payer: COMMERCIAL

## 2023-12-11 ENCOUNTER — APPOINTMENT (OUTPATIENT)
Dept: VASCULAR LAB | Facility: MEDICAL CENTER | Age: 43
End: 2023-12-11
Attending: INTERNAL MEDICINE
Payer: COMMERCIAL

## 2023-12-11 PROCEDURE — 2023F DILAT RTA XM W/O RTNOPTHY: CPT | Mod: DUPCHRG | Performed by: NURSE PRACTITIONER

## 2023-12-11 NOTE — PROGRESS NOTES
Pt is no longer taking Xarelto as she is not on ketamine infusions. Pt cancelled her f/u LOT visit. She will reach out if she decides to resume ketamine. Will discharge from the anticoagulation clinic for now.    Jessi MATA

## 2024-01-17 DIAGNOSIS — E03.9 PRIMARY HYPOTHYROIDISM: ICD-10-CM

## 2024-01-18 RX ORDER — LEVOTHYROXINE SODIUM 75 MCG
75 TABLET ORAL
Qty: 30 TABLET | Refills: 0 | Status: SHIPPED | OUTPATIENT
Start: 2024-01-18

## 2024-03-18 PROBLEM — M75.42 SUBACROMIAL IMPINGEMENT OF LEFT SHOULDER: Status: ACTIVE | Noted: 2024-03-18

## 2024-03-18 PROBLEM — M75.02 ADHESIVE CAPSULITIS OF LEFT SHOULDER: Status: ACTIVE | Noted: 2024-03-18

## 2024-04-08 ENCOUNTER — HOSPITAL ENCOUNTER (OUTPATIENT)
Dept: LAB | Facility: MEDICAL CENTER | Age: 44
End: 2024-04-08
Attending: PSYCHIATRY & NEUROLOGY
Payer: COMMERCIAL

## 2024-04-08 LAB
25(OH)D3 SERPL-MCNC: 58 NG/ML (ref 30–100)
ALBUMIN SERPL BCP-MCNC: 4.4 G/DL (ref 3.2–4.9)
ALBUMIN/GLOB SERPL: 1.7 G/DL
ALP SERPL-CCNC: 66 U/L (ref 30–99)
ALT SERPL-CCNC: 5 U/L (ref 2–50)
ANION GAP SERPL CALC-SCNC: 13 MMOL/L (ref 7–16)
AST SERPL-CCNC: 15 U/L (ref 12–45)
BASOPHILS # BLD AUTO: 0.6 % (ref 0–1.8)
BASOPHILS # BLD: 0.05 K/UL (ref 0–0.12)
BILIRUB SERPL-MCNC: 0.2 MG/DL (ref 0.1–1.5)
BUN SERPL-MCNC: 10 MG/DL (ref 8–22)
CALCIUM ALBUM COR SERPL-MCNC: 9 MG/DL (ref 8.5–10.5)
CALCIUM SERPL-MCNC: 9.3 MG/DL (ref 8.5–10.5)
CHLORIDE SERPL-SCNC: 106 MMOL/L (ref 96–112)
CHOLEST SERPL-MCNC: 186 MG/DL (ref 100–199)
CO2 SERPL-SCNC: 19 MMOL/L (ref 20–33)
CREAT SERPL-MCNC: 0.89 MG/DL (ref 0.5–1.4)
EOSINOPHIL # BLD AUTO: 0.16 K/UL (ref 0–0.51)
EOSINOPHIL NFR BLD: 1.9 % (ref 0–6.9)
ERYTHROCYTE [DISTWIDTH] IN BLOOD BY AUTOMATED COUNT: 48.1 FL (ref 35.9–50)
FOLATE SERPL-MCNC: 10.4 NG/ML
GFR SERPLBLD CREATININE-BSD FMLA CKD-EPI: 81 ML/MIN/1.73 M 2
GLOBULIN SER CALC-MCNC: 2.6 G/DL (ref 1.9–3.5)
GLUCOSE SERPL-MCNC: 83 MG/DL (ref 65–99)
HCT VFR BLD AUTO: 39.7 % (ref 37–47)
HDLC SERPL-MCNC: 57 MG/DL
HGB BLD-MCNC: 13.3 G/DL (ref 12–16)
IMM GRANULOCYTES # BLD AUTO: 0.02 K/UL (ref 0–0.11)
IMM GRANULOCYTES NFR BLD AUTO: 0.2 % (ref 0–0.9)
LDLC SERPL CALC-MCNC: 108 MG/DL
LYMPHOCYTES # BLD AUTO: 2.14 K/UL (ref 1–4.8)
LYMPHOCYTES NFR BLD: 25.6 % (ref 22–41)
MCH RBC QN AUTO: 31 PG (ref 27–33)
MCHC RBC AUTO-ENTMCNC: 33.5 G/DL (ref 32.2–35.5)
MCV RBC AUTO: 92.5 FL (ref 81.4–97.8)
MONOCYTES # BLD AUTO: 0.42 K/UL (ref 0–0.85)
MONOCYTES NFR BLD AUTO: 5 % (ref 0–13.4)
NEUTROPHILS # BLD AUTO: 5.57 K/UL (ref 1.82–7.42)
NEUTROPHILS NFR BLD: 66.7 % (ref 44–72)
NRBC # BLD AUTO: 0 K/UL
NRBC BLD-RTO: 0 /100 WBC (ref 0–0.2)
PLATELET # BLD AUTO: 305 K/UL (ref 164–446)
PMV BLD AUTO: 11.6 FL (ref 9–12.9)
POTASSIUM SERPL-SCNC: 3.9 MMOL/L (ref 3.6–5.5)
PROT SERPL-MCNC: 7 G/DL (ref 6–8.2)
RBC # BLD AUTO: 4.29 M/UL (ref 4.2–5.4)
SODIUM SERPL-SCNC: 138 MMOL/L (ref 135–145)
T4 FREE SERPL-MCNC: 1.17 NG/DL (ref 0.93–1.7)
TRIGL SERPL-MCNC: 106 MG/DL (ref 0–149)
TSH SERPL DL<=0.005 MIU/L-ACNC: 4.37 UIU/ML (ref 0.38–5.33)
VIT B12 SERPL-MCNC: 353 PG/ML (ref 211–911)
WBC # BLD AUTO: 8.4 K/UL (ref 4.8–10.8)

## 2024-04-08 PROCEDURE — 85025 COMPLETE CBC W/AUTO DIFF WBC: CPT

## 2024-04-08 PROCEDURE — 80053 COMPREHEN METABOLIC PANEL: CPT

## 2024-04-08 PROCEDURE — 84439 ASSAY OF FREE THYROXINE: CPT

## 2024-04-08 PROCEDURE — 82306 VITAMIN D 25 HYDROXY: CPT

## 2024-04-08 PROCEDURE — 36415 COLL VENOUS BLD VENIPUNCTURE: CPT

## 2024-04-08 PROCEDURE — 84443 ASSAY THYROID STIM HORMONE: CPT

## 2024-04-08 PROCEDURE — 82607 VITAMIN B-12: CPT

## 2024-04-08 PROCEDURE — 80061 LIPID PANEL: CPT

## 2024-04-08 PROCEDURE — 82746 ASSAY OF FOLIC ACID SERUM: CPT

## 2024-04-17 DIAGNOSIS — Z30.41 USES ORAL CONTRACEPTION: ICD-10-CM

## 2024-04-17 DIAGNOSIS — G43.831 INTRACTABLE MENSTRUAL MIGRAINE WITH STATUS MIGRAINOSUS: ICD-10-CM

## 2024-04-17 RX ORDER — LEVONORGESTREL AND ETHINYL ESTRADIOL 150-30(84)
1 KIT ORAL
Qty: 91 TABLET | Refills: 1 | Status: SHIPPED | OUTPATIENT
Start: 2024-04-17

## 2024-04-17 NOTE — TELEPHONE ENCOUNTER
Received request via: Patient    Was the patient seen in the last year in this department? Yes    Does the patient have an active prescription (recently filled or refills available) for medication(s) requested? No    Pharmacy Name:   [id number] - [place] - [address]  [address]  [place] [address]  Phone: [phone] Fax: [phone]    [id number] - [place], [place] - [address] [facility] [address]  [address] [address] [facility] [place] [address]  Phone: [phone] Fax: [phone]        Does the patient have detention Plus and need 100 day supply (blood pressure, diabetes and cholesterol meds only)? Patient does not have SCP

## 2024-04-24 ENCOUNTER — TELEPHONE (OUTPATIENT)
Dept: ENDOCRINOLOGY | Facility: MEDICAL CENTER | Age: 44
End: 2024-04-24
Payer: COMMERCIAL

## 2024-04-24 DIAGNOSIS — E03.9 PRIMARY HYPOTHYROIDISM: ICD-10-CM

## 2024-04-24 RX ORDER — LEVOTHYROXINE SODIUM 75 MCG
75 TABLET ORAL
Qty: 30 TABLET | Refills: 0 | Status: SHIPPED | OUTPATIENT
Start: 2024-04-24

## 2024-04-24 RX ORDER — LIOTHYRONINE SODIUM 5 UG/1
5 TABLET ORAL
Qty: 30 TABLET | Refills: 0 | Status: SHIPPED | OUTPATIENT
Start: 2024-04-24 | End: 2024-04-25

## 2024-04-24 NOTE — TELEPHONE ENCOUNTER
Pt is requesting refills on synthroid and [facility]othyronine [facility].

## 2024-04-25 RX ORDER — LIOTHYRONINE SODIUM 5 UG/1
5 TABLET ORAL
Qty: 90 TABLET | Refills: 1 | Status: SHIPPED | OUTPATIENT
Start: 2024-04-25

## 2024-04-30 ENCOUNTER — TELEMEDICINE (OUTPATIENT)
Dept: MEDICAL GROUP | Facility: MEDICAL CENTER | Age: 44
End: 2024-04-30
Payer: COMMERCIAL

## 2024-04-30 VITALS — HEIGHT: 65 IN | BODY MASS INDEX: 22.49 KG/M2 | WEIGHT: 135 LBS

## 2024-04-30 DIAGNOSIS — G43.119 INTRACTABLE MIGRAINE WITH AURA WITHOUT STATUS MIGRAINOSUS: ICD-10-CM

## 2024-04-30 DIAGNOSIS — M54.2 CERVICAL MUSCLE PAIN: ICD-10-CM

## 2024-04-30 DIAGNOSIS — E78.00 HYPERCHOLESTEROLEMIA: ICD-10-CM

## 2024-04-30 RX ORDER — TIZANIDINE 4 MG/1
4 TABLET ORAL EVERY 8 HOURS PRN
Qty: 270 TABLET | Refills: 1 | Status: SHIPPED | OUTPATIENT
Start: 2024-04-30

## 2024-04-30 RX ORDER — LOVASTATIN 40 MG/1
40 TABLET ORAL NIGHTLY
Qty: 90 TABLET | Refills: 1 | Status: SHIPPED | OUTPATIENT
Start: 2024-04-30

## 2024-04-30 ASSESSMENT — ENCOUNTER SYMPTOMS: INSOMNIA: 1

## 2024-04-30 ASSESSMENT — FIBROSIS 4 INDEX: FIB4 SCORE: 0.99

## 2024-04-30 ASSESSMENT — PATIENT HEALTH QUESTIONNAIRE - PHQ9: CLINICAL INTERPRETATION OF PHQ2 SCORE: 0

## 2024-04-30 NOTE — PROGRESS NOTES
CC:   Chief Complaint   Patient presents with    Medication Refill     tizanidine (ZANAFLEX) 4 MG Tab     Diagnoses of Hypercholesterolemia, Cervical muscle pain, and Intractable migraine with aura without status migrainosus were pertinent to this visit.  Verbal consent was acquired by the patient to use [employer] ambient listening note generation during this visit Yes     HPI: Dany is a pleasant 43 y.o. female who presents today to discuss the following problems:     This visit was conducted via Zoom using secure and encrypted videoconferencing technology.   The patient was in their home in the [facility].    The patient's identity was confirmed and verbal consent was obtained for this virtual visit.     History of Present Illness  The patient is a 43-year-old female who presents via virtual visit for evaluation of multiple medical concerns.    The patient requires a refill of her tizanidine prescription, which she uses as a muscle relaxant to aid in sleep. This medication aids in relaxing her muscles during sleep, with one dose taken towards the end of the evening.    Recent laboratory tests, conducted by her psychiatrist, have indicated slightly elevated cholesterol levels. Despite adhering to her prescribed lovastatin regimen and maintaining a healthy diet, she suspects that her elevated LDL levels may be hereditary.  She maintains an active lifestyle during the summer months, including walking her dog and group walks.    The patient has been on exogenous estrogen for migraine prevention. She has previously discontinued this medication, but experienced a psychotic reaction and a significant blood clot from an IV site. She expresses dissatisfaction with her current neurologist, but is currently under the care of a nurse practitioner from [facility]. Her migraines are currently well-managed with Aimovig and topiramate, with a rescue medication available for use as needed. She typically experiences  migraines around her menstrual cycle. She has a history of PTSD and sleep disturbances.   Her father and brother have hypertension.    Past Medical History:   Diagnosis Date    Allergy, unspecified not elsewhere classified     Anxiety 8/26/2013    Arthritis     Cervical joints, and hands    Chest tightness or pressure 3/27/2018    High cholesterol     Migraine with aura and without status migrainosus, not intractable 8/26/2013    Pain 7/27/2017    Chronic pain- Migraine; neck pain, spasm    Psychiatric problem     hx of anxiety    Seizure (HCC) 1/2016    ? possibly r/t migraine     Skipped heart beats 8/14/2019    Syncope, near 8/14/2019       Current Outpatient Medications Ordered in Epic   Medication Sig Dispense Refill    lovastatin (MEVACOR) 40 MG tablet Take 1 Tablet by mouth every evening. 90 Tablet 1    tizanidine (ZANAFLEX) 4 MG Tab Take 1 Tablet by mouth every 8 hours as needed (muscle spasm). 270 Tablet 1    DAYSEE 0.15-0.03 &0.01 MG Tab TAKE 1 TABLET BY MOUTH EVERY DAY 91 Tablet 1    liothyronine (CYTOMEL) 5 MCG Tab TAKE 1 TABLET BY MOUTH EVERY DAY. PLEASE MAKE AN APPOINTMENT FOR ANY FUTURE REFILLS 90 Tablet 1    SYNTHROID 75 MCG Tab Take 1 Tablet by mouth every morning on an empty stomach. Need appointment for any future refills 30 Tablet 0    ondansetron (ZOFRAN) 4 MG Tab tablet Take 1 Tablet by mouth every four hours as needed for Nausea/Vomiting. 20 Tablet 0    hydrOXYzine HCl (ATARAX) 10 MG Tab TAKE 1-3 TABLET BY MOUTH EVERY DAY AS NEEDED INSOMNIA      DULoxetine (CYMBALTA) 20 MG Cap DR Particles TAKE 1 CAPSULE BY MOUTH EVERY DAY AT BEDTIME      topiramate (TOPAMAX) 50 MG tablet TAKE 3 TABLETS BY MOUTH TWICE A  Tablet 1    ergocalciferol (DRISDOL) 26783 UNIT capsule Take 1 Capsule by mouth every 14 days. 12 Capsule 1    AIMOVIG 140 MG/ML Solution Auto-injector Inject 140 mg under the skin every 4 weeks. 1 mL 11    rizatriptan (MAXALT-MLT) 10 MG disintegrating tablet TAKE 1 TABLET BY MOUTH AT  "HEADACHE ONSET REPEAT EVERY HOUR AS NEEDED UP TO 4 TABLETS IN 24 HOURS 10 Tablet 11    Cyanocobalamin (VITAMIN B 12 PO) Take  by mouth.      cetirizine (ZYRTEC) 10 MG Tab Take 10 mg by mouth every day.       No current [facility]-ordered facility-administered medications on file.     Review of Systems   Musculoskeletal:  Positive for joint pain.   Psychiatric/Behavioral:  The patient has insomnia.        Objective:     Exam:  Ht 1.651 m (5' 5\")   Wt 61.2 kg (135 lb)   BMI 22.47 kg/m²  Body mass index is 22.47 kg/m².    Physical Exam  Vitals reviewed: Limited exam due to virtual appointment.   Pulmonary:      Effort: Pulmonary effort is normal.   Neurological:      Mental Status: She is oriented to person, place, and time.   Psychiatric:         Mood and Affect: Mood normal.         Results: I have reviewed lipid panel, CBC, CMP, vitamin B12 and TSH from 4/8/2024  Results  Laboratory Studies  Cholesterol levels are the same as last year.    Assessment & Plan:   Dany  is a pleasant 43 y.o. female with the following -     Assessment & Plan  1. Hypercholesterolemia.  The target LDL level is set to be below 100. The patient's lovastatin dosage will be escalated from 20 mg to 40 mg daily. The patient has been advised to maintain a balanced diet, engage in regular physical activity, and engage in regular exercise.    2. Cervicalgia.  The patient has been using tizanidine as needed, which has also alleviated her insomnia. A refill was provided today.    3. Contraception management.  The patient is on a daily regimen of birth control for migraine control. Despite attempts to wean her off last year, she experienced mood swings. We discussed the increased risk of deep vein thrombosis (DVT) and pulmonary embolism (PE), as well as stroke risks associated with exogenous estrogens. The patient has been advised to gradually reduce her birth control dosage, opting for a pill instead of a full pill over a period of 6 weeks. Should the " patient experience breakthrough symptoms, recurrent migraines, or night sweats, an adjustment of her medication by her psychiatrist and neurologist for migraines will be beneficial.    4. Migraines.  This is a chronic condition for the patient. She is currently under the care of a neurologist and is receiving weekly Aimovig injections, Topamax 50 mg, and rizatriptan for breakthrough symptoms. The patient reports that the birth control has reduced the frequency of her headaches. We have discussed the importance of weaning off birth control due to the risk of blood clots and other complications.    Problem List Items Addressed This Visit       Hypercholesterolemia (Chronic)    Relevant Medications    lovastatin (MEVACOR) 40 MG tablet    Intractable migraine with aura without status migrainosus    Relevant Medications    lovastatin (MEVACOR) 40 MG tablet    tizanidine (ZANAFLEX) 4 MG Tab     Other Visit Diagnoses       Cervical muscle pain        Relevant Medications    tizanidine (ZANAFLEX) 4 MG Tab          Return in about 2 months (around 6/30/2024), or if symptoms worsen or fail to improve.    Please note that this dictation was created using voice recognition software. I have made every reasonable attempt to correct obvious errors, but I expect that there are errors of grammar and possibly content that I did not discover before finalizing the note.

## 2024-05-03 ENCOUNTER — TELEMEDICINE (OUTPATIENT)
Dept: ENDOCRINOLOGY | Facility: MEDICAL CENTER | Age: 44
End: 2024-05-03
Attending: INTERNAL MEDICINE
Payer: COMMERCIAL

## 2024-05-03 VITALS — HEIGHT: 65 IN | BODY MASS INDEX: 22.49 KG/M2 | WEIGHT: 135 LBS

## 2024-05-03 DIAGNOSIS — E55.9 VITAMIN D DEFICIENCY: ICD-10-CM

## 2024-05-03 DIAGNOSIS — E53.8 B12 DEFICIENCY: ICD-10-CM

## 2024-05-03 DIAGNOSIS — E03.9 PRIMARY HYPOTHYROIDISM: ICD-10-CM

## 2024-05-03 PROCEDURE — 99214 OFFICE O/P EST MOD 30 MIN: CPT | Mod: 95 | Performed by: INTERNAL MEDICINE

## 2024-05-03 RX ORDER — LEVOTHYROXINE SODIUM 88 MCG
88 TABLET ORAL
Qty: 90 TABLET | Refills: 2 | Status: SHIPPED | OUTPATIENT
Start: 2024-05-03

## 2024-05-03 RX ORDER — ERGOCALCIFEROL 1.25 MG/1
50000 CAPSULE ORAL
Qty: 12 CAPSULE | Refills: 1 | Status: SHIPPED | OUTPATIENT
Start: 2024-05-03

## 2024-05-03 ASSESSMENT — FIBROSIS 4 INDEX: FIB4 SCORE: 0.99
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
## 2024-05-24 ENCOUNTER — PATIENT MESSAGE (OUTPATIENT)
Dept: MEDICAL GROUP | Facility: MEDICAL CENTER | Age: 44
End: 2024-05-24
Payer: COMMERCIAL

## 2024-05-24 DIAGNOSIS — M25.512 CHRONIC LEFT SHOULDER PAIN: Chronic | ICD-10-CM

## 2024-05-24 DIAGNOSIS — G89.29 CHRONIC LEFT SHOULDER PAIN: Chronic | ICD-10-CM

## 2024-09-20 DIAGNOSIS — E03.9 PRIMARY HYPOTHYROIDISM: ICD-10-CM

## 2024-09-25 ENCOUNTER — APPOINTMENT (OUTPATIENT)
Dept: MEDICAL GROUP | Facility: MEDICAL CENTER | Age: 44
End: 2024-09-25
Payer: COMMERCIAL

## 2024-09-25 VITALS
DIASTOLIC BLOOD PRESSURE: 60 MMHG | WEIGHT: 145 LBS | SYSTOLIC BLOOD PRESSURE: 96 MMHG | BODY MASS INDEX: 24.16 KG/M2 | HEIGHT: 65 IN | HEART RATE: 83 BPM | OXYGEN SATURATION: 98 %

## 2024-09-25 DIAGNOSIS — N94.6 DYSMENORRHEA: ICD-10-CM

## 2024-09-25 DIAGNOSIS — Z12.11 COLON CANCER SCREENING: ICD-10-CM

## 2024-09-25 DIAGNOSIS — G43.119 INTRACTABLE MIGRAINE WITH AURA WITHOUT STATUS MIGRAINOSUS: ICD-10-CM

## 2024-09-25 DIAGNOSIS — K59.09 OTHER CONSTIPATION: ICD-10-CM

## 2024-09-25 DIAGNOSIS — Z23 NEED FOR VACCINATION: ICD-10-CM

## 2024-09-25 PROCEDURE — 3078F DIAST BP <80 MM HG: CPT | Performed by: INTERNAL MEDICINE

## 2024-09-25 PROCEDURE — 90471 IMMUNIZATION ADMIN: CPT | Performed by: INTERNAL MEDICINE

## 2024-09-25 PROCEDURE — 99214 OFFICE O/P EST MOD 30 MIN: CPT | Mod: 25 | Performed by: INTERNAL MEDICINE

## 2024-09-25 PROCEDURE — 3074F SYST BP LT 130 MM HG: CPT | Performed by: INTERNAL MEDICINE

## 2024-09-25 PROCEDURE — 90656 IIV3 VACC NO PRSV 0.5 ML IM: CPT | Performed by: INTERNAL MEDICINE

## 2024-09-25 RX ORDER — LEVOTHYROXINE SODIUM 88 MCG
88 TABLET ORAL
Qty: 90 TABLET | Refills: 2 | Status: SHIPPED
Start: 2024-09-25

## 2024-09-25 ASSESSMENT — ENCOUNTER SYMPTOMS
CONSTIPATION: 1
CHILLS: 0
FEVER: 0

## 2024-09-25 ASSESSMENT — FIBROSIS 4 INDEX: FIB4 SCORE: 1.01

## 2024-09-25 NOTE — PROGRESS NOTES
CC:   Chief Complaint   Patient presents with    Follow-Up     Concerns on spotting for 30 days  Referral to OBGYN      Contraception     Diagnoses of Dysmenorrhea, Need for vaccination, Colon cancer screening, Other constipation, and Intractable migraine with aura without status migrainosus were pertinent to this visit.  Verbal consent was acquired by the patient to use Meshfire ambient listening note generation during this visit Yes     History of Present Illness  Dany is a 44-year-old female who presents for evaluation of the progression of migraine prevention and persistent spotting over the last month.    She has been experiencing severe migraines and has been on Aimovig injections and Topamax for a significant period. Despite this, she continues to suffer from migraines. She plans to discontinue Aimovig and transition to Botox, a process she is currently discussing with her neurologist. She also reports mood disturbances when not on birth control pills.    She has been experiencing persistent spotting over the last 30 days. She has not recently seen her OB/GYN and would like to be referred back to Dr. Bruna Colon.    She has been dealing with chronic constipation since her 20s. Despite trying various over-the-counter remedies such as MiraLAX and Senokot, and increasing her fiber intake, her symptoms persist. She consumes at least 64 ounces of water daily, often more. She is interested in a referral to gastroenterology for further evaluation and a colonoscopy, given her age of 44 years.    Past Medical History:   Diagnosis Date    Allergy, unspecified not elsewhere classified     Anxiety 8/26/2013    Arthritis     Cervical joints, and hands    Chest tightness or pressure 3/27/2018    High cholesterol     Migraine with aura and without status migrainosus, not intractable 8/26/2013    Pain 7/27/2017    Chronic pain- Migraine; neck pain, spasm    Psychiatric problem     hx of anxiety    Seizure (HCC)  "1/2016    ? possibly r/t migraine     Skipped heart beats 8/14/2019    Syncope, near 8/14/2019       Current Outpatient Medications Ordered in Epic   Medication Sig Dispense Refill    SYNTHROID 88 MCG Tab Take 1 Tablet by mouth every morning on an empty stomach. 90 Tablet 2    ergocalciferol (DRISDOL) 98622 UNIT capsule Take 1 Capsule by mouth every 14 days. 12 Capsule 1    lovastatin (MEVACOR) 40 MG tablet Take 1 Tablet by mouth every evening. 90 Tablet 1    tizanidine (ZANAFLEX) 4 MG Tab Take 1 Tablet by mouth every 8 hours as needed (muscle spasm). 270 Tablet 1    liothyronine (CYTOMEL) 5 MCG Tab TAKE 1 TABLET BY MOUTH EVERY DAY. PLEASE MAKE AN APPOINTMENT FOR ANY FUTURE REFILLS 90 Tablet 1    ondansetron (ZOFRAN) 4 MG Tab tablet Take 1 Tablet by mouth every four hours as needed for Nausea/Vomiting. 20 Tablet 0    hydrOXYzine HCl (ATARAX) 10 MG Tab TAKE 1-3 TABLET BY MOUTH EVERY DAY AS NEEDED INSOMNIA      DULoxetine (CYMBALTA) 20 MG Cap DR Particles TAKE 1 CAPSULE BY MOUTH EVERY DAY AT BEDTIME      topiramate (TOPAMAX) 50 MG tablet TAKE 3 TABLETS BY MOUTH TWICE A  Tablet 1    AIMOVIG 140 MG/ML Solution Auto-injector Inject 140 mg under the skin every 4 weeks. 1 mL 11    rizatriptan (MAXALT-MLT) 10 MG disintegrating tablet TAKE 1 TABLET BY MOUTH AT HEADACHE ONSET REPEAT EVERY HOUR AS NEEDED UP TO 4 TABLETS IN 24 HOURS 10 Tablet 11    Cyanocobalamin (VITAMIN B 12 PO) Take  by mouth.      cetirizine (ZYRTEC) 10 MG Tab Take 10 mg by mouth every day.       No current [facility]-ordered facility-administered medications on file.     Review of Systems   Constitutional:  Negative for chills and fever.   Gastrointestinal:  Positive for constipation.   All other systems reviewed and are negative.    Objective:     Exam:  BP 96/60   Pulse 83   Ht 1.651 m (5' 5\")   Wt 65.8 kg (145 lb)   SpO2 98%   BMI 24.13 kg/m²  Body mass index is 24.13 kg/m².    Physical Exam  Constitutional:       Appearance: Normal appearance. " She is normal weight.   HENT:      Head: Normocephalic and atraumatic.   Cardiovascular:      Pulses: Normal pulses.   Abdominal:      Tenderness: There is no guarding or rebound.   Neurological:      General: No focal deficit present.      Mental Status: She is alert and oriented to person, place, and time.   Psychiatric:         Mood and Affect: Mood normal.         Behavior: Behavior normal.         Thought Content: Thought content normal.         Judgment: Judgment normal.       Assessment & Plan:   Dany  is a pleasant 44 y.o. female with the following -   Assessment & Plan  1. Migraines.  This is a chronic problem. She has been on Aimovig injections and Topamax for migraine prevention but continues to experience significant migraines. She plans to discontinue Aimovig and transition to Botox, which was previously effective. She will follow up with [facility] for further management.    2. Dysmenorrhea.  She has had persistent spotting over the last 30 days. She would like to be referred back to Dr. Bruna Colon for further evaluation and management.    3. Constipation.  This is a chronic and persistent problem since her 20s. She has tried different over-the-counter remedies like MiraLAX and Senokot and has increased fiber consumption in her diet. Despite drinking at least 64 ounces of water daily, her symptoms persist. She is interested in a referral to gastroenterology for evaluation and a colonoscopy. She can consider using a suppository and is recommended to start a daily regimen of MiraLAX.    4. Need for vaccination.  The influenza vaccine will be administered in the office today.    Problem List Items Addressed This Visit       Dysmenorrhea    Relevant Orders    Referral to OB/Gyn    Intractable migraine with aura without status migrainosus    Other constipation    Relevant Orders    Referral to Gastroenterology     Other Visit Diagnoses       Need for vaccination        Relevant Orders     INFLUENZA VACCINE QUAD INJ (PF) (Completed)    Colon cancer screening        Relevant Orders    Referral to Gastroenterology          Return if symptoms worsen or fail to improve.    Please note that this dictation was created using voice recognition software. I have made every reasonable attempt to correct obvious errors, but I expect that there are errors of grammar and possibly content that I did not discover before finalizing the note.

## 2024-10-22 DIAGNOSIS — E03.9 PRIMARY HYPOTHYROIDISM: ICD-10-CM

## 2024-10-22 RX ORDER — LIOTHYRONINE SODIUM 5 UG/1
5 TABLET ORAL
Qty: 90 TABLET | Refills: 1 | Status: SHIPPED | OUTPATIENT
Start: 2024-10-22

## 2024-10-26 DIAGNOSIS — E78.00 HYPERCHOLESTEROLEMIA: ICD-10-CM

## 2024-10-27 RX ORDER — LOVASTATIN 40 MG/1
40 TABLET ORAL EVERY EVENING
Qty: 90 TABLET | Refills: 1 | Status: SHIPPED | OUTPATIENT
Start: 2024-10-27

## 2024-11-05 ENCOUNTER — APPOINTMENT (OUTPATIENT)
Dept: ENDOCRINOLOGY | Facility: MEDICAL CENTER | Age: 44
End: 2024-11-05
Attending: INTERNAL MEDICINE
Payer: COMMERCIAL

## 2024-12-20 ENCOUNTER — GYNECOLOGY VISIT (OUTPATIENT)
Dept: OBGYN | Facility: CLINIC | Age: 44
End: 2024-12-20
Payer: COMMERCIAL

## 2024-12-20 VITALS
BODY MASS INDEX: 24.16 KG/M2 | WEIGHT: 145 LBS | SYSTOLIC BLOOD PRESSURE: 121 MMHG | HEIGHT: 65 IN | DIASTOLIC BLOOD PRESSURE: 80 MMHG

## 2024-12-20 DIAGNOSIS — Z30.9 ENCOUNTER FOR CONTRACEPTIVE MANAGEMENT, UNSPECIFIED TYPE: ICD-10-CM

## 2024-12-20 DIAGNOSIS — Z01.419 WOMEN'S ANNUAL ROUTINE GYNECOLOGICAL EXAMINATION: ICD-10-CM

## 2024-12-20 DIAGNOSIS — Z86.69 HX OF MIGRAINES: ICD-10-CM

## 2024-12-20 DIAGNOSIS — N95.1 PERIMENOPAUSE: ICD-10-CM

## 2024-12-20 DIAGNOSIS — R23.2 HOT FLASHES: ICD-10-CM

## 2024-12-20 DIAGNOSIS — Z12.31 ENCOUNTER FOR SCREENING MAMMOGRAM FOR MALIGNANT NEOPLASM OF BREAST: ICD-10-CM

## 2024-12-20 PROCEDURE — 3079F DIAST BP 80-89 MM HG: CPT | Performed by: OBSTETRICS & GYNECOLOGY

## 2024-12-20 PROCEDURE — 99396 PREV VISIT EST AGE 40-64: CPT | Performed by: OBSTETRICS & GYNECOLOGY

## 2024-12-20 PROCEDURE — 3074F SYST BP LT 130 MM HG: CPT | Performed by: OBSTETRICS & GYNECOLOGY

## 2024-12-20 RX ORDER — LEVONORGESTREL / ETHINYL ESTRADIOL AND ETHINYL ESTRADIOL 150-30(84)
1 KIT ORAL DAILY
Qty: 91 TABLET | Refills: 0 | Status: SHIPPED | OUTPATIENT
Start: 2024-12-20

## 2024-12-20 RX ORDER — LEVONORGESTREL / ETHINYL ESTRADIOL AND ETHINYL ESTRADIOL 150-30(84)
KIT ORAL
COMMUNITY
End: 2024-12-20 | Stop reason: SDUPTHER

## 2024-12-20 ASSESSMENT — FIBROSIS 4 INDEX: FIB4 SCORE: 1.01

## 2024-12-20 NOTE — PROGRESS NOTES
ANNUAL GYNECOLOGY VISIT    Chief Complaint  Gynecologic Exam      Subjective  Dany Lambert is a 44 y.o. female who presents today for Annual Exam.  She has been on continuous OCPs for many years due to severe menstrual related migraines. Her mom is a retired neurologist. Per that patient she possibly had an aura one time, but she does not routinely have aura. She is on migraine medications and reports her migraines are well controlled as long as her hormone levels remain stable. She had tried to come off of her medications and had hot flashes and migraines. She also had an IV infiltrate and had a superficial phlebitis, not a DVT. She reports that her PCP no longer feels comfortable prescribing OCPs due to age and all the factors above. She is not currently sexually active. I discussed with her transitioning off the OCP and onto a lower dose transdermal estrogen and using a Mirena IUD for endometrial protection, menstrual suppression, and contraception if needed. She is open to trying this option. We discussed that the transdermal estrogen is a much lower estrogen dose, avoids first pass metabolism so there's less clotting risk, will keep her estrogen levels at a steady state, and will help her with the menopausal transition when it occurs. She is going to return for IUD placement and will be started on the estrogen patch at that time. A one time refill of her Daysee was provided today to bridge her until her follow up appointment.     Preventive Care   Immunization History   Administered Date(s) Administered    COVID-19, mRNA, LNP-S, PF, sergio-sucrose, 30 mcg/0.3 mL 10/05/2023    DTP - Historical vaccine 1978, 1978, 01/24/1980, 05/08/1981    Hepatitis B Vaccine (Adol/Adult) 04/20/2012, 05/04/2016, 08/14/2019    INFLUENZA TIV (IM) 11/05/2013, 12/11/2014    Influenza Seasonal Injectable - Historical Data 11/05/2013    Influenza Vaccine Quad Inj (Pf) 12/11/2014, 11/17/2016, 09/13/2017, 09/19/2018,  10/01/2019, 11/10/2021, 10/07/2022, 2023    Influenza Vaccine Quad Inj (Preserved) 2015    Influenza Vaccine Quad Recombinant 2020, 2021    Influenza split virus trivalent (PF) 2024    Influenza, unspecified formulation 2020    MMR Vaccine 1980    OPV TRIVALENT - HISTORICAL DATA (GIVEN PRIOR TO MAY 2016) 1978, 1978, 1980, 1983    PFIZER BIVALENT SARS-COV-2 VACCINE (12+) 10/12/2022    PFIZER PURPLE CAP SARS-COV-2 VACCINATION (12+) 2020, 2021, 11/10/2021    Tdap Vaccine 2012    Tuberculin Skin Test 2012, 2013, 11/15/2013, 2016, 2016     Last Mammogram: , normal   Scheduled for colonoscopy     Gynecology History and ROS  Current Sexual Activity: No  History of sexually transmitted diseases?  no  Abnormal discharge? No  Current Contraception:  Daysee, continuous use    Menstrual History  No LMP recorded. (Menstrual status: Other).  Around July due to continuous OCP use, this was constant bleeding for 6 weeks     Pap History  Last pap smear:   History of moderate or severe dysplasia: No    Cancer Risk Assessement:  Family history of:   - Breast cancer: yes, paternal grandmother    - Ovarian cancer: no   - Uterine cancer: no   - Colon cancer: no    Has had BRCA testing and is negative*     Obstetric History  OB History    Para Term  AB Living   1 0     1     SAB IAB Ectopic Molar Multiple Live Births                    # Outcome Date GA Lbr Nakul/2nd Weight Sex Type Anes PTL Lv   1 AB                Past Medical History  Past Medical History:   Diagnosis Date    Allergy, unspecified not elsewhere classified     Anxiety 2013    Arthritis     Cervical joints, and hands    Chest tightness or pressure 3/27/2018    High cholesterol     Migraine with aura and without status migrainosus, not intractable 2013    Pain 2017    Chronic pain- Migraine; neck pain, spasm    Psychiatric problem      hx of anxiety    Seizure (HCC) 1/2016    ? possibly r/t migraine     Skipped heart beats 8/14/2019    Syncope, near 8/14/2019       Past Surgical History  Past Surgical History:   Procedure Laterality Date    PB MANIPULATN SHLDR JT W ANESTHESIA Left 4/10/2024    Procedure: LEFT SHOULDER ARTHROSCOPY WITH MANIPULATION;  Surgeon: Tara Hinojosa M.D.;  Location: [facility];  Service: Orthopedics    NE SHLDR ARTHROSCOP,PART ACROMIOPLAS Left 4/10/2024    Procedure: LEFT LYSIS OF ADHESIONS, LEFT SUBACROMIAL DECOMPRESSION, REPAIRS AS INDICATED;  Surgeon: Tara Hinojosa M.D.;  Location: [facility];  Service: Orthopedics    NE DSTR NROLYTC AGNT PARVERTEB FCT SNGL CRVCL/THORA Right 9/29/2017    Procedure: NEURO DEST FACET C/T W/IG SNGL C2-4;  Surgeon: Scotty Navarro D.O.;  Location: [facility];  Service: Pain Management    NE DSTR NROLYTC AGNT PARVERTEB FCT ADDL CRVCL/THORA Right 9/29/2017    Procedure: NEURO DEST FACET C/T W/IG ADDL;  Surgeon: Scotty Navaror D.O.;  Location: [facility];  Service: Pain Management    NE DSTR NROLYTC AGNT PARVERTEB FCT SNGL CRVCL/THORA Left 7/28/2017    Procedure: NEURO DEST FACET C/T W/IG SNGL - C2-4;  Surgeon: Scotty Navarro D.O.;  Location: [facility];  Service: Pain Management    NE DSTR NROLYTC AGNT PARVERTEB FCT ADDL CRVCL/THORA  7/28/2017    Procedure: NEURO DEST FACET C/T W/IG ADDL;  Surgeon: Scotty Navarro D.O.;  Location: SUR[facility];  Service: Pain Management    TONSILLECTOMY  1997    CYST EXCISION Left as child    cyst removal on L wrist       Social History  Social History     Socioeconomic History    Marital status: Single     Spouse name: Not on file    Number of children: Not on file    Years of education: Not on file    Highest education level: Associate degree: occupational, technical, or vocational program   Occupational History    Not on file    Tobacco Use    Smoking status: Former     Current packs/day: 0.00     Average packs/day: 1 pack/day for 15.0 years (15.0 ttl pk-yrs)     Types: Cigarettes     Start date: 1995     Quit date: 2010     Years since quittin.9    Smokeless tobacco: Never   Vaping Use    Vaping status: Never Used   Substance and Sexual Activity    Alcohol use: No    Drug use: No    Sexual activity: Yes     Partners: Male     Birth control/protection: Pill   Other Topics Concern     Service No    Blood Transfusions No    Caffeine Concern No    Occupational Exposure No    Hobby Hazards No    Sleep Concern Yes    Stress Concern No    Weight Concern No    Special Diet No    Back Care No    Exercise Yes    Bike Helmet No    Seat Belt Yes    Self-Exams Yes   Social History Narrative    40 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      Social Drivers of Health     Financial Resource Strain: Low Risk  (2023)    Overall Financial Resource Strain (CARDIA)     Difficulty of Paying Living Expenses: Not hard at all   Food Insecurity: No Food Insecurity (2023)    Hunger Vital Sign     Worried About Running Out of Food in the Last Year: Never true     Ran Out of Food in the Last Year: Never true   Transportation Needs: No Transportation Needs (2023)    PRAPARE - Transportation     Lack of Transportation (Medical): No     Lack of Transportation (Non-Medical): No   Physical Activity: Inactive (2023)    Exercise Vital Sign     Days of Exercise per Week: 0 days     Minutes of Exercise per Session: 0 min   Stress: No Stress Concern Present (2023)    [employer] [place] [employer] - Occupational Stress Questionnaire     Feeling of Stress : Not at all   Social Connections: Socially Isolated (2023)    Social Connection and Isolation Panel [NHANES]     Frequency of Communication with Friends and Family: More than three times a week     Frequency of Social Gatherings with Friends and Family: Once a week     Attends  Yazidism Services: Never     Active Member of Clubs or Organizations: No     Attends Club or Organization Meetings: Never     Marital Status: Never    Intimate Partner Violence: Not on file   Housing Stability: Low Risk  (1/9/2023)    Housing Stability Vital Sign     Unable to Pay for Housing in the Last Year: No     Number of Places Lived in the Last Year: 1     Unstable Housing in the Last Year: No       Family History  Family History   Problem Relation Age of Onset    Diabetes Maternal Grandfather     Migraines Maternal Grandfather     Cancer Paternal Grandfather     Alzheimer's Disease Paternal Grandfather     Cancer Paternal Grandmother 30        breast    Glasses Mother     Migraines Mother     Stroke Mother     Suicide Attempts Maternal Uncle         [facility]    Breast Cancer Maternal Grandmother     Hypertension Father     Glasses Father     Hyperlipidemia Father        Home Medications  Current Outpatient Medications on File Prior to Visit   Medication Sig Dispense Refill    Levonorgest-Eth Estrad 91-Day 0.15-0.03 &0.01 MG Tab Take  by mouth.      lovastatin (MEVACOR) 40 MG tablet TAKE 1 TABLET BY MOUTH EVERY DAY IN THE EVENING 90 Tablet 1    liothyronine (CYTOMEL) 5 MCG Tab TAKE 1 TABLET BY MOUTH EVERY DAY. PLEASE MAKE AN APPOINTMENT FOR ANY FUTURE REFILLS 90 Tablet 1    SYNTHROID 88 MCG Tab TAKE 1 TABLET BY MOUTH EVERY DAY IN THE MORNING ON AN EMPTY STOMACH 90 Tablet 2    ergocalciferol (DRISDOL) 25855 UNIT capsule Take 1 Capsule by mouth every 14 days. 12 Capsule 1    tizanidine (ZANAFLEX) 4 MG Tab Take 1 Tablet by mouth every 8 hours as needed (muscle spasm). 270 Tablet 1    ondansetron (ZOFRAN) 4 MG Tab tablet Take 1 Tablet by mouth every four hours as needed for Nausea/Vomiting. 20 Tablet 0    hydrOXYzine HCl (ATARAX) 10 MG Tab TAKE 1-3 TABLET BY MOUTH EVERY DAY AS NEEDED INSOMNIA      DULoxetine (CYMBALTA) 20 MG Cap DR Particles in the morning, at noon, and at bedtime.       "topiramate (TOPAMAX) 50 MG tablet TAKE 3 TABLETS BY MOUTH TWICE A  Tablet 1    AIMOVIG 140 MG/ML Solution Auto-injector Inject 140 mg under the skin every 4 weeks. 1 mL 11    rizatriptan (MAXALT-MLT) 10 MG disintegrating tablet TAKE 1 TABLET BY MOUTH AT HEADACHE ONSET REPEAT EVERY HOUR AS NEEDED UP TO 4 TABLETS IN 24 HOURS 10 Tablet 11    Cyanocobalamin (VITAMIN B 12 PO) Take  by mouth.      cetirizine (ZYRTEC) 10 MG Tab Take 10 mg by mouth every day.       No current facility-administered medications on file prior to visit.       Allergies/Reactions  Allergies   Allergen Reactions    Benadryl Allergy Anxiety    Demerol Hives    Medrol [Methylprednisolone] Hives and Itching    Previfem [Norgestimate-Ethinyl Estradiol]      Nausea/vomiting    Reglan [Metoclopramide] Anxiety    Seasonal        ROS  Positive ROS: migraines, neck pain  Gen: no fevers or chills, no significant weight loss or gain, excessive fatigue  Respiratory:  no cough or dyspnea  Cardiac:  no chest pain, no palpitations, no syncope  Breast: no breast discharge, pain, lump or skin changes  GI:  no heartburn, no abdominal pain, no nausea or vomiting  Urinary: no dysuria, urgency, frequency, incontinence   Psych: no depression or anxiety  Neuro: no migraines with aura, fainting spells, numbness or tingling  Extremities: no joint pain, persistently swollen ankles, recurrent leg cramps      Physical Examination:  Vital Signs:   Vitals:    12/20/24 0808   BP: 121/80   Weight: 145 lb   Height: 5' 5\"     Body mass index is 24.13 kg/m².    Constitutional: The patient is well developed and well nourished.  Psychiatric: Patient is oriented to time place and person.   Skin: No rash observed.  Neck: Appears symmetric. Thyroid normal size  Respiratory: normal effort  Breast: Inspection reveals no asymetry or nipple discharge, no skin thickening, dimpling or erythema.  Palpation demonstrates no masses.  Abdomen: Soft, non-tender.  Pelvic Exam:     Vulva: " "external female genitalia are normal in appearance. No lesions    Urethra - no lesions, no erythema    Vagina: moist, pink, normal ruggae    Cervix: pink, smooth, no lesions, no CMT    Uterus - non-tender, normal size, shape, contour, mobile, anteverted    Ovaries: non-tender, no appreciable masses  Pap Smear performed: No  Extremeties: Legs are symmetric and without tenderness. There is no edema present.    Labs/Imaging:  HDL (mg/dL)   Date Value   04/08/2024 57     LDL (mg/dL)   Date Value   04/08/2024 108 (H)     No components found for: \"A1C1\"  WBC (K/uL)   Date Value   04/08/2024 8.4     Lab Results   Component Value Date/Time    CO2 19 (L) 04/08/2024 1134    BUN 10 04/08/2024 1134           Assessment & Plan  Dany Lambert is a 44 y.o. female who presents today for Annual Gyn Exam.     1. Women's annual routine gynecological examination  - She is up to date on her pap smear   - Mammogram ordered today. Last mammogram three years ago. She did have to go every 6 months for some time due to calcifications  - She is scheduled to get a colonoscopy    2. Encounter for screening mammogram for malignant neoplasm of breast  - MA-SCREENING MAMMO BILAT W/TOMOSYNTHESIS W/CAD; Future    3. Perimenopause  - She is experiencing hot flashes when she tried a trial of being off OCPs    4. Hot flashes    5. Hx of migraines    Return: 1 month for Mirena IUD placement. At that time, will start on low dose transdermal estrogen     Philip Colon M.D.        "

## 2025-01-10 ENCOUNTER — APPOINTMENT (OUTPATIENT)
Dept: RADIOLOGY | Facility: MEDICAL CENTER | Age: 45
End: 2025-01-10
Payer: COMMERCIAL

## 2025-01-22 ENCOUNTER — HOSPITAL ENCOUNTER (OUTPATIENT)
Dept: LAB | Facility: MEDICAL CENTER | Age: 45
End: 2025-01-22
Attending: INTERNAL MEDICINE
Payer: COMMERCIAL

## 2025-01-22 DIAGNOSIS — E03.9 PRIMARY HYPOTHYROIDISM: ICD-10-CM

## 2025-01-22 DIAGNOSIS — E55.9 VITAMIN D DEFICIENCY: ICD-10-CM

## 2025-01-22 DIAGNOSIS — E53.8 B12 DEFICIENCY: ICD-10-CM

## 2025-01-22 LAB
25(OH)D3 SERPL-MCNC: 64 NG/ML (ref 30–100)
ALBUMIN SERPL BCP-MCNC: 4.1 G/DL (ref 3.2–4.9)
ALBUMIN/GLOB SERPL: 1.6 G/DL
ALP SERPL-CCNC: 63 U/L (ref 30–99)
ALT SERPL-CCNC: 14 U/L (ref 2–50)
ANION GAP SERPL CALC-SCNC: 13 MMOL/L (ref 7–16)
AST SERPL-CCNC: 14 U/L (ref 12–45)
BILIRUB SERPL-MCNC: 0.2 MG/DL (ref 0.1–1.5)
BUN SERPL-MCNC: 14 MG/DL (ref 8–22)
CALCIUM ALBUM COR SERPL-MCNC: 8.7 MG/DL (ref 8.5–10.5)
CALCIUM SERPL-MCNC: 8.8 MG/DL (ref 8.5–10.5)
CHLORIDE SERPL-SCNC: 106 MMOL/L (ref 96–112)
CO2 SERPL-SCNC: 18 MMOL/L (ref 20–33)
CREAT SERPL-MCNC: 0.93 MG/DL (ref 0.5–1.4)
FASTING STATUS PATIENT QL REPORTED: NORMAL
GFR SERPLBLD CREATININE-BSD FMLA CKD-EPI: 77 ML/MIN/1.73 M 2
GLOBULIN SER CALC-MCNC: 2.5 G/DL (ref 1.9–3.5)
GLUCOSE SERPL-MCNC: 94 MG/DL (ref 65–99)
POTASSIUM SERPL-SCNC: 4.2 MMOL/L (ref 3.6–5.5)
PROT SERPL-MCNC: 6.6 G/DL (ref 6–8.2)
SODIUM SERPL-SCNC: 137 MMOL/L (ref 135–145)
T3FREE SERPL-MCNC: 3.54 PG/ML (ref 2–4.4)
T4 FREE SERPL-MCNC: 1.1 NG/DL (ref 0.93–1.7)
TSH SERPL-ACNC: 1.14 UIU/ML (ref 0.35–5.5)
VIT B12 SERPL-MCNC: 2708 PG/ML (ref 211–911)

## 2025-01-22 PROCEDURE — 36415 COLL VENOUS BLD VENIPUNCTURE: CPT

## 2025-01-22 PROCEDURE — 82607 VITAMIN B-12: CPT

## 2025-01-22 PROCEDURE — 84481 FREE ASSAY (FT-3): CPT

## 2025-01-22 PROCEDURE — 82306 VITAMIN D 25 HYDROXY: CPT

## 2025-01-22 PROCEDURE — 84443 ASSAY THYROID STIM HORMONE: CPT

## 2025-01-22 PROCEDURE — 84439 ASSAY OF FREE THYROXINE: CPT

## 2025-01-22 PROCEDURE — 80053 COMPREHEN METABOLIC PANEL: CPT

## 2025-01-29 ENCOUNTER — APPOINTMENT (OUTPATIENT)
Dept: ENDOCRINOLOGY | Facility: MEDICAL CENTER | Age: 45
End: 2025-01-29
Attending: INTERNAL MEDICINE
Payer: COMMERCIAL

## 2025-01-29 ENCOUNTER — TELEPHONE (OUTPATIENT)
Dept: ENDOCRINOLOGY | Facility: MEDICAL CENTER | Age: 45
End: 2025-01-29
Payer: COMMERCIAL

## 2025-01-29 NOTE — TELEPHONE ENCOUNTER
Left a detailed message to get pt r/s from no show appt. Also informed patient in message of our no show policy. Patient now has 1 no show

## 2025-01-30 ENCOUNTER — TELEPHONE (OUTPATIENT)
Dept: ENDOCRINOLOGY | Facility: MEDICAL CENTER | Age: 45
End: 2025-01-30
Payer: COMMERCIAL

## 2025-01-30 NOTE — TELEPHONE ENCOUNTER
Pt left  vm needing to reschedule. Called pt back and advised Dr. WHITLOCK is booking into Sept. Found cancellation for march 4th and let pt know to call back if this day/time does not work.

## 2025-02-12 ENCOUNTER — APPOINTMENT (OUTPATIENT)
Dept: OBGYN | Facility: CLINIC | Age: 45
End: 2025-02-12
Payer: COMMERCIAL

## 2025-02-12 VITALS — DIASTOLIC BLOOD PRESSURE: 73 MMHG | WEIGHT: 149 LBS | SYSTOLIC BLOOD PRESSURE: 126 MMHG | BODY MASS INDEX: 24.79 KG/M2

## 2025-02-12 DIAGNOSIS — Z30.430 ENCOUNTER FOR IUD INSERTION: ICD-10-CM

## 2025-02-12 DIAGNOSIS — Z30.9 ENCOUNTER FOR CONTRACEPTIVE MANAGEMENT, UNSPECIFIED TYPE: ICD-10-CM

## 2025-02-12 DIAGNOSIS — Z79.890 HORMONE REPLACEMENT THERAPY: ICD-10-CM

## 2025-02-12 DIAGNOSIS — R23.2 HOT FLASHES: ICD-10-CM

## 2025-02-12 LAB
POCT INT CON NEG: NEGATIVE
POCT INT CON POS: POSITIVE
POCT URINE PREGNANCY TEST: NEGATIVE

## 2025-02-12 PROCEDURE — 81025 URINE PREGNANCY TEST: CPT | Performed by: OBSTETRICS & GYNECOLOGY

## 2025-02-12 PROCEDURE — 58300 INSERT INTRAUTERINE DEVICE: CPT | Performed by: OBSTETRICS & GYNECOLOGY

## 2025-02-12 PROCEDURE — 99213 OFFICE O/P EST LOW 20 MIN: CPT | Mod: 25 | Performed by: OBSTETRICS & GYNECOLOGY

## 2025-02-12 RX ORDER — ESTRADIOL 0.05 MG/D
1 PATCH TRANSDERMAL
Qty: 4 PATCH | Refills: 5 | Status: SHIPPED | OUTPATIENT
Start: 2025-02-12

## 2025-02-12 ASSESSMENT — ENCOUNTER SYMPTOMS
HEADACHES: 1
MUSCULOSKELETAL NEGATIVE: 1
EYES NEGATIVE: 1
CARDIOVASCULAR NEGATIVE: 1
CONSTITUTIONAL NEGATIVE: 1
GASTROINTESTINAL NEGATIVE: 1
PSYCHIATRIC NEGATIVE: 1
RESPIRATORY NEGATIVE: 1

## 2025-02-12 ASSESSMENT — FIBROSIS 4 INDEX: FIB4 SCORE: 0.56

## 2025-02-12 NOTE — PROCEDURES
IUD Insertion and Removal    Performed by: Philip Colon M.D.  Authorized by: Philip Colon M.D.    Procedure: IUD insertion    Consent obtained by patient, parent, or legal power of  - including discussion of procedure risks and benefits, patient questions answered, and patient education provided: yes    Pregnancy risk: negative pregnancy test    Pre-Medications:  Motrin  Date/Time of Insertion:  2/12/2025 8:39 AM  Pelvic exam performed: yes    Speculum placed in vagina: yes    Cervix cleaned and prepped: yes    Tenaculum/Allis/Ring Forceps applied to cervix: yes    Anesthesia used: yes    Local anesthesia:  Paracervical  Local anesthetic:  Lidocaine  Uterus sound depth (cm):  8  Cervix dilated: yes    Cervix dilated with:  Cervical os finder  IUD inserted without complications: yes    Strings trimmed to (cm):  3  Patient tolerated procedure well: yes    Estimated blood loss (mL):  1  Intended removal date: 5 years        Philip Colon M.D.

## 2025-02-12 NOTE — PROGRESS NOTES
GYN PROBLEM VISIT    CC:  Contraception    HPI: Patient is a 44 y.o.  who presents for HRT management. She has has been on OCPs for many years due to menstrual migraines. Due to safety concerns, she'd like to phase off of OCPs, but has been experiencing debilitating migraines and hot flashes when she has tried to come off. We discussed options last time and ultimately she decided that she'd like to try a Mirena IUD for menstraul suppression/endometrial protection with transdermal estrogen. Today the patient is counseled on procedure of IUD insertion. I discussed with the patient the risks of IUD including infection, risk of IUD expulsion, the risk of uterine perforation (1-1000, slightly higher while breastfeeding), risk of IUD migration or lost strings.  If the IUD does migrate the patient may require a separate procedure such as hysteroscopy or laparoscopy to remove or retrieve the migrated IUD. I also discussed the 0.1% risk of pregnancy with IUD use which coincides with an increased risk of ectopic pregnancy with IUD use.  We reviewed leaving the strings long enough to prevent disappearance however this may initially result in the possibility that partner can feel the IUD during intercourse; this typically resolves as the strings soften and tuck behind cervix. I also discussed the side effects of progestin-containing IUDs including decreased, irregular or absent menses and/or spotting.  All questions answered.  Informed consent is signed.  UPT negative.    We also discussed HRT and estrogen. She understands that there are risks with estrogen use including increased risk of blood clots, stroke, and hormone sensitive breast cancers. We discussed that the contraindications to systemic hormone replacement therapy are history of breast cancer, coronary artery disease, previous VTE, transient ischemic attack or previous stroke, unexplained vaginal bleeding, high risk endometrial cancer, and active liver  disease. She does not have any of these contraindications. We discussed that there is limited data on HRT and migraines with aura. We also discussed that her migraines may worsen as we figured out the best dose of estrogen for her.     ROS:   Review of Systems   Constitutional: Negative.    HENT: Negative.     Eyes: Negative.    Respiratory: Negative.     Cardiovascular: Negative.    Gastrointestinal: Negative.    Genitourinary: Negative.    Musculoskeletal: Negative.    Skin: Negative.    Neurological:  Positive for headaches.        Hot flashes   Endo/Heme/Allergies: Negative.    Psychiatric/Behavioral: Negative.           PFSH:  I personally reviewed the past medical and surgical histories.     Social History     Tobacco Use    Smoking status: Former     Current packs/day: 0.00     Average packs/day: 1 pack/day for 15.0 years (15.0 ttl pk-yrs)     Types: Cigarettes     Start date: 1/1/1995     Quit date: 1/1/2010     Years since quitting: 15.1    Smokeless tobacco: Never   Vaping Use    Vaping status: Never Used   Substance Use Topics    Alcohol use: No    Drug use: No       Social History     Substance and Sexual Activity   Sexual Activity Yes    Partners: Male    Birth control/protection: Pill        ALLERGIES / REACTIONS:  Allergies   Allergen Reactions    Benadryl Allergy Anxiety    Demerol Hives    Medrol [Methylprednisolone] Hives and Itching    Previfem [Norgestimate-Ethinyl Estradiol]      Nausea/vomiting    Reglan [Metoclopramide] Anxiety    Seasonal                            PHYSICAL EXAMINATION:  Vital Signs:   Vitals:    02/12/25 0824   BP: 126/73   Weight: 149 lb     Body mass index is 24.79 kg/m².    Physical Exam  Vitals reviewed. Exam conducted with a chaperone present.   Constitutional:       Appearance: Normal appearance.   HENT:      Head: Normocephalic.   Eyes:      Extraocular Movements: Extraocular movements intact.      Pupils: Pupils are equal, round, and reactive to light.    Cardiovascular:      Rate and Rhythm: Normal rate.   Pulmonary:      Effort: Pulmonary effort is normal.   Abdominal:      General: Abdomen is flat.      Palpations: Abdomen is soft.   Genitourinary:     General: Normal vulva.      Exam position: Lithotomy position.      Vagina: Normal.      Cervix: Normal.      Uterus: Normal.       Adnexa: Right adnexa normal and left adnexa normal.      Comments: Uterus is retroverted and sounds to 8cm. See procedure note for details.   Musculoskeletal:         General: Normal range of motion.      Cervical back: Normal range of motion.   Skin:     General: Skin is warm and dry.   Neurological:      General: No focal deficit present.      Mental Status: She is alert and oriented to person, place, and time.   Psychiatric:         Mood and Affect: Mood normal.         Behavior: Behavior normal.          ASSESSMENT AND PLAN:  44 y.o.    1. Encounter for contraceptive management, unspecified type  - Consent for all Surgical, Special Diagnostic or Therapeutic Procedures  - POCT Pregnancy    2. Encounter for IUD insertion  - levonorgestrel (Mirena) 20 MCG/DAY IUD 1 Each    3. Hormone replacement therapy  - estradiol (CLIMARA) 0.05 MG/24HR PATCH WEEKLY; Place 1 Patch on the skin every 7 days.  Dispense: 4 Patch; Refill: 5    4. Hot flashes  - estradiol (CLIMARA) 0.05 MG/24HR PATCH WEEKLY; Place 1 Patch on the skin every 7 days.  Dispense: 4 Patch; Refill: 5    Plan:  See above for counseling  IUD was placed without complication. See separate procedure note  We discussed the risks and benefits of HRT. She feels like the benefits outweigh the risks. A prescription for a transdermal estrogen patch was prescribed.   Follow up for a string and medication check in 1 month    At least 25 minutes were spent on chart review, counseling the patient, and coordinating future care in addition to the procedure today. Greater than 50% of this time was face to face with the patient    Collinkindottie  CUAUHTEMOC Colon M.D.  Obstetrics & Gynecology   [phone]  8:53 AM

## 2025-02-14 ENCOUNTER — HOSPITAL ENCOUNTER (OUTPATIENT)
Dept: RADIOLOGY | Facility: MEDICAL CENTER | Age: 45
End: 2025-02-14
Payer: COMMERCIAL

## 2025-02-14 DIAGNOSIS — K58.1 IRRITABLE BOWEL SYNDROME WITH CONSTIPATION: ICD-10-CM

## 2025-02-14 DIAGNOSIS — E07.9 DISEASE OF THYROID GLAND: ICD-10-CM

## 2025-02-14 DIAGNOSIS — K62.5 HEMORRHAGE OF RECTUM AND ANUS: ICD-10-CM

## 2025-02-14 DIAGNOSIS — R14.0 GASTRIC TYMPANY: ICD-10-CM

## 2025-02-14 DIAGNOSIS — R13.10 PROBLEMS WITH SWALLOWING AND MASTICATION: ICD-10-CM

## 2025-02-14 DIAGNOSIS — Z12.11 SPECIAL SCREENING FOR MALIGNANT NEOPLASMS, COLON: ICD-10-CM

## 2025-02-14 PROCEDURE — 700117 HCHG RX CONTRAST REV CODE 255

## 2025-02-14 PROCEDURE — 74220 X-RAY XM ESOPHAGUS 1CNTRST: CPT

## 2025-02-14 RX ADMIN — BARIUM SULFATE 700 MG: 700 TABLET ORAL at 15:26

## 2025-03-04 ENCOUNTER — TELEMEDICINE (OUTPATIENT)
Dept: ENDOCRINOLOGY | Facility: MEDICAL CENTER | Age: 45
End: 2025-03-04
Attending: INTERNAL MEDICINE
Payer: COMMERCIAL

## 2025-03-04 VITALS — BODY MASS INDEX: 23.32 KG/M2 | HEIGHT: 65 IN | WEIGHT: 140 LBS

## 2025-03-04 DIAGNOSIS — R53.83 OTHER FATIGUE: ICD-10-CM

## 2025-03-04 DIAGNOSIS — E55.9 VITAMIN D DEFICIENCY: ICD-10-CM

## 2025-03-04 DIAGNOSIS — E53.8 B12 DEFICIENCY: ICD-10-CM

## 2025-03-04 DIAGNOSIS — E03.9 PRIMARY HYPOTHYROIDISM: ICD-10-CM

## 2025-03-04 PROCEDURE — 99214 OFFICE O/P EST MOD 30 MIN: CPT | Mod: 95 | Performed by: INTERNAL MEDICINE

## 2025-03-04 RX ORDER — LIOTHYRONINE SODIUM 5 UG/1
5 TABLET ORAL
Qty: 90 TABLET | Refills: 2 | Status: SHIPPED | OUTPATIENT
Start: 2025-03-04

## 2025-03-04 RX ORDER — LEVOTHYROXINE SODIUM 88 MCG
88 TABLET ORAL
Qty: 90 TABLET | Refills: 2 | Status: SHIPPED | OUTPATIENT
Start: 2025-03-04

## 2025-03-04 ASSESSMENT — FIBROSIS 4 INDEX: FIB4 SCORE: 0.56
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
## 2025-03-20 ENCOUNTER — APPOINTMENT (OUTPATIENT)
Dept: OBGYN | Facility: CLINIC | Age: 45
End: 2025-03-20
Payer: COMMERCIAL

## 2025-03-20 DIAGNOSIS — M54.2 CERVICAL MUSCLE PAIN: ICD-10-CM

## 2025-04-01 DIAGNOSIS — R23.2 HOT FLASHES: ICD-10-CM

## 2025-04-01 DIAGNOSIS — Z79.890 HORMONE REPLACEMENT THERAPY: ICD-10-CM

## 2025-04-02 RX ORDER — ESTRADIOL 0.05 MG/D
1 PATCH TRANSDERMAL
Qty: 12 PATCH | Refills: 2 | Status: SHIPPED | OUTPATIENT
Start: 2025-04-02

## 2025-04-15 ENCOUNTER — APPOINTMENT (OUTPATIENT)
Dept: LAB | Facility: MEDICAL CENTER | Age: 45
End: 2025-04-15
Payer: COMMERCIAL

## 2025-04-15 ENCOUNTER — HOSPITAL ENCOUNTER (OUTPATIENT)
Facility: MEDICAL CENTER | Age: 45
End: 2025-04-15
Attending: INTERNAL MEDICINE
Payer: COMMERCIAL

## 2025-04-15 ENCOUNTER — HOSPITAL ENCOUNTER (OUTPATIENT)
Facility: MEDICAL CENTER | Age: 45
End: 2025-04-15
Payer: COMMERCIAL

## 2025-04-15 ENCOUNTER — TELEPHONE (OUTPATIENT)
Dept: MEDICAL GROUP | Age: 45
End: 2025-04-15
Payer: COMMERCIAL

## 2025-04-15 DIAGNOSIS — G43.119 INTRACTABLE MIGRAINE WITH AURA WITHOUT STATUS MIGRAINOSUS: ICD-10-CM

## 2025-04-15 LAB
C DIFF TOX GENS STL QL NAA+PROBE: NEGATIVE
C PARVUM AG STL QL IA.RAPID: NEGATIVE
G LAMBLIA AG STL QL IA.RAPID: NEGATIVE
LACTOFERRIN STL QL IA: NEGATIVE

## 2025-04-15 PROCEDURE — 86364 TISS TRNSGLTMNASE EA IG CLAS: CPT | Mod: 91

## 2025-04-15 PROCEDURE — 87177 OVA AND PARASITES SMEARS: CPT

## 2025-04-15 PROCEDURE — 82784 ASSAY IGA/IGD/IGG/IGM EACH: CPT

## 2025-04-15 PROCEDURE — 83993 ASSAY FOR CALPROTECTIN FECAL: CPT

## 2025-04-15 PROCEDURE — 87209 SMEAR COMPLEX STAIN: CPT

## 2025-04-15 PROCEDURE — 87493 C DIFF AMPLIFIED PROBE: CPT

## 2025-04-15 PROCEDURE — 87899 AGENT NOS ASSAY W/OPTIC: CPT

## 2025-04-15 PROCEDURE — 87046 STOOL CULTR AEROBIC BACT EA: CPT

## 2025-04-15 PROCEDURE — 83630 LACTOFERRIN FECAL (QUAL): CPT

## 2025-04-15 PROCEDURE — 87329 GIARDIA AG IA: CPT

## 2025-04-15 PROCEDURE — 87045 FECES CULTURE AEROBIC BACT: CPT

## 2025-04-15 PROCEDURE — 87328 CRYPTOSPORIDIUM AG IA: CPT

## 2025-04-15 PROCEDURE — 36415 COLL VENOUS BLD VENIPUNCTURE: CPT

## 2025-04-15 PROCEDURE — 86258 DGP ANTIBODY EACH IG CLASS: CPT

## 2025-04-15 NOTE — TELEPHONE ENCOUNTER
----- Message from MARCELLA BOGGS sent at 4/14/2025  3:53 PM PDT -----  Hello,     I got a call from [facility] and pt has an appt 4/16/25 but referral is needed from PCP for ins. Can you please put in a referral so we can process for her appt?     Thank you!  Eliana

## 2025-04-16 LAB
E COLI SXT1+2 STL IA: NORMAL
SIGNIFICANT IND 70042: NORMAL
SITE SITE: NORMAL
SOURCE SOURCE: NORMAL

## 2025-04-16 NOTE — Clinical Note
REFERRAL APPROVAL NOTICE         Sent on April 16, 2025                   Dany Lambert  [address]   [id number]  [place] [id number]                   Dear Ms. Lambert,    After a careful review of the medical information and benefit coverage, [facility] has processed your referral. See below for additional details.    If applicable, you must be actively enrolled with your insurance for coverage of the authorized service. If you have any questions regarding your coverage, please contact your insurance directly.    REFERRAL INFORMATION   Referral #:  [id number]  Referred-To Provider    Referred-By Provider:  Neurology    Ara Adair M.D.   [facility]      [address] [place] [address]  [phone] [id number]  [place] [address]  [phone]    Referral Start Date:  04/15/2025  Referral End Date:   04/15/2026             SCHEDULING  If you do not already have an appointment, please call [phone] to make an appointment.     MORE INFORMATION  If you do not already have a [employer] account, sign up at: [email or web address]  You can access your medical information, make appointments, see lab results, billing information, and more.  If you have questions regarding this referral, please contact  the [facility] Referrals department at:             [phone]. Monday - Friday 8:00AM - 5:00PM.     Sincerely,    [facility]

## 2025-04-17 LAB
CALPROTECTIN STL-MCNT: 7 UG/G
GLIADIN IGA SER IA-ACNC: <0.72 FLU (ref 0–4.99)
GLIADIN IGG SER IA-ACNC: <0.56 FLU (ref 0–4.99)
IGA SERPL-MCNC: 114 MG/DL (ref 68–408)
TTG IGA SER IA-ACNC: <1.02 FLU (ref 0–4.99)
TTG IGG SER IA-ACNC: <0.82 FLU (ref 0–4.99)

## 2025-04-18 LAB
BACTERIA STL CULT: NORMAL
E COLI SXT1+2 STL IA: NORMAL
SIGNIFICANT IND 70042: NORMAL
SITE SITE: NORMAL
SOURCE SOURCE: NORMAL

## 2025-04-21 LAB — OVA AND PARASITE, FECAL INTERPRETATION Q0595: NEGATIVE

## 2025-04-27 DIAGNOSIS — E78.00 HYPERCHOLESTEROLEMIA: ICD-10-CM

## 2025-04-28 RX ORDER — LOVASTATIN 40 MG/1
40 TABLET ORAL EVERY EVENING
Qty: 90 TABLET | Refills: 0 | Status: SHIPPED | OUTPATIENT
Start: 2025-04-28

## 2025-05-13 DIAGNOSIS — E03.9 PRIMARY HYPOTHYROIDISM: ICD-10-CM

## 2025-05-13 RX ORDER — LEVOTHYROXINE SODIUM 88 UG/1
88 TABLET ORAL
Qty: 90 TABLET | Refills: 1 | Status: SHIPPED | OUTPATIENT
Start: 2025-05-13

## 2025-05-15 ENCOUNTER — GYNECOLOGY VISIT (OUTPATIENT)
Dept: OBGYN | Facility: CLINIC | Age: 45
End: 2025-05-15
Payer: COMMERCIAL

## 2025-05-15 VITALS — DIASTOLIC BLOOD PRESSURE: 84 MMHG | WEIGHT: 132 LBS | SYSTOLIC BLOOD PRESSURE: 128 MMHG | BODY MASS INDEX: 21.97 KG/M2

## 2025-05-15 DIAGNOSIS — Z30.431 IUD CHECK UP: Primary | ICD-10-CM

## 2025-05-15 PROCEDURE — 3079F DIAST BP 80-89 MM HG: CPT | Performed by: STUDENT IN AN ORGANIZED HEALTH CARE EDUCATION/TRAINING PROGRAM

## 2025-05-15 PROCEDURE — 99212 OFFICE O/P EST SF 10 MIN: CPT | Performed by: STUDENT IN AN ORGANIZED HEALTH CARE EDUCATION/TRAINING PROGRAM

## 2025-05-15 PROCEDURE — 3074F SYST BP LT 130 MM HG: CPT | Performed by: STUDENT IN AN ORGANIZED HEALTH CARE EDUCATION/TRAINING PROGRAM

## 2025-05-15 ASSESSMENT — FIBROSIS 4 INDEX: FIB4 SCORE: 0.56

## 2025-05-15 NOTE — PROGRESS NOTES
Mirena string check  Inserted with Darlene on 02/12/2025 ( pt reports some spotting and cramping still)

## 2025-05-15 NOTE — PROGRESS NOTES
GYN PROBLEM VISIT    CC:  IUD string check      HPI: Patient is a 44 y.o.  No LMP recorded. (Menstrual status: Other).  in for IUD string check.   Her MIRENA IUD was placed 2025, by Dr. Colon. She states that she has still been experiencing some intermittent spotting that is dark in color. She occasionally will have some cramping, but states that this is not persistent.        ROS:   General: denies fever / chills  HEENT: denies sore throat:  CV: denies chest pain:  Repiratory: denies shortness of breath  GI: denies abdominal pain  : denies dysuria:    PFSH:  I personally reviewed the past medical and surgical histories.     Social History[1]     Social History     Substance and Sexual Activity   Sexual Activity Yes    Partners: Male    Birth control/protection: Pill        ALLERGIES / REACTIONS:  Allergies[2]                        PHYSICAL EXAMINATION:  Vital Signs:   /84   Wt 132 lb   BMI 21.97 kg/m²     Gen: appears well, NAD  Respiratory: normal effort  Abdomen: Soft, non-tender.  Pelvic Exam:    Vulva: normal.    Urethra: normal.   Vagina: normal, no lesions.    Cervix: Strings visualized and in place 2-3 cm long. Otherwise cervix is pink and moist with no erythema.   Perineum: normal.    ASSESSMENT AND PLAN:  44 y.o.      IUD String Check      - IUD strings visualized. Reviewed bleeding expectations with IUD especially possibility for abnormal bleeding first few months after insertion. No follow up needed unless pt experiences pelvic pain or bothersome bleeding. Otherwise, return to annual check ups.       Follow up in 1 year    Wandy Morales P.A.-C.  [facility]               [1]   Social History  Tobacco Use    Smoking status: Former     Current packs/day: 0.00     Average packs/day: 1 pack/day for 15.0 years (15.0 ttl pk-yrs)     Types: Cigarettes     Start date: 1995     Quit date: 2010     Years since quitting: 15.3    Smokeless tobacco: Never    Vaping Use    Vaping status: Never Used   Substance Use Topics    Alcohol use: No    Drug use: No   [2]   Allergies  Allergen Reactions    Benadryl Allergy Anxiety    Demerol Hives    Medrol [Methylprednisolone] Hives and Itching    Previfem [Norgestimate-Ethinyl Estradiol]      Nausea/vomiting    Reglan [Metoclopramide] Anxiety    Seasonal

## 2025-08-05 ENCOUNTER — APPOINTMENT (OUTPATIENT)
Dept: RADIOLOGY | Facility: MEDICAL CENTER | Age: 45
End: 2025-08-05
Attending: INTERNAL MEDICINE
Payer: COMMERCIAL

## 2025-08-05 DIAGNOSIS — E78.00 HYPERCHOLESTEROLEMIA: ICD-10-CM

## 2025-08-05 ASSESSMENT — FIBROSIS 4 INDEX
FIB4 SCORE: 0.58
FIB4 SCORE: 0.58

## 2025-08-06 RX ORDER — LOVASTATIN 40 MG/1
40 TABLET ORAL EVERY EVENING
Qty: 90 TABLET | Refills: 0 | Status: SHIPPED | OUTPATIENT
Start: 2025-08-06

## 2025-08-07 ENCOUNTER — RESULTS FOLLOW-UP (OUTPATIENT)
Dept: MEDICAL GROUP | Age: 45
End: 2025-08-07
Payer: COMMERCIAL

## 2025-08-18 ENCOUNTER — HOSPITAL ENCOUNTER (OUTPATIENT)
Dept: LAB | Facility: MEDICAL CENTER | Age: 45
End: 2025-08-18
Attending: PSYCHIATRY & NEUROLOGY
Payer: COMMERCIAL

## 2025-08-18 LAB
BASOPHILS # BLD AUTO: 0.8 % (ref 0–1.8)
BASOPHILS # BLD: 0.05 K/UL (ref 0–0.12)
EOSINOPHIL # BLD AUTO: 0.21 K/UL (ref 0–0.51)
EOSINOPHIL NFR BLD: 3.2 % (ref 0–6.9)
ERYTHROCYTE [DISTWIDTH] IN BLOOD BY AUTOMATED COUNT: 51 FL (ref 35.9–50)
FOLATE SERPL-MCNC: 32.9 NG/ML
HCT VFR BLD AUTO: 40.3 % (ref 37–47)
HGB BLD-MCNC: 13.3 G/DL (ref 12–16)
IMM GRANULOCYTES # BLD AUTO: 0.01 K/UL (ref 0–0.11)
IMM GRANULOCYTES NFR BLD AUTO: 0.2 % (ref 0–0.9)
LYMPHOCYTES # BLD AUTO: 2.4 K/UL (ref 1–4.8)
LYMPHOCYTES NFR BLD: 36.7 % (ref 22–41)
MCH RBC QN AUTO: 31.1 PG (ref 27–33)
MCHC RBC AUTO-ENTMCNC: 33 G/DL (ref 32.2–35.5)
MCV RBC AUTO: 94.4 FL (ref 81.4–97.8)
MONOCYTES # BLD AUTO: 0.54 K/UL (ref 0–0.85)
MONOCYTES NFR BLD AUTO: 8.3 % (ref 0–13.4)
NEUTROPHILS # BLD AUTO: 3.33 K/UL (ref 1.82–7.42)
NEUTROPHILS NFR BLD: 50.8 % (ref 44–72)
NRBC # BLD AUTO: 0 K/UL
NRBC BLD-RTO: 0 /100 WBC (ref 0–0.2)
PLATELET # BLD AUTO: 231 K/UL (ref 164–446)
PMV BLD AUTO: 12.1 FL (ref 9–12.9)
RBC # BLD AUTO: 4.27 M/UL (ref 4.2–5.4)
WBC # BLD AUTO: 6.5 K/UL (ref 4.8–10.8)

## 2025-08-18 PROCEDURE — 82746 ASSAY OF FOLIC ACID SERUM: CPT

## 2025-08-18 PROCEDURE — 83036 HEMOGLOBIN GLYCOSYLATED A1C: CPT

## 2025-08-18 PROCEDURE — 80061 LIPID PANEL: CPT

## 2025-08-18 PROCEDURE — 36415 COLL VENOUS BLD VENIPUNCTURE: CPT

## 2025-08-18 PROCEDURE — 82306 VITAMIN D 25 HYDROXY: CPT

## 2025-08-18 PROCEDURE — 85025 COMPLETE CBC W/AUTO DIFF WBC: CPT

## 2025-08-18 PROCEDURE — 84439 ASSAY OF FREE THYROXINE: CPT

## 2025-08-18 PROCEDURE — 80053 COMPREHEN METABOLIC PANEL: CPT

## 2025-08-18 PROCEDURE — 84443 ASSAY THYROID STIM HORMONE: CPT

## 2025-08-18 PROCEDURE — 82607 VITAMIN B-12: CPT

## 2025-08-19 LAB
25(OH)D3 SERPL-MCNC: 27 NG/ML (ref 30–100)
ALBUMIN SERPL BCP-MCNC: 4.2 G/DL (ref 3.2–4.9)
ALBUMIN/GLOB SERPL: 2 G/DL
ALP SERPL-CCNC: 87 U/L (ref 30–99)
ALT SERPL-CCNC: 18 U/L (ref 2–50)
ANION GAP SERPL CALC-SCNC: 10 MMOL/L (ref 7–16)
AST SERPL-CCNC: 23 U/L (ref 12–45)
BILIRUB SERPL-MCNC: 0.2 MG/DL (ref 0.1–1.5)
BUN SERPL-MCNC: 14 MG/DL (ref 8–22)
CALCIUM ALBUM COR SERPL-MCNC: 8.7 MG/DL (ref 8.5–10.5)
CALCIUM SERPL-MCNC: 8.9 MG/DL (ref 8.5–10.5)
CHLORIDE SERPL-SCNC: 109 MMOL/L (ref 96–112)
CHOLEST SERPL-MCNC: 176 MG/DL (ref 100–199)
CO2 SERPL-SCNC: 19 MMOL/L (ref 20–33)
CREAT SERPL-MCNC: 0.89 MG/DL (ref 0.5–1.4)
EST. AVERAGE GLUCOSE BLD GHB EST-MCNC: 111 MG/DL
GFR SERPLBLD CREATININE-BSD FMLA CKD-EPI: 80 ML/MIN/1.73 M 2
GLOBULIN SER CALC-MCNC: 2.1 G/DL (ref 1.9–3.5)
GLUCOSE SERPL-MCNC: 90 MG/DL (ref 65–99)
HBA1C MFR BLD: 5.5 % (ref 4–5.6)
HDLC SERPL-MCNC: 67 MG/DL
LDLC SERPL CALC-MCNC: 90 MG/DL
POTASSIUM SERPL-SCNC: 3.8 MMOL/L (ref 3.6–5.5)
PROT SERPL-MCNC: 6.3 G/DL (ref 6–8.2)
SODIUM SERPL-SCNC: 138 MMOL/L (ref 135–145)
T4 FREE SERPL-MCNC: 1 NG/DL (ref 0.93–1.7)
TRIGL SERPL-MCNC: 96 MG/DL (ref 0–149)
TSH SERPL-ACNC: 2.62 UIU/ML (ref 0.38–5.33)
VIT B12 SERPL-MCNC: 515 PG/ML (ref 211–911)

## (undated) DEVICE — KIT  I.V. START (100EA/CA)

## (undated) DEVICE — TOWELS CLOTH SURGICAL - (4/PK 20PK/CA)

## (undated) DEVICE — TRAY EPIDURAL REHAB ONLY!!!! - (10EA/CA)

## (undated) DEVICE — CON SEDATION/>5 YR 1ST 15 MIN

## (undated) DEVICE — TUBING IV NEEDLESS PRE-OP - (50/CA)

## (undated) DEVICE — CANNULA

## (undated) DEVICE — SPONGE GAUZE STER 4X4 8-PL - (2/PK 50PK/BX 12BX/CS)

## (undated) DEVICE — CANNULA W/ SUPPLY TUBING O2 - (50/CA)

## (undated) DEVICE — DRESSING TRANSPARENT FILM TEGADERM 4 X 4.75" (50EA/BX)"

## (undated) DEVICE — GLOVE SURGICAL PROTEXIS PI 8.0 LF - (50PR/BX)

## (undated) DEVICE — TRAY EPIDURAL SINGLE SHOT (10EA/CA)

## (undated) DEVICE — BANDAID X-LARGE 2 X 4 IN LF (50EA/BX)

## (undated) DEVICE — CUFF SOFT ADLT 12W/RECTUS - CONNECTER (20/CA)

## (undated) DEVICE — GROUNDING PAD

## (undated) DEVICE — LACTATED RINGERS INJ. 500 ML - (24EA/CA)